# Patient Record
Sex: FEMALE | Race: WHITE | Employment: UNEMPLOYED | URBAN - METROPOLITAN AREA
[De-identification: names, ages, dates, MRNs, and addresses within clinical notes are randomized per-mention and may not be internally consistent; named-entity substitution may affect disease eponyms.]

---

## 2017-02-06 ENCOUNTER — OFFICE VISIT (OUTPATIENT)
Dept: INTERNAL MEDICINE | Age: 82
End: 2017-02-06

## 2017-02-06 VITALS
SYSTOLIC BLOOD PRESSURE: 110 MMHG | WEIGHT: 140 LBS | HEIGHT: 61 IN | TEMPERATURE: 97.6 F | BODY MASS INDEX: 26.43 KG/M2 | DIASTOLIC BLOOD PRESSURE: 60 MMHG | HEART RATE: 60 BPM

## 2017-02-06 DIAGNOSIS — E11.9 DIET-CONTROLLED TYPE 2 DIABETES MELLITUS (HCC): Primary | Chronic | ICD-10-CM

## 2017-02-06 DIAGNOSIS — Z01.818 PREOP GENERAL PHYSICAL EXAM: ICD-10-CM

## 2017-02-06 DIAGNOSIS — Z91.81 AT HIGH RISK FOR FALLS: ICD-10-CM

## 2017-02-06 PROCEDURE — G8427 DOCREV CUR MEDS BY ELIG CLIN: HCPCS | Performed by: INTERNAL MEDICINE

## 2017-02-06 PROCEDURE — 1123F ACP DISCUSS/DSCN MKR DOCD: CPT | Performed by: INTERNAL MEDICINE

## 2017-02-06 PROCEDURE — G8420 CALC BMI NORM PARAMETERS: HCPCS | Performed by: INTERNAL MEDICINE

## 2017-02-06 PROCEDURE — 83036 HEMOGLOBIN GLYCOSYLATED A1C: CPT | Performed by: INTERNAL MEDICINE

## 2017-02-06 PROCEDURE — 1090F PRES/ABSN URINE INCON ASSESS: CPT | Performed by: INTERNAL MEDICINE

## 2017-02-06 PROCEDURE — 99213 OFFICE O/P EST LOW 20 MIN: CPT | Performed by: INTERNAL MEDICINE

## 2017-02-06 PROCEDURE — 1036F TOBACCO NON-USER: CPT | Performed by: INTERNAL MEDICINE

## 2017-02-06 PROCEDURE — 4040F PNEUMOC VAC/ADMIN/RCVD: CPT | Performed by: INTERNAL MEDICINE

## 2017-02-06 PROCEDURE — G8484 FLU IMMUNIZE NO ADMIN: HCPCS | Performed by: INTERNAL MEDICINE

## 2017-02-06 ASSESSMENT — ENCOUNTER SYMPTOMS
EYE PAIN: 0
BLURRED VISION: 0
VOICE CHANGE: 0
COUGH: 0
BLOOD IN STOOL: 0
SHORTNESS OF BREATH: 0
ABDOMINAL PAIN: 0
SORE THROAT: 0
RESPIRATORY NEGATIVE: 1
GASTROINTESTINAL NEGATIVE: 1
EYE DISCHARGE: 0
CHEST TIGHTNESS: 0
TROUBLE SWALLOWING: 0

## 2017-03-27 RX ORDER — GABAPENTIN 300 MG/1
CAPSULE ORAL
Qty: 60 CAPSULE | Refills: 2 | Status: SHIPPED | OUTPATIENT
Start: 2017-03-27 | End: 2017-06-14 | Stop reason: SDUPTHER

## 2017-04-27 RX ORDER — OMEPRAZOLE 20 MG/1
CAPSULE, DELAYED RELEASE ORAL
Qty: 90 CAPSULE | Refills: 1 | Status: SHIPPED | OUTPATIENT
Start: 2017-04-27 | End: 2017-10-16 | Stop reason: SDUPTHER

## 2017-05-08 ENCOUNTER — OFFICE VISIT (OUTPATIENT)
Dept: INTERNAL MEDICINE | Age: 82
End: 2017-05-08

## 2017-05-08 VITALS
HEART RATE: 59 BPM | TEMPERATURE: 98.7 F | WEIGHT: 140 LBS | DIASTOLIC BLOOD PRESSURE: 62 MMHG | HEIGHT: 61 IN | SYSTOLIC BLOOD PRESSURE: 122 MMHG | OXYGEN SATURATION: 98 % | BODY MASS INDEX: 26.43 KG/M2

## 2017-05-08 DIAGNOSIS — E11.9 DIET-CONTROLLED TYPE 2 DIABETES MELLITUS (HCC): Chronic | ICD-10-CM

## 2017-05-08 DIAGNOSIS — J00 HEAD COLD: Primary | ICD-10-CM

## 2017-05-08 PROBLEM — H25.10 NUCLEAR SENILE CATARACT: Status: ACTIVE | Noted: 2017-03-13

## 2017-05-08 PROBLEM — H40.1190 PRIMARY OPEN ANGLE GLAUCOMA: Status: ACTIVE | Noted: 2017-03-13

## 2017-05-08 PROBLEM — H35.3190 NONEXUDATIVE AGE-RELATED MACULAR DEGENERATION: Status: ACTIVE | Noted: 2017-03-13

## 2017-05-08 LAB
GLUCOSE BLD-MCNC: 164 MG/DL
HBA1C MFR BLD: 6.3 %

## 2017-05-08 PROCEDURE — G8427 DOCREV CUR MEDS BY ELIG CLIN: HCPCS | Performed by: INTERNAL MEDICINE

## 2017-05-08 PROCEDURE — 1123F ACP DISCUSS/DSCN MKR DOCD: CPT | Performed by: INTERNAL MEDICINE

## 2017-05-08 PROCEDURE — 1090F PRES/ABSN URINE INCON ASSESS: CPT | Performed by: INTERNAL MEDICINE

## 2017-05-08 PROCEDURE — 4040F PNEUMOC VAC/ADMIN/RCVD: CPT | Performed by: INTERNAL MEDICINE

## 2017-05-08 PROCEDURE — 83036 HEMOGLOBIN GLYCOSYLATED A1C: CPT | Performed by: INTERNAL MEDICINE

## 2017-05-08 PROCEDURE — G8420 CALC BMI NORM PARAMETERS: HCPCS | Performed by: INTERNAL MEDICINE

## 2017-05-08 PROCEDURE — 1036F TOBACCO NON-USER: CPT | Performed by: INTERNAL MEDICINE

## 2017-05-08 PROCEDURE — 99213 OFFICE O/P EST LOW 20 MIN: CPT | Performed by: INTERNAL MEDICINE

## 2017-05-08 PROCEDURE — 82962 GLUCOSE BLOOD TEST: CPT | Performed by: INTERNAL MEDICINE

## 2017-05-08 ASSESSMENT — ENCOUNTER SYMPTOMS
BLOOD IN STOOL: 0
BLURRED VISION: 0
SORE THROAT: 0
RHINORRHEA: 1
EYE PAIN: 0
TROUBLE SWALLOWING: 0
GASTROINTESTINAL NEGATIVE: 1
SHORTNESS OF BREATH: 0
VOICE CHANGE: 0
WHEEZING: 0
ABDOMINAL PAIN: 0
EYE DISCHARGE: 0
COUGH: 1
SINUS PRESSURE: 0
CHEST TIGHTNESS: 0

## 2017-08-08 ENCOUNTER — OFFICE VISIT (OUTPATIENT)
Dept: INTERNAL MEDICINE | Age: 82
End: 2017-08-08

## 2017-08-08 VITALS
TEMPERATURE: 98.8 F | WEIGHT: 137 LBS | BODY MASS INDEX: 25.86 KG/M2 | DIASTOLIC BLOOD PRESSURE: 54 MMHG | HEIGHT: 61 IN | SYSTOLIC BLOOD PRESSURE: 138 MMHG | HEART RATE: 60 BPM

## 2017-08-08 DIAGNOSIS — I73.9 PERIPHERAL VASCULAR DISEASE OF LOWER EXTREMITY (HCC): ICD-10-CM

## 2017-08-08 DIAGNOSIS — E11.9 CONTROLLED TYPE 2 DIABETES MELLITUS WITHOUT COMPLICATION, WITHOUT LONG-TERM CURRENT USE OF INSULIN (HCC): Primary | ICD-10-CM

## 2017-08-08 DIAGNOSIS — M48.061 SPINAL STENOSIS OF LUMBAR REGION: Chronic | ICD-10-CM

## 2017-08-08 LAB — GLUCOSE BLD-MCNC: 121 MG/DL

## 2017-08-08 PROCEDURE — 99213 OFFICE O/P EST LOW 20 MIN: CPT | Performed by: INTERNAL MEDICINE

## 2017-08-08 PROCEDURE — 1090F PRES/ABSN URINE INCON ASSESS: CPT | Performed by: INTERNAL MEDICINE

## 2017-08-08 PROCEDURE — G8427 DOCREV CUR MEDS BY ELIG CLIN: HCPCS | Performed by: INTERNAL MEDICINE

## 2017-08-08 PROCEDURE — 82962 GLUCOSE BLOOD TEST: CPT | Performed by: INTERNAL MEDICINE

## 2017-08-08 PROCEDURE — G8419 CALC BMI OUT NRM PARAM NOF/U: HCPCS | Performed by: INTERNAL MEDICINE

## 2017-08-08 PROCEDURE — 1123F ACP DISCUSS/DSCN MKR DOCD: CPT | Performed by: INTERNAL MEDICINE

## 2017-08-08 PROCEDURE — 4040F PNEUMOC VAC/ADMIN/RCVD: CPT | Performed by: INTERNAL MEDICINE

## 2017-08-08 PROCEDURE — 1036F TOBACCO NON-USER: CPT | Performed by: INTERNAL MEDICINE

## 2017-08-08 ASSESSMENT — ENCOUNTER SYMPTOMS
BACK PAIN: 1
BLOOD IN STOOL: 0
GASTROINTESTINAL NEGATIVE: 1
VOICE CHANGE: 0
RESPIRATORY NEGATIVE: 1
SHORTNESS OF BREATH: 0
COUGH: 0
SORE THROAT: 0
BOWEL INCONTINENCE: 0
BLURRED VISION: 0
ABDOMINAL PAIN: 0
EYE PAIN: 0
TROUBLE SWALLOWING: 0
EYE DISCHARGE: 0
CHEST TIGHTNESS: 0

## 2017-09-12 DIAGNOSIS — Z12.31 VISIT FOR SCREENING MAMMOGRAM: Primary | ICD-10-CM

## 2017-09-18 RX ORDER — GABAPENTIN 300 MG/1
CAPSULE ORAL
Qty: 60 CAPSULE | Refills: 2 | Status: SHIPPED | OUTPATIENT
Start: 2017-09-18 | End: 2018-01-22

## 2017-10-16 RX ORDER — OMEPRAZOLE 20 MG/1
CAPSULE, DELAYED RELEASE ORAL
Qty: 90 CAPSULE | Refills: 1 | Status: SHIPPED | OUTPATIENT
Start: 2017-10-16 | End: 2018-11-01

## 2017-10-25 ENCOUNTER — HOSPITAL ENCOUNTER (OUTPATIENT)
Dept: WOMENS IMAGING | Age: 82
Discharge: HOME | End: 2017-10-25
Payer: MEDICARE

## 2017-10-25 DIAGNOSIS — Z12.31 VISIT FOR SCREENING MAMMOGRAM: ICD-10-CM

## 2017-10-25 DIAGNOSIS — E11.9 CONTROLLED TYPE 2 DIABETES MELLITUS WITHOUT COMPLICATION, WITHOUT LONG-TERM CURRENT USE OF INSULIN (HCC): ICD-10-CM

## 2017-10-25 LAB
ANION GAP SERPL CALCULATED.3IONS-SCNC: 12 MEQ/L (ref 7–13)
BUN BLDV-MCNC: 24 MG/DL (ref 8–23)
CALCIUM SERPL-MCNC: 10.4 MG/DL (ref 8.6–10.2)
CHLORIDE BLD-SCNC: 101 MEQ/L (ref 98–107)
CO2: 28 MEQ/L (ref 22–29)
CREAT SERPL-MCNC: 0.57 MG/DL (ref 0.5–0.9)
FOLATE: >20 NG/ML (ref 7.3–26.1)
GFR AFRICAN AMERICAN: >60
GFR NON-AFRICAN AMERICAN: >60
GLUCOSE BLD-MCNC: 141 MG/DL (ref 74–109)
HCT VFR BLD CALC: 38.8 % (ref 37–47)
HEMOGLOBIN: 12.5 G/DL (ref 12–16)
MCH RBC QN AUTO: 30.3 PG (ref 27–31.3)
MCHC RBC AUTO-ENTMCNC: 32.2 % (ref 33–37)
MCV RBC AUTO: 94 FL (ref 82–100)
PDW BLD-RTO: 13.7 % (ref 11.5–14.5)
PLATELET # BLD: 199 K/UL (ref 130–400)
POTASSIUM SERPL-SCNC: 4.9 MEQ/L (ref 3.5–5.1)
RBC # BLD: 4.13 M/UL (ref 4.2–5.4)
SODIUM BLD-SCNC: 141 MEQ/L (ref 132–144)
VITAMIN B-12: 489 PG/ML (ref 211–946)
WBC # BLD: 7.4 K/UL (ref 4.8–10.8)

## 2017-10-25 PROCEDURE — G0202 SCR MAMMO BI INCL CAD: HCPCS

## 2017-11-22 RX ORDER — OMEPRAZOLE 20 MG/1
CAPSULE, DELAYED RELEASE ORAL
Qty: 90 CAPSULE | Refills: 1 | Status: SHIPPED | OUTPATIENT
Start: 2017-11-22 | End: 2018-11-01 | Stop reason: SDUPTHER

## 2017-12-04 ENCOUNTER — HOSPITAL ENCOUNTER (EMERGENCY)
Age: 82
Discharge: HOME | End: 2017-12-04
Attending: EMERGENCY MEDICINE
Payer: MEDICARE

## 2017-12-04 ENCOUNTER — APPOINTMENT (OUTPATIENT)
Dept: GENERAL RADIOLOGY | Age: 82
End: 2017-12-04
Payer: MEDICARE

## 2017-12-04 VITALS
SYSTOLIC BLOOD PRESSURE: 182 MMHG | HEIGHT: 62 IN | HEART RATE: 66 BPM | RESPIRATION RATE: 18 BRPM | BODY MASS INDEX: 25.76 KG/M2 | DIASTOLIC BLOOD PRESSURE: 73 MMHG | OXYGEN SATURATION: 98 % | WEIGHT: 140 LBS | TEMPERATURE: 98.8 F
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
## 2017-12-04 PROCEDURE — 96374 THER/PROPH/DIAG INJ IV PUSH: CPT

## 2017-12-04 PROCEDURE — 6360000002 HC RX W HCPCS: Performed by: EMERGENCY MEDICINE

## 2017-12-04 PROCEDURE — 99283 EMERGENCY DEPT VISIT LOW MDM: CPT

## 2017-12-04 PROCEDURE — 72170 X-RAY EXAM OF PELVIS: CPT

## 2017-12-04 RX ORDER — KETOROLAC TROMETHAMINE 30 MG/ML
30 INJECTION, SOLUTION INTRAMUSCULAR; INTRAVENOUS ONCE
Status: COMPLETED | OUTPATIENT
Start: 2017-12-04 | End: 2017-12-04

## 2017-12-04 RX ADMIN — KETOROLAC TROMETHAMINE 30 MG: 30 INJECTION, SOLUTION INTRAMUSCULAR at 15:10

## 2017-12-04 ASSESSMENT — ENCOUNTER SYMPTOMS
EYE DISCHARGE: 0
ABDOMINAL DISTENTION: 0
ABDOMINAL PAIN: 0
COLOR CHANGE: 0
FACIAL SWELLING: 0
SHORTNESS OF BREATH: 0
VOMITING: 0
PHOTOPHOBIA: 0
WHEEZING: 0
BACK PAIN: 1
RHINORRHEA: 0

## 2017-12-04 ASSESSMENT — PAIN DESCRIPTION - ORIENTATION
ORIENTATION: LOWER
ORIENTATION: LOWER

## 2017-12-04 ASSESSMENT — PAIN DESCRIPTION - DESCRIPTORS: DESCRIPTORS: ACHING;DISCOMFORT

## 2017-12-04 ASSESSMENT — PAIN SCALES - GENERAL
PAINLEVEL_OUTOF10: 8
PAINLEVEL_OUTOF10: 6
PAINLEVEL_OUTOF10: 7

## 2017-12-04 ASSESSMENT — PAIN DESCRIPTION - PAIN TYPE
TYPE: ACUTE PAIN
TYPE: ACUTE PAIN

## 2017-12-04 ASSESSMENT — PAIN DESCRIPTION - LOCATION
LOCATION: BACK
LOCATION: BACK

## 2017-12-04 ASSESSMENT — PAIN DESCRIPTION - PROGRESSION: CLINICAL_PROGRESSION: GRADUALLY IMPROVING

## 2017-12-04 ASSESSMENT — PAIN DESCRIPTION - FREQUENCY: FREQUENCY: INTERMITTENT

## 2017-12-04 ASSESSMENT — PAIN DESCRIPTION - ONSET: ONSET: ON-GOING

## 2017-12-04 ASSESSMENT — PAIN SCALES - WONG BAKER: WONGBAKER_NUMERICALRESPONSE: 2

## 2017-12-04 NOTE — ED PROVIDER NOTES
[facility] ED  eMERGENCY dEPARTMENT eNCOUnter      Pt Name: Genoveva Marks  MRN: [id number]  [place] 2/27/1918  Date of evaluation: 12/4/2017  Provider: Marty Solis Freedom Lucy       Chief Complaint   Patient presents with    Fall     fell on [place], not seen. having pain in low back and buttocks area, able to work and walk since fall but pain increasing         HISTORY OF PRESENT ILLNESS   (Location/Symptom, Timing/Onset, Context/Setting, Quality, Duration, Modifying Factors, Severity)  Note limiting factors. Genoveva Marks is a 80 y.o. female who presents to the emergency department With a chief complaint of pain. Patient fell originally on [place], toppling backward on her walker. She states that she landed on her back. She had a lifeline and had help getting up. She was busy because those Thanksgiving day and wasn't having any pain so she opted not to go to the hospital.  The day after Thanksgiving she started noticing some pain low \"in the crease of my buttock\". It has progressively gotten worse and now is hurting her and both of her buttock cheeks as well. She has not slowed down, she volunteered her normal work hours already twice since the accident. She states that she didn't want to bother anyone sooner however the pain became too intense today. Tylenol does take the edge off. HPI    Nursing Notes were reviewed. REVIEW OF SYSTEMS    (2-9 systems for level 4, 10 or more for level 5)     Review of Systems   Constitutional: Negative for activity change and appetite change. HENT: Negative for congestion, facial swelling and rhinorrhea. Eyes: Negative for photophobia and discharge. Respiratory: Negative for shortness of breath and wheezing. Cardiovascular: Negative for chest pain. Gastrointestinal: Negative for abdominal distention, abdominal pain and vomiting. Endocrine: Negative for polydipsia and polyphagia.    Genitourinary: Negative for difficulty urinating, frequency, vaginal bleeding and vaginal discharge. Musculoskeletal: Positive for back pain and gait problem. Skin: Negative for color change. Allergic/Immunologic: Negative for immunocompromised state. Neurological: Negative for dizziness, weakness and light-headedness. Hematological: Negative for adenopathy. Psychiatric/Behavioral: Negative for behavioral problems. Except as noted above the remainder of the review of systems was reviewed and negative. PAST MEDICAL HISTORY     Past Medical History:   Diagnosis Date    Compression fracture of T12 vertebra [facility]) 2013    kyphoplasty Dr Carmencita Busby DJD (degenerative joint disease) of hip 3/20/2014    Dr Shantell Martinez    GERD (gastroesophageal reflux disease) 8/31/10    Glaucoma     bilateral    H/O total hip arthroplasty 09/25/2015    Dr Shantell Martinez    Lumbar stenosis     Macular degeneration     left eye    PVD [facility]peripheral vascular disease) [facility] 10/28/2006    Dr Vallejo Has, stents LE    Stress incontinence 10/28/2011    Thoracic back pain 5/29/2014    Type II or unspecified type diabetes mellitus without mention of complication, not stated as uncontrolled     diet controlled    Wears dentures 2017    upper         SURGICAL HISTORY       Past Surgical History:   Procedure Laterality Date    APPENDECTOMY      ARTERIAL BYPASS [address]  2006    BRAIN SURGERY      CYST REMOVAL  06/27/2016    DR SLOAN  RT THUMB MUCOUS CYST    HYSTERECTOMY      REFRACTIVE SURGERY  062001    left     TOTAL HIP ARTHROPLASTY Right 9/25/15    DR. NARANJO    UPPER GASTROINTESTINAL ENDOSCOPY  10/07/15    DR Silvana Del Valle    VERTEBROPLASTY  2013    T12, Dr Shantell Chaudhari       Previous Medications    ACETAMINOPHEN (TYLENOL) 325 MG TABLET    Take 2 tablets by mouth every 4 hours as needed for Pain or Fever (DO NOT EXCEED 4GM IN 24 HOURS)    ASPIRIN EC 81 MG EC TABLET    Take 1 tablet by mouth daily    BRIMONIDINE (ALPHAGAN P) 0.1 % SOLN    Place 1 drop into both eyes 2 times daily    GABAPENTIN (NEURONTIN) 300 MG CAPSULE    TAKE ONE CAPSULE BY MOUTH TWICE A DAY AS NEEDED FOR PAIN    LATANOPROST (XALATAN) 0.005 % OPHTHALMIC SOLUTION    Place 1 drop into the left eye nightly    MAGNESIUM HYDROXIDE (MILK OF MAGNESIA) 400 MG/5ML SUSPENSION    Take 30 mLs by mouth as needed for Constipation    MULTIPLE VITAMIN (MULTI VITAMIN DAILY) TABS    Take 1 tablet by mouth every morning    OMEPRAZOLE (PRILOSEC) 20 MG DELAYED RELEASE CAPSULE    TAKE ONE CAPSULE BY MOUTH EVERY DAY    OMEPRAZOLE (PRILOSEC) 20 MG DELAYED RELEASE CAPSULE    TAKE ONE CAPSULE BY MOUTH EVERY DAY    POLYETHYLENE GLYCOL (MIRALAX) POWDER    Take 17 g by mouth daily as needed    SIMVASTATIN (ZOCOR) 20 MG TABLET    TAKE 1 TABLET BY MOUTH NIGHTLY. TIMOLOL (TIMOPTIC) 0.5 % OPHTHALMIC SOLUTION    Place 1 drop into both eyes 2 times daily    VITAMIN D (CHOLECALCIFEROL) 400 UNITS TABS TABLET    Take 400 Units by mouth daily       ALLERGIES     Review of patient's allergies indicates no known allergies.     FAMILY HISTORY       Family History   Problem Relation Age of Onset    Heart Failure Mother      dec age 77    Hypertension Mother     Diabetes Father      dec age 67          SOCIAL HISTORY       Social History     Social History    Marital status: Single     Spouse name: N/A    Number of children: 0    Years of education: N/A     Occupational History    retired [employer]      Social History Main Topics    Smoking status: Former Smoker     Types: Cigarettes     Quit date: 5/29/1980    Smokeless tobacco: Never Used    Alcohol use No    Drug use: No    Sexual activity: Not Currently     Partners: Male     Other Topics Concern    None     Social History Narrative    Retired [employer]    Lives alone in a house, no children     at [employer] [employer] [place] for [facility] Opening: Spontaneous  Best Verbal Response: Oriented  Best Motor Response: Obeys commands  Ton Coma Scale Score: 15        PHYSICAL EXAM    (up to 7 for level 4, 8 or more for level 5)     ED Triage Vitals [12/04/17 1420]   BP Temp Temp src Pulse Resp SpO2 Height Weight   (!) 182/73 98.8 °F (37.1 °C) -- 66 18 98 % 5' 1.5\" (1.562 m) 140 lb (63.5 kg)       Physical Exam   Constitutional: She is oriented to person, place, and time. She appears well-developed and well-nourished. HENT:   Head: Normocephalic and atraumatic. Eyes: Conjunctivae and EOM are normal. Pupils are equal, round, and reactive to light. Neck: Normal range of motion. Neck supple. Cardiovascular: Normal rate. Pulmonary/Chest: Effort normal.   Abdominal: Soft. Bowel sounds are normal.   Musculoskeletal: Normal range of motion. Patient has pain with palpation along her sacrum centrally extending down to her coccyx. Neurovascularly she is intact distally. Neurological: She is alert and oriented to person, place, and time. She has normal reflexes. Skin: Skin is warm and dry. Psychiatric: She has a normal mood and affect. DIAGNOSTIC RESULTS     EKG: All EKG's are interpreted by the Emergency Department Physician who either signs or Co-signs this chart in the absence of a cardiologist.        RADIOLOGY:   Non-plain film images such as CT, Ultrasound and MRI are read by the radiologist. Plain radiographic images are visualized and preliminarily interpreted by the emergency physician with the below findings:    No acute fracture per my read    Interpretation per the Radiologist below, if available at the time of this note:    XR PELVIS (1-2 VIEWS)   Final Result   NO APPRECIABLE ACUTE OSSEOUS ABNORMALITY. NOTE: A NONDISPLACED FRACTURE MAY NOT BE EVIDENT ON RADIOGRAPH. CONSIDER FURTHER EVALUATION AS CLINICALLY INDICATED.             ED BEDSIDE ULTRASOUND:   Performed by ED Physician - none    LABS:  Labs Reviewed - No data to display    All other labs were within normal range or not returned as of this dictation. EMERGENCY DEPARTMENT COURSE and DIFFERENTIAL DIAGNOSIS/MDM:   Vitals:    Vitals:    12/04/17 1420 12/04/17 1425   BP: (!) 182/73    Pulse: 66 66   Resp: 18    Temp: 98.8 °F (37.1 °C)    SpO2: 98%    Weight: 140 lb (63.5 kg)    Height: 5' 1.5\" (1.562 m)        Patient is given a Toradol injection here and appreciates the pain relief. She did not think that she was broken, and neither the radiologist nor myself see any acute fracture. She is able to get around at home and has helped. We discussed modalities that can alleviate her pain and discuss adding in the leave to the Tylenol and gabapentin that she typically takes while she is having this acute pain. She is requesting discharge and is discharged in stable condition. MDM    CRITICAL CARE TIME   Total Critical Care time was 0 minutes, excluding separately reportable procedures. There was a high probability of clinically significant/life threatening deterioration in the patient's condition which required my urgent intervention. CONSULTS:  None    PROCEDURES:  Unless otherwise noted below, none     Procedures    FINAL IMPRESSION      1.  Fall, initial encounter          DISPOSITION/PLAN   DISPOSITION     PATIENT REFERRED TO:  Jorge Ramos MD  [address]  [place] [address]  [facility]  [phone]      As needed      DISCHARGE MEDICATIONS:  New Prescriptions    No medications on file          (Please note that portions of this note were completed with a voice recognition program.  Efforts were made to edit the dictations but occasionally words are mis-transcribed.)    Erin Venegas DO (electronically signed)  Attending Emergency Physician          Erin Venegas DO  12/04/17 1708

## 2017-12-11 ENCOUNTER — OFFICE VISIT (OUTPATIENT)
Dept: INTERNAL MEDICINE | Age: 82
End: 2017-12-11

## 2017-12-11 ENCOUNTER — HOSPITAL ENCOUNTER (OUTPATIENT)
Dept: GENERAL RADIOLOGY | Age: 82
Discharge: HOME | End: 2017-12-11
Payer: MEDICARE

## 2017-12-11 VITALS
WEIGHT: 140 LBS | HEART RATE: 62 BPM | SYSTOLIC BLOOD PRESSURE: 132 MMHG | BODY MASS INDEX: 26.43 KG/M2 | TEMPERATURE: 97.6 F | HEIGHT: 61 IN | DIASTOLIC BLOOD PRESSURE: 50 MMHG

## 2017-12-11 DIAGNOSIS — M79.18 PAIN IN RIGHT BUTTOCK: ICD-10-CM

## 2017-12-11 DIAGNOSIS — M79.18 PAIN IN RIGHT BUTTOCK: Primary | ICD-10-CM

## 2017-12-11 DIAGNOSIS — Z96.641 HISTORY OF TOTAL RIGHT HIP REPLACEMENT: ICD-10-CM
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
## 2017-12-11 DIAGNOSIS — E11.51 DM (DIABETES MELLITUS), TYPE 2 WITH PERIPHERAL VASCULAR COMPLICATIONS (HCC): Chronic | ICD-10-CM

## 2017-12-11 LAB — HBA1C MFR BLD: 6.4 %

## 2017-12-11 PROCEDURE — G8417 CALC BMI ABV UP PARAM F/U: HCPCS | Performed by: INTERNAL MEDICINE

## 2017-12-11 PROCEDURE — G8427 DOCREV CUR MEDS BY ELIG CLIN: HCPCS | Performed by: INTERNAL MEDICINE

## 2017-12-11 PROCEDURE — 99213 OFFICE O/P EST LOW 20 MIN: CPT | Performed by: INTERNAL MEDICINE

## 2017-12-11 PROCEDURE — 1090F PRES/ABSN URINE INCON ASSESS: CPT | Performed by: INTERNAL MEDICINE

## 2017-12-11 PROCEDURE — 83036 HEMOGLOBIN GLYCOSYLATED A1C: CPT | Performed by: INTERNAL MEDICINE

## 2017-12-11 PROCEDURE — 1123F ACP DISCUSS/DSCN MKR DOCD: CPT | Performed by: INTERNAL MEDICINE

## 2017-12-11 PROCEDURE — 73502 X-RAY EXAM HIP UNI 2-3 VIEWS: CPT

## 2017-12-11 PROCEDURE — G8484 FLU IMMUNIZE NO ADMIN: HCPCS | Performed by: INTERNAL MEDICINE

## 2017-12-11 PROCEDURE — 4040F PNEUMOC VAC/ADMIN/RCVD: CPT | Performed by: INTERNAL MEDICINE

## 2017-12-11 PROCEDURE — 1036F TOBACCO NON-USER: CPT | Performed by: INTERNAL MEDICINE

## 2017-12-11 RX ORDER — TRAMADOL HYDROCHLORIDE 50 MG/1
TABLET ORAL
Qty: 21 TABLET | Refills: 0 | Status: SHIPPED | OUTPATIENT
Start: 2017-12-11 | End: 2018-01-22

## 2017-12-11 RX ORDER — ACETAMINOPHEN 325 MG/1
325 TABLET ORAL 3 TIMES DAILY PRN
Qty: 90 TABLET | Refills: 0 | Status: SHIPPED | OUTPATIENT
Start: 2017-12-11

## 2017-12-11 ASSESSMENT — ENCOUNTER SYMPTOMS
VOICE CHANGE: 0
EYE DISCHARGE: 0
BACK PAIN: 1
SHORTNESS OF BREATH: 0
BLOOD IN STOOL: 0
TROUBLE SWALLOWING: 0
RESPIRATORY NEGATIVE: 1
GASTROINTESTINAL NEGATIVE: 1
EYE PAIN: 0
CHEST TIGHTNESS: 0
COUGH: 0
SORE THROAT: 0
ABDOMINAL PAIN: 0
BLURRED VISION: 0

## 2017-12-11 NOTE — PROGRESS NOTES
Comment: >60 mL/min/1.73m2 EGFR, calc. for ages 25 and older using the  MDRD formula (not corrected for weight), is valid for stable  renal function.  GFR  10/25/2017 >60.0  >60 Final    Comment: >60 mL/min/1.73m2 EGFR, calc. for ages 25 and older using the  MDRD formula (not corrected for weight), is valid for stable  renal function.  Calcium 10/25/2017 10.4* 8.6 - 10.2 mg/dL Final    WBC 10/25/2017 7.4  4.8 - 10.8 K/uL Final    RBC 10/25/2017 4.13* 4.20 - 5.40 M/uL Final    Hemoglobin 10/25/2017 12.5  12.0 - 16.0 g/dL Final    Hematocrit 10/25/2017 38.8  37.0 - 47.0 % Final    MCV 10/25/2017 94.0  82.0 - 100.0 fL Final    MCH 10/25/2017 30.3  27.0 - 31.3 pg Final    MCHC 10/25/2017 32.2* 33.0 - 37.0 % Final    RDW 10/25/2017 13.7  11.5 - 14.5 % Final    Platelets 27/60/0841 199  130 - 400 K/uL Final       HPI:    Diabetes   She presents for her follow-up diabetic visit. She has type 2 diabetes mellitus. Her disease course has been stable. Hypoglycemia symptoms include nervousness/anxiousness. Pertinent negatives for hypoglycemia include no confusion, dizziness, headaches, speech difficulty or tremors. Pertinent negatives for diabetes include no blurred vision, no chest pain, no fatigue, no foot paresthesias, no foot ulcerations, no polydipsia, no polyuria and no weakness. There are no hypoglycemic complications. Symptoms are stable. Pertinent negatives for diabetic complications include no CVA, nephropathy, peripheral neuropathy, PVD or retinopathy. Risk factors for coronary artery disease include post-menopausal, sedentary lifestyle and diabetes mellitus. Current diabetic treatment includes diet. She is compliant with treatment most of the time. Her weight is increasing steadily. She is following a generally healthy diet. Meal planning includes avoidance of concentrated sweets. She participates in exercise intermittently. Her home blood glucose trend is fluctuating minimally. acetaminophen (TYLENOL) 325 MG tablet; Take 1 tablet by mouth 3 times daily as needed for Pain (DO NOT EXCEED 4GM IN 24 HOURS)      Quality & Risk Score Accuracy - MEDICARE ADVANTAGE    Visit Dx:  DM (diabetes mellitus), type 2 with peripheral vascular complications (HCC)  Stable Diabetes type 2 and complications based on lab values and symptoms. Continue present treatment plan, control of risk factors, and recheck at least yearly. Last edited 12/11/17 16:06 EST by Elizabeth Stovall MD  Asymptomatic, followed by vascular disease specialist, s/p arterial stents            Reviewed with the patient (/and caregiver if present): current health status, medications, activities and diet. See also orders and comments in the assessment section. Today's diagnosis (in the context of chronic problems) was discussed with patient (/and caregiver if present), questions answered. Patient given positive feedback and encouraged to continue the current healthy lifestyle, call with any concerns. Side effects, adverse effects of the medication prescribed (or refilled) today, as well as treatment plan/ rationale and result expectations have (again) been discussed with the patient who expresses understanding and consents to proceed as outlined above. Additional advise included in the \"after visit summary\". Orders Placed This Encounter   Medications    traMADol (ULTRAM) 50 MG tablet     Sig: Take 1/2- 1 tab po tid as needed for pain.      Dispense:  21 tablet     Refill:  0    acetaminophen (TYLENOL) 325 MG tablet     Sig: Take 1 tablet by mouth 3 times daily as needed for Pain (DO NOT EXCEED 4GM IN 24 HOURS)     Dispense:  90 tablet     Refill:  0       Orders Placed This Encounter   Procedures    XR HIP 2-3 VW W PELVIS RIGHT     Standing Status:   Future     Number of Occurrences:   1     Standing Expiration Date:   12/11/2018    POCT glycosylated hemoglobin (Hb A1C)     Further workup and plan will be determined based on clinical progression and follow up test/ treatment results. Close follow up needed to evaluate treatment results and for care coordination. Return if symptoms worsen or fail to improve. I have reviewed the patient's medical and surgical, family and social history, health maintenance schedule, and updated the computerized patient record. Please note this report has been partially produced by using speech recognition hardware. It may contain errors related to the system, including grammar, punctuation and spelling as well as words and phrases that may seem inaccurate. For any questions or concerns, please feel free to contact me for clarification.         Electronically signed by Nasima Owusu MD

## 2017-12-26 RX ORDER — SIMVASTATIN 20 MG
TABLET ORAL
Qty: 90 TABLET | Refills: 1 | Status: SHIPPED | OUTPATIENT
Start: 2017-12-26 | End: 2018-06-21 | Stop reason: SDUPTHER

## 2018-02-28 DIAGNOSIS — G89.29 CHRONIC MIDLINE THORACIC BACK PAIN: Primary | ICD-10-CM

## 2018-02-28 DIAGNOSIS — M54.6 CHRONIC MIDLINE THORACIC BACK PAIN: Primary | ICD-10-CM

## 2018-02-28 DIAGNOSIS — M48.061 SPINAL STENOSIS OF LUMBAR REGION, UNSPECIFIED WHETHER NEUROGENIC CLAUDICATION PRESENT: ICD-10-CM

## 2018-03-02 RX ORDER — GABAPENTIN 300 MG/1
CAPSULE ORAL
Qty: 180 CAPSULE | Refills: 0 | Status: SHIPPED | OUTPATIENT
Start: 2018-03-02 | End: 2018-06-21 | Stop reason: SDUPTHER

## 2018-04-03 ENCOUNTER — HOSPITAL ENCOUNTER (OUTPATIENT)
Dept: ULTRASOUND IMAGING | Age: 83
Discharge: HOME | End: 2018-04-05
Payer: MEDICARE

## 2018-04-03 ENCOUNTER — OFFICE VISIT (OUTPATIENT)
Dept: INTERNAL MEDICINE CLINIC | Age: 83
End: 2018-04-03
Payer: MEDICARE

## 2018-04-03 VITALS
BODY MASS INDEX: 26.43 KG/M2 | SYSTOLIC BLOOD PRESSURE: 110 MMHG | HEART RATE: 56 BPM | HEIGHT: 61 IN | OXYGEN SATURATION: 98 % | TEMPERATURE: 97.3 F | DIASTOLIC BLOOD PRESSURE: 52 MMHG | WEIGHT: 140 LBS

## 2018-04-03 DIAGNOSIS — E11.9 DIET-CONTROLLED TYPE 2 DIABETES MELLITUS (HCC): ICD-10-CM

## 2018-04-03 DIAGNOSIS — Z78.9 IMPAIRED MOBILITY AND ACTIVITIES OF DAILY LIVING: ICD-10-CM

## 2018-04-03 DIAGNOSIS — I73.9 PVD (PERIPHERAL VASCULAR DISEASE) (HCC): ICD-10-CM

## 2018-04-03 DIAGNOSIS — R60.0 LEG EDEMA, RIGHT: ICD-10-CM

## 2018-04-03 DIAGNOSIS — Z74.09 IMPAIRED MOBILITY AND ACTIVITIES OF DAILY LIVING: ICD-10-CM

## 2018-04-03 DIAGNOSIS — R60.0 LEG EDEMA, RIGHT: Primary | ICD-10-CM

## 2018-04-03 LAB
HCT VFR BLD CALC: 36.2 % (ref 37–47)
HEMOGLOBIN: 11.9 G/DL (ref 12–16)
MCH RBC QN AUTO: 31.7 PG (ref 27–31.3)
MCHC RBC AUTO-ENTMCNC: 33 % (ref 33–37)
MCV RBC AUTO: 96.1 FL (ref 82–100)
PDW BLD-RTO: 13.4 % (ref 11.5–14.5)
PLATELET # BLD: 190 K/UL (ref 130–400)
RBC # BLD: 3.76 M/UL (ref 4.2–5.4)
WBC # BLD: 6.3 K/UL (ref 4.8–10.8)

## 2018-04-03 PROCEDURE — 1123F ACP DISCUSS/DSCN MKR DOCD: CPT | Performed by: INTERNAL MEDICINE

## 2018-04-03 PROCEDURE — 99213 OFFICE O/P EST LOW 20 MIN: CPT | Performed by: INTERNAL MEDICINE

## 2018-04-03 PROCEDURE — 1036F TOBACCO NON-USER: CPT | Performed by: INTERNAL MEDICINE

## 2018-04-03 PROCEDURE — 4040F PNEUMOC VAC/ADMIN/RCVD: CPT | Performed by: INTERNAL MEDICINE

## 2018-04-03 PROCEDURE — 93971 EXTREMITY STUDY: CPT

## 2018-04-03 PROCEDURE — G8417 CALC BMI ABV UP PARAM F/U: HCPCS | Performed by: INTERNAL MEDICINE

## 2018-04-03 PROCEDURE — 1090F PRES/ABSN URINE INCON ASSESS: CPT | Performed by: INTERNAL MEDICINE

## 2018-04-03 PROCEDURE — G8428 CUR MEDS NOT DOCUMENT: HCPCS | Performed by: INTERNAL MEDICINE

## 2018-04-03 ASSESSMENT — ENCOUNTER SYMPTOMS
BLURRED VISION: 0
BLOOD IN STOOL: 0
BACK PAIN: 1
SORE THROAT: 0
SHORTNESS OF BREATH: 0
CHEST TIGHTNESS: 0
RESPIRATORY NEGATIVE: 1
COUGH: 0
ABDOMINAL PAIN: 0

## 2018-04-17 ENCOUNTER — OFFICE VISIT (OUTPATIENT)
Dept: INTERNAL MEDICINE CLINIC | Age: 83
End: 2018-04-17
Payer: MEDICARE

## 2018-04-17 VITALS
HEIGHT: 61 IN | WEIGHT: 140 LBS | SYSTOLIC BLOOD PRESSURE: 120 MMHG | BODY MASS INDEX: 26.43 KG/M2 | TEMPERATURE: 97.6 F | DIASTOLIC BLOOD PRESSURE: 60 MMHG | OXYGEN SATURATION: 98 % | HEART RATE: 56 BPM

## 2018-04-17 DIAGNOSIS — Z78.9 IMPAIRED MOBILITY AND ACTIVITIES OF DAILY LIVING: ICD-10-CM

## 2018-04-17 DIAGNOSIS — R60.0 LEG EDEMA, RIGHT: Primary | ICD-10-CM

## 2018-04-17 DIAGNOSIS — M25.551 PAIN OF RIGHT HIP JOINT: ICD-10-CM

## 2018-04-17 DIAGNOSIS — Z74.09 IMPAIRED MOBILITY AND ACTIVITIES OF DAILY LIVING: ICD-10-CM

## 2018-04-17 PROCEDURE — 99212 OFFICE O/P EST SF 10 MIN: CPT | Performed by: INTERNAL MEDICINE

## 2018-04-17 PROCEDURE — 4040F PNEUMOC VAC/ADMIN/RCVD: CPT | Performed by: INTERNAL MEDICINE

## 2018-04-17 PROCEDURE — G8417 CALC BMI ABV UP PARAM F/U: HCPCS | Performed by: INTERNAL MEDICINE

## 2018-04-17 PROCEDURE — 1036F TOBACCO NON-USER: CPT | Performed by: INTERNAL MEDICINE

## 2018-04-17 PROCEDURE — G8427 DOCREV CUR MEDS BY ELIG CLIN: HCPCS | Performed by: INTERNAL MEDICINE

## 2018-04-17 PROCEDURE — 1090F PRES/ABSN URINE INCON ASSESS: CPT | Performed by: INTERNAL MEDICINE

## 2018-04-17 PROCEDURE — 1123F ACP DISCUSS/DSCN MKR DOCD: CPT | Performed by: INTERNAL MEDICINE

## 2018-04-17 ASSESSMENT — ENCOUNTER SYMPTOMS
ABDOMINAL PAIN: 0
COUGH: 0
SORE THROAT: 0
RESPIRATORY NEGATIVE: 1
CHEST TIGHTNESS: 0
SHORTNESS OF BREATH: 0
BLOOD IN STOOL: 0
BACK PAIN: 1

## 2018-04-26 ENCOUNTER — HOSPITAL ENCOUNTER (OUTPATIENT)
Dept: PHYSICAL THERAPY | Age: 83
Setting detail: THERAPIES SERIES
Discharge: HOME | End: 2018-04-26
Payer: MEDICARE

## 2018-04-26 PROCEDURE — 97162 PT EVAL MOD COMPLEX 30 MIN: CPT

## 2018-04-26 PROCEDURE — G8978 MOBILITY CURRENT STATUS: HCPCS

## 2018-04-26 PROCEDURE — G8979 MOBILITY GOAL STATUS: HCPCS

## 2018-04-26 ASSESSMENT — PAIN DESCRIPTION - PAIN TYPE: TYPE: CHRONIC PAIN

## 2018-04-26 ASSESSMENT — PAIN DESCRIPTION - ORIENTATION: ORIENTATION: RIGHT

## 2018-04-26 ASSESSMENT — PAIN DESCRIPTION - FREQUENCY: FREQUENCY: INTERMITTENT

## 2018-04-26 ASSESSMENT — PAIN DESCRIPTION - DESCRIPTORS: DESCRIPTORS: ACHING

## 2018-04-26 ASSESSMENT — PAIN DESCRIPTION - LOCATION: LOCATION: HIP

## 2018-04-30 ENCOUNTER — HOSPITAL ENCOUNTER (OUTPATIENT)
Dept: PHYSICAL THERAPY | Age: 83
Setting detail: THERAPIES SERIES
Discharge: HOME | End: 2018-04-30
Payer: MEDICARE

## 2018-04-30 PROCEDURE — 97110 THERAPEUTIC EXERCISES: CPT

## 2018-04-30 PROCEDURE — 97112 NEUROMUSCULAR REEDUCATION: CPT

## 2018-04-30 PROCEDURE — 97116 GAIT TRAINING THERAPY: CPT

## 2018-04-30 ASSESSMENT — PAIN DESCRIPTION - LOCATION: LOCATION: HIP

## 2018-04-30 ASSESSMENT — PAIN DESCRIPTION - DESCRIPTORS: DESCRIPTORS: ACHING

## 2018-04-30 ASSESSMENT — PAIN DESCRIPTION - ORIENTATION: ORIENTATION: LEFT

## 2018-05-04 ENCOUNTER — HOSPITAL ENCOUNTER (OUTPATIENT)
Dept: PHYSICAL THERAPY | Age: 83
Setting detail: THERAPIES SERIES
Discharge: HOME | End: 2018-05-04
Payer: MEDICARE

## 2018-05-04 PROCEDURE — 97110 THERAPEUTIC EXERCISES: CPT

## 2018-05-04 PROCEDURE — 97116 GAIT TRAINING THERAPY: CPT

## 2018-05-04 PROCEDURE — 97112 NEUROMUSCULAR REEDUCATION: CPT

## 2018-05-04 ASSESSMENT — PAIN SCALES - GENERAL: PAINLEVEL_OUTOF10: 2

## 2018-05-07 ENCOUNTER — HOSPITAL ENCOUNTER (OUTPATIENT)
Dept: PHYSICAL THERAPY | Age: 83
Setting detail: THERAPIES SERIES
Discharge: HOME | End: 2018-05-07
Payer: MEDICARE

## 2018-05-07 PROCEDURE — 97112 NEUROMUSCULAR REEDUCATION: CPT

## 2018-05-07 PROCEDURE — 97110 THERAPEUTIC EXERCISES: CPT

## 2018-05-07 PROCEDURE — 97535 SELF CARE MNGMENT TRAINING: CPT

## 2018-05-07 ASSESSMENT — PAIN DESCRIPTION - ORIENTATION: ORIENTATION: LEFT

## 2018-05-07 ASSESSMENT — PAIN SCALES - GENERAL: PAINLEVEL_OUTOF10: 1

## 2018-05-07 ASSESSMENT — PAIN DESCRIPTION - DESCRIPTORS: DESCRIPTORS: ACHING

## 2018-05-07 ASSESSMENT — PAIN DESCRIPTION - LOCATION: LOCATION: HIP

## 2018-05-10 ENCOUNTER — HOSPITAL ENCOUNTER (OUTPATIENT)
Dept: PHYSICAL THERAPY | Age: 83
Setting detail: THERAPIES SERIES
Discharge: HOME | End: 2018-05-10
Payer: MEDICARE

## 2018-05-10 PROCEDURE — 97116 GAIT TRAINING THERAPY: CPT

## 2018-05-10 PROCEDURE — 97110 THERAPEUTIC EXERCISES: CPT

## 2018-05-10 PROCEDURE — 97112 NEUROMUSCULAR REEDUCATION: CPT

## 2018-05-10 ASSESSMENT — PAIN DESCRIPTION - LOCATION: LOCATION: HIP

## 2018-05-10 ASSESSMENT — PAIN DESCRIPTION - DESCRIPTORS: DESCRIPTORS: ACHING

## 2018-05-10 ASSESSMENT — PAIN DESCRIPTION - PAIN TYPE: TYPE: CHRONIC PAIN

## 2018-05-10 ASSESSMENT — PAIN DESCRIPTION - ORIENTATION: ORIENTATION: LEFT

## 2018-05-10 ASSESSMENT — PAIN SCALES - GENERAL: PAINLEVEL_OUTOF10: 1

## 2018-05-14 ENCOUNTER — HOSPITAL ENCOUNTER (OUTPATIENT)
Dept: PHYSICAL THERAPY | Age: 83
Setting detail: THERAPIES SERIES
Discharge: HOME | End: 2018-05-14
Payer: MEDICARE

## 2018-05-14 PROCEDURE — 97112 NEUROMUSCULAR REEDUCATION: CPT

## 2018-05-14 PROCEDURE — 97110 THERAPEUTIC EXERCISES: CPT

## 2018-05-14 ASSESSMENT — PAIN DESCRIPTION - LOCATION: LOCATION: HIP

## 2018-05-14 ASSESSMENT — PAIN DESCRIPTION - FREQUENCY: FREQUENCY: INTERMITTENT

## 2018-05-14 ASSESSMENT — PAIN SCALES - GENERAL: PAINLEVEL_OUTOF10: 3

## 2018-05-14 ASSESSMENT — PAIN DESCRIPTION - ORIENTATION: ORIENTATION: LEFT

## 2018-05-14 ASSESSMENT — PAIN DESCRIPTION - DESCRIPTORS: DESCRIPTORS: ACHING

## 2018-05-16 ENCOUNTER — OFFICE VISIT (OUTPATIENT)
Dept: INTERNAL MEDICINE CLINIC | Age: 83
End: 2018-05-16
Payer: MEDICARE

## 2018-05-16 VITALS
WEIGHT: 140 LBS | HEART RATE: 53 BPM | HEIGHT: 61 IN | OXYGEN SATURATION: 98 % | TEMPERATURE: 98.6 F | SYSTOLIC BLOOD PRESSURE: 118 MMHG | BODY MASS INDEX: 26.43 KG/M2 | DIASTOLIC BLOOD PRESSURE: 60 MMHG

## 2018-05-16 DIAGNOSIS — Z23 NEED FOR VACCINATION WITH 13-POLYVALENT PNEUMOCOCCAL CONJUGATE VACCINE: ICD-10-CM

## 2018-05-16 DIAGNOSIS — R60.0 BILATERAL LEG EDEMA: Primary | Chronic | ICD-10-CM

## 2018-05-16 PROCEDURE — G8427 DOCREV CUR MEDS BY ELIG CLIN: HCPCS | Performed by: INTERNAL MEDICINE

## 2018-05-16 PROCEDURE — G0009 ADMIN PNEUMOCOCCAL VACCINE: HCPCS | Performed by: INTERNAL MEDICINE

## 2018-05-16 PROCEDURE — 99212 OFFICE O/P EST SF 10 MIN: CPT | Performed by: INTERNAL MEDICINE

## 2018-05-16 PROCEDURE — G8417 CALC BMI ABV UP PARAM F/U: HCPCS | Performed by: INTERNAL MEDICINE

## 2018-05-16 PROCEDURE — 1090F PRES/ABSN URINE INCON ASSESS: CPT | Performed by: INTERNAL MEDICINE

## 2018-05-16 PROCEDURE — 4040F PNEUMOC VAC/ADMIN/RCVD: CPT | Performed by: INTERNAL MEDICINE

## 2018-05-16 PROCEDURE — 90670 PCV13 VACCINE IM: CPT | Performed by: INTERNAL MEDICINE

## 2018-05-16 PROCEDURE — 1036F TOBACCO NON-USER: CPT | Performed by: INTERNAL MEDICINE

## 2018-05-16 PROCEDURE — 1123F ACP DISCUSS/DSCN MKR DOCD: CPT | Performed by: INTERNAL MEDICINE

## 2018-05-16 RX ORDER — TIMOLOL MALEATE 5 MG/ML
SOLUTION OPHTHALMIC
Refills: 11 | COMMUNITY
Start: 2018-05-01

## 2018-05-16 ASSESSMENT — ENCOUNTER SYMPTOMS
CHEST TIGHTNESS: 0
SHORTNESS OF BREATH: 0
BACK PAIN: 1
RESPIRATORY NEGATIVE: 1
COUGH: 0
SORE THROAT: 0
ABDOMINAL PAIN: 0
BLOOD IN STOOL: 0

## 2018-05-16 ASSESSMENT — PATIENT HEALTH QUESTIONNAIRE - PHQ9
SUM OF ALL RESPONSES TO PHQ QUESTIONS 1-9: 0
SUM OF ALL RESPONSES TO PHQ9 QUESTIONS 1 & 2: 0
1. LITTLE INTEREST OR PLEASURE IN DOING THINGS: 0
2. FEELING DOWN, DEPRESSED OR HOPELESS: 0

## 2018-05-17 ENCOUNTER — HOSPITAL ENCOUNTER (OUTPATIENT)
Dept: PHYSICAL THERAPY | Age: 83
Setting detail: THERAPIES SERIES
Discharge: HOME | End: 2018-05-17
Payer: MEDICARE

## 2018-05-17 PROCEDURE — 97110 THERAPEUTIC EXERCISES: CPT

## 2018-05-17 PROCEDURE — 97112 NEUROMUSCULAR REEDUCATION: CPT

## 2018-05-17 PROCEDURE — 97116 GAIT TRAINING THERAPY: CPT

## 2018-05-17 ASSESSMENT — PAIN SCALES - GENERAL: PAINLEVEL_OUTOF10: 2

## 2018-05-17 ASSESSMENT — PAIN DESCRIPTION - ORIENTATION: ORIENTATION: LEFT

## 2018-05-17 ASSESSMENT — PAIN DESCRIPTION - LOCATION: LOCATION: HIP

## 2018-05-17 ASSESSMENT — PAIN DESCRIPTION - DESCRIPTORS: DESCRIPTORS: ACHING

## 2018-05-17 ASSESSMENT — PAIN DESCRIPTION - PAIN TYPE: TYPE: CHRONIC PAIN

## 2018-05-21 ENCOUNTER — HOSPITAL ENCOUNTER (OUTPATIENT)
Dept: PHYSICAL THERAPY | Age: 83
Setting detail: THERAPIES SERIES
Discharge: HOME | End: 2018-05-21
Payer: MEDICARE

## 2018-05-21 PROCEDURE — 97112 NEUROMUSCULAR REEDUCATION: CPT

## 2018-05-21 PROCEDURE — 97110 THERAPEUTIC EXERCISES: CPT

## 2018-05-21 ASSESSMENT — PAIN DESCRIPTION - DESCRIPTORS: DESCRIPTORS: ACHING

## 2018-05-21 ASSESSMENT — PAIN DESCRIPTION - ORIENTATION: ORIENTATION: LEFT

## 2018-05-21 ASSESSMENT — PAIN DESCRIPTION - LOCATION: LOCATION: HIP

## 2018-05-21 ASSESSMENT — PAIN DESCRIPTION - PAIN TYPE: TYPE: CHRONIC PAIN

## 2018-05-24 ENCOUNTER — HOSPITAL ENCOUNTER (OUTPATIENT)
Dept: PHYSICAL THERAPY | Age: 83
Setting detail: THERAPIES SERIES
Discharge: HOME | End: 2018-05-24
Payer: MEDICARE

## 2018-05-24 PROCEDURE — 97112 NEUROMUSCULAR REEDUCATION: CPT

## 2018-05-24 PROCEDURE — 97110 THERAPEUTIC EXERCISES: CPT

## 2018-05-24 PROCEDURE — 97116 GAIT TRAINING THERAPY: CPT

## 2018-05-24 ASSESSMENT — PAIN SCALES - GENERAL: PAINLEVEL_OUTOF10: 2

## 2018-05-24 ASSESSMENT — PAIN DESCRIPTION - PAIN TYPE: TYPE: CHRONIC PAIN

## 2018-05-24 ASSESSMENT — PAIN DESCRIPTION - LOCATION: LOCATION: HIP

## 2018-05-24 ASSESSMENT — PAIN DESCRIPTION - DESCRIPTORS: DESCRIPTORS: ACHING

## 2018-05-24 ASSESSMENT — PAIN DESCRIPTION - ORIENTATION: ORIENTATION: LEFT

## 2018-05-29 ENCOUNTER — HOSPITAL ENCOUNTER (OUTPATIENT)
Dept: PHYSICAL THERAPY | Age: 83
Setting detail: THERAPIES SERIES
Discharge: HOME | End: 2018-05-29
Payer: MEDICARE

## 2018-05-29 PROCEDURE — G8979 MOBILITY GOAL STATUS: HCPCS

## 2018-05-29 PROCEDURE — 97110 THERAPEUTIC EXERCISES: CPT

## 2018-05-29 PROCEDURE — 97112 NEUROMUSCULAR REEDUCATION: CPT

## 2018-05-29 PROCEDURE — G8978 MOBILITY CURRENT STATUS: HCPCS

## 2018-05-29 ASSESSMENT — PAIN DESCRIPTION - ORIENTATION: ORIENTATION: LEFT

## 2018-05-29 ASSESSMENT — PAIN DESCRIPTION - DESCRIPTORS: DESCRIPTORS: ACHING

## 2018-05-29 ASSESSMENT — PAIN DESCRIPTION - PAIN TYPE: TYPE: CHRONIC PAIN

## 2018-05-29 ASSESSMENT — PAIN SCALES - GENERAL: PAINLEVEL_OUTOF10: 2

## 2018-05-29 ASSESSMENT — PAIN DESCRIPTION - LOCATION: LOCATION: HIP

## 2018-05-31 ENCOUNTER — HOSPITAL ENCOUNTER (OUTPATIENT)
Dept: PHYSICAL THERAPY | Age: 83
Setting detail: THERAPIES SERIES
Discharge: HOME | End: 2018-05-31
Payer: MEDICARE

## 2018-05-31 PROCEDURE — 97110 THERAPEUTIC EXERCISES: CPT

## 2018-05-31 PROCEDURE — 97535 SELF CARE MNGMENT TRAINING: CPT

## 2018-05-31 PROCEDURE — 97112 NEUROMUSCULAR REEDUCATION: CPT

## 2018-05-31 ASSESSMENT — PAIN DESCRIPTION - ORIENTATION: ORIENTATION: RIGHT;LEFT

## 2018-05-31 ASSESSMENT — PAIN SCALES - GENERAL: PAINLEVEL_OUTOF10: 1

## 2018-05-31 ASSESSMENT — PAIN DESCRIPTION - LOCATION: LOCATION: HIP

## 2018-05-31 ASSESSMENT — PAIN DESCRIPTION - PAIN TYPE: TYPE: CHRONIC PAIN

## 2018-06-04 ENCOUNTER — HOSPITAL ENCOUNTER (OUTPATIENT)
Dept: PHYSICAL THERAPY | Age: 83
Setting detail: THERAPIES SERIES
Discharge: HOME | End: 2018-06-04
Payer: MEDICARE

## 2018-06-04 PROCEDURE — 97112 NEUROMUSCULAR REEDUCATION: CPT

## 2018-06-04 PROCEDURE — 97110 THERAPEUTIC EXERCISES: CPT

## 2018-06-04 PROCEDURE — 97116 GAIT TRAINING THERAPY: CPT

## 2018-06-04 ASSESSMENT — PAIN DESCRIPTION - ORIENTATION: ORIENTATION: LEFT

## 2018-06-04 ASSESSMENT — PAIN DESCRIPTION - PAIN TYPE: TYPE: CHRONIC PAIN

## 2018-06-04 ASSESSMENT — PAIN DESCRIPTION - FREQUENCY: FREQUENCY: INTERMITTENT

## 2018-06-04 ASSESSMENT — PAIN SCALES - GENERAL: PAINLEVEL_OUTOF10: 3

## 2018-06-04 ASSESSMENT — PAIN DESCRIPTION - DESCRIPTORS: DESCRIPTORS: ACHING

## 2018-06-04 ASSESSMENT — PAIN DESCRIPTION - LOCATION: LOCATION: HIP

## 2018-06-07 ENCOUNTER — HOSPITAL ENCOUNTER (OUTPATIENT)
Dept: PHYSICAL THERAPY | Age: 83
Setting detail: THERAPIES SERIES
Discharge: HOME | End: 2018-06-07
Payer: MEDICARE

## 2018-06-07 PROCEDURE — 97116 GAIT TRAINING THERAPY: CPT

## 2018-06-07 PROCEDURE — 97112 NEUROMUSCULAR REEDUCATION: CPT

## 2018-06-07 PROCEDURE — 97110 THERAPEUTIC EXERCISES: CPT

## 2018-06-07 PROCEDURE — 97140 MANUAL THERAPY 1/> REGIONS: CPT

## 2018-06-07 ASSESSMENT — PAIN SCALES - GENERAL: PAINLEVEL_OUTOF10: 3

## 2018-06-07 ASSESSMENT — PAIN DESCRIPTION - ORIENTATION: ORIENTATION: LOWER;LEFT

## 2018-06-07 ASSESSMENT — PAIN DESCRIPTION - DESCRIPTORS: DESCRIPTORS: ACHING

## 2018-06-07 ASSESSMENT — PAIN DESCRIPTION - LOCATION: LOCATION: BACK

## 2018-06-11 ENCOUNTER — HOSPITAL ENCOUNTER (OUTPATIENT)
Dept: PHYSICAL THERAPY | Age: 83
Setting detail: THERAPIES SERIES
Discharge: HOME | End: 2018-06-11
Payer: MEDICARE

## 2018-06-11 PROCEDURE — 97110 THERAPEUTIC EXERCISES: CPT

## 2018-06-11 PROCEDURE — 97112 NEUROMUSCULAR REEDUCATION: CPT

## 2018-06-18 ENCOUNTER — HOSPITAL ENCOUNTER (OUTPATIENT)
Dept: PHYSICAL THERAPY | Age: 83
Setting detail: THERAPIES SERIES
Discharge: HOME | End: 2018-06-18
Payer: MEDICARE

## 2018-06-18 PROCEDURE — 97112 NEUROMUSCULAR REEDUCATION: CPT

## 2018-06-18 PROCEDURE — 97110 THERAPEUTIC EXERCISES: CPT

## 2018-06-18 ASSESSMENT — PAIN SCALES - GENERAL: PAINLEVEL_OUTOF10: 2

## 2018-06-18 ASSESSMENT — PAIN DESCRIPTION - LOCATION: LOCATION: HIP

## 2018-06-18 ASSESSMENT — PAIN DESCRIPTION - DESCRIPTORS: DESCRIPTORS: STABBING

## 2018-06-18 ASSESSMENT — PAIN DESCRIPTION - ORIENTATION: ORIENTATION: LEFT

## 2018-07-18 ENCOUNTER — CLINICAL DOCUMENTATION (OUTPATIENT)
Dept: PHYSICAL THERAPY | Age: 83
End: 2018-07-18

## 2018-07-30 ENCOUNTER — CLINICAL DOCUMENTATION (OUTPATIENT)
Dept: CARDIOLOGY | Age: 83
End: 2018-07-30

## 2018-08-20 ENCOUNTER — HOSPITAL ENCOUNTER (OUTPATIENT)
Dept: GENERAL RADIOLOGY | Age: 83
Discharge: HOME | End: 2018-08-22
Payer: MEDICARE

## 2018-08-20 ENCOUNTER — OFFICE VISIT (OUTPATIENT)
Dept: INTERNAL MEDICINE CLINIC | Age: 83
End: 2018-08-20
Payer: MEDICARE

## 2018-08-20 VITALS
BODY MASS INDEX: 26.43 KG/M2 | DIASTOLIC BLOOD PRESSURE: 60 MMHG | TEMPERATURE: 97.2 F | SYSTOLIC BLOOD PRESSURE: 130 MMHG | WEIGHT: 140 LBS | OXYGEN SATURATION: 98 % | HEIGHT: 61 IN | HEART RATE: 65 BPM

## 2018-08-20 DIAGNOSIS — E11.51 DM (DIABETES MELLITUS), TYPE 2 WITH PERIPHERAL VASCULAR COMPLICATIONS (HCC): Chronic | ICD-10-CM

## 2018-08-20 DIAGNOSIS — I89.0 DEPENDENT LYMPHEDEMA DUE TO IMPAIRED MOBILITY: ICD-10-CM

## 2018-08-20 DIAGNOSIS — R05.9 COUGH: ICD-10-CM

## 2018-08-20 DIAGNOSIS — R05.9 COUGH: Primary | Chronic | ICD-10-CM

## 2018-08-20 DIAGNOSIS — Z74.09 DEPENDENT LYMPHEDEMA DUE TO IMPAIRED MOBILITY: ICD-10-CM

## 2018-08-20 LAB
GLUCOSE BLD-MCNC: 179 MG/DL
HBA1C MFR BLD: 5.7 %

## 2018-08-20 PROCEDURE — 4040F PNEUMOC VAC/ADMIN/RCVD: CPT | Performed by: INTERNAL MEDICINE

## 2018-08-20 PROCEDURE — G8417 CALC BMI ABV UP PARAM F/U: HCPCS | Performed by: INTERNAL MEDICINE

## 2018-08-20 PROCEDURE — 71046 X-RAY EXAM CHEST 2 VIEWS: CPT

## 2018-08-20 PROCEDURE — 1036F TOBACCO NON-USER: CPT | Performed by: INTERNAL MEDICINE

## 2018-08-20 PROCEDURE — 99213 OFFICE O/P EST LOW 20 MIN: CPT | Performed by: INTERNAL MEDICINE

## 2018-08-20 PROCEDURE — 1123F ACP DISCUSS/DSCN MKR DOCD: CPT | Performed by: INTERNAL MEDICINE

## 2018-08-20 PROCEDURE — G8427 DOCREV CUR MEDS BY ELIG CLIN: HCPCS | Performed by: INTERNAL MEDICINE

## 2018-08-20 PROCEDURE — 83036 HEMOGLOBIN GLYCOSYLATED A1C: CPT | Performed by: INTERNAL MEDICINE

## 2018-08-20 PROCEDURE — 1101F PT FALLS ASSESS-DOCD LE1/YR: CPT | Performed by: INTERNAL MEDICINE

## 2018-08-20 PROCEDURE — 1090F PRES/ABSN URINE INCON ASSESS: CPT | Performed by: INTERNAL MEDICINE

## 2018-08-20 PROCEDURE — 82962 GLUCOSE BLOOD TEST: CPT | Performed by: INTERNAL MEDICINE

## 2018-08-20 RX ORDER — BRIMONIDINE TARTRATE 2 MG/ML
1 SOLUTION/ DROPS OPHTHALMIC 2 TIMES DAILY
Refills: 11 | COMMUNITY
Start: 2018-08-07

## 2018-08-20 ASSESSMENT — ENCOUNTER SYMPTOMS
WHEEZING: 0
RHINORRHEA: 0
HEARTBURN: 0
ABDOMINAL PAIN: 0
SHORTNESS OF BREATH: 0
CHEST TIGHTNESS: 0
BLOOD IN STOOL: 0
BACK PAIN: 1
SORE THROAT: 0
COUGH: 1

## 2018-08-20 NOTE — PROGRESS NOTES
Alcohol use No    Drug use: No    Sexual activity: Not Currently     Partners: Male     Other Topics Concern    Not on file     Social History Narrative    Retired [employer] alone in a house, no children     [place] [employer]    Volunteers for [employer] [facility]       Family History   Problem Relation Age of Onset    Heart Failure Mother         dec age 77    Hypertension Mother    [facility] Diabetes Father         dec age 67       Family and social history were reviewed and pertinent changes documented. ALLERGIES    Patient has no known allergies. Current Outpatient Prescriptions on File Prior to Visit   Medication Sig Dispense Refill    simvastatin (ZOCOR) 20 MG tablet TAKE 1 TABLET BY MOUTH NIGHTLY. 90 tablet 1    gabapentin (NEURONTIN) 300 MG capsule TAKE ONE CAPSULE BY MOUTH TWICE A DAY AS NEEDED FOR PAIN. 180 capsule 0    timolol (TIMOPTIC-XE) 0.5 % ophthalmic gel-forming USE 1 DROP IN BOTH EYES TWICE DAILY.   11    acetaminophen (TYLENOL) 325 MG tablet Take 1 tablet by mouth 3 times daily as needed for Pain (DO NOT EXCEED 4GM IN 24 HOURS) 90 tablet 0    omeprazole (PRILOSEC) 20 MG delayed release capsule TAKE ONE CAPSULE BY MOUTH EVERY DAY 90 capsule 1    omeprazole (PRILOSEC) 20 MG delayed release capsule TAKE ONE CAPSULE BY MOUTH EVERY DAY 90 capsule 1    polyethylene glycol (MIRALAX) powder Take 17 g by mouth daily as needed 510 g 3    aspirin EC 81 MG EC tablet Take 1 tablet by mouth daily 30 tablet 6    latanoprost (XALATAN) 0.005 % ophthalmic solution Place 1 drop into the left eye nightly      timolol (TIMOPTIC) 0.5 % ophthalmic solution Place 1 drop into both eyes 2 times daily      brimonidine (ALPHAGAN P) 0.1 % SOLN Place 1 drop into both eyes 2 times daily      Multiple Vitamin (MULTI VITAMIN DAILY) TABS Take 1 tablet by mouth every morning      vitamin D (CHOLECALCIFEROL) 400 UNITS TABS tablet Take 400 Units by mouth daily      magnesium hydroxide Carotid pulses are 2+ on the right side, and 2+ on the left side. Radial pulses are 2+ on the right side, and 2+ on the left side. 3+ hard, pitting edema of the right lower extremity all way up to the thigh,  Newly developed edema of the distal left leg and +2 pitting edema of the dorsum of the left foot   Pulmonary/Chest: Effort normal and breath sounds normal. No respiratory distress. She has no wheezes. She has no rales. Abdominal: Soft. She exhibits no distension. There is no tenderness. Musculoskeletal:        Right hip: She exhibits decreased range of motion, decreased strength and tenderness. Left hip: She exhibits decreased strength. She exhibits normal range of motion and no tenderness. Able to stand from the wheelchair with difficulty and very slowly   Neurological: She is alert and oriented to person, place, and time. Coordination normal.   Skin: Skin is warm. No rash noted. There is pallor. Psychiatric: She has a normal mood and affect. Her behavior is normal. Judgment normal. Her mood appears not anxious. Her speech is rapid and/or pressured. She is not slowed and not withdrawn. She does not express inappropriate judgment. She does not exhibit a depressed mood. She exhibits abnormal recent memory (mild impairment). She exhibits normal remote memory. Occasional repetitions  She is attentive. Assessment:    Venus was seen today for cough, diabetes and leg swelling. 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 See also orders and comments in the assessment section. Today's diagnosis (in the context of chronic problems) was discussed with patient (/and caregiver if present), questions answered. The current medical regimen is effective;  continue present plan and medications. The following tests/ consults were ordered for diagnostic workup of the current complaints/ physical exam findings, after discussion and with patient's consent. Will contact patient for discussion and further management planning once reports available for review. Orders Placed This Encounter   Procedures    XR CHEST STANDARD (2 VW)     Standing Status:   Future     Number of Occurrences:   1     Standing Expiration Date:   8/20/2019    Internal Referral to Home Health     Referral Priority:   Routine     Referral Type:   Home Health Care     Referral Reason:   Specialty Services Required     Number of Visits Requested:   1    POCT glycosylated hemoglobin (Hb A1C)    POCT Glucose       Close follow up needed to evaluate treatment results and for care coordination. Return in about 3 months (around 11/20/2018). I have reviewed the patient's medical and surgical, family and social history, health maintenance schedule, and updated the computerized patient record. Please note this report has been partially produced by using speech recognition hardware. It may contain errors related to the system, including grammar, punctuation and spelling as well as words and phrases that may seem inaccurate. For any questions or concerns, please feel free to contact me for clarification.         Electronically signed by Ronn Caal MD

## 2018-08-21 ENCOUNTER — CARE COORDINATION (OUTPATIENT)
Dept: CARE COORDINATION | Age: 83
End: 2018-08-21

## 2018-08-21 NOTE — CARE COORDINATION
Called patient to discuss enrollment in [address] [place] [address]. I left a voice mail message introducing myself and a brief description of the Care Coordination Program.   Requested a call back to discuss the program.  Cell phone number given. Mailed Care Coordination Introduction letter to patient's home address. Received call back from patient. Patient is hard of hearing. Informed patient that Dr Suresh Johnson made a referral to home health for SN/PT/OT/MSW. Informed patient that [facility] will be contacting her to schedule appointment.

## 2018-08-21 NOTE — LETTER
8/21/2018    [address] [facility]  [address] [facility]      Dear Juaquin Servin,    My name is Eunice De Oliveira and I am a registered nurse who partners with Ifrah Decker MD to improve patients' health. Ifrah Dekcer MD believes you would benefit from working with me. As a member of your health care team, I would work with other providers involved in your care, offer education for your specific health conditions, and connect you with additional resources as needed. I will collaborate with Ifrah Decker MD to support you in following your treatment plan. The additional support I provide is no additional cost to you. My primary focus is to help you achieve specific goals and improve your health. We are committed to walk with you on this journey and look forward to working with you. Please call me to further discuss your healthcare needs. I am available by phone or for appointments at the office. You can reach me at [phone].     In good health,     Eunice De Oliveira RN

## 2018-08-28 ENCOUNTER — CARE COORDINATION (OUTPATIENT)
Dept: CARE COORDINATION | Age: 83
End: 2018-08-28

## 2018-08-28 NOTE — CARE COORDINATION
Ambulatory Care Coordination Note  8/28/2018  CM Risk Score: 1  Jose Mortality Risk Score: 44.37    ACC: Sarah Keys RN    Summary Note: Patient contacted me by phone in follow-up to introduction letter. Discussed enrollment  Care Coordination Program. Patient enrolled in Care Coordination. Completed Care Coordination initial assessement. Patient states that Dr Scot Valentin office is working on getting her a \"boot\" for her legs. She states that her leg measurements were taken and the boot was ordered. She states she was informed that it may take 3 weeks before she receives the boot. Patient states she has not heard from [facility]. Informed patient that I would contact [facility] to follow-up. [address], spoke with Nell Lord RN. She will follow-up on referral and contact patient to schedule. Received e-mail conformation that Merari Arriaza scheduled for 8/29/2018. Lab Results   Component Value Date    LABA1C 5.7 08/20/2018    LABA1C 6.4 12/11/2017    LABA1C 6.3 05/08/2017       Diabetes Assessment    Medic Alert ID:  No  Meal Planning:  None   How often do you test your blood sugar?:  No Testing   Do you have barriers with adherence to non-pharmacologic self-management interventions?  (Nutrition/Exercise/Self-Monitoring):  No   Have you ever had to go to the ED for symptoms of low blood sugar?:  No       No patient-reported symptoms       and   General Assessment    Do you have any symptoms that are causing concern?:  Yes  Progression since Onset:  Unchanged  Reported Symptoms:  Other (Comment: swelling of bilateral legs)         Ambulatory Care Coordination Assessment    Care Coordination Protocol  Program Enrollment:  Rising Risk  Referral from Primary Care Provider:  Yes  Week 1 - Initial Assessment     Do you have all of your prescriptions and are they filled?:  Yes (Comment: Kash manages pill box.)  Barriers to medication adherence:  None  Are you able to afford your medications?:

## 2018-08-29 ENCOUNTER — TELEPHONE (OUTPATIENT)
Dept: INTERNAL MEDICINE CLINIC | Age: 83
End: 2018-08-29

## 2018-08-29 NOTE — TELEPHONE ENCOUNTER
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

## 2018-10-25 ENCOUNTER — CARE COORDINATION (OUTPATIENT)
Dept: CARE COORDINATION | Age: 83
End: 2018-10-25

## 2018-11-01 RX ORDER — OMEPRAZOLE 20 MG/1
CAPSULE, DELAYED RELEASE ORAL
Qty: 90 CAPSULE | Refills: 1 | Status: SHIPPED | OUTPATIENT
Start: 2018-11-01 | End: 2019-04-15 | Stop reason: SDUPTHER

## 2018-12-07 ENCOUNTER — CARE COORDINATION (OUTPATIENT)
Dept: CARE COORDINATION | Age: 83
End: 2018-12-07

## 2018-12-10 ENCOUNTER — OFFICE VISIT (OUTPATIENT)
Dept: FAMILY MEDICINE CLINIC | Age: 83
End: 2018-12-10
Payer: MEDICARE

## 2018-12-10 ENCOUNTER — HOSPITAL ENCOUNTER (OUTPATIENT)
Dept: GENERAL RADIOLOGY | Age: 83
Discharge: HOME | End: 2018-12-12
Payer: MEDICARE

## 2018-12-10 ENCOUNTER — CARE COORDINATION (OUTPATIENT)
Dept: CARE COORDINATION | Age: 83
End: 2018-12-10

## 2018-12-10 VITALS
HEIGHT: 61 IN | BODY MASS INDEX: 26.43 KG/M2 | RESPIRATION RATE: 12 BRPM | OXYGEN SATURATION: 98 % | SYSTOLIC BLOOD PRESSURE: 132 MMHG | TEMPERATURE: 97.1 F | DIASTOLIC BLOOD PRESSURE: 70 MMHG | WEIGHT: 140 LBS | HEART RATE: 63 BPM

## 2018-12-10 DIAGNOSIS — R53.83 FATIGUE, UNSPECIFIED TYPE: ICD-10-CM

## 2018-12-10 DIAGNOSIS — R06.89 DECREASED LUNG SOUNDS: Primary | ICD-10-CM

## 2018-12-10 DIAGNOSIS — R06.89 DECREASED LUNG SOUNDS: ICD-10-CM

## 2018-12-10 DIAGNOSIS — R06.89 DECREASED BREATH SOUNDS: Primary | ICD-10-CM

## 2018-12-10 DIAGNOSIS — R05.9 COUGH: ICD-10-CM

## 2018-12-10 PROCEDURE — 1090F PRES/ABSN URINE INCON ASSESS: CPT | Performed by: NURSE PRACTITIONER

## 2018-12-10 PROCEDURE — G8484 FLU IMMUNIZE NO ADMIN: HCPCS | Performed by: NURSE PRACTITIONER

## 2018-12-10 PROCEDURE — G8427 DOCREV CUR MEDS BY ELIG CLIN: HCPCS | Performed by: NURSE PRACTITIONER

## 2018-12-10 PROCEDURE — 71046 X-RAY EXAM CHEST 2 VIEWS: CPT

## 2018-12-10 PROCEDURE — 1101F PT FALLS ASSESS-DOCD LE1/YR: CPT | Performed by: NURSE PRACTITIONER

## 2018-12-10 PROCEDURE — 4040F PNEUMOC VAC/ADMIN/RCVD: CPT | Performed by: NURSE PRACTITIONER

## 2018-12-10 PROCEDURE — 1123F ACP DISCUSS/DSCN MKR DOCD: CPT | Performed by: NURSE PRACTITIONER

## 2018-12-10 PROCEDURE — 1036F TOBACCO NON-USER: CPT | Performed by: NURSE PRACTITIONER

## 2018-12-10 PROCEDURE — 99213 OFFICE O/P EST LOW 20 MIN: CPT | Performed by: NURSE PRACTITIONER

## 2018-12-10 PROCEDURE — G8417 CALC BMI ABV UP PARAM F/U: HCPCS | Performed by: NURSE PRACTITIONER

## 2018-12-10 RX ORDER — DOXYCYCLINE HYCLATE 100 MG
100 TABLET ORAL 2 TIMES DAILY
Qty: 14 TABLET | Refills: 0 | Status: SHIPPED | OUTPATIENT
Start: 2018-12-10 | End: 2018-12-17

## 2018-12-10 ASSESSMENT — ENCOUNTER SYMPTOMS
EYE PAIN: 0
VOICE CHANGE: 1
ABDOMINAL PAIN: 0
VOMITING: 0
EYE REDNESS: 0
CHEST TIGHTNESS: 0
DIARRHEA: 0
HEMOPTYSIS: 0
SINUS PRESSURE: 0
NAUSEA: 0
EYE ITCHING: 0
COUGH: 1
HEARTBURN: 0
RHINORRHEA: 1
EYE DISCHARGE: 0
PHOTOPHOBIA: 0

## 2018-12-10 NOTE — PATIENT INSTRUCTIONS
Patient Education        Pneumonia: Care Instructions  Your Care Instructions    Pneumonia is an infection of the lungs. Most cases are caused by infections from bacteria or viruses. Pneumonia may be mild or very severe. If it is caused by bacteria, you will be treated with antibiotics. It may take a few weeks to a few months to recover fully from pneumonia, depending on how sick you were and whether your overall health is good. Follow-up care is a key part of your treatment and safety. Be sure to make and go to all appointments, and call your doctor if you are having problems. It's also a good idea to know your test results and keep a list of the medicines you take. How can you care for yourself at home? · Take your antibiotics exactly as directed. Do not stop taking the medicine just because you are feeling better. You need to take the full course of antibiotics. · Take your medicines exactly as prescribed. Call your doctor if you think you are having a problem with your medicine. · Get plenty of rest and sleep. You may feel weak and tired for a while, but your energy level will improve with time. · To prevent dehydration, drink plenty of fluids, enough so that your urine is light yellow or clear like water. Choose water and other caffeine-free clear liquids until you feel better. If you have kidney, heart, or liver disease and have to limit fluids, talk with your doctor before you increase the amount of fluids you drink. · Take care of your cough so you can rest. A cough that brings up mucus from your lungs is common with pneumonia. It is one way your body gets rid of the infection. But if coughing keeps you from resting or causes severe fatigue and chest-wall pain, talk to your doctor. He or she may suggest that you take a medicine to reduce the cough. · Use a vaporizer or humidifier to add moisture to your bedroom. Follow the directions for cleaning the machine.   · Do not smoke or allow others to under license by [facility] [address] If you have questions about a medical condition or this instruction, always ask your healthcare professional. [place] [address] any warranty or liability for your use of this information.

## 2018-12-31 ENCOUNTER — HOSPITAL ENCOUNTER (OUTPATIENT)
Dept: GENERAL RADIOLOGY | Age: 83
Discharge: HOME | End: 2019-01-02
Payer: MEDICARE

## 2018-12-31 ENCOUNTER — OFFICE VISIT (OUTPATIENT)
Dept: INTERNAL MEDICINE CLINIC | Age: 83
End: 2018-12-31
Payer: MEDICARE

## 2018-12-31 VITALS
HEIGHT: 61 IN | OXYGEN SATURATION: 95 % | SYSTOLIC BLOOD PRESSURE: 120 MMHG | BODY MASS INDEX: 26.43 KG/M2 | WEIGHT: 140 LBS | TEMPERATURE: 98.5 F | DIASTOLIC BLOOD PRESSURE: 70 MMHG | HEART RATE: 63 BPM

## 2018-12-31 DIAGNOSIS — M54.6 ACUTE LEFT-SIDED THORACIC BACK PAIN: ICD-10-CM

## 2018-12-31 DIAGNOSIS — M48.061 SPINAL STENOSIS OF LUMBAR REGION, UNSPECIFIED WHETHER NEUROGENIC CLAUDICATION PRESENT: ICD-10-CM

## 2018-12-31 DIAGNOSIS — M54.6 ACUTE LEFT-SIDED THORACIC BACK PAIN: Primary | ICD-10-CM

## 2018-12-31 DIAGNOSIS — M54.6 CHRONIC MIDLINE THORACIC BACK PAIN: ICD-10-CM

## 2018-12-31 DIAGNOSIS — G89.29 CHRONIC MIDLINE THORACIC BACK PAIN: ICD-10-CM

## 2018-12-31 PROCEDURE — 1101F PT FALLS ASSESS-DOCD LE1/YR: CPT | Performed by: INTERNAL MEDICINE

## 2018-12-31 PROCEDURE — 71101 X-RAY EXAM UNILAT RIBS/CHEST: CPT

## 2018-12-31 PROCEDURE — G8417 CALC BMI ABV UP PARAM F/U: HCPCS | Performed by: INTERNAL MEDICINE

## 2018-12-31 PROCEDURE — 4040F PNEUMOC VAC/ADMIN/RCVD: CPT | Performed by: INTERNAL MEDICINE

## 2018-12-31 PROCEDURE — G8484 FLU IMMUNIZE NO ADMIN: HCPCS | Performed by: INTERNAL MEDICINE

## 2018-12-31 PROCEDURE — 1036F TOBACCO NON-USER: CPT | Performed by: INTERNAL MEDICINE

## 2018-12-31 PROCEDURE — G8428 CUR MEDS NOT DOCUMENT: HCPCS | Performed by: INTERNAL MEDICINE

## 2018-12-31 PROCEDURE — 1123F ACP DISCUSS/DSCN MKR DOCD: CPT | Performed by: INTERNAL MEDICINE

## 2018-12-31 PROCEDURE — 1090F PRES/ABSN URINE INCON ASSESS: CPT | Performed by: INTERNAL MEDICINE

## 2018-12-31 PROCEDURE — 99213 OFFICE O/P EST LOW 20 MIN: CPT | Performed by: INTERNAL MEDICINE

## 2018-12-31 RX ORDER — GABAPENTIN 300 MG/1
300 CAPSULE ORAL NIGHTLY
Qty: 90 CAPSULE | Refills: 0 | Status: SHIPPED | OUTPATIENT
Start: 2018-12-31 | End: 2019-02-02

## 2018-12-31 NOTE — PROGRESS NOTES
include no CAD, no COPD, no CHF and no DVT. Fatigue   This is a recurrent problem. The current episode started more than 1 month ago. The problem occurs daily. Associated symptoms include coughing, fatigue and weakness (both legs). Pertinent negatives include no abdominal pain, change in bowel habit, chills, diaphoresis, fever, headaches, neck pain, numbness, sore throat or urinary symptoms. Nothing aggravates the symptoms. She has tried rest and sleep for the symptoms. The treatment provided no relief. More detail above in the chiefcomplaint(s), interim history and below in the review of systems. Past Medical History:   Diagnosis Date    Compression fracture of T12 vertebra [facility]) 2013    kyphoplasty Dr Justin Mendoza DJD (degenerative joint disease) of hip 3/20/2014    Dr Jacques Tenorio DM (diabetes mellitus), type 2 with peripheral vascular complications (HCC)     diet controlled    GERD (gastroesophageal reflux disease) 8/31/10    Glaucoma     bilateral    H/O total hip arthroplasty 09/25/2015    Dr Mccoy Smaller    Lower leg edema     Lumbar stenosis     Macular degeneration     left eye    PVD (peripheral vascular disease) ([facility] [address] 10/28/2006    Dr Echeverria Rho, stents LE    Stress incontinence 10/28/2011    Thoracic back pain 05/29/2014    Wears dentures 2017    upper       Past Surgical History:   Procedure Laterality Date    APPENDECTOMY      ARTERIAL BYPASS [address]  2006    BRAIN SURGERY      CYST REMOVAL  06/27/2016    DR SLOAN  RT THUMB MUCOUS CYST    HYSTERECTOMY      REFRACTIVE SURGERY  062001    left     TOTAL HIP ARTHROPLASTY Right 9/25/15    DR. NARANJO    UPPER GASTROINTESTINAL ENDOSCOPY  10/07/15    DR Rodriguez Outlroc    VERTEBROPLASTY  2013    T12, Dr Wren Fried History     Social History    Marital status: Single     Spouse name: N/A    Number of children: 0    Years of education: N/A     Occupational History    retired [employer]      Social History Main Topics    Smoking status: Pulses:       Carotid pulses are 2+ on the right side, and 2+ on the left side. Radial pulses are 2+ on the right side, and 2+ on the left side. 3+ hard, pitting edema of the right lower extremity all way up to the thigh,  Newly developed edema of the distal left leg and +2 pitting edema of the dorsum of the left foot   Pulmonary/Chest: Effort normal and breath sounds normal. No respiratory distress. She has no wheezes. She has no rales. She exhibits tenderness (left ribs 8-9 on the axillary line, sharp and reproducible with palpation). Abdominal: Soft. She exhibits no distension. There is no tenderness. Musculoskeletal:        Right hip: She exhibits decreased range of motion, decreased strength and tenderness. Left hip: She exhibits decreased strength. She exhibits normal range of motion and no tenderness. Able to stand from the wheelchair with difficulty and very slowly   Neurological: She is alert and oriented to person, place, and time. Coordination normal.   Skin: Skin is warm. No rash noted. There is pallor. Psychiatric: Her behavior is normal. Judgment normal. Her mood appears anxious. Her speech is rapid and/or pressured. She is not slowed and not withdrawn. She does not express inappropriate judgment. She does not exhibit a depressed mood. She exhibits abnormal recent memory (mild impairment). She exhibits normal remote memory. Occasional repetitions  She is attentive. Assessment:    Venus was seen today for chest pain, fatigue and medication refill. Diagnoses and all orders for this visit:    Acute left-sided thoracic back pain              No evidence of rib contusion but cannot rule out intercostal muscle strain, even rib fracture due to osteoporosis and cough  -     XR RIBS LEFT INCLUDE CHEST (MIN 3 VIEWS);  Future    Chronic midline thoracic back pain               Due to reports of fatigue and oversleeping, reduce gabapentin to 300 mg daily only at bedtime (eliminated the morning 300 mg dose)  -     gabapentin (NEURONTIN) 300 MG capsule; Take 1 capsule by mouth nightly for 90 days. Raquel Martinez Spinal stenosis of lumbar region, unspecified whether neurogenic claudication present  -     gabapentin (NEURONTIN) 300 MG capsule; Take 1 capsule by mouth nightly for 90 days. .        Plan:    Reviewed with the patient (/and caregiver if present): current health status, medications, activities and diet. See also orders and comments in the assessment section. Today's diagnosis (in the context ofchronic problems) was discussed with patient (/and caregiver if present), questions answered. The patient is evaluated today prior to controlled medication refill. The patient has been on a stable dose of medication for at least 3 months, without impairing side effects, with good insight into the problem, compliant with the treatment plan. The medication dose is reduced due to reports of oversleeping and slight memory impairment, per her friend. The medication provides relief to the symptoms for which it is prescribed (chronic back pain). Orders Placed This Encounter   Medications    gabapentin (NEURONTIN) 300 MG capsule     Sig: Take 1 capsule by mouth nightly for 90 days. .     Dispense:  90 capsule     Refill:  0       Orders Placed This Encounter   Procedures    XR RIBS LEFT INCLUDE CHEST (MIN 3 VIEWS)     Standing Status:   Future     Number of Occurrences:   1     Standing Expiration Date:   12/31/2019     Further workup and plan will be determined based on clinical progression and follow up test/ treatment results. Close follow up needed to evaluate treatment results and for care coordination. Return if symptoms worsen or fail to improve. I have reviewed the patient's medical and surgical, family and social history, health maintenance schedule, and updated the computerized patient record.     Please note this report has been partially produced by using speech

## 2019-02-02 ENCOUNTER — OFFICE VISIT (OUTPATIENT)
Dept: INTERNAL MEDICINE CLINIC | Age: 84
End: 2019-02-02
Payer: MEDICARE

## 2019-02-02 VITALS
HEIGHT: 61 IN | HEART RATE: 60 BPM | SYSTOLIC BLOOD PRESSURE: 142 MMHG | TEMPERATURE: 96 F | BODY MASS INDEX: 26.43 KG/M2 | WEIGHT: 140 LBS | DIASTOLIC BLOOD PRESSURE: 52 MMHG | OXYGEN SATURATION: 94 %

## 2019-02-02 DIAGNOSIS — Z00.00 ROUTINE GENERAL MEDICAL EXAMINATION AT A HEALTH CARE FACILITY: ICD-10-CM

## 2019-02-02 DIAGNOSIS — Z23 NEED FOR PROPHYLACTIC VACCINATION AND INOCULATION AGAINST VARICELLA: ICD-10-CM

## 2019-02-02 DIAGNOSIS — R30.0 DYSURIA: Primary | ICD-10-CM

## 2019-02-02 DIAGNOSIS — R30.0 DYSURIA: ICD-10-CM

## 2019-02-02 LAB
BACTERIA: ABNORMAL /HPF
BILIRUBIN URINE: ABNORMAL
BLOOD, URINE: ABNORMAL
CLARITY: ABNORMAL
COLOR: ABNORMAL
EPITHELIAL CELLS, UA: ABNORMAL /HPF (ref 0–5)
GLUCOSE URINE: NEGATIVE MG/DL
HYALINE CASTS: ABNORMAL /HPF (ref 0–5)
KETONES, URINE: ABNORMAL MG/DL
LEUKOCYTE ESTERASE, URINE: ABNORMAL
NITRITE, URINE: NEGATIVE
PH UA: 5.5 (ref 5–9)
PROTEIN UA: NEGATIVE MG/DL
RBC UA: ABNORMAL /HPF (ref 0–5)
SPECIFIC GRAVITY UA: 1.02 (ref 1–1.03)
URINE REFLEX TO CULTURE: YES
UROBILINOGEN, URINE: 1 E.U./DL
WBC UA: >100 /HPF (ref 0–5)

## 2019-02-02 PROCEDURE — G0438 PPPS, INITIAL VISIT: HCPCS | Performed by: INTERNAL MEDICINE

## 2019-02-02 PROCEDURE — 4040F PNEUMOC VAC/ADMIN/RCVD: CPT | Performed by: INTERNAL MEDICINE

## 2019-02-02 PROCEDURE — G8484 FLU IMMUNIZE NO ADMIN: HCPCS | Performed by: INTERNAL MEDICINE

## 2019-02-02 ASSESSMENT — ANXIETY QUESTIONNAIRES: GAD7 TOTAL SCORE: 0

## 2019-02-02 ASSESSMENT — PATIENT HEALTH QUESTIONNAIRE - PHQ9
SUM OF ALL RESPONSES TO PHQ QUESTIONS 1-9: 0
SUM OF ALL RESPONSES TO PHQ QUESTIONS 1-9: 0

## 2019-02-02 ASSESSMENT — LIFESTYLE VARIABLES: HOW OFTEN DO YOU HAVE A DRINK CONTAINING ALCOHOL: 0

## 2019-02-04 DIAGNOSIS — N39.0 E. COLI URINARY TRACT INFECTION: Primary | ICD-10-CM

## 2019-02-04 DIAGNOSIS — B96.20 E. COLI URINARY TRACT INFECTION: Primary | ICD-10-CM

## 2019-02-04 LAB
ORGANISM: ABNORMAL
URINE CULTURE, ROUTINE: ABNORMAL
URINE CULTURE, ROUTINE: ABNORMAL

## 2019-02-04 RX ORDER — CIPROFLOXACIN 250 MG/1
250 TABLET, FILM COATED ORAL 2 TIMES DAILY
Qty: 10 TABLET | Refills: 0 | Status: SHIPPED | OUTPATIENT
Start: 2019-02-04 | End: 2019-02-09

## 2019-02-15 ENCOUNTER — CARE COORDINATION (OUTPATIENT)
Dept: CARE COORDINATION | Age: 84
End: 2019-02-15

## 2019-03-05 ENCOUNTER — TELEPHONE (OUTPATIENT)
Dept: INTERNAL MEDICINE CLINIC | Age: 84
End: 2019-03-05

## 2019-03-05 RX ORDER — AZITHROMYCIN 250 MG/1
250 TABLET, FILM COATED ORAL SEE ADMIN INSTRUCTIONS
Qty: 6 TABLET | Refills: 0 | Status: SHIPPED | OUTPATIENT
Start: 2019-03-05 | End: 2019-03-10

## 2019-03-11 RX ORDER — SIMVASTATIN 20 MG
TABLET ORAL
Qty: 90 TABLET | Refills: 1 | Status: SHIPPED | OUTPATIENT
Start: 2019-03-11 | End: 2019-05-29 | Stop reason: ALTCHOICE

## 2019-03-25 ENCOUNTER — HOSPITAL ENCOUNTER (OUTPATIENT)
Dept: ULTRASOUND IMAGING | Age: 84
Discharge: HOME | End: 2019-03-27
Payer: MEDICARE

## 2019-03-25 ENCOUNTER — OFFICE VISIT (OUTPATIENT)
Dept: FAMILY MEDICINE CLINIC | Age: 84
End: 2019-03-25
Payer: MEDICARE

## 2019-03-25 ENCOUNTER — HOSPITAL ENCOUNTER (OUTPATIENT)
Dept: GENERAL RADIOLOGY | Age: 84
Discharge: HOME | End: 2019-03-27
Payer: MEDICARE

## 2019-03-25 VITALS
DIASTOLIC BLOOD PRESSURE: 64 MMHG | SYSTOLIC BLOOD PRESSURE: 124 MMHG | HEART RATE: 61 BPM | HEIGHT: 61 IN | OXYGEN SATURATION: 98 % | BODY MASS INDEX: 26.43 KG/M2 | TEMPERATURE: 98.4 F | WEIGHT: 140 LBS | RESPIRATION RATE: 14 BRPM

## 2019-03-25 DIAGNOSIS — I73.9 CLAUDICATION (HCC): ICD-10-CM

## 2019-03-25 DIAGNOSIS — M79.605 ACUTE LEG PAIN, LEFT: ICD-10-CM

## 2019-03-25 DIAGNOSIS — M89.8X1 PAIN OF LEFT SCAPULA: Primary | ICD-10-CM

## 2019-03-25 DIAGNOSIS — M89.8X1 PAIN OF LEFT SCAPULA: ICD-10-CM

## 2019-03-25 DIAGNOSIS — I70.209 FEMORAL ARTERY STENOSIS (HCC): Primary | ICD-10-CM

## 2019-03-25 DIAGNOSIS — R05.9 COUGH: ICD-10-CM

## 2019-03-25 DIAGNOSIS — E11.9 DIET-CONTROLLED TYPE 2 DIABETES MELLITUS (HCC): ICD-10-CM

## 2019-03-25 DIAGNOSIS — E11.51 DM (DIABETES MELLITUS), TYPE 2 WITH PERIPHERAL VASCULAR COMPLICATIONS (HCC): ICD-10-CM

## 2019-03-25 PROCEDURE — 93926 LOWER EXTREMITY STUDY: CPT

## 2019-03-25 PROCEDURE — 73010 X-RAY EXAM OF SHOULDER BLADE: CPT

## 2019-03-25 PROCEDURE — 99214 OFFICE O/P EST MOD 30 MIN: CPT | Performed by: PHYSICIAN ASSISTANT

## 2019-03-25 PROCEDURE — 1123F ACP DISCUSS/DSCN MKR DOCD: CPT | Performed by: PHYSICIAN ASSISTANT

## 2019-03-25 PROCEDURE — 1090F PRES/ABSN URINE INCON ASSESS: CPT | Performed by: PHYSICIAN ASSISTANT

## 2019-03-25 PROCEDURE — G8428 CUR MEDS NOT DOCUMENT: HCPCS | Performed by: PHYSICIAN ASSISTANT

## 2019-03-25 PROCEDURE — G8417 CALC BMI ABV UP PARAM F/U: HCPCS | Performed by: PHYSICIAN ASSISTANT

## 2019-03-25 PROCEDURE — 71046 X-RAY EXAM CHEST 2 VIEWS: CPT

## 2019-03-25 PROCEDURE — 1036F TOBACCO NON-USER: CPT | Performed by: PHYSICIAN ASSISTANT

## 2019-03-25 PROCEDURE — 1101F PT FALLS ASSESS-DOCD LE1/YR: CPT | Performed by: PHYSICIAN ASSISTANT

## 2019-03-25 PROCEDURE — 4040F PNEUMOC VAC/ADMIN/RCVD: CPT | Performed by: PHYSICIAN ASSISTANT

## 2019-03-25 PROCEDURE — G8484 FLU IMMUNIZE NO ADMIN: HCPCS | Performed by: PHYSICIAN ASSISTANT

## 2019-03-25 PROCEDURE — 73030 X-RAY EXAM OF SHOULDER: CPT

## 2019-03-25 RX ORDER — LIDOCAINE 50 MG/G
1 PATCH TOPICAL DAILY
Qty: 30 PATCH | Refills: 1 | Status: SHIPPED | OUTPATIENT
Start: 2019-03-25 | End: 2019-04-15

## 2019-03-25 RX ORDER — GLIPIZIDE 10 MG/1
TABLET, FILM COATED, EXTENDED RELEASE ORAL
COMMUNITY
Start: 2005-11-15 | End: 2019-05-29 | Stop reason: ALTCHOICE

## 2019-03-25 ASSESSMENT — ENCOUNTER SYMPTOMS
SHORTNESS OF BREATH: 0
COLOR CHANGE: 0
BACK PAIN: 1
CHEST TIGHTNESS: 0
WHEEZING: 0
COUGH: 1

## 2019-03-28 DIAGNOSIS — M48.061 SPINAL STENOSIS OF LUMBAR REGION, UNSPECIFIED WHETHER NEUROGENIC CLAUDICATION PRESENT: ICD-10-CM

## 2019-03-28 DIAGNOSIS — G89.29 CHRONIC MIDLINE THORACIC BACK PAIN: ICD-10-CM

## 2019-03-28 DIAGNOSIS — M54.6 CHRONIC MIDLINE THORACIC BACK PAIN: ICD-10-CM

## 2019-03-29 RX ORDER — GABAPENTIN 300 MG/1
300 CAPSULE ORAL NIGHTLY
Qty: 90 CAPSULE | Refills: 0 | Status: SHIPPED | OUTPATIENT
Start: 2019-03-29 | End: 2019-04-15 | Stop reason: ALTCHOICE

## 2019-04-02 ENCOUNTER — HOSPITAL ENCOUNTER (EMERGENCY)
Age: 84
Discharge: HOME | End: 2019-04-02
Attending: EMERGENCY MEDICINE
Payer: MEDICARE

## 2019-04-02 VITALS
DIASTOLIC BLOOD PRESSURE: 69 MMHG | BODY MASS INDEX: 26.43 KG/M2 | RESPIRATION RATE: 16 BRPM | HEIGHT: 61 IN | SYSTOLIC BLOOD PRESSURE: 168 MMHG | HEART RATE: 76 BPM | OXYGEN SATURATION: 97 % | WEIGHT: 140 LBS | TEMPERATURE: 98 F
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
## 2019-04-02 DIAGNOSIS — N39.0 ACUTE UTI: ICD-10-CM

## 2019-04-02 DIAGNOSIS — E87.5 HYPERKALEMIA: Primary | ICD-10-CM

## 2019-04-02 LAB
ALBUMIN SERPL-MCNC: 3.8 G/DL (ref 3.5–4.6)
ALP BLD-CCNC: 104 U/L (ref 40–130)
ALT SERPL-CCNC: 19 U/L (ref 0–33)
ANION GAP SERPL CALCULATED.3IONS-SCNC: 11 MEQ/L (ref 9–15)
AST SERPL-CCNC: 23 U/L (ref 0–35)
BACTERIA: ABNORMAL /HPF
BASOPHILS ABSOLUTE: 0.1 K/UL (ref 0–0.2)
BASOPHILS RELATIVE PERCENT: 1.1 %
BILIRUB SERPL-MCNC: <0.2 MG/DL (ref 0.2–0.7)
BILIRUBIN URINE: NEGATIVE
BLOOD, URINE: ABNORMAL
BUN BLDV-MCNC: 20 MG/DL (ref 8–23)
CALCIUM SERPL-MCNC: 10.3 MG/DL (ref 8.5–9.9)
CHLORIDE BLD-SCNC: 97 MEQ/L (ref 95–107)
CLARITY: ABNORMAL
CO2: 25 MEQ/L (ref 20–31)
COLOR: YELLOW
CREAT SERPL-MCNC: 0.59 MG/DL (ref 0.5–0.9)
EOSINOPHILS ABSOLUTE: 0.2 K/UL (ref 0–0.7)
EOSINOPHILS RELATIVE PERCENT: 3.4 %
EPITHELIAL CELLS, UA: ABNORMAL /HPF (ref 0–5)
GFR AFRICAN AMERICAN: >60
GFR NON-AFRICAN AMERICAN: >60
GLOBULIN: 2.5 G/DL (ref 2.3–3.5)
GLUCOSE BLD-MCNC: 114 MG/DL (ref 70–99)
GLUCOSE URINE: NEGATIVE MG/DL
HCT VFR BLD CALC: 37.8 % (ref 37–47)
HEMOGLOBIN: 12.6 G/DL (ref 12–16)
HYALINE CASTS: ABNORMAL /HPF (ref 0–5)
KETONES, URINE: ABNORMAL MG/DL
LEUKOCYTE ESTERASE, URINE: ABNORMAL
LYMPHOCYTES ABSOLUTE: 1.5 K/UL (ref 1–4.8)
LYMPHOCYTES RELATIVE PERCENT: 20.4 %
MCH RBC QN AUTO: 30.8 PG (ref 27–31.3)
MCHC RBC AUTO-ENTMCNC: 33.2 % (ref 33–37)
MCV RBC AUTO: 92.6 FL (ref 82–100)
MONOCYTES ABSOLUTE: 0.7 K/UL (ref 0.2–0.8)
MONOCYTES RELATIVE PERCENT: 9.5 %
NEUTROPHILS ABSOLUTE: 4.9 K/UL (ref 1.4–6.5)
NEUTROPHILS RELATIVE PERCENT: 65.6 %
NITRITE, URINE: NEGATIVE
PDW BLD-RTO: 13.7 % (ref 11.5–14.5)
PH UA: 5.5 (ref 5–9)
PLATELET # BLD: 248 K/UL (ref 130–400)
POTASSIUM SERPL-SCNC: 5.2 MEQ/L (ref 3.4–4.9)
PROTEIN UA: 30 MG/DL
RBC # BLD: 4.08 M/UL (ref 4.2–5.4)
RBC UA: ABNORMAL /HPF (ref 0–5)
SODIUM BLD-SCNC: 133 MEQ/L (ref 135–144)
SPECIFIC GRAVITY UA: 1.02 (ref 1–1.03)
TOTAL PROTEIN: 6.3 G/DL (ref 6.3–8)
URINE REFLEX TO CULTURE: YES
UROBILINOGEN, URINE: 0.2 E.U./DL
WBC # BLD: 7.4 K/UL (ref 4.8–10.8)
WBC UA: >100 /HPF (ref 0–5)

## 2019-04-02 PROCEDURE — 87077 CULTURE AEROBIC IDENTIFY: CPT

## 2019-04-02 PROCEDURE — 87086 URINE CULTURE/COLONY COUNT: CPT

## 2019-04-02 PROCEDURE — 6360000002 HC RX W HCPCS: Performed by: NURSE PRACTITIONER

## 2019-04-02 PROCEDURE — 81001 URINALYSIS AUTO W/SCOPE: CPT

## 2019-04-02 PROCEDURE — 85025 COMPLETE CBC W/AUTO DIFF WBC: CPT

## 2019-04-02 PROCEDURE — 96365 THER/PROPH/DIAG IV INF INIT: CPT

## 2019-04-02 PROCEDURE — 6370000000 HC RX 637 (ALT 250 FOR IP): Performed by: NURSE PRACTITIONER

## 2019-04-02 PROCEDURE — 99284 EMERGENCY DEPT VISIT MOD MDM: CPT

## 2019-04-02 PROCEDURE — 36415 COLL VENOUS BLD VENIPUNCTURE: CPT

## 2019-04-02 PROCEDURE — 87186 SC STD MICRODIL/AGAR DIL: CPT

## 2019-04-02 PROCEDURE — 2580000003 HC RX 258: Performed by: NURSE PRACTITIONER

## 2019-04-02 PROCEDURE — 80053 COMPREHEN METABOLIC PANEL: CPT

## 2019-04-02 RX ORDER — CIPROFLOXACIN 500 MG/1
500 TABLET, FILM COATED ORAL 2 TIMES DAILY
Qty: 14 TABLET | Refills: 0 | Status: SHIPPED | OUTPATIENT
Start: 2019-04-02 | End: 2019-04-09

## 2019-04-02 RX ORDER — SODIUM POLYSTYRENE SULFONATE 15 G/60ML
15 SUSPENSION ORAL; RECTAL ONCE
Status: COMPLETED | OUTPATIENT
Start: 2019-04-02 | End: 2019-04-02

## 2019-04-02 RX ORDER — 0.9 % SODIUM CHLORIDE 0.9 %
500 INTRAVENOUS SOLUTION INTRAVENOUS ONCE
Status: COMPLETED | OUTPATIENT
Start: 2019-04-02 | End: 2019-04-02

## 2019-04-02 RX ADMIN — SODIUM POLYSTYRENE SULFONATE 15 G: 15 SUSPENSION ORAL; RECTAL at 16:58

## 2019-04-02 RX ADMIN — SODIUM CHLORIDE 500 ML: 9 INJECTION, SOLUTION INTRAVENOUS at 16:58

## 2019-04-02 RX ADMIN — CEFTRIAXONE SODIUM 1 G: 1 INJECTION, POWDER, FOR SOLUTION INTRAMUSCULAR; INTRAVENOUS at 16:58

## 2019-04-02 ASSESSMENT — ENCOUNTER SYMPTOMS
ABDOMINAL PAIN: 0
SHORTNESS OF BREATH: 0
BACK PAIN: 0
COUGH: 0

## 2019-04-02 NOTE — ED NOTES
Pt states that when she was in the bathroom today she felt as if she was floating. Pt stated she eased herself to the ground and did not hit her head on anything. Strength in the BUE is strong and equal. Strength in the BLE is moderate and equal. Pt denies any pain.       Immanuel Munguia V RN  04/02/19 1056

## 2019-04-02 NOTE — ED PROVIDER NOTES
Macular degeneration     left eye    PVD (peripheral vascular disease) ([facility] [address] 10/28/2006    Dr Era Swanson, stents LE    Stress incontinence 10/28/2011    Thoracic back pain 05/29/2014    Wears dentures 2017    upper         SURGICAL HISTORY       Past Surgical History:   Procedure Laterality Date    APPENDECTOMY      ARTERIAL BYPASS SURGRY  2006    BRAIN SURGERY      CYST REMOVAL  06/27/2016    DR SLOAN  RT THUMB MUCOUS CYST    HYSTERECTOMY      REFRACTIVE SURGERY  062001    left     TOTAL HIP ARTHROPLASTY Right 9/25/15    DR. NARANJO    UPPER GASTROINTESTINAL ENDOSCOPY  10/07/15    DR Saige Arenas    VERTEBROPLASTY  2013    T12, Dr Yue Martínez       Previous Medications    ACETAMINOPHEN (TYLENOL) 325 MG TABLET    Take 1 tablet by mouth 3 times daily as needed for Pain (DO NOT EXCEED 4GM IN 24 HOURS)    ASPIRIN EC 81 MG EC TABLET    Take 1 tablet by mouth daily    BRIMONIDINE (ALPHAGAN P) 0.1 % SOLN    Place 1 drop into both eyes 2 times daily    BRIMONIDINE (ALPHAGAN P) 0.15 % OPHTHALMIC SOLUTION    USE 1 DROP IN BOTH EYES TWICE DAILY. GABAPENTIN (NEURONTIN) 300 MG CAPSULE    Take 1 capsule by mouth nightly for 90 days. GLIPIZIDE (GLUCOTROL XL) 10 MG EXTENDED RELEASE TABLET    Take by mouth    LATANOPROST (XALATAN) 0.005 % OPHTHALMIC SOLUTION    Place 1 drop into the left eye nightly    LIDOCAINE (LIDODERM) 5 %    Place 1 patch onto the skin daily 12 hours on, 12 hours off.     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 She appears well-developed and well-nourished. HENT:   Head: Normocephalic and atraumatic. Right Ear: External ear normal.   Left Ear: External ear normal.   Mouth/Throat: Oropharynx is clear and moist.   Eyes: Pupils are equal, round, and reactive to light. Conjunctivae and EOM are normal.   Neck: Normal range of motion. Neck supple. Cardiovascular: Normal rate and regular rhythm. Pulmonary/Chest: Effort normal and breath sounds normal.   Abdominal: Soft. Bowel sounds are normal. She exhibits no distension. There is no tenderness. Genitourinary:       There is tenderness on the left labia. There is no lesion on the left labia. Musculoskeletal: Normal range of motion. Neurological: She is alert and oriented to person, place, and time. She has normal reflexes. Skin: Skin is warm and dry. Psychiatric: Judgment normal.   Nursing note and vitals reviewed. All other labs were within normal range or not returned as of this dictation. EMERGENCY DEPARTMENT COURSE and DIFFERENTIALDIAGNOSIS/MDM:   Vitals:    Vitals:    04/02/19 1545 04/02/19 1553 04/02/19 1554 04/02/19 1757   BP: (!) 161/85   (!) 168/69   Pulse: 63   76   Resp: 16   16   Temp:       TempSrc:       SpO2: 97% 100% 100% 97%   Weight:       Height:                80 yr old female with UTI, fall and hyperkalemia. She was given a prescription for Cipro. She is to  F/U with her PCP in 1-2 days.   Patient is comfortable at discharge, denies any pain. Caregiver/Family concerned because of her elevated SBP. Discussed with family to check BP's at home for a couple days and report them to her PCP if elevation continues. Patient denies any CP, SOB, headache or dizziness at discharge. Return to ER if symptoms change as discussed with family. Patient/ Caregiver verbalizes understanding. PROCEDURES:  Unless otherwise noted below, none     Procedures      FINAL IMPRESSION      1. Hyperkalemia    2. Acute UTI    3.  Fall, initial encounter          DISPOSITION/PLAN   DISPOSITION Decision To Discharge 04/02/2019 06:04:21 PM          JADE Langford CNP (electronically signed)  Attending Emergency Physician      JADE Langford CNP  04/02/19 9419 [facility], APRN - CNP  04/02/19 9396

## 2019-04-02 NOTE — ED NOTES
Pt resting in bed, visitor at bedside. No distress noted. Denies any needs at this time.      Tomi Correa, RN  04/02/19 9870

## 2019-04-02 NOTE — ED NOTES
Pt states needs water and a little time before she will be able to urinate. Water provided.       Alison Guerra RN  04/02/19 2219

## 2019-04-02 NOTE — ED NOTES
Pt and family now state that they do not want to take anything for her HTN as she is usually low and they are afraid she will become too low. They will f/u with PCP.       Zully Ortega V RN  04/02/19 0814

## 2019-04-02 NOTE — ED TRIAGE NOTES
Pt arrived to ER with c/o fall while in the bathroom. Per pt she walked to the restroom with her walker and she began to feel her legs get weak and eased down to the ground. Pt denies any pain at this time. Pt A&Ox4, skin p/w/d. resp even and unlabored.

## 2019-04-04 LAB
ORGANISM: ABNORMAL
URINE CULTURE, ROUTINE: ABNORMAL
URINE CULTURE, ROUTINE: ABNORMAL

## 2019-04-07 ENCOUNTER — TELEPHONE (OUTPATIENT)
Dept: FAMILY MEDICINE CLINIC | Age: 84
End: 2019-04-07

## 2019-04-08 ENCOUNTER — TELEPHONE (OUTPATIENT)
Dept: FAMILY MEDICINE CLINIC | Age: 84
End: 2019-04-08

## 2019-04-08 NOTE — TELEPHONE ENCOUNTER
PT sees DR. Germán Deleon for vascular and niece wants to know if she really needs to see a cardiologist. She has had surgery and stents and has been in treatment for the stenosis for a while, and she states that she does not want to put her through meeting a new doctor and extra tests, if it is something that the vascular doctor can continue to manage. Please advise, thank you.

## 2019-04-08 NOTE — TELEPHONE ENCOUNTER
No, I was not aware that she saw Dr. Salty Benavidez. Continue with seeing him. Thank you for letting us know!

## 2019-04-08 NOTE — TELEPHONE ENCOUNTER
Would prefer if we can get something from the pharmacy. Does she still want something sent in for her?

## 2019-04-08 NOTE — TELEPHONE ENCOUNTER
Patients niece called she is the POA and was not able to come to the visit but was wondering why you did a referral to Cardiology? Mary said she does not remember discussing and neither did the aid.   She said that at age 80 does she really need to start seeing a cardiologist?  Celia Deleon would like a call back with an explanation [phone]

## 2019-04-15 ENCOUNTER — OFFICE VISIT (OUTPATIENT)
Dept: FAMILY MEDICINE CLINIC | Age: 84
End: 2019-04-15
Payer: MEDICARE

## 2019-04-15 VITALS
OXYGEN SATURATION: 98 % | TEMPERATURE: 97.2 F | DIASTOLIC BLOOD PRESSURE: 60 MMHG | BODY MASS INDEX: 26.45 KG/M2 | RESPIRATION RATE: 16 BRPM | HEART RATE: 64 BPM | HEIGHT: 61 IN | SYSTOLIC BLOOD PRESSURE: 122 MMHG

## 2019-04-15 DIAGNOSIS — Z74.09 IMPAIRED MOBILITY AND ACTIVITIES OF DAILY LIVING: ICD-10-CM

## 2019-04-15 DIAGNOSIS — Z78.9 IMPAIRED MOBILITY AND ACTIVITIES OF DAILY LIVING: ICD-10-CM

## 2019-04-15 DIAGNOSIS — E87.5 HYPERKALEMIA: ICD-10-CM

## 2019-04-15 DIAGNOSIS — K21.9 GASTROESOPHAGEAL REFLUX DISEASE WITHOUT ESOPHAGITIS: Chronic | ICD-10-CM

## 2019-04-15 DIAGNOSIS — I73.9 PVD (PERIPHERAL VASCULAR DISEASE) (HCC): ICD-10-CM

## 2019-04-15 DIAGNOSIS — E11.51 DM (DIABETES MELLITUS), TYPE 2 WITH PERIPHERAL VASCULAR COMPLICATIONS (HCC): Primary | ICD-10-CM

## 2019-04-15 DIAGNOSIS — Z87.440 H/O URINARY TRACT INFECTION: ICD-10-CM

## 2019-04-15 PROBLEM — M89.8X1 PAIN OF LEFT SCAPULA: Status: RESOLVED | Noted: 2019-03-25 | Resolved: 2019-04-15

## 2019-04-15 LAB
ANION GAP SERPL CALCULATED.3IONS-SCNC: 13 MEQ/L (ref 9–15)
BUN BLDV-MCNC: 13 MG/DL (ref 8–23)
CALCIUM SERPL-MCNC: 9.5 MG/DL (ref 8.5–9.9)
CHLORIDE BLD-SCNC: 93 MEQ/L (ref 95–107)
CO2: 23 MEQ/L (ref 20–31)
CREAT SERPL-MCNC: 0.55 MG/DL (ref 0.5–0.9)
GFR AFRICAN AMERICAN: >60
GFR NON-AFRICAN AMERICAN: >60
GLUCOSE BLD-MCNC: 108 MG/DL (ref 70–99)
POTASSIUM SERPL-SCNC: 4 MEQ/L (ref 3.4–4.9)
SODIUM BLD-SCNC: 129 MEQ/L (ref 135–144)

## 2019-04-15 PROCEDURE — 99215 OFFICE O/P EST HI 40 MIN: CPT | Performed by: FAMILY MEDICINE

## 2019-04-15 PROCEDURE — 1036F TOBACCO NON-USER: CPT | Performed by: FAMILY MEDICINE

## 2019-04-15 PROCEDURE — G8599 NO ASA/ANTIPLAT THER USE RNG: HCPCS | Performed by: FAMILY MEDICINE

## 2019-04-15 PROCEDURE — 1090F PRES/ABSN URINE INCON ASSESS: CPT | Performed by: FAMILY MEDICINE

## 2019-04-15 PROCEDURE — 1123F ACP DISCUSS/DSCN MKR DOCD: CPT | Performed by: FAMILY MEDICINE

## 2019-04-15 PROCEDURE — G8427 DOCREV CUR MEDS BY ELIG CLIN: HCPCS | Performed by: FAMILY MEDICINE

## 2019-04-15 PROCEDURE — 4040F PNEUMOC VAC/ADMIN/RCVD: CPT | Performed by: FAMILY MEDICINE

## 2019-04-15 PROCEDURE — G8417 CALC BMI ABV UP PARAM F/U: HCPCS | Performed by: FAMILY MEDICINE

## 2019-04-15 RX ORDER — OMEPRAZOLE 20 MG/1
CAPSULE, DELAYED RELEASE ORAL
Qty: 90 CAPSULE | Refills: 1 | Status: SHIPPED | OUTPATIENT
Start: 2019-04-15 | End: 2019-08-28 | Stop reason: SDUPTHER

## 2019-04-15 RX ORDER — LATANOPROST 50 UG/ML
1 SOLUTION/ DROPS OPHTHALMIC NIGHTLY
Qty: 1 BOTTLE | Refills: 2 | Status: CANCELLED | OUTPATIENT
Start: 2019-04-15

## 2019-04-15 RX ORDER — DOCUSATE SODIUM 100 MG/1
100 CAPSULE, LIQUID FILLED ORAL DAILY
Status: ON HOLD | COMMUNITY
End: 2019-05-24 | Stop reason: HOSPADM

## 2019-04-15 NOTE — PROGRESS NOTES
Subjective  Venus Teran, 80 y.o. female presents today with:  Chief Complaint   Patient presents with   [address]     former dr Joy Casas pt here to establish care.  Follow-Up from [facility]     04/02/19 was seen in the ER dx fall, hyperkalemia, UTI. completed Rocephin injection and Cipro. would like urine rechecked.  Cough     completed xrays but states it does not go away. clear phlegm. HPI    This is a new patient to me. I have reviewed the past medical and surgical history, social history and family history provided. I have reviewed  medication, previous testing and working diagnoses. I have reviewed the allergies and health maintenance information and correlated it into my decision making for this patient for care, diagnostics, consultations and treatment for today's visit. Patient is here for DM f/u. Sugars have been moderately well-controlled and patient has not been experiencing hyper- or hypoglycemic symptoms. Compliance with meds is good and there are no side effects. Patient exercises occasionally and tries to eat healthy. Is up to date on foot and eye exams and immunizations. UTI - recently treated. Feels well now. Also had hyperkalemia at the time. Has spinal stenosis but states she has no back pain. Notes that her left leg is weak and gives out and that she has swelling in her right leg for which she uses a lymphedema pump for an hour daily. 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 Uses a w/c for long distances. No recent falls. Has live-in caregiver. Is followed by Dr. William Mccurdy for PVD, claudication. No other questions and or concerns for today's visit      Review of Systems  No fevers, chills, sweats. No unintended weight loss. No abdominal pain, nausea, vomiting, diarrhea, constipation, bloody stools, black tarry stools. No rashes. No swollen glands. This patient has no chest pain or pressure. There is no shortness of breath.         Past Sexual Activity    Alcohol use: No     Alcohol/week: 0.0 oz    Drug use: No    Sexual activity: Not Currently     Partners: Male   Lifestyle    Physical activity:     Days per week: Not on file     Minutes per session: Not on file    Stress: Not on file   Relationships    Social connections:     Talks on phone: Not on file     Gets together: Not on file     Attends Denominational service: Not on file     Active member of club or organization: Not on file     Attends meetings of clubs or organizations: Not on file     Relationship status: Not on file    Intimate partner violence:     Fear of current or ex partner: Not on file     Emotionally abused: Not on file     Physically abused: Not on file     Forced sexual activity: Not on file   Other Topics Concern    Not on file   Social History Narrative    Retired [employer] alone in a house, no children    Former  at [address] for [employer] for many years      Family History   Problem Relation Age of Onset    Heart Failure Mother         dec age 77    Hypertension Mother     Diabetes Father         dec age 67     No Known Allergies  Current Outpatient Medications   Medication Sig Dispense Refill    omeprazole (PRILOSEC) 20 MG delayed release capsule TAKE ONE CAPSULE BY MOUTH EVERY DAY 90 capsule 1    docusate sodium (COLACE) 100 MG capsule Take 100 mg by mouth daily      glipiZIDE (GLUCOTROL XL) 10 MG extended release tablet Take by mouth      LUTEIN PO Take by mouth      simvastatin (ZOCOR) 20 MG tablet TAKE 1 TABLET BY MOUTH EVERY DAY AT NIGHT 90 tablet 1    brimonidine (ALPHAGAN P) 0.15 % ophthalmic solution USE 1 DROP IN BOTH EYES TWICE DAILY. 11    timolol (TIMOPTIC-XE) 0.5 % ophthalmic gel-forming USE 1 DROP IN BOTH EYES TWICE DAILY.   11    acetaminophen (TYLENOL) 325 MG tablet Take 1 tablet by mouth 3 times daily as needed for Pain (DO NOT EXCEED 4GM IN 24 HOURS) 90 tablet 0    aspirin EC 81 MG EC tablet Take 1 tablet by mouth daily 30 tablet 6    latanoprost (XALATAN) 0.005 % ophthalmic solution Place 1 drop into the left eye nightly      Multiple Vitamin (MULTI VITAMIN DAILY) TABS Take 1 tablet by mouth every morning      vitamin D (CHOLECALCIFEROL) 400 UNITS TABS tablet Take 400 Units by mouth daily      magnesium hydroxide (MILK OF MAGNESIA) 400 MG/5ML suspension Take 30 mLs by mouth as needed for Constipation       No current facility-administered medications for this visit. PMH, Surgical Hx, Family Hx, and Social Hxreviewed and updated. Health Maintenance reviewed. Objective    Vitals:    04/15/19 1026   BP: 122/60   Pulse: 64   Resp: 16   Temp: 97.2 °F (36.2 °C)   SpO2: 98%   Height: 5' 1\" (1.549 m)        Physical Exam   Constitutional: She is oriented to person, place, and time. She appears well-developed and well-nourished. HENT:   Head: Normocephalic and atraumatic. Right Ear: External ear normal.   Left Ear: External ear normal.   Nose: Nose normal.   Mouth/Throat: Oropharynx is clear and moist.   Eyes: Conjunctivae are normal. No scleral icterus. Neck: Normal range of motion. Neck supple. No thyromegaly present. Cardiovascular: Normal rate, regular rhythm and normal heart sounds. Pulmonary/Chest: Effort normal and breath sounds normal.   Musculoskeletal: She exhibits edema (2+ RLE, pitting). Lymphadenopathy:     She has no cervical adenopathy. Neurological: She is alert and oriented to person, place, and time. Left leg girth sig smaller than right from hip to ankle   Skin: Skin is warm and dry. Psychiatric: She has a normal mood and affect.          Lab Results   Component Value Date    LABA1C 5.7 08/20/2018    LABA1C 6.4 12/11/2017    LABA1C 6.3 05/08/2017     Lab Results   Component Value Date    CREATININE 0.55 04/15/2019     Lab Results   Component Value Date    ALT 19 04/02/2019    AST 23 04/02/2019     Lab Results   Component Value Date    CHOL 161 08/16/2016    TRIG 93 08/16/2016    HDL 64 (H) 08/16/2016    LDLCALC 78 08/16/2016        Assessment & Plan   Visit Diagnoses and Associated Orders     DM (diabetes mellitus), type 2 with peripheral vascular complications ([facility] [address]    -  Primary    Stable and well-controlled. Will discuss reduction of glucotrol dose at next appt. PVD (peripheral vascular disease) ([facility])        stable, fair controlled; followed by Dr. Bishop Lay. Impaired mobility and activities of daily living        required aide and w/c. Hyperkalemia        recheck labs    Basic Metabolic Panel [[id number] Custom]   - Future Order         Gastroesophageal reflux disease without esophagitis        controlled with PPI    omeprazole (PRILOSEC) 20 MG delayed release capsule [[id number]]           H/O urinary tract infection        check UA    Urinalysis [[id number] Custom]   - Future Order         ORDERS WITHOUT AN ASSOCIATED DIAGNOSIS    docusate sodium (COLACE) 100 MG capsule [[id number]]              Reviewed with the patient: all disease processes, current clinical status, medications, activities and diet.      Side effects, adverse effects of the medication prescribed today, as well as treatment plan/ rationale and result expectations have been discussed with the patient who expresses understanding and desires to proceed.     Close follow up to evaluate treatment results and for coordination of care. I have reviewed the patient's medical history in detail and updated the computerized patient record. More than 50% of the 45 minute appointment was spent in face-to-face counseling, education and care coordination.       Orders Placed This Encounter   Procedures    Basic Metabolic Panel     Standing Status:   Future     Number of Occurrences:   1     Standing Expiration Date:   4/15/2020    Urinalysis     Standing Status:   Future     Standing Expiration Date:   4/15/2020     Orders Placed This Encounter   Medications    omeprazole (PRILOSEC) 20 MG delayed release capsule     Sig: TAKE ONE CAPSULE BY MOUTH EVERY DAY     Dispense:  90 capsule     Refill:  1     Medications Discontinued During This Encounter   Medication Reason    brimonidine (ALPHAGAN P) 0.1 % SOLN DUPLICATE    timolol (TIMOPTIC) 0.5 % ophthalmic solution DUPLICATE    gabapentin (NEURONTIN) 300 MG capsule Therapy completed    lidocaine (LIDODERM) 5 % LIST CLEANUP    polyethylene glycol (MIRALAX) powder LIST CLEANUP    omeprazole (PRILOSEC) 20 MG delayed release capsule REORDER     Return in about 3 months (around 7/15/2019) for GERD, multi. Controlled Substances Monitoring:     RX Monitoring 3/29/2019   Attestation The Prescription Monitoring Report for this patient was reviewed today. Chronic Pain Routine Monitoring Obtaining appropriate analgesic effect of treatment. ;No signs of potential drug abuse or diversion identified: otherwise, see note documentation   Chronic Pain > 80 MEDD -       Aysha SCHULTE MD

## 2019-04-16 DIAGNOSIS — Z87.440 H/O URINARY TRACT INFECTION: ICD-10-CM

## 2019-04-16 LAB
BACTERIA: ABNORMAL /HPF
BILIRUBIN URINE: NEGATIVE
BLOOD, URINE: NEGATIVE
CLARITY: CLEAR
COLOR: YELLOW
EPITHELIAL CELLS, UA: ABNORMAL /HPF (ref 0–5)
GLUCOSE URINE: NEGATIVE MG/DL
HYALINE CASTS: ABNORMAL /HPF (ref 0–5)
KETONES, URINE: NEGATIVE MG/DL
LEUKOCYTE ESTERASE, URINE: ABNORMAL
NITRITE, URINE: NEGATIVE
PH UA: 7 (ref 5–9)
PROTEIN UA: NEGATIVE MG/DL
RBC UA: ABNORMAL /HPF (ref 0–5)
SPECIFIC GRAVITY UA: 1.01 (ref 1–1.03)
UROBILINOGEN, URINE: 0.2 E.U./DL
WBC UA: ABNORMAL /HPF (ref 0–5)

## 2019-04-17 DIAGNOSIS — E87.1 HYPONATREMIA: Chronic | ICD-10-CM

## 2019-04-24 PROBLEM — R05.9 COUGH: Status: RESOLVED | Noted: 2019-03-25 | Resolved: 2019-04-24

## 2019-04-25 DIAGNOSIS — E87.1 HYPONATREMIA: Chronic | ICD-10-CM

## 2019-04-25 LAB
ANION GAP SERPL CALCULATED.3IONS-SCNC: 14 MEQ/L (ref 9–15)
BUN BLDV-MCNC: 15 MG/DL (ref 8–23)
CALCIUM SERPL-MCNC: 9.4 MG/DL (ref 8.5–9.9)
CHLORIDE BLD-SCNC: 96 MEQ/L (ref 95–107)
CO2: 23 MEQ/L (ref 20–31)
CREAT SERPL-MCNC: 0.55 MG/DL (ref 0.5–0.9)
GFR AFRICAN AMERICAN: >60
GFR NON-AFRICAN AMERICAN: >60
GLUCOSE BLD-MCNC: 123 MG/DL (ref 70–99)
POTASSIUM SERPL-SCNC: 5.1 MEQ/L (ref 3.4–4.9)
SODIUM BLD-SCNC: 133 MEQ/L (ref 135–144)

## 2019-05-01 DIAGNOSIS — E87.1 HYPONATREMIA: Primary | Chronic | ICD-10-CM

## 2019-05-08 ENCOUNTER — HOSPITAL ENCOUNTER (OUTPATIENT)
Age: 84
Setting detail: OBSERVATION
Discharge: NURSING, REHAB OR LONG-TERM CARE | DRG: 552 | End: 2019-05-09
Attending: EMERGENCY MEDICINE | Admitting: INTERNAL MEDICINE
Payer: MEDICARE

## 2019-05-08 ENCOUNTER — APPOINTMENT (OUTPATIENT)
Dept: GENERAL RADIOLOGY | Age: 84
DRG: 552 | End: 2019-05-08
Payer: MEDICARE

## 2019-05-08 DIAGNOSIS — M25.551 RIGHT HIP PAIN: Primary | ICD-10-CM

## 2019-05-08 DIAGNOSIS — R26.2 UNABLE TO AMBULATE: ICD-10-CM

## 2019-05-08 PROCEDURE — 36415 COLL VENOUS BLD VENIPUNCTURE: CPT

## 2019-05-08 PROCEDURE — 80053 COMPREHEN METABOLIC PANEL: CPT

## 2019-05-08 PROCEDURE — 73502 X-RAY EXAM HIP UNI 2-3 VIEWS: CPT

## 2019-05-08 PROCEDURE — 99285 EMERGENCY DEPT VISIT HI MDM: CPT

## 2019-05-08 PROCEDURE — 96374 THER/PROPH/DIAG INJ IV PUSH: CPT

## 2019-05-08 PROCEDURE — 85025 COMPLETE CBC W/AUTO DIFF WBC: CPT

## 2019-05-08 PROCEDURE — 6360000002 HC RX W HCPCS: Performed by: EMERGENCY MEDICINE

## 2019-05-08 PROCEDURE — 96375 TX/PRO/DX INJ NEW DRUG ADDON: CPT

## 2019-05-08 RX ORDER — ONDANSETRON 2 MG/ML
4 INJECTION INTRAMUSCULAR; INTRAVENOUS ONCE
Status: COMPLETED | OUTPATIENT
Start: 2019-05-08 | End: 2019-05-08

## 2019-05-08 RX ORDER — MORPHINE SULFATE 2 MG/ML
2 INJECTION, SOLUTION INTRAMUSCULAR; INTRAVENOUS ONCE
Status: COMPLETED | OUTPATIENT
Start: 2019-05-08 | End: 2019-05-08

## 2019-05-08 RX ORDER — KETOROLAC TROMETHAMINE 15 MG/ML
15 INJECTION, SOLUTION INTRAMUSCULAR; INTRAVENOUS ONCE
Status: COMPLETED | OUTPATIENT
Start: 2019-05-08 | End: 2019-05-08

## 2019-05-08 RX ADMIN — ONDANSETRON 4 MG: 2 INJECTION INTRAMUSCULAR; INTRAVENOUS at 22:55

## 2019-05-08 RX ADMIN — MORPHINE SULFATE 2 MG: 2 INJECTION, SOLUTION INTRAMUSCULAR; INTRAVENOUS at 22:55

## 2019-05-08 RX ADMIN — KETOROLAC TROMETHAMINE 15 MG: 15 INJECTION, SOLUTION INTRAMUSCULAR; INTRAVENOUS at 22:55

## 2019-05-08 ASSESSMENT — PAIN DESCRIPTION - LOCATION
LOCATION: HIP
LOCATION: HIP

## 2019-05-08 ASSESSMENT — ENCOUNTER SYMPTOMS
EYE DISCHARGE: 0
SORE THROAT: 0
VOMITING: 0
COUGH: 0
PHOTOPHOBIA: 0
SHORTNESS OF BREATH: 0
ABDOMINAL PAIN: 0
WHEEZING: 0
CHEST TIGHTNESS: 0
ABDOMINAL DISTENTION: 0

## 2019-05-08 ASSESSMENT — PAIN DESCRIPTION - PAIN TYPE
TYPE: ACUTE PAIN
TYPE: ACUTE PAIN

## 2019-05-08 ASSESSMENT — PAIN SCALES - GENERAL
PAINLEVEL_OUTOF10: 7
PAINLEVEL_OUTOF10: 10
PAINLEVEL_OUTOF10: 10

## 2019-05-08 ASSESSMENT — PAIN DESCRIPTION - ORIENTATION: ORIENTATION: RIGHT

## 2019-05-09 ENCOUNTER — APPOINTMENT (OUTPATIENT)
Dept: CT IMAGING | Age: 84
DRG: 552 | End: 2019-05-09
Payer: MEDICARE

## 2019-05-09 ENCOUNTER — HOSPITAL ENCOUNTER (INPATIENT)
Age: 84
LOS: 15 days | Discharge: HOME HEALTH | DRG: 552 | End: 2019-05-24
Attending: PHYSICAL MEDICINE & REHABILITATION | Admitting: PHYSICAL MEDICINE & REHABILITATION
Payer: MEDICARE

## 2019-05-09 ENCOUNTER — APPOINTMENT (OUTPATIENT)
Dept: GENERAL RADIOLOGY | Age: 84
DRG: 552 | End: 2019-05-09
Payer: MEDICARE

## 2019-05-09 VITALS
BODY MASS INDEX: 26.43 KG/M2 | DIASTOLIC BLOOD PRESSURE: 61 MMHG | OXYGEN SATURATION: 100 % | HEIGHT: 61 IN | HEART RATE: 65 BPM | WEIGHT: 140 LBS | SYSTOLIC BLOOD PRESSURE: 140 MMHG | RESPIRATION RATE: 16 BRPM | TEMPERATURE: 97.7 F

## 2019-05-09 DIAGNOSIS — M15.9 PRIMARY OSTEOARTHRITIS INVOLVING MULTIPLE JOINTS: ICD-10-CM

## 2019-05-09 DIAGNOSIS — Z96.642 HISTORY OF TOTAL LEFT HIP REPLACEMENT: ICD-10-CM

## 2019-05-09 DIAGNOSIS — I73.9 CLAUDICATION (HCC): ICD-10-CM

## 2019-05-09 DIAGNOSIS — M48.061 SPINAL STENOSIS OF LUMBAR REGION WITHOUT NEUROGENIC CLAUDICATION: Primary | ICD-10-CM

## 2019-05-09 DIAGNOSIS — M79.605 ACUTE LEG PAIN, LEFT: ICD-10-CM

## 2019-05-09 PROBLEM — Z96.641 HISTORY OF TOTAL HIP REPLACEMENT, RIGHT: Status: ACTIVE | Noted: 2019-05-09

## 2019-05-09 PROBLEM — R27.0 ATAXIA: Status: ACTIVE | Noted: 2019-05-09

## 2019-05-09 PROBLEM — R26.9 GAIT ABNORMALITY: Status: ACTIVE | Noted: 2019-05-09

## 2019-05-09 PROBLEM — Z98.890: Status: ACTIVE | Noted: 2019-05-09

## 2019-05-09 PROBLEM — R26.2 AMBULATORY DYSFUNCTION: Status: ACTIVE | Noted: 2019-05-09

## 2019-05-09 PROBLEM — E11.39 TYPE 2 DIABETES MELLITUS WITH OPHTHALMIC COMPLICATION, WITHOUT LONG-TERM CURRENT USE OF INSULIN (HCC): Chronic | Status: ACTIVE | Noted: 2019-05-09

## 2019-05-09 PROBLEM — H35.30 MACULAR DEGENERATION OF LEFT EYE: Status: ACTIVE | Noted: 2019-05-09

## 2019-05-09 PROBLEM — E11.39 TYPE 2 DIABETES MELLITUS WITH OPHTHALMIC COMPLICATION, WITHOUT LONG-TERM CURRENT USE OF INSULIN (HCC): Status: ACTIVE | Noted: 2019-05-09

## 2019-05-09 PROBLEM — R53.1 WEAKNESS: Status: ACTIVE | Noted: 2019-05-09

## 2019-05-09 PROBLEM — Z98.890 H/O BRAIN SURGERY: Status: ACTIVE | Noted: 2019-05-09

## 2019-05-09 PROBLEM — M19.90 DJD (DEGENERATIVE JOINT DISEASE): Status: ACTIVE | Noted: 2019-05-09

## 2019-05-09 PROBLEM — Z98.890 S/P KYPHOPLASTY: Status: ACTIVE | Noted: 2019-05-09

## 2019-05-09 PROBLEM — H40.9 GLAUCOMA: Status: ACTIVE | Noted: 2019-05-09

## 2019-05-09 LAB
ALBUMIN SERPL-MCNC: 4.2 G/DL (ref 3.5–4.6)
ALP BLD-CCNC: 143 U/L (ref 40–130)
ALT SERPL-CCNC: 18 U/L (ref 0–33)
ANION GAP SERPL CALCULATED.3IONS-SCNC: 12 MEQ/L (ref 9–15)
ANION GAP SERPL CALCULATED.3IONS-SCNC: 12 MEQ/L (ref 9–15)
AST SERPL-CCNC: 15 U/L (ref 0–35)
BACTERIA: ABNORMAL /HPF
BASOPHILS ABSOLUTE: 0.1 K/UL (ref 0–0.2)
BASOPHILS RELATIVE PERCENT: 0.6 %
BILIRUB SERPL-MCNC: <0.2 MG/DL (ref 0.2–0.7)
BILIRUBIN URINE: NEGATIVE
BLOOD, URINE: NEGATIVE
BUN BLDV-MCNC: 17 MG/DL (ref 8–23)
BUN BLDV-MCNC: 19 MG/DL (ref 8–23)
CALCIUM SERPL-MCNC: 9.4 MG/DL (ref 8.5–9.9)
CALCIUM SERPL-MCNC: 9.9 MG/DL (ref 8.5–9.9)
CHLORIDE BLD-SCNC: 95 MEQ/L (ref 95–107)
CHLORIDE BLD-SCNC: 97 MEQ/L (ref 95–107)
CLARITY: CLEAR
CO2: 24 MEQ/L (ref 20–31)
CO2: 24 MEQ/L (ref 20–31)
COLOR: YELLOW
CREAT SERPL-MCNC: 0.55 MG/DL (ref 0.5–0.9)
CREAT SERPL-MCNC: 0.66 MG/DL (ref 0.5–0.9)
EOSINOPHILS ABSOLUTE: 0.1 K/UL (ref 0–0.7)
EOSINOPHILS RELATIVE PERCENT: 1.5 %
EPITHELIAL CELLS, UA: ABNORMAL /HPF (ref 0–5)
GFR AFRICAN AMERICAN: >60
GFR AFRICAN AMERICAN: >60
GFR NON-AFRICAN AMERICAN: >60
GFR NON-AFRICAN AMERICAN: >60
GLOBULIN: 2.5 G/DL (ref 2.3–3.5)
GLUCOSE BLD-MCNC: 105 MG/DL (ref 70–99)
GLUCOSE BLD-MCNC: 114 MG/DL (ref 70–99)
GLUCOSE URINE: NEGATIVE MG/DL
HCT VFR BLD CALC: 36.6 % (ref 37–47)
HCT VFR BLD CALC: 39.6 % (ref 37–47)
HEMOGLOBIN: 12.1 G/DL (ref 12–16)
HEMOGLOBIN: 13.3 G/DL (ref 12–16)
HYALINE CASTS: ABNORMAL /HPF (ref 0–5)
KETONES, URINE: NEGATIVE MG/DL
LEUKOCYTE ESTERASE, URINE: ABNORMAL
LYMPHOCYTES ABSOLUTE: 1.9 K/UL (ref 1–4.8)
LYMPHOCYTES RELATIVE PERCENT: 19.9 %
MCH RBC QN AUTO: 30.8 PG (ref 27–31.3)
MCH RBC QN AUTO: 31 PG (ref 27–31.3)
MCHC RBC AUTO-ENTMCNC: 33.1 % (ref 33–37)
MCHC RBC AUTO-ENTMCNC: 33.5 % (ref 33–37)
MCV RBC AUTO: 91.8 FL (ref 82–100)
MCV RBC AUTO: 93.4 FL (ref 82–100)
MONOCYTES ABSOLUTE: 0.7 K/UL (ref 0.2–0.8)
MONOCYTES RELATIVE PERCENT: 7.4 %
NEUTROPHILS ABSOLUTE: 6.6 K/UL (ref 1.4–6.5)
NEUTROPHILS RELATIVE PERCENT: 70.6 %
NITRITE, URINE: NEGATIVE
PDW BLD-RTO: 13.1 % (ref 11.5–14.5)
PDW BLD-RTO: 13.1 % (ref 11.5–14.5)
PH UA: 5.5 (ref 5–9)
PLATELET # BLD: 225 K/UL (ref 130–400)
PLATELET # BLD: 232 K/UL (ref 130–400)
POTASSIUM REFLEX MAGNESIUM: 4.2 MEQ/L (ref 3.4–4.9)
POTASSIUM SERPL-SCNC: 4.4 MEQ/L (ref 3.4–4.9)
PROTEIN UA: NEGATIVE MG/DL
RBC # BLD: 3.92 M/UL (ref 4.2–5.4)
RBC # BLD: 4.32 M/UL (ref 4.2–5.4)
RBC UA: ABNORMAL /HPF (ref 0–5)
SODIUM BLD-SCNC: 131 MEQ/L (ref 135–144)
SODIUM BLD-SCNC: 133 MEQ/L (ref 135–144)
SPECIFIC GRAVITY UA: 1.05 (ref 1–1.03)
TOTAL PROTEIN: 6.7 G/DL (ref 6.3–8)
URINE REFLEX TO CULTURE: YES
UROBILINOGEN, URINE: 1 E.U./DL
WBC # BLD: 9.4 K/UL (ref 4.8–10.8)
WBC # BLD: 9.7 K/UL (ref 4.8–10.8)
WBC UA: ABNORMAL /HPF (ref 0–5)

## 2019-05-09 PROCEDURE — 6370000000 HC RX 637 (ALT 250 FOR IP): Performed by: INTERNAL MEDICINE

## 2019-05-09 PROCEDURE — G0378 HOSPITAL OBSERVATION PER HR: HCPCS

## 2019-05-09 PROCEDURE — 6360000002 HC RX W HCPCS: Performed by: PHYSICAL MEDICINE & REHABILITATION

## 2019-05-09 PROCEDURE — 87186 SC STD MICRODIL/AGAR DIL: CPT

## 2019-05-09 PROCEDURE — 96376 TX/PRO/DX INJ SAME DRUG ADON: CPT

## 2019-05-09 PROCEDURE — 72193 CT PELVIS W/DYE: CPT

## 2019-05-09 PROCEDURE — 1180000000 HC REHAB R&B

## 2019-05-09 PROCEDURE — 6370000000 HC RX 637 (ALT 250 FOR IP): Performed by: HOSPITALIST

## 2019-05-09 PROCEDURE — 36415 COLL VENOUS BLD VENIPUNCTURE: CPT

## 2019-05-09 PROCEDURE — 6360000004 HC RX CONTRAST MEDICATION: Performed by: INTERNAL MEDICINE

## 2019-05-09 PROCEDURE — 85027 COMPLETE CBC AUTOMATED: CPT

## 2019-05-09 PROCEDURE — 2580000003 HC RX 258: Performed by: HOSPITALIST

## 2019-05-09 PROCEDURE — 97166 OT EVAL MOD COMPLEX 45 MIN: CPT

## 2019-05-09 PROCEDURE — 6360000002 HC RX W HCPCS: Performed by: HOSPITALIST

## 2019-05-09 PROCEDURE — 71045 X-RAY EXAM CHEST 1 VIEW: CPT

## 2019-05-09 PROCEDURE — 6370000000 HC RX 637 (ALT 250 FOR IP): Performed by: PHYSICAL MEDICINE & REHABILITATION

## 2019-05-09 PROCEDURE — 96372 THER/PROPH/DIAG INJ SC/IM: CPT

## 2019-05-09 PROCEDURE — 87077 CULTURE AEROBIC IDENTIFY: CPT

## 2019-05-09 PROCEDURE — 80048 BASIC METABOLIC PNL TOTAL CA: CPT

## 2019-05-09 PROCEDURE — 81001 URINALYSIS AUTO W/SCOPE: CPT

## 2019-05-09 PROCEDURE — 87086 URINE CULTURE/COLONY COUNT: CPT

## 2019-05-09 PROCEDURE — 97162 PT EVAL MOD COMPLEX 30 MIN: CPT

## 2019-05-09 RX ORDER — PANTOPRAZOLE SODIUM 40 MG/1
40 TABLET, DELAYED RELEASE ORAL
Status: DISCONTINUED | OUTPATIENT
Start: 2019-05-09 | End: 2019-05-09 | Stop reason: HOSPADM

## 2019-05-09 RX ORDER — SODIUM CHLORIDE 0.9 % (FLUSH) 0.9 %
10 SYRINGE (ML) INJECTION PRN
Status: DISCONTINUED | OUTPATIENT
Start: 2019-05-09 | End: 2019-05-24 | Stop reason: HOSPADM

## 2019-05-09 RX ORDER — KETOROLAC TROMETHAMINE 15 MG/ML
15 INJECTION, SOLUTION INTRAMUSCULAR; INTRAVENOUS EVERY 6 HOURS PRN
Status: DISCONTINUED | OUTPATIENT
Start: 2019-05-09 | End: 2019-05-09 | Stop reason: HOSPADM

## 2019-05-09 RX ORDER — PANTOPRAZOLE SODIUM 40 MG/1
40 TABLET, DELAYED RELEASE ORAL
Status: CANCELLED | OUTPATIENT
Start: 2019-05-10

## 2019-05-09 RX ORDER — SODIUM CHLORIDE 0.9 % (FLUSH) 0.9 %
10 SYRINGE (ML) INJECTION PRN
Status: CANCELLED | OUTPATIENT
Start: 2019-05-09

## 2019-05-09 RX ORDER — ASPIRIN 81 MG/1
81 TABLET ORAL DAILY
Status: CANCELLED | OUTPATIENT
Start: 2019-05-10

## 2019-05-09 RX ORDER — SODIUM CHLORIDE 0.9 % (FLUSH) 0.9 %
10 SYRINGE (ML) INJECTION EVERY 12 HOURS SCHEDULED
Status: DISCONTINUED | OUTPATIENT
Start: 2019-05-09 | End: 2019-05-14

## 2019-05-09 RX ORDER — ASPIRIN 81 MG/1
81 TABLET ORAL DAILY
Status: DISCONTINUED | OUTPATIENT
Start: 2019-05-09 | End: 2019-05-09 | Stop reason: HOSPADM

## 2019-05-09 RX ORDER — ASPIRIN 81 MG/1
81 TABLET ORAL DAILY
Status: DISCONTINUED | OUTPATIENT
Start: 2019-05-10 | End: 2019-05-24 | Stop reason: HOSPADM

## 2019-05-09 RX ORDER — ONDANSETRON 2 MG/ML
4 INJECTION INTRAMUSCULAR; INTRAVENOUS EVERY 6 HOURS PRN
Status: DISCONTINUED | OUTPATIENT
Start: 2019-05-09 | End: 2019-05-24 | Stop reason: HOSPADM

## 2019-05-09 RX ORDER — SODIUM CHLORIDE 0.9 % (FLUSH) 0.9 %
10 SYRINGE (ML) INJECTION PRN
Status: DISCONTINUED | OUTPATIENT
Start: 2019-05-09 | End: 2019-05-09 | Stop reason: HOSPADM

## 2019-05-09 RX ORDER — SODIUM CHLORIDE 0.9 % (FLUSH) 0.9 %
10 SYRINGE (ML) INJECTION EVERY 12 HOURS SCHEDULED
Status: DISCONTINUED | OUTPATIENT
Start: 2019-05-09 | End: 2019-05-09 | Stop reason: HOSPADM

## 2019-05-09 RX ORDER — ONDANSETRON 2 MG/ML
4 INJECTION INTRAMUSCULAR; INTRAVENOUS EVERY 6 HOURS PRN
Status: CANCELLED | OUTPATIENT
Start: 2019-05-09

## 2019-05-09 RX ORDER — DOCUSATE SODIUM 100 MG/1
100 CAPSULE, LIQUID FILLED ORAL DAILY
Status: DISCONTINUED | OUTPATIENT
Start: 2019-05-10 | End: 2019-05-24 | Stop reason: HOSPADM

## 2019-05-09 RX ORDER — DOCUSATE SODIUM 100 MG/1
100 CAPSULE, LIQUID FILLED ORAL DAILY
Status: DISCONTINUED | OUTPATIENT
Start: 2019-05-09 | End: 2019-05-09 | Stop reason: HOSPADM

## 2019-05-09 RX ORDER — DOCUSATE SODIUM 100 MG/1
100 CAPSULE, LIQUID FILLED ORAL DAILY
Status: CANCELLED | OUTPATIENT
Start: 2019-05-10

## 2019-05-09 RX ORDER — PANTOPRAZOLE SODIUM 40 MG/1
40 TABLET, DELAYED RELEASE ORAL
Status: DISCONTINUED | OUTPATIENT
Start: 2019-05-10 | End: 2019-05-24 | Stop reason: HOSPADM

## 2019-05-09 RX ORDER — ONDANSETRON 2 MG/ML
4 INJECTION INTRAMUSCULAR; INTRAVENOUS EVERY 6 HOURS PRN
Status: DISCONTINUED | OUTPATIENT
Start: 2019-05-09 | End: 2019-05-09 | Stop reason: HOSPADM

## 2019-05-09 RX ORDER — KETOROLAC TROMETHAMINE 15 MG/ML
15 INJECTION, SOLUTION INTRAMUSCULAR; INTRAVENOUS EVERY 6 HOURS PRN
Status: DISCONTINUED | OUTPATIENT
Start: 2019-05-09 | End: 2019-05-13

## 2019-05-09 RX ORDER — LIDOCAINE 4 G/G
3 PATCH TOPICAL DAILY
Status: DISCONTINUED | OUTPATIENT
Start: 2019-05-09 | End: 2019-05-24 | Stop reason: HOSPADM

## 2019-05-09 RX ORDER — ACETAMINOPHEN 325 MG/1
650 TABLET ORAL EVERY 4 HOURS PRN
Status: DISCONTINUED | OUTPATIENT
Start: 2019-05-09 | End: 2019-05-24 | Stop reason: HOSPADM

## 2019-05-09 RX ORDER — ACETAMINOPHEN 325 MG/1
650 TABLET ORAL EVERY 4 HOURS PRN
Status: CANCELLED | OUTPATIENT
Start: 2019-05-09

## 2019-05-09 RX ORDER — FAMOTIDINE 20 MG/1
20 TABLET, FILM COATED ORAL 2 TIMES DAILY
Status: CANCELLED | OUTPATIENT
Start: 2019-05-09

## 2019-05-09 RX ORDER — KETOROLAC TROMETHAMINE 15 MG/ML
15 INJECTION, SOLUTION INTRAMUSCULAR; INTRAVENOUS EVERY 6 HOURS PRN
Status: CANCELLED | OUTPATIENT
Start: 2019-05-09 | End: 2019-05-14

## 2019-05-09 RX ORDER — ACETAMINOPHEN 325 MG/1
650 TABLET ORAL EVERY 4 HOURS PRN
Status: DISCONTINUED | OUTPATIENT
Start: 2019-05-09 | End: 2019-05-09 | Stop reason: HOSPADM

## 2019-05-09 RX ORDER — ERGOCALCIFEROL 1.25 MG/1
50000 CAPSULE ORAL DAILY
Status: COMPLETED | OUTPATIENT
Start: 2019-05-09 | End: 2019-05-11

## 2019-05-09 RX ORDER — SODIUM CHLORIDE 0.9 % (FLUSH) 0.9 %
10 SYRINGE (ML) INJECTION EVERY 12 HOURS SCHEDULED
Status: CANCELLED | OUTPATIENT
Start: 2019-05-09

## 2019-05-09 RX ORDER — CYANOCOBALAMIN 1000 UG/ML
1000 INJECTION INTRAMUSCULAR; SUBCUTANEOUS WEEKLY
Status: DISCONTINUED | OUTPATIENT
Start: 2019-05-09 | End: 2019-05-24 | Stop reason: HOSPADM

## 2019-05-09 RX ADMIN — IOPAMIDOL 100 ML: 755 INJECTION, SOLUTION INTRAVENOUS at 09:59

## 2019-05-09 RX ADMIN — ASPIRIN 81 MG: 81 TABLET, COATED ORAL at 09:25

## 2019-05-09 RX ADMIN — DOCUSATE SODIUM 100 MG: 100 CAPSULE, LIQUID FILLED ORAL at 09:25

## 2019-05-09 RX ADMIN — ERGOCALCIFEROL 50000 UNITS: 1.25 CAPSULE ORAL at 18:09

## 2019-05-09 RX ADMIN — Medication 10 ML: at 09:25

## 2019-05-09 RX ADMIN — ACETAMINOPHEN 650 MG: 325 TABLET ORAL at 20:13

## 2019-05-09 RX ADMIN — CYANOCOBALAMIN 1000 MCG: 1000 INJECTION INTRAMUSCULAR; SUBCUTANEOUS at 18:09

## 2019-05-09 RX ADMIN — PANTOPRAZOLE SODIUM 40 MG: 40 TABLET, DELAYED RELEASE ORAL at 06:16

## 2019-05-09 RX ADMIN — ACETAMINOPHEN 650 MG: 325 TABLET ORAL at 06:16

## 2019-05-09 RX ADMIN — KETOROLAC TROMETHAMINE 15 MG: 15 INJECTION, SOLUTION INTRAMUSCULAR; INTRAVENOUS at 05:12

## 2019-05-09 RX ADMIN — ENOXAPARIN SODIUM 40 MG: 40 INJECTION SUBCUTANEOUS at 09:25

## 2019-05-09 ASSESSMENT — PAIN SCALES - GENERAL
PAINLEVEL_OUTOF10: 2
PAINLEVEL_OUTOF10: 7
PAINLEVEL_OUTOF10: 7
PAINLEVEL_OUTOF10: 0
PAINLEVEL_OUTOF10: 7
PAINLEVEL_OUTOF10: 0
PAINLEVEL_OUTOF10: 7
PAINLEVEL_OUTOF10: 2

## 2019-05-09 ASSESSMENT — ENCOUNTER SYMPTOMS
CONSTIPATION: 1
BLOOD IN STOOL: 0
VOMITING: 0
SHORTNESS OF BREATH: 0
VOMITING: 0
BACK PAIN: 1
EYE REDNESS: 0
SHORTNESS OF BREATH: 1
NAUSEA: 0
WHEEZING: 0
NAUSEA: 0
STRIDOR: 0
DIARRHEA: 0
COUGH: 1
PHOTOPHOBIA: 0
ABDOMINAL PAIN: 0
SORE THROAT: 0
EYE PAIN: 0
COUGH: 0

## 2019-05-09 ASSESSMENT — PAIN DESCRIPTION - LOCATION
LOCATION: HIP

## 2019-05-09 ASSESSMENT — PAIN DESCRIPTION - ORIENTATION
ORIENTATION: RIGHT

## 2019-05-09 ASSESSMENT — PAIN DESCRIPTION - PAIN TYPE
TYPE: ACUTE PAIN
TYPE: ACUTE PAIN

## 2019-05-09 ASSESSMENT — PAIN DESCRIPTION - DESCRIPTORS: DESCRIPTORS: ACHING;SORE

## 2019-05-09 NOTE — PROGRESS NOTES
Physical Therapy Med Surg Initial Assessment  Facility/Department: [facility]  Room: 23/[place] [address]       NAME: Arcelia Márquez  : 1918 (89 y.o.)  MRN: [id number]  CODE STATUS: Full Code    Date of Service: 2019    Patient Diagnosis(es): Ambulatory dysfunction [R26.2]   Chief Complaint   Patient presents with    Hip Pain     right     Patient Active Problem List    Diagnosis Date Noted    Ambulatory dysfunction 2019    Type 2 diabetes mellitus with ophthalmic complication, without long-term current use of insulin ([facility] 2019    Hyponatremia 2019    Gastroesophageal reflux disease without esophagitis 04/15/2019    Claudication ([facility] 2019    Lumbar stenosis     DM (diabetes mellitus), type 2 with peripheral vascular complications ([facility]     Nuclear senile cataract 2017    Nonexudative age-related macular degeneration 2017    Primary open angle glaucoma 2017    H/O total hip arthroplasty     Impaired mobility and activities of daily living 2014    Acute leg pain, left 2014    Acute right hip pain 2014    Spinal stenosis of lumbar region 2012    Stress incontinence 10/28/2011    Hearing loss 10/28/2011    PVD (peripheral vascular disease) ([facility] 10/28/2006        Past Medical History:   Diagnosis Date    Compression fracture of T12 vertebra [facility]) 2013    kyphoplasty Dr Kurt Willis    DJD (degenerative joint disease) of hip 3/20/2014    Dr Kary Brown DM (diabetes mellitus), type 2 with peripheral vascular complications (HCC)     diet controlled    GERD (gastroesophageal reflux disease) 8/31/10    Glaucoma     bilateral    Lower leg edema     Lumbar stenosis     Macular degeneration     left eye    Pain of left scapula 3/25/2019    PVD (peripheral vascular disease) ([facility] 10/28/2006    Dr Emily Loo, stents LE    Stress incontinence 10/28/2011    Thoracic back pain 2014    MILD COMPRESSION FRACTURE OF T12 WITHOUT CANAL in function and at increased risk for falls. Continue PT to address impairments and return to PLOF.     REQUIRES PT FOLLOW UP: Yes      PLAN OF CARE:  Plan  Times per week: 5-7  Times per day: Daily  Current Treatment Recommendations: Strengthening, Functional Mobility Training, Neuromuscular Re-education, Equipment Evaluation, Education, & procurement, Transfer Training, Gait Training, Safety Education & Training, Balance Training, Endurance Training, Stair training, Modalities, Manual Therapy - Soft Tissue Mobilization, Pain Management, Home Exercise Program  Safety Devices  Type of devices: Call light within reach, Chair alarm in place    Goals:  Patient goals : agreeable to PT POC  Long term goals  Long term goal 1: indep with bed mobility  Long term goal 2: indep with transfers  Long term goal 3: pt to ambulate 50 ft FWW vs RW modified indep  Long term goal 4: pt to navigate 4 steps with supervision    [facility] ([address] [place] [address]) Sahil Lynne 28 Inpatient Mobility Raw Score : 16     Therapy Time:   Individual   Time In 0847   Time Out 0903   Minutes [address] [place] [address], [address] [facility], 05/09/19 at 9:17 AM

## 2019-05-09 NOTE — ED TRIAGE NOTES
Pt staes right hip pain x 6 hours after elevating from chair. Pt denies any fall/injury. H/O right hip replacement. Pt states pain with movement. MSP's intact. No shortening or rotation noted.

## 2019-05-09 NOTE — ED PROVIDER NOTES
TABLET    Take by mouth    LATANOPROST (XALATAN) 0.005 % OPHTHALMIC SOLUTION    Place 1 drop into the left eye nightly    LUTEIN PO    Take by mouth    MAGNESIUM HYDROXIDE (MILK OF MAGNESIA) 400 MG/5ML SUSPENSION    Take 30 mLs by mouth as needed for Constipation    MULTIPLE VITAMIN (MULTI VITAMIN DAILY) TABS    Take 1 tablet by mouth every morning    OMEPRAZOLE (PRILOSEC) 20 MG DELAYED RELEASE CAPSULE    TAKE ONE CAPSULE BY MOUTH EVERY DAY    SIMVASTATIN (ZOCOR) 20 MG TABLET    TAKE 1 TABLET BY MOUTH EVERY DAY AT NIGHT    TIMOLOL (TIMOPTIC-XE) 0.5 % OPHTHALMIC GEL-FORMING    USE 1 DROP IN BOTH EYES TWICE DAILY. VITAMIN D (CHOLECALCIFEROL) 400 UNITS TABS TABLET    Take 400 Units by mouth daily       ALLERGIES     Patient has no known allergies.     FAMILY HISTORY       Family History   Problem Relation Age of Onset    Heart Failure Mother         dec age 77    Hypertension Mother     Diabetes Father         dec age 67          SOCIAL HISTORY       Social History     Socioeconomic History    Marital status: Single     Spouse name: None    Number of children: 0    Years of education: None    Highest education level: None   Occupational History    Occupation: retired [employer]   Social Needs    Financial resource strain: None    Food insecurity:     Worry: None     Inability: None    Transportation needs:     Medical: None     Non-medical: None   Tobacco Use    Smoking status: Former Smoker     Types: Cigarettes     Last attempt to quit: 1980     Years since quittin.9    Smokeless tobacco: Never Used   Substance and Sexual Activity    Alcohol use: No     Alcohol/week: 0.0 oz    Drug use: No    Sexual activity: Not Currently     Partners: Male   Lifestyle    Physical activity:     Days per week: None     Minutes per session: None    Stress: None   Relationships    Social connections:     Talks on phone: None     Gets together: None     Attends [employer] service: None     Active member of

## 2019-05-09 NOTE — CONSULTS
Physical Medicine & Rehabilitation  Consult Note      Admitting Physician: Breann Carmona DO    Primary Care Provider: Kristen Crespo MD     Reason for Consult:  Asses rehab needs,promote physical and mental function, and decrease likelihood of re-admit to the hospital after discharge. History of Present Illness:    Kvng Blunt is a 80 y.o. female admitted to [facility] on 5/9/2019. Patient was admitted through the emergency room after complaining of right hip pain ataxia and a productive cough. Hip Pain    The incident occurred 3 to 5 days ago. The incident occurred at home. There was no injury mechanism. The pain is present in the right thigh and right hip. The pain is at a severity of 8/10. The pain is severe. The pain has been fluctuating since onset. She reports no foreign bodies present. The symptoms are aggravated by movement, palpation and weight bearing. She has tried acetaminophen for the symptoms. The treatment provided mild relief. Their inpatient work up has included:    Imaging:    Imaging and other studies reviewed and discussed with patient and staff    Xr Hip Right   5/9/2019   EXAMINATION: RIGHT  HIP, TWO VIEWS CLINICAL HISTORY: Right hip pain for 6 hours COMPARISONS: None available TECHNIQUE: AP film of the pelvis to include both hips and lateral views of the right  hip were obtained. FINDINGS: Decreased bone mineralization. Postsurgical changes of right total hip arthroplasty. No periprosthetic lucency or fracture. No acute displaced fracture of the pelvis. Degenerative changes of the sacroiliac joints, lower lumbar spine, and left hip. Vascular stent noted within the proximal right lower extremity soft tissues. No acute osseous abnormality. Ct Pelvis W  5/9/2019  CT of the pelvis with intravenous contrast medium History:  Right groin pain. No history of trauma.  Technical Factors: CT imaging of the pelvis were

## 2019-05-10 ENCOUNTER — PROCEDURE VISIT (OUTPATIENT)
Dept: PHYSICAL MEDICINE AND REHAB | Age: 84
End: 2019-05-10
Payer: MEDICARE

## 2019-05-10 ENCOUNTER — CARE COORDINATION (OUTPATIENT)
Dept: CASE MANAGEMENT | Age: 84
End: 2019-05-10

## 2019-05-10 DIAGNOSIS — M79.7 FIBROMYALGIA: Primary | ICD-10-CM

## 2019-05-10 PROCEDURE — 97530 THERAPEUTIC ACTIVITIES: CPT

## 2019-05-10 PROCEDURE — 1180000000 HC REHAB R&B

## 2019-05-10 PROCEDURE — 97166 OT EVAL MOD COMPLEX 45 MIN: CPT

## 2019-05-10 PROCEDURE — 64445 NJX AA&/STRD SCIATIC NRV IMG: CPT | Performed by: PHYSICAL MEDICINE & REHABILITATION

## 2019-05-10 PROCEDURE — 97535 SELF CARE MNGMENT TRAINING: CPT

## 2019-05-10 PROCEDURE — 20611 DRAIN/INJ JOINT/BURSA W/US: CPT | Performed by: PHYSICAL MEDICINE & REHABILITATION

## 2019-05-10 PROCEDURE — 97110 THERAPEUTIC EXERCISES: CPT

## 2019-05-10 PROCEDURE — 99223 1ST HOSP IP/OBS HIGH 75: CPT | Performed by: PHYSICAL MEDICINE & REHABILITATION

## 2019-05-10 PROCEDURE — 6360000002 HC RX W HCPCS: Performed by: INTERNAL MEDICINE

## 2019-05-10 PROCEDURE — 6370000000 HC RX 637 (ALT 250 FOR IP): Performed by: PHYSICAL MEDICINE & REHABILITATION

## 2019-05-10 PROCEDURE — 97116 GAIT TRAINING THERAPY: CPT

## 2019-05-10 PROCEDURE — 97162 PT EVAL MOD COMPLEX 30 MIN: CPT

## 2019-05-10 PROCEDURE — 6370000000 HC RX 637 (ALT 250 FOR IP): Performed by: INTERNAL MEDICINE

## 2019-05-10 RX ORDER — LIDOCAINE HYDROCHLORIDE 20 MG/ML
5 INJECTION, SOLUTION INFILTRATION; PERINEURAL ONCE
Status: DISCONTINUED | OUTPATIENT
Start: 2019-05-10 | End: 2019-05-10 | Stop reason: HOSPADM

## 2019-05-10 RX ORDER — HYDROCODONE BITARTRATE AND ACETAMINOPHEN 5; 325 MG/1; MG/1
1 TABLET ORAL EVERY 4 HOURS PRN
Status: DISCONTINUED | OUTPATIENT
Start: 2019-05-10 | End: 2019-05-14

## 2019-05-10 RX ORDER — PREDNISONE 20 MG/1
20 TABLET ORAL DAILY
Status: COMPLETED | OUTPATIENT
Start: 2019-05-10 | End: 2019-05-14

## 2019-05-10 RX ORDER — NITROFURANTOIN 25; 75 MG/1; MG/1
100 CAPSULE ORAL EVERY 12 HOURS SCHEDULED
Status: DISCONTINUED | OUTPATIENT
Start: 2019-05-10 | End: 2019-05-24

## 2019-05-10 RX ORDER — LIDOCAINE HYDROCHLORIDE 10 MG/ML
20 INJECTION, SOLUTION INFILTRATION; PERINEURAL ONCE
Status: DISCONTINUED | OUTPATIENT
Start: 2019-05-10 | End: 2019-05-10 | Stop reason: HOSPADM

## 2019-05-10 RX ORDER — AZITHROMYCIN 250 MG/1
250 TABLET, FILM COATED ORAL DAILY
Status: COMPLETED | OUTPATIENT
Start: 2019-05-10 | End: 2019-05-14

## 2019-05-10 RX ADMIN — ERGOCALCIFEROL 50000 UNITS: 1.25 CAPSULE ORAL at 08:59

## 2019-05-10 RX ADMIN — DOCUSATE SODIUM 100 MG: 100 CAPSULE, LIQUID FILLED ORAL at 08:59

## 2019-05-10 RX ADMIN — PREDNISONE 20 MG: 20 TABLET ORAL at 14:38

## 2019-05-10 RX ADMIN — HYDROCODONE BITARTRATE AND ACETAMINOPHEN 1 TABLET: 5; 325 TABLET ORAL at 21:15

## 2019-05-10 RX ADMIN — ASPIRIN 81 MG: 81 TABLET, COATED ORAL at 08:59

## 2019-05-10 RX ADMIN — ACETAMINOPHEN 650 MG: 325 TABLET ORAL at 00:50

## 2019-05-10 RX ADMIN — PANTOPRAZOLE SODIUM 40 MG: 40 TABLET, DELAYED RELEASE ORAL at 06:45

## 2019-05-10 RX ADMIN — KETOROLAC TROMETHAMINE 15 MG: 15 INJECTION, SOLUTION INTRAMUSCULAR; INTRAVENOUS at 01:09

## 2019-05-10 RX ADMIN — NITROFURANTOIN (MONOHYDRATE/MACROCRYSTALS) 100 MG: 75; 25 CAPSULE ORAL at 21:15

## 2019-05-10 RX ADMIN — NITROFURANTOIN (MONOHYDRATE/MACROCRYSTALS) 100 MG: 75; 25 CAPSULE ORAL at 11:38

## 2019-05-10 RX ADMIN — LIDOCAINE HYDROCHLORIDE 5 ML: 20 INJECTION, SOLUTION INFILTRATION; PERINEURAL at 13:43

## 2019-05-10 RX ADMIN — LIDOCAINE HYDROCHLORIDE 20 ML: 10 INJECTION, SOLUTION INFILTRATION; PERINEURAL at 13:41

## 2019-05-10 RX ADMIN — AZITHROMYCIN 250 MG: 250 TABLET, FILM COATED ORAL at 14:38

## 2019-05-10 ASSESSMENT — ENCOUNTER SYMPTOMS
WHEEZING: 0
BLOOD IN STOOL: 0
DIARRHEA: 0
EYE PAIN: 0
PHOTOPHOBIA: 0
STRIDOR: 0
NAUSEA: 0
SHORTNESS OF BREATH: 1
ABDOMINAL PAIN: 0
COUGH: 0
EYE REDNESS: 0
CONSTIPATION: 1
SORE THROAT: 0
COUGH: 1
VOMITING: 0
SHORTNESS OF BREATH: 0
BACK PAIN: 1

## 2019-05-10 ASSESSMENT — PAIN SCALES - GENERAL
PAINLEVEL_OUTOF10: 7
PAINLEVEL_OUTOF10: 4
PAINLEVEL_OUTOF10: 10
PAINLEVEL_OUTOF10: 4

## 2019-05-10 ASSESSMENT — PAIN DESCRIPTION - DESCRIPTORS: DESCRIPTORS: ACHING;DISCOMFORT

## 2019-05-10 ASSESSMENT — PAIN DESCRIPTION - ORIENTATION: ORIENTATION: RIGHT

## 2019-05-10 ASSESSMENT — PAIN DESCRIPTION - LOCATION: LOCATION: HIP

## 2019-05-10 ASSESSMENT — PAIN DESCRIPTION - PAIN TYPE: TYPE: ACUTE PAIN

## 2019-05-10 NOTE — PROGRESS NOTES
Occupational Therapy   Occupational Therapy Initial Assessment  Date: 5/10/2019   Patient Name: Mauricio Sheehan  MRN: [id number]     : 1918    Date of Service: 5/10/2019     Transfer care to lead OTR    Discharge Recommendations:  Continue to assess pending progress       Assessment   Performance deficits / Impairments: Decreased functional mobility ; Decreased ADL status; Decreased strength;Decreased safe awareness;Decreased balance;Decreased fine motor control;Decreased endurance  Assessment: Pt is a 80 y.o female with the above mentioned deficits. Pt lives alone but has caregiver at home who assists pt during bathing (assist dresssing PRN for time convenience. Caregiver completes I/ADL's. Pt would benefit from skilled OT to increase safety and performance with ADL's to highest level of IND. Prognosis: Good  Decision Making: Medium Complexity  History: Multi comorb   Exam: 7 perf imp   Assistance / Modification: Mod A  REQUIRES OT FOLLOW UP: Yes  Activity Tolerance  Activity Tolerance: Patient Tolerated treatment well  Safety Devices  Safety Devices in place: Yes  Type of devices: All fall risk precautions in place           Patient Diagnosis(es): There were no encounter diagnoses. has a past medical history of Compression fracture of T12 vertebra ([address] DJD (degenerative joint disease) of hip, DM (diabetes mellitus), type 2 with peripheral vascular complications ([address] GERD (gastroesophageal reflux disease), Glaucoma, Lower leg edema, Lumbar stenosis, Macular degeneration, Pain of left scapula, PVD (peripheral vascular disease) ([address] Stress incontinence, and Thoracic back pain. has a past surgical history that includes Refractive surgery ([id number]); Appendectomy; brain surgery; Hysterectomy; vertebroplasty (); Total hip arthroplasty (Right, 9/25/15); Upper gastrointestinal endoscopy (10/07/15); Arterial bypass surgry (); and cyst removal (2016). Restrictions  Restrictions/Precautions  Restrictions/Precautions: Fall Risk    Subjective   General  Chart Reviewed: Yes  Patient assessed for rehabilitation services?: Yes  Referring Practitioner: Dr. Gregory Billings   Diagnosis: Impaired mobility 2° to flare up of DJD/O. A     Social/Functional History  Social/Functional History  Lives With: Alone  Type of Home: House  Home Layout: Two level(Main floor + basement- in basement has 'a big rec room', approx 10 steps down)  Home Access: Level entry  Bathroom Shower/Tub: Walk-in shower  Bathroom Toilet: Standard  Bathroom Equipment: Shower chair  Home Equipment: Rolling walker  Receives Help From: Personal care attendant  ADL Assistance: Needs assistance(Has assist for showering a few days a week, otherwise able to perform ADLs)  Homemaking Assistance: Needs assistance(pt reported caregiver cleans and does laundry )  Ambulation Assistance: Independent(WW)  Transfer Assistance: Independent  Active : No  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 )  Observation/Palpation  Observation: Pt eating seated in w/c no signs of distress.    Balance  Sitting Balance: Supervision  Standing Balance: Contact guard assistance  Functional Mobility  Functional - Mobility Device: 4-Wheeled Walker  Activity: To/from bathroom  Assist Level: Contact guard assistance  Toilet Transfers  Toilet - Technique: Ambulating  Equipment Used: Grab bars  Toilet Transfer: Contact guard assistance  Toilet Transfers Comments: Verbal cues to back all the way up   Shower Transfers  Shower Transfers: Not tested  [employer] Transfers Comments: Pt delined shower, agreed to bathe at sink   ADL  Feeding: Modified independent (Pt stated has dentures but dropped in toilet. Pt reported waiting for new ones. )  Grooming: Setup; Increased time to complete  UE Bathing: Increased time to complete;Stand by assistance  LE Bathing: Maximum assistance(Pt's caregiver in room and assisted to wash buttocks and BLE's)  UE Dressing: Setup; Increased time to complete  LE Dressing: Maximum assistance(Assist to don B socks, thread underpants and pants. Pt able to pull pants up with CGA. )  Toileting: Minimal assistance(to pull down brief. Pt had BM and able to complete toileting with CGA.  )  Additional Comments: Pt caregiver present. Cues to allow pt to complete tasks IND'ly. Caregiver stated this is typical routine at home. Pt stated it is quicker. Tone RUE  RUE Tone: Normotonic  Tone LUE  LUE Tone: Normotonic  Coordination  Movements Are Fluid And Coordinated: No  Coordination and Movement description: Fine motor impairments;Right UE;Left UE;Decreased speed     Bed mobility  Supine to Sit: Unable to assess  Sit to Supine: Unable to assess  Comment: Pt in w/c   Transfers  Sit to stand: Minimal assistance  Stand to sit: Contact guard assistance     Cognition  Overall Cognitive Status: Exceptions  Arousal/Alertness: Appropriate responses to stimuli  Following Commands: Follows one step commands with repetition; Follows one step commands with increased time  Attention Span: Attends with cues to redirect  Memory: Appears intact(pt recall 2/3 words IND'ly after 1 min )  Safety Judgement: Decreased awareness of need for safety  Problem Solving: Assistance required to generate solutions  Insights: Fully aware of deficits  Initiation: Requires cues for some(pt seated at sink asking \"now what\" after agreeing to bathing a sink )  Sequencing: Requires cues for some(pt wanted to put on makeup before shirt )  Cognition Comment: Comp 5  Exp 7 Social 4  Prob 5  Mem 4 Perception  Overall Perceptual Status: WFL     Sensation  Overall Sensation Status: WFL        LUE AROM (degrees)  LUE AROM : WFL  Left Hand AROM (degrees)  Left Hand AROM: WFL  RUE AROM (degrees)  RUE AROM : WFL  Right Hand AROM (degrees)  Right Hand AROM: WFL  LUE Strength  Gross LUE Strength: Exceptions to [facility]  L Hand Grasp: 3+/5  L Hand Release: 3+/5  LUE Strength Comment: 3+/5 all planes  RUE Strength  Gross RUE Strength: Exceptions to [facility]  R Hand Grasp: 3+/5  R Hand Release: 3+/5  RUE Strength Comment: 3+/5 all planes     Hand Dominance  Hand Dominance: Right          Plan   Plan  Times per week: 5-7x/wk   Plan weeks: 1-2  Current Treatment Recommendations: Strengthening, Balance Training, Functional Mobility Training, Pain Management, Safety Education & Training, Self-Care / ADL, Equipment Evaluation, Education, & procurement, Patient/Caregiver Education & Training, Positioning, Home Management Training, Endurance Training      Goals  Patient Goals   Patient goals : \"To get stronger or something. \"       [x]  Patient will complete self care as followed using the recommended adaptive equipment and/or adaptive techniques as instructed:  Feeding: Mod IND  Grooming: Mod IND  Bathing: Min A   UE Dressing: Set up   LE Dressing: Min A   Toileting: Mod IND  Toilet Transfers: Mod IND  Tub Transfers: Supervised    []  Patient will sequence self-care routine with   verbal/tactile cues. []  Patient will improve  UE sensation and/or utilize compensatory techniques for safe completion of self-care as projected. [x]  Patient will improve static and dynamic standing balance to complete pants management at Min A level     []  Patient will improve  UE Function (AROM, strength, motor control, tone normalization) to complete ADLs as projected. [x]  Patient will improve functional endurance to tolerate/complete 45 minutes of ADLs.      [x]  Patient will improve B UE strength and endurance to 4-/5 in order to participate in self-care activities as projected. [x]  Patient will improve B hand fine motor coordination to [address] + in order to manage clothing fasteners/self-care containers in a timely manner     []  Patient will improve visual perception to  in order to improve participation in self care and leisure activities     [x]  Patient will perform kitchen mobility at device level without episodes of LOB and good safety awareness      [x]  Patient will perform basic room mobility at Mod IND level. [x]  Patient will access appropriate D/C site with as few architectural barriers as possible. []  Patient and/or caregiver will demonstrate understanding of hip precautions with  verbal/tactile cues. []  Patient and/or caregiver will demonstrate understanding of energy conservation techniques with  verbal/tactile cues. [x]  Patient and/or caregiver will demonstrate understanding of recommended HEP for UE strengthening.         [x]  Patient's cognition will improve to safely perform ADLs:  Comprehension: 6  Expression: 6  Social Interaction: 5  Problem Solvin  Memory: 4        Therapy Time   Individual Concurrent Group Co-treatment   Time In 0930         Time Out 1040         Minutes 70            Variance: 10(Dr. Martinez in room, Min made up at end of session )    Electronically signed by KIARA Denson/L on 5/10/2019 at 11:36 AM  Dillan Paul OTR/ALEXIS

## 2019-05-10 NOTE — PROGRESS NOTES
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 very distractible and [place]    OBJECTIVE:   Vision/Hearing:  Vision Exceptions: Wears glasses at all times  Hearing Exceptions: Hard of hearing/hearing concerns;Bilateral hearing aid    Cognition:  Overall Orientation Status: Within Functional Limits  Follows Commands: Impaired(requires frequent reminders, verbal cues, and repetition)  Observation/Palpation  Observation: No acute distress noted. Mild edema noted R ankle. Pt hypersensitive to R hip/LE. Very distractible. ROM:  RLE General PROM: Pt reluctant to allow therapist to complete PROM of hip. Knee and ankle functional  RLE General AROM: hip flexion to 90; ER WFL; pt completes 10degrees approximately IR; active hip extension limited by pain  LLE PROM: WNL    Strength:  Strength Other  Other: Unable to follow formal MMT d/t distraction and hearing barriers. Pt demonstrates pain limiting weakness R LE requiring assist to lift LE during functional activities. Limited weight acceptance R LE during gait activities. L LE and B LE functional.    Neuro:  Sensation  Overall Sensation Status: (hypersensitive R LE)        Motor Control  Gross Motor? : (pain guarding)    Bed mobility  Bridging: Stand by assistance;Contact guard assistance  Rolling to Left: Contact guard assistance  Rolling to Right: Contact guard assistance  Supine to Sit: Moderate assistance  Sit to Supine: Moderate assistance  Comment: Pt completing 2 trials sit<>supine.  Extensive instruction

## 2019-05-10 NOTE — CARE COORDINATION
[facility] Update Call    5/10/2019    Patient: Rosanna Cruz Patient : 1918   MRN: [id number]  Reason for Admission: -[phone] [id number] Observation Ambulatory dysfunction, Right hip pain.   Discharge Date: 19 RARS: Readmission Risk Score: 7  CM: 2  PHP Plan: MSSP  PCP: Amelia Hatfield MD       Care Transitions Post Acute Facility Update    Care Transitions Interventions  Post Acute Facility:  [address] [place] [address] Acute Facility Update

## 2019-05-10 NOTE — PROGRESS NOTES
Patient was here today for Nerve block guided by U/S  injections of Right Greater Troch  Patient placed in upright position areas marked and prepped with alcohol. Sites injected with 2% 2 cc Lidocaine and 20 mg Kenalog followed by 1% 8 cc Lidocaine administered  By provider.

## 2019-05-10 NOTE — PROGRESS NOTES
(06/27/2016). Restrictions  Restrictions/Precautions  Restrictions/Precautions: Fall Risk  Subjective \"This hand and wrist are getting tired. \"  General  Chart Reviewed: Yes  Patient assessed for rehabilitation services?: Yes  Referring Practitioner: Dr. Ananya Singh   Diagnosis: Impaired mobility 2° to flare up of DJD/O. A      Orientation     Objective  Pt completed functional acts while seated at table top to increase bilateral function for task completion. Pt completed placement of marbles on 100 hole pegboard with right hand. Pt initiated removal with left UE and red graded clothes pin, but required use of right UE and finally removal with right hand secondary to left hand fatigue. Pt reports 0/10 pain during session  +                 Plan   Plan  Times per week: 5-7x/wk   Plan weeks: 1-2  Current Treatment Recommendations: Strengthening, Balance Training, Functional Mobility Training, Pain Management, Safety Education & Training, Self-Care / ADL, Equipment Evaluation, Education, & procurement, Patient/Caregiver Education & Training, Positioning, Home Management Training, Endurance Training  G-Code     OutComes Score                                                  AM-PAC Score             Goals  Patient Goals   Patient goals : \"To get stronger or something. \"        Therapy Time   Individual Concurrent Group Co-treatment   Time In 1300         Time Out 1330         Minutes [facility] KIARA Loya/L Electronically signed by KIARA Noonan/L on 4/63/78 at 3:28 PM

## 2019-05-10 NOTE — H&P
HISTORY & PHYSICAL       DATE OF ADMISSION:  5/9/2019    DATE OF SERVICE:  5/10/19    Subjective:    Alexia White, 80 y.o. female presents today with:     CHIEF COMPLAINT:   8 year old female who presented to the ER for evaluation of right hip pain. She reports right hip pain that began 2 hours prior to admission when she was going to stand up from her chair, afterwards she was unable to ambulate. In the ER x-rays were negative for acute processes. She described the pain as moderate to severe with movement. CT Pelvis 05/09/19 revealed grade one L4 spondylolisthesis. Right Hip Replacement . No fracture. Marked atherosclerosis disease with remote right superficial femoral artery stenting. On exam she has severe right sciatica as well as tenderness at the prosthesis laterally where the greater trochanter would be. The patient has been found to have severe abnormality of gait and mobility with impaired self care due to Impaired Mobility secondary to flare up of DJD/OA and is admitted to the acute inpatient rehab program.     Transcribed from pre-admission information sheet completed by Kunal Alberto RN/mdl as directed by Dr. Hayes Higuera. Hip Pain    The incident occurred 2 days ago. The incident occurred at work. There was no injury mechanism. The pain is present in the right thigh, right hip and right leg. The pain is at a severity of 10/10. The pain is severe. The pain has been intermittent since onset. Associated symptoms include an inability to bear weight and a loss of motion. Pertinent negatives include no loss of sensation, muscle weakness, numbness or tingling. She reports no foreign bodies present. The symptoms are aggravated by movement, palpation and weight bearing. She has tried rest, elevation, heat, acetaminophen, immobilization, ice and non-weight bearing for the symptoms. The treatment provided mild relief.            The patient has stabilized medically andis able to edema     Lumbar stenosis     Macular degeneration     left eye    Pain of left scapula 3/25/2019    PVD (peripheral vascular disease) ([address] 10/28/2006    Dr Debra Villarreal, stents LE    Stress incontinence 10/28/2011    Thoracic back pain 5/29/2014    MILD COMPRESSION FRACTURE OF T12 WITHOUT CANAL COMPROMISE. CENTRAL DISK HERNIATION AT T8-9 WITH MILD CANAL NARROWING AT THIS LEVEL. Past Surgical History:   Procedure Laterality Date    APPENDECTOMY      ARTERIAL BYPASS SURGRY  2006    BRAIN SURGERY      CYST REMOVAL  06/27/2016    DR SLOAN  RT THUMB MUCOUS CYST    HYSTERECTOMY      REFRACTIVE SURGERY  [id number]    left     TOTAL HIP ARTHROPLASTY Right 9/25/15    DR. NARANJO    UPPER GASTROINTESTINAL ENDOSCOPY  10/07/15    DR Gen Patel    VERTEBROPLASTY  2013    T12, Dr Jennifer Sharp       Current Facility-Administered Medications   Medication Dose Route Frequency Provider Last Rate Last Dose    HYDROcodone-acetaminophen (NORCO) 5-325 MG per tablet 1 tablet  1 tablet Oral Q4H PRN Shanice Scullin, DO        nitrofurantoin (macrocrystal-monohydrate) (MACROBID) capsule 100 mg  100 mg Oral 2 times per day Shanice Scullin, DO   100 mg at 05/10/19 1138    azithromycin (ZITHROMAX) tablet 250 mg  250 mg Oral Daily Indiana Malik MD        predniSONE (DELTASONE) tablet 20 mg  20 mg Oral Daily Indiana Malik MD        acetaminophen (TYLENOL) tablet 650 mg  650 mg Oral Q4H PRN Indiana Malik MD   650 mg at 05/10/19 0050    magnesium hydroxide (MILK OF MAGNESIA) 400 MG/5ML suspension 30 mL  30 mL Oral Daily PRN Indiana Malik MD        ondansetron [facility]) injection 4 mg  4 mg Intravenous Q6H PRN Indiana Malki MD        sodium chloride flush 0.9 % injection 10 mL  10 mL Intravenous 2 times per day Indiana Malik MD        sodium chloride flush 0.9 % injection 10 mL  10 mL Intravenous PRN Indiana Malik MD        aspirin EC tablet 81 mg  81 mg Oral Daily Indiana Malik MD   81 mg at 05/10/19 0859    docusate sodium medical status reassessed regarding patients ability to participate in therapies and patient found to be able to participate in acute intensivecomprehensive inpatient rehabilitation program.  Therapeutic modifications regarding activities in therapies, place, amount of time per day and intensity of therapy made daily. Enroll in acute course of therapy program to include 1 1/2 hours per day of PT 5 to 7days per week and 1 1/2 hours per day of OT 5 to 7 days per week, and Rec T 1/2 hour per day 3-5 days per week. The patient is stable medically and physically on previous exam.       This patient present with significant new onset decreased mobility andinability to perform activities of daily living skills independently and is at significant risk for prolonged disability  For this reason they have been admitted to [facility]. Thepatient's current functional and medical status are highly complex but the patient is able to participate in intensive rehabilitation. A comprehensive inpatient rehabilitation program is appropriate. The patient Juan R Sweeting initial evaluation by the rehabilitation team and be discussed at regular treatment team meetings to assess progress, mobility, self care, mood and discharge issues. Physical therapy will be consulted for mobilityand endurance issues and should be performed 1 to 2 times per day, 7 days per week for the length of stay. Occupational therapy will be consulted for activities of daily living and should be performed 1 to 2 times per day,7 days per week for the length of stay. Their capacity to participate at an acute level, decision to be treated in the gym, room or on the unit, their activity goals for the day and the number of minutes of activeparticipation will be reassessed and re-prescribed daily. Because this patient is medically complex, I will check a CBC, BMP, UA and orthostatic blood pressures.   They will be reassessed daily regarding their ability toparticipate in an acute level rehabilitation program.  Recreational Therapy will be consulted for community re-entry and adjustment to disability. Communication, cognitive and emotional issues will also be addressed duringthis rehabilitation stay by rehabilitation psychologist or speech therapist as appropriate. I reviewed the patient's old and current charts and discussed other rehabilitation options with the rehabilitation teamincluding the rehab RN and the admission team as well as the patient. I feel that the patient's functional recovery would be best served at an acute inpatient rehabilitation program because the patient needs intense therapythree hours per day, direct RN supervision and daily monitoring by a physician for medical status. This cannot be sufficiently provided by home health care, a skilled nursing facility or in an outpatient setting. I furtherfeel that the patient has the potential to improve functional abilities in an acute intensive rehabilitation program.    Old records were reviewed and summarized. 2.  Other diagnoses which complicate rehabilitation stay include: Active Problems:  1. Hearing loss-A she has hearing aids and I'll add assistive devices for hearing  2. Acute leg pain, left due to sciatica secondary to spondylolisthesis of the lumbar spine versus loosening of the right hip replacement   History of total hip replacement, right, -the far right hip x-rays are unremarkable try soft tissue injections and treatment of sciatica that does not work will need bone scan  3. PVD (peripheral vascular disease) H/O vascular surgery -add aspirin, monitor for bleeding  4. DM (diabetes mellitus), type 2 with peripheral vascular complications,   Type 2 diabetes mellitus with ophthalmic complication, without long-term current use of insulin   5.    Gastroesophageal reflux disease without esophagitis-titrate Protonix, Colace, elevate I have encouraged the patient to attend the Rehab Adjustment to Disability Support Group and recreational therapy. 6.  Estimated length of stay is 2-3 weeks. Discharge to home with help from family and home health PT, OT, RN, and aide. Patient should be independent at discharge. 7.  The patient's medical and rehab prognosis are good. [address] regarding the patient's back up to general medical needs. A welcome letter was presented with an explanation of my services, my specialty and what to expect during the rehabilitation process. Aswell as introducing myself, I also wrote my name on their bedside marker board with their name as well as the names of the other physicians with an explanation of our individual roles in their care, as well as the rehab process.             Morris Vidal D.O., F.A.A.P.M.&ALONZO.

## 2019-05-11 PROCEDURE — 97112 NEUROMUSCULAR REEDUCATION: CPT

## 2019-05-11 PROCEDURE — 97116 GAIT TRAINING THERAPY: CPT

## 2019-05-11 PROCEDURE — 99232 SBSQ HOSP IP/OBS MODERATE 35: CPT | Performed by: PHYSICAL MEDICINE & REHABILITATION

## 2019-05-11 PROCEDURE — 1180000000 HC REHAB R&B

## 2019-05-11 PROCEDURE — 97535 SELF CARE MNGMENT TRAINING: CPT

## 2019-05-11 PROCEDURE — 6370000000 HC RX 637 (ALT 250 FOR IP): Performed by: PHYSICAL MEDICINE & REHABILITATION

## 2019-05-11 PROCEDURE — 6370000000 HC RX 637 (ALT 250 FOR IP): Performed by: INTERNAL MEDICINE

## 2019-05-11 PROCEDURE — 97110 THERAPEUTIC EXERCISES: CPT

## 2019-05-11 PROCEDURE — 97530 THERAPEUTIC ACTIVITIES: CPT

## 2019-05-11 RX ADMIN — HYDROCODONE BITARTRATE AND ACETAMINOPHEN 1 TABLET: 5; 325 TABLET ORAL at 23:23

## 2019-05-11 RX ADMIN — NITROFURANTOIN (MONOHYDRATE/MACROCRYSTALS) 100 MG: 75; 25 CAPSULE ORAL at 21:15

## 2019-05-11 RX ADMIN — ERGOCALCIFEROL 50000 UNITS: 1.25 CAPSULE ORAL at 09:06

## 2019-05-11 RX ADMIN — DOCUSATE SODIUM 100 MG: 100 CAPSULE, LIQUID FILLED ORAL at 09:06

## 2019-05-11 RX ADMIN — PREDNISONE 20 MG: 20 TABLET ORAL at 09:06

## 2019-05-11 RX ADMIN — PANTOPRAZOLE SODIUM 40 MG: 40 TABLET, DELAYED RELEASE ORAL at 05:59

## 2019-05-11 RX ADMIN — NITROFURANTOIN (MONOHYDRATE/MACROCRYSTALS) 100 MG: 75; 25 CAPSULE ORAL at 09:06

## 2019-05-11 RX ADMIN — HYDROCODONE BITARTRATE AND ACETAMINOPHEN 1 TABLET: 5; 325 TABLET ORAL at 09:11

## 2019-05-11 RX ADMIN — AZITHROMYCIN 250 MG: 250 TABLET, FILM COATED ORAL at 09:06

## 2019-05-11 RX ADMIN — ASPIRIN 81 MG: 81 TABLET, COATED ORAL at 09:06

## 2019-05-11 ASSESSMENT — PAIN SCALES - GENERAL
PAINLEVEL_OUTOF10: 0
PAINLEVEL_OUTOF10: 0
PAINLEVEL_OUTOF10: 1
PAINLEVEL_OUTOF10: 0
PAINLEVEL_OUTOF10: 3
PAINLEVEL_OUTOF10: 5
PAINLEVEL_OUTOF10: 0

## 2019-05-11 NOTE — PROGRESS NOTES
place;Chair alarm in place;Gait belt      Therapy Time:   Individual   Time In 0930   Time Out 1030   Minutes 60     Minutes:      Transfer/Bed mobility training:10      Gait training:15      Neuro re education:15     Therapeutic ex:20      Joahn Craig PTA, 05/11/19 at 12:47 PM

## 2019-05-12 LAB
ORGANISM: ABNORMAL
URINE CULTURE, ROUTINE: ABNORMAL
URINE CULTURE, ROUTINE: ABNORMAL

## 2019-05-12 PROCEDURE — 6370000000 HC RX 637 (ALT 250 FOR IP): Performed by: INTERNAL MEDICINE

## 2019-05-12 PROCEDURE — 1180000000 HC REHAB R&B

## 2019-05-12 PROCEDURE — 92610 EVALUATE SWALLOWING FUNCTION: CPT

## 2019-05-12 PROCEDURE — 99232 SBSQ HOSP IP/OBS MODERATE 35: CPT | Performed by: PHYSICAL MEDICINE & REHABILITATION

## 2019-05-12 PROCEDURE — 92523 SPEECH SOUND LANG COMPREHEN: CPT

## 2019-05-12 PROCEDURE — 6370000000 HC RX 637 (ALT 250 FOR IP): Performed by: PHYSICAL MEDICINE & REHABILITATION

## 2019-05-12 RX ORDER — L. ACIDOPHILUS/L.BULGARICUS 1MM CELL
2 TABLET ORAL 3 TIMES DAILY
Status: DISCONTINUED | OUTPATIENT
Start: 2019-05-12 | End: 2019-05-24 | Stop reason: HOSPADM

## 2019-05-12 RX ADMIN — ACETAMINOPHEN 650 MG: 325 TABLET ORAL at 08:44

## 2019-05-12 RX ADMIN — PANTOPRAZOLE SODIUM 40 MG: 40 TABLET, DELAYED RELEASE ORAL at 05:40

## 2019-05-12 RX ADMIN — AZITHROMYCIN 250 MG: 250 TABLET, FILM COATED ORAL at 08:41

## 2019-05-12 RX ADMIN — NITROFURANTOIN (MONOHYDRATE/MACROCRYSTALS) 100 MG: 75; 25 CAPSULE ORAL at 19:48

## 2019-05-12 RX ADMIN — LACTOBACILLUS TAB 2 TABLET: TAB at 14:10

## 2019-05-12 RX ADMIN — DOCUSATE SODIUM 100 MG: 100 CAPSULE, LIQUID FILLED ORAL at 08:41

## 2019-05-12 RX ADMIN — ASPIRIN 81 MG: 81 TABLET, COATED ORAL at 08:41

## 2019-05-12 RX ADMIN — HYDROCODONE BITARTRATE AND ACETAMINOPHEN 1 TABLET: 5; 325 TABLET ORAL at 11:03

## 2019-05-12 RX ADMIN — LACTOBACILLUS TAB 2 TABLET: TAB at 19:48

## 2019-05-12 RX ADMIN — NITROFURANTOIN (MONOHYDRATE/MACROCRYSTALS) 100 MG: 75; 25 CAPSULE ORAL at 08:41

## 2019-05-12 RX ADMIN — PREDNISONE 20 MG: 20 TABLET ORAL at 08:41

## 2019-05-12 RX ADMIN — HYDROCODONE BITARTRATE AND ACETAMINOPHEN 1 TABLET: 5; 325 TABLET ORAL at 19:48

## 2019-05-12 ASSESSMENT — PAIN SCALES - GENERAL
PAINLEVEL_OUTOF10: 1
PAINLEVEL_OUTOF10: 2
PAINLEVEL_OUTOF10: 2
PAINLEVEL_OUTOF10: 7
PAINLEVEL_OUTOF10: 4

## 2019-05-12 NOTE — PROGRESS NOTES
Subjective: The patient complains of severe  acute  right hip pain and generalized back and sciatica on the right partially relieved by PT, OT, right sciatic block, trigger point injections, rest, Toradol, Norco and relieved with Tylenol and exacerbated by  palpation range of motion and weightbearing. I am concerned about patients  advanced age poor vision poor hearing. I am also concerned about her poor memory however she covers socially quite well. She is always very friendly and happy to see me again that I hear her asking the therapist who I am when I leave. This does not seem to interfere with her functional status at home and from her baseline cognition. 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 ROS x10: The patient also complains of severely impaired mobility and activities of daily living. Otherwise no new problems with vision, hearing, nose, mouth, throat, dermal, cardiovascular, GI, , pulmonary, musculoskeletal, psychiatric or neurological. See Rehab H&P on Rehab chart dated . Vital signs:  BP (!) 177/93   Pulse 84   Temp 97 °F (36.1 °C) (Oral)   Resp 18   Ht 5' 1\" (1.549 m)   Wt 140 lb (63.5 kg)   LMP  (LMP Unknown)   SpO2 97%   BMI 26.45 kg/m²   I/O:   PO/Intake:  fair PO intake, no problems observed or reported. Bowel/Bladder:  continent,  Enterococcus faecalis UTI on Macrobid  General:  Patient is well developed, adequately nourished, non-obese and     well kempt. HEENT:    PERRLA but poor vision, hearing intact to loud voice with hearing aids, external inspection of ear     and nose benign. Inspection of lips, tongue and gums benign  Musculoskeletal: No significant change in strength or tone. All joints stable. Inspection and palpation of digits and nails show no clubbing,       cyanosis or inflammatory conditions. Neuro/Psychiatric: Affect: flat but pleasant. Alert and oriented to person, place and     situation. time convenience. Caregiver completes I/ADL's. Pt would benefit from skilled OT to increase safety and performance with ADL's to highest level of IND. Speech therapy: FIMS: Comprehension: 5 - Patient understands basic needs (hungry/hot/pain)  Expression: 5 - Expresses basic ideas/needs only (hungry/hot/pain)  Social Interaction: 5 - Patient is appropriate with supervision/cues  Problem Solvin - Patient able to solve simple/routine tasks  Memory: 5 - Patient requires prompting with stress/unfamiliar situations      Lab/X-ray studies reviewed, analyzed and discussed with patient and staff:   No results found for this or any previous visit (from the past 24 hour(s)). Xr Hip Right  2019   EXAMINATION: RIGHT  HIP, TWO VIEWS CLINICAL HISTORY: Right hip pain for 6 hours COMPARISONS: None available TECHNIQUE: AP film of the pelvis to include both hips and lateral views of the right  hip were obtained. FINDINGS: Decreased bone mineralization. Postsurgical changes of right total hip arthroplasty. No periprosthetic lucency or fracture. No acute displaced fracture of the pelvis. Degenerative changes of the sacroiliac joints, lower lumbar spine, and left hip. Vascular stent noted within the proximal right lower extremity soft tissues. No acute osseous abnormality. Ct Pelvis  2019    Grade one L4 spondylolisthesis. Right hip replacement. No fracture identified. Marked atherosclerotic disease with remote right superficial femoral artery vascular stenting. All CT scans at this facility use dose modulation, iterative reconstruction, and/or weight based dosing when appropriate to reduce radiation dose to as low as reasonably achievable. Xr Chest  2019  Portable chest radiograph History: Cough Technique: AP portable view of the chest obtained. Comparison: Chest    No acute intrathoracic process. Findings suggesting COPD.        Previous extensive, complex labs, notes and diagnostics reviewed and analyzed. ALLERGIES:    Allergies as of 05/09/2019    (No Known Allergies)      (please also verify by checking MAR)     I have encouraged patient to attend their adjustment to rehabilitation support group with rec therapy and rehabilitation psychology in order to improve their adjustments, well-being, and help their spiritual and cognitive recovery. Complex Physical Medicine & Rehab Issues Assess & Plan:   1. Severe abnormality of gait and mobility and impaired self-care and ADL's secondary to progressive flare up of generalized osteoarthritis . Functional and medical status reassessed regarding patients ability to participate in therapies and patient found to be able to participate in acute intensive comprehensive inpatient rehabilitation program including PT/OT to improve balance, ambulation, ADLs, and to improve the P/AROM. Therapeutic modifications regarding activities in therapies, place, amount of time per day and intensity of therapy made daily. In bed therapies or bedside therapies prn.   2. Bowel severe opiate-related constipation and Bladder dysfunction overactive bladder with occasional stress incontinence:  frequent toileting, ambulate to bathroom with assistance, check post void residuals. Check for C.difficile x1 if >2 loose stools in 24 hours, continue bowel & bladder program.  Monitor bowel and bladder function. Lactinex 2 PO every AC. MOM prn, Brown Bomb prn, Glycerin suppository prn, enema prn.  3. Severe right hip pain at site of old prosthesis and at site of sciatica generalized OA pain: reassess pain every shift and prior to and after each therapy session, give prn Tylenol and  Norco, Ultram, modalities prn in therapy, Lidoderm, K-pad prn. Consider low-dose Duragesic patch if needed  4. Skin healing and breakdown risk:  continue pressure relief program.  Daily skin exams and reports from nursing.   5. Fatigue due to nutritional and hydration deficiency:  continue to monitor

## 2019-05-12 NOTE — PROGRESS NOTES
(refer to BSE evaluation), No for speech/language/cognition  Duration/Frequency of Treatment: 1-2x/week for dysphagia    Subjective:   Previous level of function and limitations: Per family to [facility] SLP, patients cognitive linguistic skills are at baseline. General  Chart Reviewed: Yes  Patient assessed for rehabilitation services?: Yes  Additional Pertinent Hx: Per Kinza Richter, this pt is at baseline for cognition. She has compensatory strategies implemented at [facility] and continues to benefit from them. Family / Caregiver Present: No  Subjective  Subjective: Patient alert and oriented to place, pleasant, compliant with instruction. Social/Functional History  Additional Comments: Pt. very distractible and [place]  Vision  Vision: Impaired  Vision Exceptions: Wears glasses at all times  Hearing  Hearing: Exceptions to [facility]  Hearing Exceptions: Hard of hearing/hearing concerns;Bilateral hearing aid    Objective:     Oral/Motor  Oral Motor: Within functional limits    Auditory Comprehension  Comprehension: Exceptions  Complex Questions: Moderate  Two Step Basic Commands: Mild  Multistep Basic Commands: Mild  Interfering Components: Attention - selective; Working memory; Hearing  Effective Techniques: Increased volume;Repetition; Slowed speech; Extra processing time    Reading Comprehension  Reading Status: Within functional limits(When large print provided)    Expression  Primary Mode of Expression: Verbal    Verbal Expression  Verbal Expression: Within functional limits    Pragmatics/Social Functioning  Pragmatics: Within functional limits    Cognition:      Orientation  Overall Orientation Status: Impaired  Orientation Level: Oriented to place;Oriented to person  Attention  Attention: Exceptions to [facility]  Alternating Attention: Moderate  Sustained Attention: Mild  Memory  People Encountered: Mild  Short-term Memory:  Moderate  Working Memory: Severe  Problem Solving  Problem Solving: Exceptions to [facility]  Complex Functional

## 2019-05-12 NOTE — PROGRESS NOTES
[place] [facility]   BEDSIDE SWALLOW EVALUATION    NAME: Dale Cantor  : 1918  MRN: [id number]    ADMISSION DATE: 2019  REHAB DIAGNOSIS(ES): Impaired mobility secondary to flare up of DJD/OA  ADMITTING DIAGNOSIS: has Stress incontinence; Hearing loss; Spinal stenosis of lumbar region; Impaired mobility and activities of daily living; Acute leg pain, left; Acute right hip pain; H/O total hip arthroplasty; PVD (peripheral vascular disease) ([address] Nuclear senile cataract; Nonexudative age-related macular degeneration; Primary open angle glaucoma; DM (diabetes mellitus), type 2 with peripheral vascular complications ([address] Lumbar stenosis; Claudication ([address] Gastroesophageal reflux disease without esophagitis; Hyponatremia; Ambulatory dysfunction; Type 2 diabetes mellitus with ophthalmic complication, without long-term current use of insulin ([address] Weakness; Gait abnormality due to Impaired Mobility secondary to flare up of DJD[facility]OA[facility] Ataxia; Glaucoma; Macular degeneration of left eye; H/O brain surgery; BMI 26.0-26.9,adult; History of total hip replacement, right; DJD (degenerative joint disease); S/P kyphoplasty; and H/O vascular surgery on their problem list.    ONSET DATE: 2019    Date of Eval: 2019  Evaluating Therapist: Celia Nicolas    Recommended Diet and Intervention  Diet Solids Recommendation: Dysphagia II Mechanically Altered  Liquid Consistency Recommendation: Thin  Recommended Form of Meds: Whole with water  Recommendations: Dysphagia treatment  Therapeutic Interventions: Diet tolerance monitoring    Compensatory Swallowing Strategies  Compensatory Swallowing Strategies: Small bites/sips; No straws;Remain upright for 30-45 minutes after meals;Effortful swallow    Reason for Referral  Dale Cantor was referred for a bedside swallow evaluation to assess the efficiency of her swallow function, identify signs and symptoms of aspiration

## 2019-05-13 PROCEDURE — 97535 SELF CARE MNGMENT TRAINING: CPT

## 2019-05-13 PROCEDURE — 1180000000 HC REHAB R&B

## 2019-05-13 PROCEDURE — 97530 THERAPEUTIC ACTIVITIES: CPT

## 2019-05-13 PROCEDURE — 97150 GROUP THERAPEUTIC PROCEDURES: CPT

## 2019-05-13 PROCEDURE — 6370000000 HC RX 637 (ALT 250 FOR IP): Performed by: PHYSICAL MEDICINE & REHABILITATION

## 2019-05-13 PROCEDURE — 97112 NEUROMUSCULAR REEDUCATION: CPT

## 2019-05-13 PROCEDURE — 97110 THERAPEUTIC EXERCISES: CPT

## 2019-05-13 PROCEDURE — 6370000000 HC RX 637 (ALT 250 FOR IP): Performed by: INTERNAL MEDICINE

## 2019-05-13 PROCEDURE — 99233 SBSQ HOSP IP/OBS HIGH 50: CPT | Performed by: PHYSICAL MEDICINE & REHABILITATION

## 2019-05-13 PROCEDURE — 97116 GAIT TRAINING THERAPY: CPT

## 2019-05-13 RX ORDER — ACETAMINOPHEN 80 MG
TABLET,CHEWABLE ORAL ONCE
Status: COMPLETED | OUTPATIENT
Start: 2019-05-13 | End: 2019-05-13

## 2019-05-13 RX ORDER — ACETAMINOPHEN 500 MG
500 TABLET ORAL 3 TIMES DAILY
Status: DISCONTINUED | OUTPATIENT
Start: 2019-05-13 | End: 2019-05-24 | Stop reason: HOSPADM

## 2019-05-13 RX ORDER — TRAMADOL HYDROCHLORIDE 50 MG/1
25 TABLET ORAL EVERY 6 HOURS PRN
Status: DISCONTINUED | OUTPATIENT
Start: 2019-05-13 | End: 2019-05-24 | Stop reason: HOSPADM

## 2019-05-13 RX ORDER — HYDROCODONE BITARTRATE AND ACETAMINOPHEN 5; 325 MG/1; MG/1
0.5 TABLET ORAL EVERY 4 HOURS PRN
Status: DISCONTINUED | OUTPATIENT
Start: 2019-05-13 | End: 2019-05-24 | Stop reason: HOSPADM

## 2019-05-13 RX ADMIN — TRAMADOL HYDROCHLORIDE 25 MG: 50 TABLET, FILM COATED ORAL at 21:34

## 2019-05-13 RX ADMIN — HYDROCODONE BITARTRATE AND ACETAMINOPHEN 1 TABLET: 5; 325 TABLET ORAL at 03:40

## 2019-05-13 RX ADMIN — AZITHROMYCIN 250 MG: 250 TABLET, FILM COATED ORAL at 08:21

## 2019-05-13 RX ADMIN — Medication: at 21:33

## 2019-05-13 RX ADMIN — DOCUSATE SODIUM 100 MG: 100 CAPSULE, LIQUID FILLED ORAL at 08:20

## 2019-05-13 RX ADMIN — PREDNISONE 20 MG: 20 TABLET ORAL at 08:20

## 2019-05-13 RX ADMIN — NITROFURANTOIN (MONOHYDRATE/MACROCRYSTALS) 100 MG: 75; 25 CAPSULE ORAL at 08:20

## 2019-05-13 RX ADMIN — LACTOBACILLUS TAB 2 TABLET: TAB at 08:20

## 2019-05-13 RX ADMIN — NITROFURANTOIN (MONOHYDRATE/MACROCRYSTALS) 100 MG: 75; 25 CAPSULE ORAL at 21:33

## 2019-05-13 RX ADMIN — LACTOBACILLUS TAB 2 TABLET: TAB at 21:32

## 2019-05-13 RX ADMIN — ACETAMINOPHEN 500 MG: 500 TABLET ORAL at 13:16

## 2019-05-13 RX ADMIN — ACETAMINOPHEN 500 MG: 500 TABLET ORAL at 21:33

## 2019-05-13 RX ADMIN — LACTOBACILLUS TAB 2 TABLET: TAB at 13:16

## 2019-05-13 RX ADMIN — ASPIRIN 81 MG: 81 TABLET, COATED ORAL at 08:20

## 2019-05-13 RX ADMIN — PANTOPRAZOLE SODIUM 40 MG: 40 TABLET, DELAYED RELEASE ORAL at 06:34

## 2019-05-13 ASSESSMENT — PAIN SCALES - GENERAL
PAINLEVEL_OUTOF10: 7
PAINLEVEL_OUTOF10: 8
PAINLEVEL_OUTOF10: 5
PAINLEVEL_OUTOF10: 1
PAINLEVEL_OUTOF10: 7
PAINLEVEL_OUTOF10: 0

## 2019-05-13 ASSESSMENT — PAIN DESCRIPTION - DESCRIPTORS: DESCRIPTORS: ACHING

## 2019-05-13 ASSESSMENT — PAIN DESCRIPTION - ORIENTATION: ORIENTATION: RIGHT

## 2019-05-13 ASSESSMENT — PAIN DESCRIPTION - FREQUENCY: FREQUENCY: INTERMITTENT

## 2019-05-13 ASSESSMENT — PAIN DESCRIPTION - PAIN TYPE: TYPE: ACUTE PAIN

## 2019-05-13 ASSESSMENT — PAIN DESCRIPTION - LOCATION: LOCATION: HIP

## 2019-05-13 NOTE — PROGRESS NOTES
stride length with distance as pt reported decreased hip pain/tightness. Exercises  Hip Flexion: x20  Knee Long Arc Quad: x20  Ankle Pumps: x20  Comments: Pt screams out in pain with seated exercises on R LE. ROM limited by pain levels. Other exercises  Other exercises 3: Seated HS curls YTB x 20. ASSESSMENT/COMMENTS:  Body structures, Functions, Activity limitations: Decreased functional mobility ; Decreased strength;Decreased endurance;Decreased balance; Increased Pain;Decreased coordination;Decreased safe awareness;Decreased ADL status; Decreased ROM; Decreased sensation  Assessment: Pt with no safety awarness with pain when performing Bed mobility pt threw herself in an uncontroled mannor that required Max A to prevent pt from rolling out of bed and onto the floor. Pt with limited tolerance with seated TherEx and declined to perform supine exercises. Pt displayed decreased pain with functional gait activities. Pt forgetful and continues to repeat herself throughout tx session. PLAN OF CARE/Safety:   Safety Devices  Type of devices: All fall risk precautions in place; Chair alarm in place;Gait belt      Therapy Time:   Individual   Time In 1400   Time Out 1500   Minutes 60     Minutes:60      Transfer/Bed mobility training:15      Gait trainin      Neuro re education:10     Therapeutic ex:10      Jacquelyn Khan  19 at 4:23 PM

## 2019-05-13 NOTE — PROGRESS NOTES
Occupational Therapy  Facility/Department: [facility]  Daily Treatment Note  NAME: Paul Stoner  : 1918  MRN: [id number]    Date of Service: 2019    Discharge Recommendations:  Continue to assess pending progress       Assessment   Assessment: Pt. demonstrated decreased cognition and sequencing throughout. Pt. with G safety for mobility of ADL. Increased time needed throughout. Pt. continues to benefit from skilled OT to maximize independence. Safety Devices  Safety Devices in place: Yes  Type of devices: All fall risk precautions in place         Patient Diagnosis(es): There were no encounter diagnoses. has a past medical history of Compression fracture of T12 vertebra ([address] DJD (degenerative joint disease) of hip, DM (diabetes mellitus), type 2 with peripheral vascular complications ([address] GERD (gastroesophageal reflux disease), Glaucoma, Lower leg edema, Lumbar stenosis, Macular degeneration, Pain of left scapula, PVD (peripheral vascular disease) ([facility] [address] Stress incontinence, and Thoracic back pain. has a past surgical history that includes Refractive surgery (538060); Appendectomy; brain surgery; Hysterectomy; vertebroplasty (); Total hip arthroplasty (Right, 9/25/15); Upper gastrointestinal endoscopy (10/07/15); Arterial bypass surgry (); and cyst removal (2016). Restrictions  Restrictions/Precautions  Restrictions/Precautions: Fall Risk  Subjective   General  Chart Reviewed: Yes  Patient assessed for rehabilitation services?: Yes  Referring Practitioner: Dr. Pratt Human   Diagnosis: Impaired mobility 2° to flare up of DJD/O. A  Pain Assessment  Pain Assessment: 0-10  Pain Level: 0(Reports some pain with movement in R hip)  Vital Signs  Patient Currently in Pain: No   Orientation     Objective    ADL  Toileting: Contact guard assistance  Additional Comments: CGA for toileting; pt. reporting increased discomfort with weight shifting.  Verbal cues throughout for how to sequence toileting tasks. Standing Balance  Time: 2 minutes  Activity: Stood at sink to wash hands after toileting  Comment: Required increased time for sequencing toileting and grooming tasks. Functional Mobility  Functional - Mobility Device: 4-Wheeled Walker  Activity: To/from bathroom  Assist Level: Contact guard assistance  Functional Mobility Comments: Verbal cues, varied SBA to CGA  Toilet Transfers  Toilet - Technique: Ambulating  Equipment Used: Grab bars  Toilet Transfer: Stand by assistance  Toilet Transfers Comments: Verbal cues for positioning     Transfers  Sit to stand: Stand by assistance  Stand to sit: Stand by assistance     Cognition  Cognition Comment: Pt. appeared mildly confused this PM. Pt. states \"I haven't seen my room in days, this will be nice. \" Pt. required multiple reorientations for location. Also required reorientation to the time x2. Pt. saw her lipstick tube on her table and states \"what's that? \" When therapist replied that it was her lipstick pt. states \"It's not\" and was not convinced until therapist opened the container and showed her. Plan   Plan  Times per week: 5-7x/wk   Plan weeks: 1-2  Current Treatment Recommendations: Strengthening, Balance Training, Functional Mobility Training, Pain Management, Safety Education & Training, Self-Care / ADL, Equipment Evaluation, Education, & procurement, Patient/Caregiver Education & Training, Positioning, Home Management Training, Endurance Training  Plan Comment: cont current POC  G-Code     OutComes Score     AM-PAC Score       Goals  Patient Goals   Patient goals : \"To get stronger or something. \"        Therapy Time   Individual Concurrent Group Co-treatment   Time In 1200         Time Out 1210         Minutes [address]/ALEXIS Electronically signed by Collette Bolder, OTR/L on 5/13/2019 at 1:05 PM

## 2019-05-13 NOTE — PROGRESS NOTES
find beads and place into the container. Pt. required significant increased time to complete the task but was able to find all but 1 bead. Pt. pushed bean bags a crossed the table with a angela and 2 lb weight. Pt. pushed with no hip flexion and yelled out in pain in her R hip. Pt. was encouraged to reach as far as what is comfortable for her. Pt. required maximal verbal cues on directions for the task and where to place the bean bags in order to complete the task. Pt. would automatically reach half way a crossed the table with no complaints of pain multiple times. Pt. reached to place rings on various height poles alternating hands. Pt. was able to reach 3 out of 4 poles with no signs or symptoms of pain. Plan   Plan  Times per week: 5-7x/wk   Plan weeks: 1-2  Current Treatment Recommendations: Strengthening, Balance Training, Functional Mobility Training, Pain Management, Safety Education & Training, Self-Care / ADL, Equipment Evaluation, Education, & procurement, Patient/Caregiver Education & Training, Positioning, Home Management Training, Endurance Training  Plan Comment: cont current POC    Goals  Patient Goals   Patient goals : \"To get stronger or something. \"        Therapy Time   Individual Concurrent Group Co-treatment   Time In    1100     Time Out    1200     Minutes    2 EDMUNDO Antoine Electronically signed by EDMUNDO Quinn on 5/13/2019 at 1:37 PM

## 2019-05-13 NOTE — PROGRESS NOTES
convenience. Caregiver completes I/ADL's. Pt would benefit from skilled OT to increase safety and performance with ADL's to highest level of IND. Speech therapy: FIMS: Comprehension: 5 - Patient understands basic needs (hungry/hot/pain)  Expression: 5 - Expresses basic ideas/needs only (hungry/hot/pain)  Social Interaction: 5 - Patient is appropriate with supervision/cues  Problem Solvin - Patient able to solve simple/routine tasks  Memory: 5 - Patient requires prompting with stress/unfamiliar situations      Lab/X-ray studies reviewed, analyzed and discussed with patient and staff:   No results found for this or any previous visit (from the past 24 hour(s)). Xr Hip Right  2019   EXAMINATION: RIGHT  HIP, TWO VIEWS CLINICAL HISTORY: Right hip pain for 6 hours COMPARISONS: None available TECHNIQUE: AP film of the pelvis to include both hips and lateral views of the right  hip were obtained. FINDINGS: Decreased bone mineralization. Postsurgical changes of right total hip arthroplasty. No periprosthetic lucency or fracture. No acute displaced fracture of the pelvis. Degenerative changes of the sacroiliac joints, lower lumbar spine, and left hip. Vascular stent noted within the proximal right lower extremity soft tissues. No acute osseous abnormality. Ct Pelvis  2019    Grade one L4 spondylolisthesis. Right hip replacement. No fracture identified. Marked atherosclerotic disease with remote right superficial femoral artery vascular stenting. All CT scans at this facility use dose modulation, iterative reconstruction, and/or weight based dosing when appropriate to reduce radiation dose to as low as reasonably achievable. Xr Chest  2019  Portable chest radiograph History: Cough Technique: AP portable view of the chest obtained. Comparison: Chest    No acute intrathoracic process. Findings suggesting COPD.        Previous extensive, complex labs, notes and diagnostics reviewed and Brown Bomb prn, Glycerin suppository prn, enema prn.  3. Severe right hip pain at site of old prosthesis and at site of sciatica generalized OA pain: reassess pain every shift and prior to and after each therapy session, give scheduled 3 times a day Tylenol and consider scheduling Ultram or Norco in the a.m. prn Tylenol and  Norco, Ultram, modalities prn in therapy, Lidoderm, K-pad prn. Consider low-dose Duragesic patch if needed  4. Skin healing and breakdown risk:  continue pressure relief program.  Daily skin exams and reports from nursing. 5. Fatigue due to nutritional and hydration deficiency:  continue to monitor I&Os, calorie counts prn, dietary consult prn.  6. Acute episodic insomnia with situational adjustment disorder:  prn Ambien, monitor for day time sedation. 7. Falls risk elevated:  patient to use call light to get nursing assistance to get up, bed and chair alarm. 8. Elevated DVT risk: progressive activities in PT, continue prophylaxis GERSON hose, elevation  . 9. Complex discharge planning:  Weekly team meeting every Monday to assess progress towards goals, discuss and address social, psychological and medical comorbidities and to address difficulties they may be having progressing in therapy. Patient and family education is in progress. The patient is to follow-up with their family physician after discharge. Complex Active General Medical Issues that complicate care Assess & Plan:    1. Poor vision, poor man memory and  Hearing loss-A she has hearing aids and I'll add assistive devices for hearing add assistive devices at home and hired caregivers. 2.   Acute leg pain, left due to sciatica secondary to spondylolisthesis of the lumbar spine versus loosening of the right hip replacement   History of total hip replacement, right, -the far right hip x-rays are unremarkable try soft tissue injections and treatment of sciatica that does not work will need bone scan  3.    PVD (peripheral

## 2019-05-13 NOTE — PROGRESS NOTES
Patient attending therapy as schedule. Pivot transfer to wheelchair. Still c/o pain in right hip and back.

## 2019-05-14 PROCEDURE — 99232 SBSQ HOSP IP/OBS MODERATE 35: CPT | Performed by: PHYSICAL MEDICINE & REHABILITATION

## 2019-05-14 PROCEDURE — 1180000000 HC REHAB R&B

## 2019-05-14 PROCEDURE — 6370000000 HC RX 637 (ALT 250 FOR IP): Performed by: INTERNAL MEDICINE

## 2019-05-14 PROCEDURE — 97110 THERAPEUTIC EXERCISES: CPT

## 2019-05-14 PROCEDURE — 97150 GROUP THERAPEUTIC PROCEDURES: CPT

## 2019-05-14 PROCEDURE — 6370000000 HC RX 637 (ALT 250 FOR IP): Performed by: PHYSICAL MEDICINE & REHABILITATION

## 2019-05-14 PROCEDURE — 97535 SELF CARE MNGMENT TRAINING: CPT

## 2019-05-14 PROCEDURE — 97530 THERAPEUTIC ACTIVITIES: CPT

## 2019-05-14 PROCEDURE — 97116 GAIT TRAINING THERAPY: CPT

## 2019-05-14 PROCEDURE — 92526 ORAL FUNCTION THERAPY: CPT

## 2019-05-14 RX ADMIN — NITROFURANTOIN (MONOHYDRATE/MACROCRYSTALS) 100 MG: 75; 25 CAPSULE ORAL at 08:08

## 2019-05-14 RX ADMIN — ACETAMINOPHEN 500 MG: 500 TABLET ORAL at 19:58

## 2019-05-14 RX ADMIN — ACETAMINOPHEN 500 MG: 500 TABLET ORAL at 08:08

## 2019-05-14 RX ADMIN — DOCUSATE SODIUM 100 MG: 100 CAPSULE, LIQUID FILLED ORAL at 08:08

## 2019-05-14 RX ADMIN — LACTOBACILLUS TAB 2 TABLET: TAB at 08:08

## 2019-05-14 RX ADMIN — NITROFURANTOIN (MONOHYDRATE/MACROCRYSTALS) 100 MG: 75; 25 CAPSULE ORAL at 19:58

## 2019-05-14 RX ADMIN — ASPIRIN 81 MG: 81 TABLET, COATED ORAL at 08:08

## 2019-05-14 RX ADMIN — AZITHROMYCIN 250 MG: 250 TABLET, FILM COATED ORAL at 08:08

## 2019-05-14 RX ADMIN — PANTOPRAZOLE SODIUM 40 MG: 40 TABLET, DELAYED RELEASE ORAL at 06:43

## 2019-05-14 RX ADMIN — PREDNISONE 20 MG: 20 TABLET ORAL at 08:08

## 2019-05-14 RX ADMIN — HYDROCODONE BITARTRATE AND ACETAMINOPHEN 1 TABLET: 5; 325 TABLET ORAL at 03:29

## 2019-05-14 RX ADMIN — HYDROCODONE BITARTRATE AND ACETAMINOPHEN 0.5 TABLET: 5; 325 TABLET ORAL at 19:59

## 2019-05-14 RX ADMIN — LACTOBACILLUS TAB 2 TABLET: TAB at 19:58

## 2019-05-14 RX ADMIN — LACTOBACILLUS TAB 2 TABLET: TAB at 12:34

## 2019-05-14 RX ADMIN — ACETAMINOPHEN 500 MG: 500 TABLET ORAL at 12:35

## 2019-05-14 ASSESSMENT — PAIN SCALES - GENERAL
PAINLEVEL_OUTOF10: 0
PAINLEVEL_OUTOF10: 2
PAINLEVEL_OUTOF10: 0
PAINLEVEL_OUTOF10: 4
PAINLEVEL_OUTOF10: 0
PAINLEVEL_OUTOF10: 9

## 2019-05-14 NOTE — PROGRESS NOTES
Occupational Therapy  Facility/Department: [facility]  Daily Treatment Note  NAME: Amber Mccall  : 1918  MRN: [id number]    Date of Service: 2019    Discharge Recommendations:  Continue to assess pending progress       Assessment      Activity Tolerance  Activity Tolerance: Patient Tolerated treatment well  Safety Devices  Safety Devices in place: Yes  Type of devices: All fall risk precautions in place         Patient Diagnosis(es): There were no encounter diagnoses. has a past medical history of Compression fracture of T12 vertebra ([facility] [address] DJD (degenerative joint disease) of hip, DM (diabetes mellitus), type 2 with peripheral vascular complications ([facility] [address] GERD (gastroesophageal reflux disease), Glaucoma, Lower leg edema, Lumbar stenosis, Macular degeneration, Pain of left scapula, PVD (peripheral vascular disease) ([facility] [address] Stress incontinence, and Thoracic back pain. has a past surgical history that includes Refractive surgery ([id number]); Appendectomy; brain surgery; Hysterectomy; vertebroplasty (); Total hip arthroplasty (Right, 9/25/15); Upper gastrointestinal endoscopy (10/07/15); Arterial bypass surgry (); and cyst removal (2016). Restrictions  Restrictions/Precautions  Restrictions/Precautions: Fall Risk  Subjective   General  Chart Reviewed: Yes  Patient assessed for rehabilitation services?: Yes  Referring Practitioner: Dr. Scott Ulloa   Diagnosis: Impaired mobility 2° to flare up of DJD/O. A  Pain Assessment  Pain Assessment: 0-10  Pain Level: 0  Vital Signs  Patient Currently in Pain: No   Orientation     Objective    Pt. seen for make up time. Pt. completed strengthening exercises at below status for transfers.    Type of ROM/Therapeutic Exercise  Type of ROM/Therapeutic Exercise: Free weights  Comment: 1 lb weight  Exercises  Shoulder Flexion: 1 set x 10 reps  Shoulder Extension: 1 set x 10 reps  Horizontal ABduction: 1 set x 10 reps  Horizontal ADduction: 1 set x 10 reps  Elbow Flexion: 1 set x 10 reps  Elbow Extension: 1 set x 10 reps  Supination: 1 set x 10 reps  Pronation: 1 set x 10 reps  Wrist Flexion: 1 set x 10 reps  Wrist Extension: 1 set x 10 reps        Plan   Plan  Times per week: 5-7x/wk   Plan weeks: 1-2  Current Treatment Recommendations: Strengthening, Balance Training, Functional Mobility Training, Pain Management, Safety Education & Training, Self-Care / ADL, Equipment Evaluation, Education, & procurement, Patient/Caregiver Education & Training, Positioning, Home Management Training, Endurance Training  Plan Comment: cont current POC    Goals  Patient Goals   Patient goals : \"To get stronger or something. \"        Therapy Time   Individual Concurrent Group Co-treatment   Time In 0268        Time Out 1523        Minutes [facility] Electronically signed by EDMUNDO Iqbal on 5/14/2019 at 3:27 PM

## 2019-05-15 PROCEDURE — 6370000000 HC RX 637 (ALT 250 FOR IP): Performed by: INTERNAL MEDICINE

## 2019-05-15 PROCEDURE — 99232 SBSQ HOSP IP/OBS MODERATE 35: CPT | Performed by: PHYSICAL MEDICINE & REHABILITATION

## 2019-05-15 PROCEDURE — 97110 THERAPEUTIC EXERCISES: CPT

## 2019-05-15 PROCEDURE — 97116 GAIT TRAINING THERAPY: CPT

## 2019-05-15 PROCEDURE — 1180000000 HC REHAB R&B

## 2019-05-15 PROCEDURE — 97535 SELF CARE MNGMENT TRAINING: CPT

## 2019-05-15 PROCEDURE — 6370000000 HC RX 637 (ALT 250 FOR IP): Performed by: PHYSICAL MEDICINE & REHABILITATION

## 2019-05-15 PROCEDURE — 97530 THERAPEUTIC ACTIVITIES: CPT

## 2019-05-15 RX ADMIN — PANTOPRAZOLE SODIUM 40 MG: 40 TABLET, DELAYED RELEASE ORAL at 06:10

## 2019-05-15 RX ADMIN — NITROFURANTOIN (MONOHYDRATE/MACROCRYSTALS) 100 MG: 75; 25 CAPSULE ORAL at 10:09

## 2019-05-15 RX ADMIN — ACETAMINOPHEN 500 MG: 500 TABLET ORAL at 20:04

## 2019-05-15 RX ADMIN — NITROFURANTOIN (MONOHYDRATE/MACROCRYSTALS) 100 MG: 75; 25 CAPSULE ORAL at 20:05

## 2019-05-15 RX ADMIN — HYDROCODONE BITARTRATE AND ACETAMINOPHEN 0.5 TABLET: 5; 325 TABLET ORAL at 20:09

## 2019-05-15 RX ADMIN — DOCUSATE SODIUM 100 MG: 100 CAPSULE, LIQUID FILLED ORAL at 10:06

## 2019-05-15 RX ADMIN — ASPIRIN 81 MG: 81 TABLET, COATED ORAL at 10:12

## 2019-05-15 RX ADMIN — LACTOBACILLUS TAB 2 TABLET: TAB at 20:04

## 2019-05-15 RX ADMIN — HYDROCODONE BITARTRATE AND ACETAMINOPHEN 0.5 TABLET: 5; 325 TABLET ORAL at 06:10

## 2019-05-15 RX ADMIN — ACETAMINOPHEN 500 MG: 500 TABLET ORAL at 10:05

## 2019-05-15 RX ADMIN — LACTOBACILLUS TAB 2 TABLET: TAB at 10:04

## 2019-05-15 RX ADMIN — ACETAMINOPHEN 500 MG: 500 TABLET ORAL at 14:46

## 2019-05-15 RX ADMIN — LACTOBACILLUS TAB 2 TABLET: TAB at 14:44

## 2019-05-15 ASSESSMENT — PAIN SCALES - GENERAL
PAINLEVEL_OUTOF10: 0
PAINLEVEL_OUTOF10: 4
PAINLEVEL_OUTOF10: 5
PAINLEVEL_OUTOF10: 8
PAINLEVEL_OUTOF10: 0
PAINLEVEL_OUTOF10: 1
PAINLEVEL_OUTOF10: 6
PAINLEVEL_OUTOF10: 6
PAINLEVEL_OUTOF10: 0

## 2019-05-15 ASSESSMENT — PAIN DESCRIPTION - PAIN TYPE
TYPE: ACUTE PAIN
TYPE: ACUTE PAIN

## 2019-05-15 ASSESSMENT — PAIN DESCRIPTION - ORIENTATION
ORIENTATION: RIGHT
ORIENTATION: RIGHT

## 2019-05-15 ASSESSMENT — PAIN DESCRIPTION - LOCATION
LOCATION: HIP
LOCATION: HIP

## 2019-05-15 ASSESSMENT — PAIN DESCRIPTION - FREQUENCY: FREQUENCY: INTERMITTENT

## 2019-05-15 ASSESSMENT — PAIN DESCRIPTION - DESCRIPTORS
DESCRIPTORS: ACHING;SHARP
DESCRIPTORS: SHARP

## 2019-05-15 NOTE — PROGRESS NOTES
Occupational Therapy  Facility/Department: [facility]  Daily Treatment Note  NAME: Norma De Luna  : 1918  MRN: [id number]    Date of Service: 5/15/2019    Discharge Recommendations:  Continue to assess pending progress       Assessment      Activity Tolerance  Activity Tolerance: Patient Tolerated treatment well  Safety Devices  Safety Devices in place: Yes  Type of devices: All fall risk precautions in place  Restraints  Initially in place: No         Patient Diagnosis(es): There were no encounter diagnoses. has a past medical history of Compression fracture of T12 vertebra ([address] DJD (degenerative joint disease) of hip, DM (diabetes mellitus), type 2 with peripheral vascular complications ([address] GERD (gastroesophageal reflux disease), Glaucoma, Lower leg edema, Lumbar stenosis, Macular degeneration, Pain of left scapula, PVD (peripheral vascular disease) ([address] Stress incontinence, and Thoracic back pain. has a past surgical history that includes Refractive surgery ([id number]); Appendectomy; brain surgery; Hysterectomy; vertebroplasty (); Total hip arthroplasty (Right, 9/25/15); Upper gastrointestinal endoscopy (10/07/15); Arterial bypass surgry (); and cyst removal (2016). Restrictions  Restrictions/Precautions  Restrictions/Precautions: Fall Risk  Subjective   General  Chart Reviewed: Yes  Patient assessed for rehabilitation services?: Yes  Referring Practitioner: Dr. Mariaelena Villa   Diagnosis: Impaired mobility 2° to flare up of DJD/O. A  Pain Assessment  Pain Assessment: 0-10  Pain Level: 0  Vital Signs  Patient Currently in Pain: No   Orientation     Objective     Pt. engaged in B hand strengthening task with pinching and pulling through blue theraputty to find beads. Pt. required increased time to complete the task and took rest breaks due to back pain.       Plan   Plan  Times per week: 5-7x/wk   Plan weeks: 1-2  Current Treatment Recommendations: Strengthening, Balance Training,

## 2019-05-15 NOTE — PROGRESS NOTES
peripheral vascular complications,   Type 2 diabetes mellitus with ophthalmic complication, without long-term current use of insulin   5.    GI diarrhea risk Gastroesophageal reflux disease without esophagitis-titrate Protonix, Colace, elevate head of bed after meals add lactobacillus  6. Hyponatremia-recheck BMP discontinue sodium restriction from diet. 7.   Glaucoma, Macular degeneration of left eye-assistive device for vision  8. 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 DJD (degenerative joint disease), S/P kyphoplasty,   Lumbar stenosis-inject sciatic nerve, trigger point injections, Lidoderm patches, add Norco scheduled Tylenol, alternate eye ice and heat  10. Anemia-add iron, vitamin B12, recheck CBC  11. Acute enterococcal UTI-start Macrobid  sensitive to Macrobid on CNS, check postvoid residuals monitor for overflow incontinence improved peroneal cleansing and care add a hand-held shower and a tub bench at home  12. Bronchitis patient is on Zithromax  13.  Oral pharyngeal dysphagia-consultation lingers pathology continued dysphagia 3 with thins and no straws up at [facility], D.O., PM&R     Attending    [address]

## 2019-05-16 PROCEDURE — 97110 THERAPEUTIC EXERCISES: CPT

## 2019-05-16 PROCEDURE — 1180000000 HC REHAB R&B

## 2019-05-16 PROCEDURE — 6360000002 HC RX W HCPCS: Performed by: PHYSICAL MEDICINE & REHABILITATION

## 2019-05-16 PROCEDURE — 6370000000 HC RX 637 (ALT 250 FOR IP): Performed by: INTERNAL MEDICINE

## 2019-05-16 PROCEDURE — 99232 SBSQ HOSP IP/OBS MODERATE 35: CPT | Performed by: PHYSICAL MEDICINE & REHABILITATION

## 2019-05-16 PROCEDURE — 97150 GROUP THERAPEUTIC PROCEDURES: CPT

## 2019-05-16 PROCEDURE — 6370000000 HC RX 637 (ALT 250 FOR IP): Performed by: PHYSICAL MEDICINE & REHABILITATION

## 2019-05-16 PROCEDURE — 97535 SELF CARE MNGMENT TRAINING: CPT

## 2019-05-16 PROCEDURE — 97530 THERAPEUTIC ACTIVITIES: CPT

## 2019-05-16 PROCEDURE — 97116 GAIT TRAINING THERAPY: CPT

## 2019-05-16 RX ADMIN — ACETAMINOPHEN 500 MG: 500 TABLET ORAL at 13:39

## 2019-05-16 RX ADMIN — CYANOCOBALAMIN 1000 MCG: 1000 INJECTION INTRAMUSCULAR; SUBCUTANEOUS at 08:58

## 2019-05-16 RX ADMIN — PANTOPRAZOLE SODIUM 40 MG: 40 TABLET, DELAYED RELEASE ORAL at 05:05

## 2019-05-16 RX ADMIN — ACETAMINOPHEN 500 MG: 500 TABLET ORAL at 08:56

## 2019-05-16 RX ADMIN — LACTOBACILLUS TAB 2 TABLET: TAB at 13:38

## 2019-05-16 RX ADMIN — LACTOBACILLUS TAB 2 TABLET: TAB at 08:54

## 2019-05-16 RX ADMIN — ASPIRIN 81 MG: 81 TABLET, COATED ORAL at 08:56

## 2019-05-16 RX ADMIN — NITROFURANTOIN (MONOHYDRATE/MACROCRYSTALS) 100 MG: 75; 25 CAPSULE ORAL at 08:55

## 2019-05-16 RX ADMIN — DOCUSATE SODIUM 100 MG: 100 CAPSULE, LIQUID FILLED ORAL at 08:55

## 2019-05-16 ASSESSMENT — PAIN DESCRIPTION - DESCRIPTORS: DESCRIPTORS: SHARP;SHOOTING

## 2019-05-16 ASSESSMENT — PAIN SCALES - GENERAL
PAINLEVEL_OUTOF10: 3
PAINLEVEL_OUTOF10: 10
PAINLEVEL_OUTOF10: 2
PAINLEVEL_OUTOF10: 3
PAINLEVEL_OUTOF10: 2

## 2019-05-16 ASSESSMENT — PAIN DESCRIPTION - ORIENTATION: ORIENTATION: RIGHT

## 2019-05-16 ASSESSMENT — PAIN DESCRIPTION - LOCATION: LOCATION: LEG;HIP

## 2019-05-16 ASSESSMENT — PAIN DESCRIPTION - PAIN TYPE: TYPE: CHRONIC PAIN;ACUTE PAIN

## 2019-05-16 ASSESSMENT — PAIN - FUNCTIONAL ASSESSMENT: PAIN_FUNCTIONAL_ASSESSMENT: PREVENTS OR INTERFERES SOME ACTIVE ACTIVITIES AND ADLS

## 2019-05-16 NOTE — PROGRESS NOTES
Pt resting in bed. VSS. Assessment complete. Pt has 4/10 rt hip pain. Medicated with 1/2 a [place]. Call light in reach. Will continue to monitor.

## 2019-05-16 NOTE — PROGRESS NOTES
RRR.  No loud murmurs. Abdomen:  Soft, non-tender, no enlargement of liver or spleen. Extremities:  No significant lower extremity edema or tenderness. Skin:   Intact to general survey, no visualized or palpated problems. Rehabilitation:  Physical therapy: FIMS:  Bed Mobility:      Transfers: Sit to Stand: Stand by assistance  Stand to sit: Stand by assistance, Contact guard assistance  Bed to Chair: Contact guard assistance, Ambulation 1  Surface: level tile, carpet, ramp  Device: Rollator  Assistance: Contact guard assistance, Stand by assistance  Quality of Gait: Fwd flexed posture, with decreased stride length and minimal heel strike with limited foot clearance  Distance: 200'  Comments: Pt improved with speed and stride length with distance as pt reported decreased hip pain/tightness. , Stairs  # Steps : 4  Stairs Height: 6\"  Rails: Bilateral  Assistance: Minimal assistance  Comment: non reciprocal pattern; 1 occurance of shooting pain on descend; close guarding    FIMS: Bed, Chair, Wheel Chair: 4 - Requires steadying assistance only <25% assist  and/or requires assist with one leg only  Walk: 4 - Contact Guard/Minimal Assistance Requires up to Contact Guard or Minimal Assistance to walk at least 150 feet  Distance Walked: 200 ft  Wheel Chair: 0 - Activity Not Assessed/Does Not Occur  Stairs: 0 - Activity Does not Occur ( 0 only for the admission assessment),  , Assessment: Initiated stairs with Min A; 1 occurance of shooting pain.      Occupational therapy: FIMS:  Eatin - Feeds self with setup/supervision/cues and/or requires only setup/supervision/cues to perform tube feedings  Groomin - Requires setup/cues to do all tasks  Bathing: 3 - Able to bathe 5-7 areas  Dressing-Upper: 4 - Requires assist with buttons/zippers only and/or requires assist with one arm only  Dressing-Lower: 2 - Requires assist with 4-5 parts of dressing  Toiletin - Requires setup/supervision/cues  Toilet Transfer: 5 - need assistance with them at home. 4. Skin healing and breakdown risk:  continue pressure relief program.  Daily skin exams and reports from nursing. 5. Fatigue due to nutritional and hydration deficiency:  continue to monitor I&Os, calorie counts prn, dietary consult prn.  6. Acute episodic insomnia with situational adjustment disorder:  prn Ambien, monitor for day time sedation. 7. Falls risk elevated:  patient to use call light to get nursing assistance to get up, bed and chair alarm. 8. Elevated DVT risk: progressive activities in PT, continue prophylaxis GERSON hose, elevation  . 9. Complex discharge planning:  Discharge May 24, 2019 home alone with help from family friends and home health care PT OT RN aide and . Final weekly team meeting  Monday to assess progress towards goals, discuss and address social, psychological and medical comorbidities and to address difficulties they may be having progressing in therapy. Patient and family education is in progress. The patient is to follow-up with their family physician after discharge. Complex Active General Medical Issues that complicate care Assess & Plan:    1. Poor vision, poor man memory and  Hearing loss-A she has hearing aids and I'll add assistive devices for hearing add assistive devices at home and hired caregivers. 2.   Acute leg pain, left due to sciatica secondary to spondylolisthesis of the lumbar spine versus loosening of the right hip replacement   History of total hip replacement, right, -the far right hip x-rays are unremarkable try soft tissue injections and treatment of sciatica that does not work will need bone scan  3. PVD (peripheral vascular disease) H/O vascular surgery -add aspirin, monitor for bleeding  4.    DM (diabetes mellitus), type 2 with peripheral vascular complications,   Type 2 diabetes mellitus with ophthalmic complication, without long-term current use of insulin   5.    GI diarrhea risk

## 2019-05-16 NOTE — PROGRESS NOTES
Physical Therapy Rehab Treatment Note  Facility/Department: [facility]  Room: R262/R262-01       NAME: Pauline Harrison  : 1918 (80 y.o.)  MRN: [id number]  CODE STATUS: DNR-CCA    Date of Service: 2019  Chart Reviewed: Yes  Family / Caregiver Present: No    Restrictions:  Restrictions/Precautions: Fall Risk       SUBJECTIVE: Subjective: im in a lot of pain from my belly down  Pain Screening  Patient Currently in Pain: Yes  Pre Treatment Pain Screening  Pain at present: 0  Scale Used: Numeric Score  Intervention List: Patient able to continue with treatment    Post Treatment Pain Screening:  Pain Assessment  Pain Assessment: 0-10(\"so bad\" no number given )    OBJECTIVE:        Transfers  Sit to Stand: Stand by assistance  Stand to sit: Stand by assistance  Comment: increased time to complete     Ambulation  Ambulation?: Yes  Ambulation 1  Surface: level tile;carpet  Device: Rollator  Assistance: Contact guard assistance;Stand by assistance  Quality of Gait: FF posture, fatigue following, decreased step length bilaterally, decreased foot clearance  Distance: 250'  Comments: seated break needed due to fatigue. pt instructed on how to lock rollator against wall and sit. with poor carryover due to memory              Exercises  Hamstring Sets: x20 YTB   Heelslides: x10  Hip Flexion: x20 RLE with limited ROM, x20 LLE, MRE LLE x10  Hip Abduction: MRE LLE x10   Knee Long Arc Quad: 2 x 10 RLE with limited ROM, x20 LLE, MRE x10 LLE  Ankle Pumps: x20  Comments: ice pack applied during seated exercise. Limited ROM RLE with pain increase during seated thereex. Other exercises  Other exercises 4: MRE hip ADD x10      ASSESSMENT/COMMENTS:   Pt needed occasional seated rest break during ambulation due to fatigue. Pt was able to tolerate longer ambulation distance this date. Minimal movement and activity tolerance in RLE during seated thereex due to pain. PLAN OF CARE/Safety:   Safety Devices  Type of devices:  All

## 2019-05-16 NOTE — PROGRESS NOTES
Occupational Therapy  Facility/Department: [facility]  Daily Treatment Note  NAME: Tomasa Presley  : 1918  MRN: [id number]  Patient Diagnosis(es): There were no encounter diagnoses. has a past medical history of Compression fracture of T12 vertebra ([facility] [address] DJD (degenerative joint disease) of hip, DM (diabetes mellitus), type 2 with peripheral vascular complications ([facility] [address] GERD (gastroesophageal reflux disease), Glaucoma, Lower leg edema, Lumbar stenosis, Macular degeneration, Pain of left scapula, PVD (peripheral vascular disease) ([facility] [address] Stress incontinence, and Thoracic back pain. has a past surgical history that includes Refractive surgery ([id number]); Appendectomy; brain surgery; Hysterectomy; vertebroplasty (); Total hip arthroplasty (Right, 9/25/15); Upper gastrointestinal endoscopy (10/07/15); Arterial bypass surgry (); and cyst removal (2016). Restrictions  Restrictions/Precautions  Restrictions/Precautions: Fall Risk  Subjective   General  Chart Reviewed: Yes  Patient assessed for rehabilitation services?: Yes  Referring Practitioner: Dr. Cami Brown   Diagnosis: Impaired mobility 2° to flare up of DJD/O. A  Pain Assessment  Pain Assessment: 0-10  Pain Level: 10  Patient's Stated Pain Goal: No pain  Pain Type: Chronic pain;Acute pain  Pain Location: Leg;Hip  Pain Orientation: Right  Pain Descriptors: [place]jo Justino; Shooting  Functional Pain Assessment: Prevents or interferes some active activities and ADLs  Non-Pharmaceutical Pain Intervention(s): (Moist heat pack applied to ant hip and leg 20 min )  Vital Signs  Patient Currently in Pain: Yes   Orientation  Orientation  Overall Orientation Status: Within Normal Limits  Objective  Pt reports that both hearing aids require batteries. OT technician and OT attempted to change. Pt reports continued difficulty hearing from both. On return to room OT technician reports pt's friend was present and had new batteries.       Type of ROM/Therapeutic

## 2019-05-16 NOTE — PROGRESS NOTES
Physical Therapy Rehab Treatment Note  [facility]Department[facility]  [facility]       NAME: Paul Stoner  : 1918 (80 y.o.)  MRN: [id number]  CODE STATUS: DNR-CCA    Date of Service: 2019  Chart Reviewed: Yes  Family / Caregiver Present: No    Restrictions:  Restrictions/Precautions: Fall Risk       SUBJECTIVE: Subjective: if i dont move my leg it doesnt hurt but when i do move it it is a 15/10. I dont want to get on any bed ill tell you that right now. Pain Screening  Patient Currently in Pain: Yes  Pre Treatment Pain Screening  Pain at present: 0  Scale Used: Numeric Score  Intervention List: Patient able to continue with treatment    Post Treatment Pain Screening:  Pain Assessment  Pain Assessment: 0-10(\"so bad\" no number given )    OBJECTIVE:        Bed mobility  Comment: NT declined to lay down on mat    Transfers  Sit to Stand: Stand by assistance  Stand to sit: Stand by assistance  Comment: increased time to complete     Ambulation  Ambulation?: Yes  Ambulation 1  Surface: level tile;carpet  Device: Rollator  Assistance: Contact guard assistance;Stand by assistance  Quality of Gait: FF posture, rollator too far ahead, VC given to correct but poor carryover, lateral sway, WBOS  Distance: 200'  Comments: decreased distance due to fatigue              Exercises  Hamstring Sets: x20 YTB   Heelslides: x10  Hip Flexion: x10 limited ROM RLE   Hip Abduction: MRE LLE x10   Knee Long Arc Quad: x15 limited ROM RLE   Ankle Pumps: x20, ankle circles x10  Comments: no ice pack this session  Other exercises  Other exercises 4: MRE hip ADD x10      ASSESSMENT/COMMENTS:   Limited AROM RLE during all seated thereex. Pt reporting increase in pain with all movements. Decreased ambulation this session due to an increase in fatigue. PLAN OF CARE/Safety:   Safety Devices  Type of devices: All fall risk precautions in place; Chair alarm in place;Gait belt      Therapy Time:   Individual   Time In 1400

## 2019-05-17 PROCEDURE — 6370000000 HC RX 637 (ALT 250 FOR IP): Performed by: INTERNAL MEDICINE

## 2019-05-17 PROCEDURE — 6370000000 HC RX 637 (ALT 250 FOR IP): Performed by: PHYSICAL MEDICINE & REHABILITATION

## 2019-05-17 PROCEDURE — 97535 SELF CARE MNGMENT TRAINING: CPT

## 2019-05-17 PROCEDURE — 1180000000 HC REHAB R&B

## 2019-05-17 PROCEDURE — 97150 GROUP THERAPEUTIC PROCEDURES: CPT

## 2019-05-17 PROCEDURE — 99231 SBSQ HOSP IP/OBS SF/LOW 25: CPT | Performed by: PHYSICAL MEDICINE & REHABILITATION

## 2019-05-17 PROCEDURE — 97530 THERAPEUTIC ACTIVITIES: CPT

## 2019-05-17 PROCEDURE — 97110 THERAPEUTIC EXERCISES: CPT

## 2019-05-17 PROCEDURE — 97116 GAIT TRAINING THERAPY: CPT

## 2019-05-17 RX ADMIN — LACTOBACILLUS TAB 2 TABLET: TAB at 14:25

## 2019-05-17 RX ADMIN — NITROFURANTOIN (MONOHYDRATE/MACROCRYSTALS) 100 MG: 75; 25 CAPSULE ORAL at 00:12

## 2019-05-17 RX ADMIN — NITROFURANTOIN (MONOHYDRATE/MACROCRYSTALS) 100 MG: 75; 25 CAPSULE ORAL at 20:06

## 2019-05-17 RX ADMIN — ACETAMINOPHEN 500 MG: 500 TABLET ORAL at 20:06

## 2019-05-17 RX ADMIN — LACTOBACILLUS TAB 2 TABLET: TAB at 20:06

## 2019-05-17 RX ADMIN — LACTOBACILLUS TAB 2 TABLET: TAB at 10:02

## 2019-05-17 RX ADMIN — ASPIRIN 81 MG: 81 TABLET, COATED ORAL at 10:02

## 2019-05-17 RX ADMIN — ACETAMINOPHEN 500 MG: 500 TABLET ORAL at 10:02

## 2019-05-17 RX ADMIN — PANTOPRAZOLE SODIUM 40 MG: 40 TABLET, DELAYED RELEASE ORAL at 07:18

## 2019-05-17 RX ADMIN — ACETAMINOPHEN 500 MG: 500 TABLET ORAL at 00:11

## 2019-05-17 RX ADMIN — ACETAMINOPHEN 500 MG: 500 TABLET ORAL at 14:25

## 2019-05-17 RX ADMIN — NITROFURANTOIN (MONOHYDRATE/MACROCRYSTALS) 100 MG: 75; 25 CAPSULE ORAL at 10:02

## 2019-05-17 RX ADMIN — LACTOBACILLUS TAB 2 TABLET: TAB at 00:11

## 2019-05-17 ASSESSMENT — PAIN SCALES - GENERAL
PAINLEVEL_OUTOF10: 4
PAINLEVEL_OUTOF10: 0
PAINLEVEL_OUTOF10: 5
PAINLEVEL_OUTOF10: 1
PAINLEVEL_OUTOF10: 3

## 2019-05-17 NOTE — PROGRESS NOTES
working on hand strengthening. Patient was able to return it to the container and put the lid on it. Patient then used 2 hands to fold 2 towels with no rest breaks needed. Plan   Plan  Times per week: 5-7x/wk   Plan weeks: 1-2  Current Treatment Recommendations: Strengthening, Balance Training, Functional Mobility Training, Pain Management, Safety Education & Training, Self-Care / ADL, Equipment Evaluation, Education, & procurement, Patient/Caregiver Education & Training, Positioning, Home Management Training, Endurance Training  Plan Comment: cont current POC    Goals  Patient Goals   Patient goals : \"To get stronger or something. \"        Therapy Time   Individual Concurrent Group Co-treatment   Time In    1330     Time Out    1400     Minutes    KIARA Stauffer/ALEXIS    Electronically signed by KIARA Kang/L on 5/17/2019 at 2:17 PM

## 2019-05-17 NOTE — PROGRESS NOTES
Physical Therapy Rehab Treatment Note  Facility/Department: [facility]  Room: A497/O490-04       NAME: Betsy Mcintyre  : 1918 (89 y.o.)  MRN: [id number]  CODE STATUS: DNR-CCA    Date of Service: 2019      SKILLED INTERVENTION:  Seated exercises: LAQ, March, abduction RTB, adduction with ball, hamstring curl RTB, hip extension with ball .  Patient completed x20 reps each exercise      Makeup Therapy Time:   Individual   Time In 1003   Time Out 1025   Minutes 22     Timed Code Treatment Minutes: 22 Minutes       Patient participated in above treatment session to complete previously scheduled minutes from earlier this AM       Ashtyn Seat, PTA

## 2019-05-17 NOTE — PROGRESS NOTES
Speech therapy: FIMS: Comprehension: 4 - Patient understands basic needs 75-90%+ of the time  Expression: 4 - Expresses basic ideas/needs 75-90%+ of the time  Social Interaction: 4 - Patient appropriate 75-90%+ of the time  Problem Solving: 3 - Patient solves simple/routine tasks 50%-74%  Memory: 2 - Patient remembers 25%-49% of the time      Lab/X-ray studies reviewed, analyzed and discussed with patient and staff:   No results found for this or any previous visit (from the past 24 hour(s)). Xr Hip Right  5/9/2019   Decreased bone mineralization. Postsurgical changes of right total hip arthroplasty. No periprosthetic lucency or fracture. No acute displaced fracture of the pelvis. Degenerative changes of the sacroiliac joints, lower lumbar spine, and left hip. Vascular stent noted within the proximal right lower extremity soft tissues. No acute osseous abnormality. Ct Pelvis  5/9/2019    Grade one L4 spondylolisthesis. Right hip replacement. No fracture identified. Marked atherosclerotic disease with remote right superficial femoral artery vascular stenting. All CT scans at this facility use dose modulation, iterative reconstruction, and/or weight based dosing when appropriate to reduce radiation dose to as low as reasonably achievable. Xr Chest  5/9/2019  Portable chest radiograph History: Cough Technique: AP portable view of the chest obtained. Comparison: Chest    No acute intrathoracic process. Findings suggesting COPD. Previous extensive, complex labs, notes and diagnostics reviewed and analyzed. ALLERGIES:    Allergies as of 05/09/2019    (No Known Allergies)      (please also verify by checking MAR)      I reviewed her [facility] prescription monitoring service data sheets in hopes of eliminating polypharmacy and weaning to the lowest effective dose of pain medications and eliminating the concomitant use of benzodiazepines. I see no medications of concern.   I see no habits of combining sedatives and narcotics. Complex Physical Medicine & Rehab Issues Assess & Plan:   1. Severe abnormality of gait and mobility and impaired self-care and ADL's secondary to progressive flare up of generalized osteoarthritis . Functional and medical status reassessed regarding patients ability to participate in therapies and patient found to be able to participate in acute intensive comprehensive inpatient rehabilitation program including PT/OT to improve balance, ambulation, ADLs, and to improve the P/AROM. Therapeutic modifications regarding activities in therapies, place, amount of time per day and intensity of therapy made daily. In bed therapies or bedside therapies prn.   2. Bowel severe opiate-related constipation and Bladder dysfunction overactive bladder with occasional stress incontinence:  frequent toileting, ambulate to bathroom with assistance, check post void residuals. Check for C.difficile x1 if >2 loose stools in 24 hours, continue bowel & bladder program.  Monitor bowel and bladder function. Lactinex 2 PO every AC. MOM prn, Brown Bomb prn, Glycerin suppository prn, enema prn.  3. Severe right hip pain at site of old prosthesis and at site of sciatica generalized OA pain: reassess pain every shift and prior to and after each therapy session, give scheduled 3 times a day Tylenol and consider scheduling Ultram or Norco in the a.m. prn Tylenol and  Norco, Ultram, modalities prn in therapy, Lidoderm, K-pad prn. Consider low-dose Duragesic patch if needed add low-dose Neurontin. Transition to patient and family education regarding low-dose opiate medications as she will need assistance with them at home. 4. Skin healing and breakdown risk:  continue pressure relief program.  Daily skin exams and reports from nursing.   5. Fatigue due to nutritional and hydration deficiency:  continue to monitor I&Os, calorie counts prn, dietary consult prn.  6. Acute episodic insomnia with

## 2019-05-17 NOTE — PROGRESS NOTES
Physical Therapy Rehab Treatment Note  Facility/Department: [facility]  Room: N839/O248-73       NAME: Alexia White  : 1918 (89 y.o.)  MRN: [id number]  CODE STATUS: DNR-CCA    Date of Service: 2019  Chart Reviewed: Yes  Additional Pertinent Hx: R hip pain - began when pt stood up one day. She felt sharp pain in R hip. Pain radiates around lateral R hip. General Comment  Comments: Pt delayed tx by 20 min today whiling trying to confrim pt's availability for tx or if on hold per Nursing report. Restrictions:  Restrictions/Precautions: Fall Risk       SUBJECTIVE: Subjective: When I'm just sitting pain is a 1 or 2 and about a 5 when I'm walking. But don't ask me to lift this Right leg because it hurts. Pain Screening  Patient Currently in Pain: Yes       Post Treatment Pain Screenin/10  Pain Assessment  Pain Assessment: 0-10  Pain Level: 5    OBJECTIVE:                      Bed mobility  Comment: NT at this time as pt refused to try to get into bed. Transfers  Sit to Stand: Stand by assistance  Stand to sit: Stand by assistance  Car Transfer: Stand by assistance  Comment: Pt allowed extra time to complete Car Transfer today, pt able to complete while yelling out in pain with transfer of Right Leg into car. Ambulation  Ambulation?: Yes  Ambulation 1  Surface: level tile;carpet  Device: Rollator  Assistance: Stand by assistance  Quality of Gait: Slow recirpocal gait pattern with forward flexed posture with rinku decreased stride length and flat foot gait pattern. Distance: 200'  Stairs/Curb  Stairs?: Yes   Stairs  # Steps : 4  Stairs Height: 6\"  Rails: Bilateral  Assistance: Minimal assistance  Comment: Non-reciprocal pattern, no complaints with ascending stairs and pt c/o pain with descending with Loading WB onto Right LE.                ASSESSMENT/COMMENTS:  Body structures, Functions, Activity limitations: Decreased functional mobility ; Decreased strength;Decreased

## 2019-05-18 PROCEDURE — 97116 GAIT TRAINING THERAPY: CPT

## 2019-05-18 PROCEDURE — 1180000000 HC REHAB R&B

## 2019-05-18 PROCEDURE — 6370000000 HC RX 637 (ALT 250 FOR IP): Performed by: PHYSICAL MEDICINE & REHABILITATION

## 2019-05-18 PROCEDURE — 6370000000 HC RX 637 (ALT 250 FOR IP): Performed by: INTERNAL MEDICINE

## 2019-05-18 RX ADMIN — HYDROCODONE BITARTRATE AND ACETAMINOPHEN 0.5 TABLET: 5; 325 TABLET ORAL at 22:02

## 2019-05-18 RX ADMIN — HYDROCODONE BITARTRATE AND ACETAMINOPHEN 0.5 TABLET: 5; 325 TABLET ORAL at 06:56

## 2019-05-18 RX ADMIN — LACTOBACILLUS TAB 2 TABLET: TAB at 14:29

## 2019-05-18 RX ADMIN — PANTOPRAZOLE SODIUM 40 MG: 40 TABLET, DELAYED RELEASE ORAL at 06:52

## 2019-05-18 RX ADMIN — NITROFURANTOIN (MONOHYDRATE/MACROCRYSTALS) 100 MG: 75; 25 CAPSULE ORAL at 09:33

## 2019-05-18 RX ADMIN — ACETAMINOPHEN 500 MG: 500 TABLET ORAL at 14:30

## 2019-05-18 RX ADMIN — DOCUSATE SODIUM 100 MG: 100 CAPSULE, LIQUID FILLED ORAL at 09:33

## 2019-05-18 RX ADMIN — NITROFURANTOIN (MONOHYDRATE/MACROCRYSTALS) 100 MG: 75; 25 CAPSULE ORAL at 22:04

## 2019-05-18 RX ADMIN — LACTOBACILLUS TAB 2 TABLET: TAB at 09:34

## 2019-05-18 RX ADMIN — ACETAMINOPHEN 500 MG: 500 TABLET ORAL at 09:34

## 2019-05-18 RX ADMIN — ACETAMINOPHEN 500 MG: 500 TABLET ORAL at 22:03

## 2019-05-18 RX ADMIN — LACTOBACILLUS TAB 2 TABLET: TAB at 22:03

## 2019-05-18 RX ADMIN — ASPIRIN 81 MG: 81 TABLET, COATED ORAL at 09:34

## 2019-05-18 ASSESSMENT — PAIN SCALES - GENERAL
PAINLEVEL_OUTOF10: 5
PAINLEVEL_OUTOF10: 3
PAINLEVEL_OUTOF10: 5
PAINLEVEL_OUTOF10: 3

## 2019-05-18 NOTE — PROGRESS NOTES
precautions in place; Chair alarm in place;Gait belt      Therapy Time:   Individual   Time In 1440   Time Out 1500   Minutes 20     Minutes:40      Transfer/Bed mobility trainin      Gait training:10      Neuro re education:2     Yong Essex, PTA, 19 at 4:42 PM

## 2019-05-18 NOTE — FLOWSHEET NOTE
Pt awake in bed. Accepted Am meds without problem. Assisted to BR with walker. Back to bed. Call light in reach. Bed alarm engaged.

## 2019-05-19 PROCEDURE — 1180000000 HC REHAB R&B

## 2019-05-19 PROCEDURE — 6370000000 HC RX 637 (ALT 250 FOR IP): Performed by: INTERNAL MEDICINE

## 2019-05-19 PROCEDURE — 6370000000 HC RX 637 (ALT 250 FOR IP): Performed by: PHYSICAL MEDICINE & REHABILITATION

## 2019-05-19 RX ADMIN — DOCUSATE SODIUM 100 MG: 100 CAPSULE, LIQUID FILLED ORAL at 09:53

## 2019-05-19 RX ADMIN — NITROFURANTOIN (MONOHYDRATE/MACROCRYSTALS) 100 MG: 75; 25 CAPSULE ORAL at 09:53

## 2019-05-19 RX ADMIN — HYDROCODONE BITARTRATE AND ACETAMINOPHEN 0.5 TABLET: 5; 325 TABLET ORAL at 23:03

## 2019-05-19 RX ADMIN — ACETAMINOPHEN 500 MG: 500 TABLET ORAL at 23:02

## 2019-05-19 RX ADMIN — PANTOPRAZOLE SODIUM 40 MG: 40 TABLET, DELAYED RELEASE ORAL at 05:12

## 2019-05-19 RX ADMIN — ASPIRIN 81 MG: 81 TABLET, COATED ORAL at 09:53

## 2019-05-19 RX ADMIN — HYDROCODONE BITARTRATE AND ACETAMINOPHEN 0.5 TABLET: 5; 325 TABLET ORAL at 16:36

## 2019-05-19 RX ADMIN — LACTOBACILLUS TAB 2 TABLET: TAB at 23:02

## 2019-05-19 RX ADMIN — NITROFURANTOIN (MONOHYDRATE/MACROCRYSTALS) 100 MG: 75; 25 CAPSULE ORAL at 23:02

## 2019-05-19 RX ADMIN — LACTOBACILLUS TAB 2 TABLET: TAB at 13:23

## 2019-05-19 RX ADMIN — ACETAMINOPHEN 500 MG: 500 TABLET ORAL at 13:24

## 2019-05-19 RX ADMIN — HYDROCODONE BITARTRATE AND ACETAMINOPHEN 0.5 TABLET: 5; 325 TABLET ORAL at 05:15

## 2019-05-19 RX ADMIN — ACETAMINOPHEN 500 MG: 500 TABLET ORAL at 09:52

## 2019-05-19 RX ADMIN — LACTOBACILLUS TAB 2 TABLET: TAB at 09:53

## 2019-05-19 ASSESSMENT — PAIN SCALES - GENERAL
PAINLEVEL_OUTOF10: 6
PAINLEVEL_OUTOF10: 5
PAINLEVEL_OUTOF10: 0
PAINLEVEL_OUTOF10: 5
PAINLEVEL_OUTOF10: 6

## 2019-05-20 PROCEDURE — 92526 ORAL FUNCTION THERAPY: CPT

## 2019-05-20 PROCEDURE — 6370000000 HC RX 637 (ALT 250 FOR IP): Performed by: PHYSICAL MEDICINE & REHABILITATION

## 2019-05-20 PROCEDURE — 6370000000 HC RX 637 (ALT 250 FOR IP): Performed by: INTERNAL MEDICINE

## 2019-05-20 PROCEDURE — 99233 SBSQ HOSP IP/OBS HIGH 50: CPT | Performed by: PHYSICAL MEDICINE & REHABILITATION

## 2019-05-20 PROCEDURE — 97535 SELF CARE MNGMENT TRAINING: CPT

## 2019-05-20 PROCEDURE — 97112 NEUROMUSCULAR REEDUCATION: CPT

## 2019-05-20 PROCEDURE — 97530 THERAPEUTIC ACTIVITIES: CPT

## 2019-05-20 PROCEDURE — 97110 THERAPEUTIC EXERCISES: CPT

## 2019-05-20 PROCEDURE — 97150 GROUP THERAPEUTIC PROCEDURES: CPT

## 2019-05-20 PROCEDURE — 1180000000 HC REHAB R&B

## 2019-05-20 PROCEDURE — 97116 GAIT TRAINING THERAPY: CPT

## 2019-05-20 RX ADMIN — DOCUSATE SODIUM 100 MG: 100 CAPSULE, LIQUID FILLED ORAL at 09:29

## 2019-05-20 RX ADMIN — LACTOBACILLUS TAB 2 TABLET: TAB at 09:28

## 2019-05-20 RX ADMIN — PANTOPRAZOLE SODIUM 40 MG: 40 TABLET, DELAYED RELEASE ORAL at 06:22

## 2019-05-20 RX ADMIN — LACTOBACILLUS TAB 2 TABLET: TAB at 13:38

## 2019-05-20 RX ADMIN — NITROFURANTOIN (MONOHYDRATE/MACROCRYSTALS) 100 MG: 75; 25 CAPSULE ORAL at 09:29

## 2019-05-20 RX ADMIN — NITROFURANTOIN (MONOHYDRATE/MACROCRYSTALS) 100 MG: 75; 25 CAPSULE ORAL at 20:34

## 2019-05-20 RX ADMIN — LACTOBACILLUS TAB 2 TABLET: TAB at 20:34

## 2019-05-20 RX ADMIN — ASPIRIN 81 MG: 81 TABLET, COATED ORAL at 09:29

## 2019-05-20 RX ADMIN — ACETAMINOPHEN 500 MG: 500 TABLET ORAL at 09:29

## 2019-05-20 RX ADMIN — ACETAMINOPHEN 500 MG: 500 TABLET ORAL at 20:34

## 2019-05-20 RX ADMIN — ACETAMINOPHEN 500 MG: 500 TABLET ORAL at 13:38

## 2019-05-20 ASSESSMENT — PAIN DESCRIPTION - ORIENTATION: ORIENTATION: RIGHT

## 2019-05-20 ASSESSMENT — PAIN SCALES - GENERAL
PAINLEVEL_OUTOF10: 5
PAINLEVEL_OUTOF10: 0
PAINLEVEL_OUTOF10: 1
PAINLEVEL_OUTOF10: 0
PAINLEVEL_OUTOF10: 3
PAINLEVEL_OUTOF10: 5
PAINLEVEL_OUTOF10: 3

## 2019-05-20 ASSESSMENT — PAIN DESCRIPTION - FREQUENCY: FREQUENCY: INTERMITTENT

## 2019-05-20 ASSESSMENT — PAIN DESCRIPTION - LOCATION: LOCATION: LEG;HIP

## 2019-05-20 ASSESSMENT — PAIN DESCRIPTION - DESCRIPTORS: DESCRIPTORS: ACHING

## 2019-05-20 ASSESSMENT — PAIN DESCRIPTION - PAIN TYPE: TYPE: ACUTE PAIN

## 2019-05-20 NOTE — PROGRESS NOTES
Speech Language Pathology Treatment Note  [facility]ility/Department: [facility]  5/20/2019    Rehab Dx/Hx: IMPAIRED MOBILITY 2ND TO FLARE UP OF D.TIARRA/O.A. Precautions: falls    ST Dx: Dysphagia     Date of Admit: 5/9/2019  Room #: R262/R262-01    Time in: 1200      Time out: 1210    Subjective:  Alert, Cooperative and Distractible        Interventions used this date:  Dysphagia Treatment and Instruction in Compensatory Strategies    Objective/Assessment:  Patient progressing towards goals:  Short-term Goals  Timeframe for Short-term Goals: 2-4 weeks   Goal 1: Patient will swallow soft solids 10/10 trials without overt s/s of aspiration. Goal 2: Pt will swallow thin liquids via cup sip 10/10 trials without overt s/s of aspiration. Compensatory Swallowing Strategies: Small bites/sips, No straws, Remain upright for 30-45 minutes after meals, Effortful swallow      Pt was seen for therapeutic meal monitor. Pt was able to clear a cohesive bolus per soft consistencies, given extra time to complete the task. Pt demonstrated good safety judgment and prompted this SLP to wait and allow her to chew so as not to speak with food in her mouth. No further dysphagia intervention is warranted at this time, as highest practical level has been achieved. Treatment/Activity Tolerance:  Patient tolerated treatment well    Plan:  Discharge    Pain:  Patient demonstrated no s/s of pain. Patient/Caregiver Education:  Patient educated on session and progression towards goals. and Education needs reinforcement. Safety Devices:   All fall risk precautions in place      Signature: Electronically signed by CHEIKH Joyce on 5/20/2019 at 12:36 PM

## 2019-05-20 NOTE — PROGRESS NOTES
Occupational Therapy  Facility/Department: [facility]  Daily Treatment Note  NAME: Selina Ascencio  : 1918  MRN: [id number]    Date of Service: 2019    Discharge Recommendations:  Continue to assess pending progress       Assessment      Activity Tolerance  Activity Tolerance: Patient Tolerated treatment well  Safety Devices  Safety Devices in place: Yes  Type of devices: All fall risk precautions in place         Patient Diagnosis(es): There were no encounter diagnoses. has a past medical history of Compression fracture of T12 vertebra ([address] DJD (degenerative joint disease) of hip, DM (diabetes mellitus), type 2 with peripheral vascular complications ([address] GERD (gastroesophageal reflux disease), Glaucoma, Lower leg edema, Lumbar stenosis, Macular degeneration, Pain of left scapula, PVD (peripheral vascular disease) ([address] Stress incontinence, and Thoracic back pain. has a past surgical history that includes Refractive surgery (017441); Appendectomy; brain surgery; Hysterectomy; vertebroplasty (); Total hip arthroplasty (Right, 9/25/15); Upper gastrointestinal endoscopy (10/07/15); Arterial bypass surgry (); and cyst removal (2016). Restrictions  Restrictions/Precautions  Restrictions/Precautions: Fall Risk  Subjective   General  Chart Reviewed: Yes  Patient assessed for rehabilitation services?: Yes  Referring Practitioner: Dr. Kenn Jones   Diagnosis: Impaired mobility 2° to flare up of DJD/O. A  Pain Assessment  Pain Assessment: 0-10  Pain Level: 0  Vital Signs  Patient Currently in Pain: No   Orientation     Objective    Pt. engaged in fine motor and strengthening group to promote independence with her ADLs. Pt. reached to  marbles from a pegboard and place them into the container. Pt. had no difficulty with removing the marbles with either hand. Pt. picked up pennies from table top surface and placed into a slit in the container lid.  Pt. had maximal difficulty with

## 2019-05-20 NOTE — PROGRESS NOTES
Speech Language Pathology    NAME:  Dale Cantor  ROOM: S142/N773-21  :  1918  DATE: 2019      Speech Therapy attempted to see Dale Cantor on this date for a/an:    [] Bedside Swallow Evaluation   [] Speech/Language Evaluation   [] Modified Barium Swallow   [x] Treatment    Pt was unable to be seen due to patient:   [] Currently off unit   [] Refused   [] Too lethargic to participate    [] NPO for testing   [] On Bipap   [] Nursing Deferred:   [x] Other:  Attempted to see pt for therapeutic meal monitor this AM for breakfast. Upon SLP arrival, pt had just completed her breakfast. Pt voiced no complaints re: recommended diet textures. ST will continue to monitor, as able.        Electronically signed by CHEIKH Bonilla on 19 at 8:15 AM

## 2019-05-20 NOTE — PROGRESS NOTES
unremarkable try soft tissue injections and treatment of sciatica that does not work will need bone scan  3. PVD (peripheral vascular disease) H/O vascular surgery -add aspirin, monitor for bleeding  4. DM (diabetes mellitus), type 2 with peripheral vascular complications,   Type 2 diabetes mellitus with ophthalmic complication, without long-term current use of insulin   5.    GI diarrhea risk Gastroesophageal reflux disease without esophagitis-titrate Protonix, Colace, elevate head of bed after meals add lactobacillus  6. Hyponatremia-recheck BMP discontinue sodium restriction from diet. 7.   Glaucoma, Macular degeneration of left eye-assistive device for vision  8. 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 DJD (degenerative joint disease), S/P kyphoplasty,   Lumbar stenosis-inject sciatic nerve, trigger point injections, Lidoderm patches, add Norco scheduled Tylenol, alternate eye ice and heat  10. Anemia-add iron, vitamin B12, recheck CBC  11. Acute enterococcal UTI-start Macrobid  sensitive to Macrobid on CNS, check postvoid residuals monitor for overflow incontinence improved peroneal cleansing and care add a hand-held shower and a tub bench at home  12. Bronchitis patient is on Zithromax  13.  Oral pharyngeal dysphagia-consultation lingers pathology continued dysphagia 3 with thins and no straws up at [id number] [place], D.O., PM&R     Attending    [address] [facility]

## 2019-05-20 NOTE — PROGRESS NOTES
combining sedatives and narcotics. Complex Physical Medicine & Rehab Issues Assess & Plan:   1. Severe abnormality of gait and mobility and impaired self-care and ADL's secondary to progressive flare up of generalized osteoarthritis . Functional and medical status reassessed regarding patients ability to participate in therapies and patient found to be able to participate in acute intensive comprehensive inpatient rehabilitation program including PT/OT to improve balance, ambulation, ADLs, and to improve the P/AROM. Therapeutic modifications regarding activities in therapies, place, amount of time per day and intensity of therapy made daily. In bed therapies or bedside therapies prn.   2. Bowel severe opiate-related constipation and Bladder dysfunction overactive bladder with occasional stress incontinence:  frequent toileting, ambulate to bathroom with assistance, check post void residuals. Check for C.difficile x1 if >2 loose stools in 24 hours, continue bowel & bladder program.  Monitor bowel and bladder function. Lactinex 2 PO every AC. MOM prn, Brown Bomb prn, Glycerin suppository prn, enema prn.  3. Severe right hip pain at site of old prosthesis and at site of sciatica generalized OA pain: reassess pain every shift and prior to and after each therapy session, give scheduled 3 times a day Tylenol and consider scheduling Ultram or Norco in the a.m. prn Tylenol and  Norco, Ultram, modalities prn in therapy, Lidoderm, K-pad prn. Consider low-dose Duragesic patch if needed add low-dose Neurontin. Transition to patient and family education regarding low-dose opiate medications as she will need assistance with them at home. 4. Skin healing and breakdown risk:  continue pressure relief program.  Daily skin exams and reports from nursing.   5. Fatigue due to nutritional and hydration deficiency:  continue to monitor I&Os, calorie counts prn, dietary consult prn.  6. Acute episodic insomnia with situational adjustment disorder:  prn Ambien, monitor for day time sedation. 7. Falls risk elevated:  patient to use call light to get nursing assistance to get up, bed and chair alarm. 8. Elevated DVT risk: progressive activities in PT, continue prophylaxis GERSON hose, elevation  . 9. Complex discharge planning:  Discharge May 24, 2019 home alone with help from family friends and home health care PT OT RN aide and . Final weekly team meeting  Monday to assess progress towards goals, discuss and address social, psychological and medical comorbidities and to address difficulties they may be having progressing in therapy. Patient and family education is in progress. The patient is to follow-up with their family physician after discharge. Complex Active General Medical Issues that complicate care Assess & Plan:    1. Poor vision, poor man memory and  Hearing loss-A she has hearing aids and I'll add assistive devices for hearing add assistive devices at home and hired caregivers. 2.   Acute leg pain, left due to sciatica secondary to spondylolisthesis of the lumbar spine versus loosening of the right hip replacement   History of total hip replacement, right, -the far right hip x-rays are unremarkable try soft tissue injections and treatment of sciatica that does not work will need bone scan  3. PVD (peripheral vascular disease) H/O vascular surgery -add aspirin, monitor for bleeding  4. DM (diabetes mellitus), type 2 with peripheral vascular complications,   Type 2 diabetes mellitus with ophthalmic complication, without long-term current use of insulin   5.    GI diarrhea risk Gastroesophageal reflux disease without esophagitis-titrate Protonix, Colace, elevate head of bed after meals add lactobacillus  6. Hyponatremia-recheck BMP discontinue sodium restriction from diet. 7.   Glaucoma, Macular degeneration of left eye-assistive device for vision  8.    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 DJD (degenerative joint disease), S/P kyphoplasty,   Lumbar stenosis-inject sciatic nerve, trigger point injections, Lidoderm patches, add Norco scheduled Tylenol, alternate eye ice and heat  10. Anemia-add iron, vitamin B12, recheck CBC  11. Acute enterococcal UTI-start Macrobid  sensitive to Macrobid on CNS, check postvoid residuals monitor for overflow incontinence improved peroneal cleansing and care add a hand-held shower and a tub bench at home  12. Bronchitis patient is on Zithromax  13.  Oral pharyngeal dysphagia-consultation lingers pathology continued dysphagia 3 with thins and no straws up at [id number] [facility] [place] D.O., PM&R     Attending    [address]

## 2019-05-21 ENCOUNTER — APPOINTMENT (OUTPATIENT)
Dept: GENERAL RADIOLOGY | Age: 84
DRG: 552 | End: 2019-05-21
Attending: PHYSICAL MEDICINE & REHABILITATION
Payer: MEDICARE

## 2019-05-21 PROCEDURE — 6370000000 HC RX 637 (ALT 250 FOR IP): Performed by: INTERNAL MEDICINE

## 2019-05-21 PROCEDURE — 97535 SELF CARE MNGMENT TRAINING: CPT

## 2019-05-21 PROCEDURE — 97112 NEUROMUSCULAR REEDUCATION: CPT

## 2019-05-21 PROCEDURE — 97530 THERAPEUTIC ACTIVITIES: CPT

## 2019-05-21 PROCEDURE — 97110 THERAPEUTIC EXERCISES: CPT

## 2019-05-21 PROCEDURE — 99232 SBSQ HOSP IP/OBS MODERATE 35: CPT | Performed by: PHYSICAL MEDICINE & REHABILITATION

## 2019-05-21 PROCEDURE — 6370000000 HC RX 637 (ALT 250 FOR IP): Performed by: PHYSICAL MEDICINE & REHABILITATION

## 2019-05-21 PROCEDURE — 97116 GAIT TRAINING THERAPY: CPT

## 2019-05-21 PROCEDURE — 1180000000 HC REHAB R&B

## 2019-05-21 PROCEDURE — 73502 X-RAY EXAM HIP UNI 2-3 VIEWS: CPT

## 2019-05-21 RX ADMIN — HYDROCODONE BITARTRATE AND ACETAMINOPHEN 0.5 TABLET: 5; 325 TABLET ORAL at 20:18

## 2019-05-21 RX ADMIN — LACTOBACILLUS TAB 2 TABLET: TAB at 07:55

## 2019-05-21 RX ADMIN — NITROFURANTOIN (MONOHYDRATE/MACROCRYSTALS) 100 MG: 75; 25 CAPSULE ORAL at 20:18

## 2019-05-21 RX ADMIN — ACETAMINOPHEN 500 MG: 500 TABLET ORAL at 20:18

## 2019-05-21 RX ADMIN — ASPIRIN 81 MG: 81 TABLET, COATED ORAL at 07:56

## 2019-05-21 RX ADMIN — LACTOBACILLUS TAB 2 TABLET: TAB at 14:46

## 2019-05-21 RX ADMIN — PANTOPRAZOLE SODIUM 40 MG: 40 TABLET, DELAYED RELEASE ORAL at 05:17

## 2019-05-21 RX ADMIN — LACTOBACILLUS TAB 2 TABLET: TAB at 20:18

## 2019-05-21 RX ADMIN — ACETAMINOPHEN 500 MG: 500 TABLET ORAL at 07:56

## 2019-05-21 RX ADMIN — DOCUSATE SODIUM 100 MG: 100 CAPSULE, LIQUID FILLED ORAL at 07:56

## 2019-05-21 RX ADMIN — NITROFURANTOIN (MONOHYDRATE/MACROCRYSTALS) 100 MG: 75; 25 CAPSULE ORAL at 07:55

## 2019-05-21 RX ADMIN — ACETAMINOPHEN 500 MG: 500 TABLET ORAL at 14:46

## 2019-05-21 ASSESSMENT — PAIN DESCRIPTION - ORIENTATION
ORIENTATION: LOWER
ORIENTATION: RIGHT

## 2019-05-21 ASSESSMENT — PAIN SCALES - GENERAL
PAINLEVEL_OUTOF10: 1
PAINLEVEL_OUTOF10: 5
PAINLEVEL_OUTOF10: 3
PAINLEVEL_OUTOF10: 2
PAINLEVEL_OUTOF10: 2
PAINLEVEL_OUTOF10: 1
PAINLEVEL_OUTOF10: 2
PAINLEVEL_OUTOF10: 1

## 2019-05-21 ASSESSMENT — PAIN DESCRIPTION - DESCRIPTORS: DESCRIPTORS: ACHING

## 2019-05-21 ASSESSMENT — PAIN DESCRIPTION - LOCATION
LOCATION: HIP
LOCATION: BACK

## 2019-05-21 ASSESSMENT — PAIN DESCRIPTION - FREQUENCY: FREQUENCY: INTERMITTENT

## 2019-05-21 ASSESSMENT — PAIN DESCRIPTION - PAIN TYPE
TYPE: ACUTE PAIN
TYPE: ACUTE PAIN

## 2019-05-21 NOTE — PROGRESS NOTES
task completion. Pt noted to make pyramid 8 rows high. Pt completed disassembly of stack. Pt tolerated. Pt reports 0/10 pain during session. Plan   Plan  Times per week: 5-7x/wk   Plan weeks: 1-2  Current Treatment Recommendations: Strengthening, Balance Training, Functional Mobility Training, Pain Management, Safety Education & Training, Self-Care / ADL, Equipment Evaluation, Education, & procurement, Patient/Caregiver Education & Training, Positioning, Home Management Training, Endurance Training  Plan Comment: cont current POC  G-Code     OutComes Score                                                  AM-PAC Score             Goals  Patient Goals   Patient goals : \"To get stronger or something. \"        Therapy Time   Individual Concurrent Group Co-treatment   Time In 1330         Time Out 1400         Minutes [address] Gabino Osborn OTR/L Electronically signed by Donny Ramirez OTR/L on 9/73/27 at 4:28 PM

## 2019-05-21 NOTE — CARE COORDINATION
Spoke with patient and POA regarding discharge planning. At this time, given freedom of choice, patient is requesting [facility] upon discharge. Order placed.  Electronically signed by Cyn Drake RN on 5/21/2019 at 12:46 PM

## 2019-05-21 NOTE — PROGRESS NOTES
Alert and oriented to person, place and     situation. No significant change in deep tendon reflexes or     sensation  Lungs:  Diminished, CTA-B. Respiration effort is normal at rest.     Heart:   S1 = S2, RRR. No loud murmurs. Abdomen:  Soft, non-tender, no enlargement of liver or spleen. Extremities:  No significant lower extremity edema right hip tenderness. Skin:   Intact to general survey, no visualized or palpated problems. Rehabilitation:  Physical therapy: FIMS:  Bed Mobility: Scooting: Stand by assistance    Transfers: Sit to Stand: Supervision  Stand to sit: Supervision  Bed to Chair: Supervision, Ambulation 1  Surface: level tile, ramp  Device: Rollator  Assistance: Stand by assistance  Quality of Gait: Improved posture this PM tx session, pt able to self correct. Steady reciprocal gait pattern  Distance: 250ft. x 2   Comments: decreased distance due to fatigue , Stairs  # Steps : 4  Stairs Height: 6\"  Rails: Bilateral  Assistance: Minimal assistance  Comment: Non-reciprocal pattern, no complaints with ascending stairs and pt c/o pain with descending with Loading WB onto Right LE.     FIMS: Bed, Chair, Wheel Chair: 5 - Requires setup/supervision/cues  Walk: 6 - Modified [place]  Walks at least 150 feet with an ambulatory device, orthosis or prosthesis OR requires extra amount of time OR there is concern for safety  Distance Walked: 250ft   Wheel Chair: 0 - Activity Not Assessed/Does Not Occur  Stairs: 0 - Activity Does not Occur ( 0 only for the admission assessment),  , Assessment: TUG performed in 25 sec. Pt performed x5 sit- 28 sec.      Occupational therapy: FIMS:  Eatin - Feeds self with setup/supervision/cues and/or requires only setup/supervision/cues to perform tube feedings  Grooming: 3 - Able to perform 2-3 tasks (mod assist)  Bathing: 3 - Able to bathe 5-7 areas  Dressing-Upper: 3 - Requires assist with 2 tasks  Dressing-Lower: 2 - Requires assist with 4-5 parts of dressing  Toileting: 3 - Able to perform 2 tasks  Toilet Transfer: 4 - Requires steadying assistance only < 25% assist  Shower Transfer: 0 - Activity does not occur(sponge bath),  , Assessment: Pt. demonstrated decreased cognition and sequencing throughout. Pt. with G safety for mobility of ADL. Increased time needed throughout. Pt. continues to benefit from skilled OT to maximize independence. Speech therapy: FIMS: Comprehension: 5 - Patient understands basic needs (hungry/hot/pain)  Expression: 5 - Expresses basic ideas/needs only (hungry/hot/pain)  Social Interaction: 5 - Patient is appropriate with supervision/cues  Problem Solvin - Patient solves simple/routine tasks 75-90%+   Memory: 4 - Patient remembers 75-90%+ of the time      Lab/X-ray studies reviewed, analyzed and discussed with patient and staff:   No results found for this or any previous visit (from the past 24 hour(s)). Xr Hip Right  2019   Decreased bone mineralization. Postsurgical changes of right total hip arthroplasty. No periprosthetic lucency or fracture. No acute displaced fracture of the pelvis. Degenerative changes of the sacroiliac joints, lower lumbar spine, and left hip. Vascular stent noted within the proximal right lower extremity soft tissues. No acute osseous abnormality. Ct Pelvis  2019    Grade one L4 spondylolisthesis. Right hip replacement. No fracture identified. Marked atherosclerotic disease with remote right superficial femoral artery vascular stenting. All CT scans at this facility use dose modulation, iterative reconstruction, and/or weight based dosing when appropriate to reduce radiation dose to as low as reasonably achievable. Xr Chest  2019  Portable chest radiograph History: Cough Technique: AP portable view of the chest obtained. Comparison: Chest    No acute intrathoracic process. Findings suggesting COPD.        Previous extensive, complex labs, notes and diagnostics reviewed and analyzed. ALLERGIES:    Allergies as of 05/09/2019    (No Known Allergies)      (please also verify by checking MAR)      Yesterday I evaluated this patient for periodic reassessment of medical and functional status. The patient was discussed in detail at the treatment team meeting focusing on current medical issues, progress in therapies, social issues, psychological issues, barriers to progress and strategies to address these barriers, and discharge planning. See the hand written addendum to rehab progress note. The patient continues to be high risk for future disability and their medical and rehabilitation prognosis continue to be good and therefore, we will continue the patient's rehabilitation course as planned. The patient's tentative discharge date was set. Patient and family education was discussed. The patient was made aware of the team discussion regarding their progress. Discharge plans were discussed along with barriers to progress and strategies to address these barriers, patient encouraged to continue to discuss discharge plans with . Complex Physical Medicine & Rehab Issues Assess & Plan:   1. Severe abnormality of gait and mobility and impaired self-care and ADL's secondary to progressive flare up of generalized osteoarthritis . Functional and medical status reassessed regarding patients ability to participate in therapies and patient found to be able to participate in acute intensive comprehensive inpatient rehabilitation program including PT/OT to improve balance, ambulation, ADLs, and to improve the P/AROM. Therapeutic modifications regarding activities in therapies, place, amount of time per day and intensity of therapy made daily.   In bed therapies or bedside therapies prn.   2. Bowel severe opiate-related constipation and Bladder dysfunction overactive bladder with occasional stress incontinence:  frequent toileting, ambulate to bathroom with assistance, check

## 2019-05-21 NOTE — PROGRESS NOTES
Pt in bed resting. Yells out from time to time. meds given per orders. Turned and repositioned. Fall precautions in place. Call light in reach.

## 2019-05-21 NOTE — DISCHARGE INSTR - COC
Agency   · Name: [facility]   · Address:  · OZRLJ:[phone]  · Fax:    Dialysis Facility (if applicable)   · Name:  · Address:  · Dialysis Schedule:  · Phone:  · Fax:    / signature: Electronically signed by Jono Higginbotham RN on 5/21/19 at 12:49 PM    PHYSICIAN SECTION    Prognosis: Good    Condition at Discharge: Stable    Rehab Potential (if transferring to Rehab): Good    Recommended Labs or Other Treatments After Discharge:     Physician Certification: I certify the above information and transfer of Can Heck  is necessary for the continuing treatment of the diagnosis listed and that she requires Home Care    Update Admission H&P: No change in H&P    PHYSICIAN SIGNATURE:Dr Mai 15    Electronically signed by Jono Higginbotham RN on 5/21/19 at 12:49 PM

## 2019-05-21 NOTE — PROGRESS NOTES
Occupational Therapy  Facility/Department: [facility]  Daily Treatment Note  NAME: Kendy Simons  : 1918  MRN: [id number]    Date of Service: 2019    Discharge Recommendations:  Continue to assess pending progress       Assessment      Activity Tolerance  Activity Tolerance: Patient Tolerated treatment well  Safety Devices  Safety Devices in place: Yes  Type of devices: All fall risk precautions in place         Patient Diagnosis(es): There were no encounter diagnoses. has a past medical history of Compression fracture of T12 vertebra ([facility] [address] DJD (degenerative joint disease) of hip, DM (diabetes mellitus), type 2 with peripheral vascular complications ([facility] [address] GERD (gastroesophageal reflux disease), Glaucoma, Lower leg edema, Lumbar stenosis, Macular degeneration, Pain of left scapula, PVD (peripheral vascular disease) ([facility] [address] Stress incontinence, and Thoracic back pain. has a past surgical history that includes Refractive surgery (817847); Appendectomy; brain surgery; Hysterectomy; vertebroplasty (); Total hip arthroplasty (Right, 9/25/15); Upper gastrointestinal endoscopy (10/07/15); Arterial bypass surgry (); and cyst removal (2016). Restrictions  Restrictions/Precautions  Restrictions/Precautions: Fall Risk  Subjective   General  Chart Reviewed: Yes  Patient assessed for rehabilitation services?: Yes  Referring Practitioner: Dr. Charles Yang   Diagnosis: Impaired mobility 2° to flare up of DJD/O. A  Pain Assessment  Pain Assessment: 0-10  Pain Level: 3  Pain Type: Acute pain  Pain Location: Hip  Pain Orientation: Right  Pain Descriptors: Aching  Pain Frequency: Intermittent  Vital Signs  Patient Currently in Pain: Yes   Orientation     Objective     Pt. engaged in B hand strengthening task with pinching through blue theraputty to find beads. Pt. was to pinch and pull through the beads and then place them in the container.    Pt. completed toileting at Supervision and toilet transfer

## 2019-05-22 PROCEDURE — 6370000000 HC RX 637 (ALT 250 FOR IP): Performed by: PHYSICAL MEDICINE & REHABILITATION

## 2019-05-22 PROCEDURE — 97116 GAIT TRAINING THERAPY: CPT

## 2019-05-22 PROCEDURE — 97530 THERAPEUTIC ACTIVITIES: CPT

## 2019-05-22 PROCEDURE — 97110 THERAPEUTIC EXERCISES: CPT

## 2019-05-22 PROCEDURE — 6370000000 HC RX 637 (ALT 250 FOR IP): Performed by: INTERNAL MEDICINE

## 2019-05-22 PROCEDURE — 99232 SBSQ HOSP IP/OBS MODERATE 35: CPT | Performed by: PHYSICAL MEDICINE & REHABILITATION

## 2019-05-22 PROCEDURE — 1180000000 HC REHAB R&B

## 2019-05-22 PROCEDURE — 97535 SELF CARE MNGMENT TRAINING: CPT

## 2019-05-22 RX ADMIN — PANTOPRAZOLE SODIUM 40 MG: 40 TABLET, DELAYED RELEASE ORAL at 06:23

## 2019-05-22 RX ADMIN — DOCUSATE SODIUM 100 MG: 100 CAPSULE, LIQUID FILLED ORAL at 10:24

## 2019-05-22 RX ADMIN — ACETAMINOPHEN 500 MG: 500 TABLET ORAL at 20:11

## 2019-05-22 RX ADMIN — LACTOBACILLUS TAB 2 TABLET: TAB at 10:24

## 2019-05-22 RX ADMIN — NITROFURANTOIN (MONOHYDRATE/MACROCRYSTALS) 100 MG: 75; 25 CAPSULE ORAL at 10:24

## 2019-05-22 RX ADMIN — ACETAMINOPHEN 500 MG: 500 TABLET ORAL at 10:24

## 2019-05-22 RX ADMIN — LACTOBACILLUS TAB 2 TABLET: TAB at 14:41

## 2019-05-22 RX ADMIN — ASPIRIN 81 MG: 81 TABLET, COATED ORAL at 10:24

## 2019-05-22 RX ADMIN — ACETAMINOPHEN 500 MG: 500 TABLET ORAL at 14:41

## 2019-05-22 RX ADMIN — LACTOBACILLUS TAB 2 TABLET: TAB at 20:11

## 2019-05-22 RX ADMIN — NITROFURANTOIN (MONOHYDRATE/MACROCRYSTALS) 100 MG: 75; 25 CAPSULE ORAL at 20:10

## 2019-05-22 ASSESSMENT — PAIN DESCRIPTION - PAIN TYPE: TYPE: ACUTE PAIN

## 2019-05-22 ASSESSMENT — PAIN SCALES - GENERAL
PAINLEVEL_OUTOF10: 5
PAINLEVEL_OUTOF10: 1
PAINLEVEL_OUTOF10: 0
PAINLEVEL_OUTOF10: 0
PAINLEVEL_OUTOF10: 5

## 2019-05-22 NOTE — PROGRESS NOTES
Subjective: The patient complains of severe  acute  right hip pain and generalized back and sciatica on the right partially relieved by PT, OT, right sciatic block, trigger point injections, rest, Toradol, Norco and relieved with Tylenol and exacerbated by  palpation range of motion and weightbearing. I am concerned about patients  advanced age poor vision poor hearing. Her family decided in the a.m. if they did want to recheck her hip x-ray. I ordered a hip x-ray yesterday. It is still normal with no signs of fracture. ROS x10: The patient also complains of severely impaired mobility and activities of daily living. Otherwise no new problems with vision, hearing, nose, mouth, throat, dermal, cardiovascular, GI, , pulmonary, musculoskeletal, psychiatric or neurological. See Rehab H&P on Rehab chart dated . Vital signs:  /60   Pulse 74   Temp 97 °F (36.1 °C) (Oral)   Resp 18   Ht 5' 1\" (1.549 m)   Wt 140 lb (63.5 kg)   LMP  (LMP Unknown)   SpO2 97%   BMI 26.45 kg/m²   I/O:   PO/Intake:  fair PO intake,  dysphagia 3 with thins no straws    Bowel/Bladder:  continent,  Enterococcus faecalis UTI on Macrobid  General:  Patient is well developed, adequately nourished, non-obese and     well kempt. HEENT:    PERRLA but poor vision, hearing intact to loud voice with hearing aids, external inspection of ear     and nose benign. Inspection of lips, tongue and gums benign  Musculoskeletal: No significant change in strength or tone. All joints stable. Inspection and palpation of digits and nails show no clubbing,       cyanosis or inflammatory conditions. Neuro/Psychiatric: Affect: flat but pleasant. Alert and oriented to person, place and     situation. No significant change in deep tendon reflexes or     sensation  Lungs:  Diminished, CTA-B. Respiration effort is normal at rest.     Heart:   S1 = S2, RRR. No loud murmurs.     Abdomen:  Soft, non-tender, no enlargement of occur(sponge bath),  , Assessment: Pt. demonstrated decreased cognition and sequencing throughout. Pt. with G safety for mobility of ADL. Increased time needed throughout. Pt. continues to benefit from skilled OT to maximize independence. Speech therapy: FIMS: Comprehension: 2 - Patient understands basic needs 25-49% of the time  Expression: 4 - Expresses basic ideas/needs 75-90%+ of the time  Social Interaction: 5 - Patient is appropriate with supervision/cues  Problem Solving: 3 - Patient solves simple/routine tasks 50%-74%  Memory: 2 - Patient remembers 25%-49% of the time      Lab/X-ray studies reviewed, analyzed and discussed with patient and staff:   No results found for this or any previous visit (from the past 24 hour(s)). EXAMINATION: XR HIP RIGHT (2-3 VIEWS):       DATE AND TIME: 5/21/2019 at 9:15 AM.       CLINICAL HISTORY: ACUTE RIGHT HIP PAIN.        COMPARISON: May 8, 2019.       FINDINGS: Right total hip replacement in satisfactory position, with no change. No acute fracture or dislocation. Remaining bones intact. In the face of persisting clinical concern, further follow-up imaging with CT is not precluded.                                                                                        Impression       NO ACUTE OSSEOUS ABNORMALITY OF THE RIGHT HIP.              Xr Hip Right  5/9/2019   Decreased bone mineralization. Postsurgical changes of right total hip arthroplasty. No periprosthetic lucency or fracture. No acute displaced fracture of the pelvis. Degenerative changes of the sacroiliac joints, lower lumbar spine, and left hip. Vascular stent noted within the proximal right lower extremity soft tissues. No acute osseous abnormality. Ct Pelvis  5/9/2019    Grade one L4 spondylolisthesis. Right hip replacement. No fracture identified. Marked atherosclerotic disease with remote right superficial femoral artery vascular stenting.  All CT scans at this facility use dose modulation, iterative reconstruction, and/or weight based dosing when appropriate to reduce radiation dose to as low as reasonably achievable. Xr Chest  5/9/2019  Portable chest radiograph History: Cough Technique: AP portable view of the chest obtained. Comparison: Chest    No acute intrathoracic process. Findings suggesting COPD. Previous extensive, complex labs, notes and diagnostics reviewed and analyzed. ALLERGIES:    Allergies as of 05/09/2019    (No Known Allergies)      (please also verify by checking MAR)       I reviewed her [facility] prescription monitoring service data sheets in hopes of eliminating polypharmacy and weaning to the lowest effective dose of pain medications and eliminating the concomitant use of benzodiazepines. I see no medications of concern. I see no habits of combining sedatives and narcotics. Complex Physical Medicine & Rehab Issues Assess & Plan:   1. Severe abnormality of gait and mobility and impaired self-care and ADL's secondary to progressive flare up of generalized osteoarthritis . Functional and medical status reassessed regarding patients ability to participate in therapies and patient found to be able to participate in acute intensive comprehensive inpatient rehabilitation program including PT/OT to improve balance, ambulation, ADLs, and to improve the P/AROM. Therapeutic modifications regarding activities in therapies, place, amount of time per day and intensity of therapy made daily. In bed therapies or bedside therapies prn.   2. Bowel severe opiate-related constipation and Bladder dysfunction overactive bladder with occasional stress incontinence:  frequent toileting, ambulate to bathroom with assistance, check post void residuals. Check for C.difficile x1 if >2 loose stools in 24 hours, continue bowel & bladder program.  Monitor bowel and bladder function. Lactinex 2 PO every AC.   MOM prn, Brown Bomb prn, Glycerin suppository prn, enema prn.  3. Severe right

## 2019-05-22 NOTE — PROGRESS NOTES
Occupational Therapy  Facility/Department: [facility]  Daily Treatment Note  NAME: Germaine Aragon  : 1918  MRN: [id number]    Date of Service: 2019    Discharge Recommendations:  Continue to assess pending progress       Assessment      Activity Tolerance  Activity Tolerance: Patient Tolerated treatment well  Safety Devices  Safety Devices in place: Yes  Type of devices: All fall risk precautions in place         Patient Diagnosis(es): There were no encounter diagnoses. has a past medical history of Compression fracture of T12 vertebra ([facility] [address] DJD (degenerative joint disease) of hip, DM (diabetes mellitus), type 2 with peripheral vascular complications ([facility] [address] GERD (gastroesophageal reflux disease), Glaucoma, Lower leg edema, Lumbar stenosis, Macular degeneration, Pain of left scapula, PVD (peripheral vascular disease) ([facility] [address] Stress incontinence, and Thoracic back pain. has a past surgical history that includes Refractive surgery (388400); Appendectomy; brain surgery; Hysterectomy; vertebroplasty (); Total hip arthroplasty (Right, 9/25/15); Upper gastrointestinal endoscopy (10/07/15); Arterial bypass surgry (); and cyst removal (2016). Restrictions  Restrictions/Precautions  Restrictions/Precautions: Fall Risk  Subjective   General  Chart Reviewed: Yes  Patient assessed for rehabilitation services?: Yes  Referring Practitioner: Dr. Juaquin Walker   Diagnosis: Impaired mobility 2° to flare up of DJD/O. A  Pain Assessment  Pain Assessment: 0-10  Pain Level: 0  Vital Signs  Patient Currently in Pain: No   Orientation     Objective     Pt. engaged in B hand fine motor task with threading beads on a leather lace. Pt. stabilized the lace with her R hand and manipulated the beads with her L hand. Pt. able to complete with min difficulty. Pt. then removed the beads and placed back into the container with no difficulty.    Pt. engaged in B UE endurance task with repetitively reaching to place dowels into a wooden post with her R Hand mostly. Pt. had no difficulty or symptoms of pain when reaching to place the dowels in at the top level. Plan   Plan  Times per week: 5-7x/wk   Plan weeks: 1-2  Current Treatment Recommendations: Strengthening, Balance Training, Functional Mobility Training, Pain Management, Safety Education & Training, Self-Care / ADL, Equipment Evaluation, Education, & procurement, Patient/Caregiver Education & Training, Positioning, Home Management Training, Endurance Training  Plan Comment: cont current POC    Goals  Patient Goals   Patient goals : \"To get stronger or something. \"        Therapy Time   Individual Concurrent Group Co-treatment   Time In 1330         Time Out 1400         Minutes [id number] Electronically signed by EDMUNDO Velazquez on 5/22/2019 at 2:16 PM

## 2019-05-23 PROCEDURE — 6370000000 HC RX 637 (ALT 250 FOR IP): Performed by: PHYSICAL MEDICINE & REHABILITATION

## 2019-05-23 PROCEDURE — 6360000002 HC RX W HCPCS: Performed by: PHYSICAL MEDICINE & REHABILITATION

## 2019-05-23 PROCEDURE — 6370000000 HC RX 637 (ALT 250 FOR IP): Performed by: INTERNAL MEDICINE

## 2019-05-23 PROCEDURE — 97110 THERAPEUTIC EXERCISES: CPT

## 2019-05-23 PROCEDURE — 97116 GAIT TRAINING THERAPY: CPT

## 2019-05-23 PROCEDURE — 99231 SBSQ HOSP IP/OBS SF/LOW 25: CPT | Performed by: PHYSICAL MEDICINE & REHABILITATION

## 2019-05-23 PROCEDURE — 97535 SELF CARE MNGMENT TRAINING: CPT

## 2019-05-23 PROCEDURE — 97150 GROUP THERAPEUTIC PROCEDURES: CPT

## 2019-05-23 PROCEDURE — 97112 NEUROMUSCULAR REEDUCATION: CPT

## 2019-05-23 PROCEDURE — 1180000000 HC REHAB R&B

## 2019-05-23 RX ADMIN — ASPIRIN 81 MG: 81 TABLET, COATED ORAL at 08:33

## 2019-05-23 RX ADMIN — PANTOPRAZOLE SODIUM 40 MG: 40 TABLET, DELAYED RELEASE ORAL at 05:33

## 2019-05-23 RX ADMIN — LACTOBACILLUS TAB 2 TABLET: TAB at 08:32

## 2019-05-23 RX ADMIN — NITROFURANTOIN (MONOHYDRATE/MACROCRYSTALS) 100 MG: 75; 25 CAPSULE ORAL at 08:32

## 2019-05-23 RX ADMIN — ACETAMINOPHEN 500 MG: 500 TABLET ORAL at 21:06

## 2019-05-23 RX ADMIN — ACETAMINOPHEN 500 MG: 500 TABLET ORAL at 08:33

## 2019-05-23 RX ADMIN — NITROFURANTOIN (MONOHYDRATE/MACROCRYSTALS) 100 MG: 75; 25 CAPSULE ORAL at 21:06

## 2019-05-23 RX ADMIN — CYANOCOBALAMIN 1000 MCG: 1000 INJECTION INTRAMUSCULAR; SUBCUTANEOUS at 08:33

## 2019-05-23 RX ADMIN — ACETAMINOPHEN 500 MG: 500 TABLET ORAL at 14:42

## 2019-05-23 RX ADMIN — LACTOBACILLUS TAB 2 TABLET: TAB at 14:42

## 2019-05-23 RX ADMIN — DOCUSATE SODIUM 100 MG: 100 CAPSULE, LIQUID FILLED ORAL at 08:32

## 2019-05-23 RX ADMIN — LACTOBACILLUS TAB 2 TABLET: TAB at 21:06

## 2019-05-23 ASSESSMENT — PAIN SCALES - GENERAL
PAINLEVEL_OUTOF10: 1
PAINLEVEL_OUTOF10: 0
PAINLEVEL_OUTOF10: 1
PAINLEVEL_OUTOF10: 0
PAINLEVEL_OUTOF10: 1
PAINLEVEL_OUTOF10: 3
PAINLEVEL_OUTOF10: 1

## 2019-05-23 NOTE — FLOWSHEET NOTE
Patient assessment completed earlier this shift. The patient up to the bathroom with assistance. Will continue to monitor.

## 2019-05-23 NOTE — PROGRESS NOTES
performed correctly x20 reps of each. ASSESSMENT/COMMENTS:  Body structures, Functions, Activity limitations: Decreased functional mobility ; Decreased strength;Decreased endurance;Decreased balance; Increased Pain;Decreased coordination;Decreased safe awareness;Decreased ADL status; Decreased ROM; Decreased sensation  Assessment: Pt improved TUG test to 16 sec. Pt able to self identify posture position and AD Location without VCs this tx session. Pt unable to safely pick object up from floor level. PLAN OF CARE/Safety:   Safety Devices  Type of devices: All fall risk precautions in place; Chair alarm in place;Gait belt      Therapy Time:   Individual   Time In 1400   Time Out 1430   Minutes 30     Minutes:30      Transfer/Bed mobility trainin      Gait training:15      Neuro re education:10     Therapeutic ex:      Lashawn Boothe  19 at 4:00 PM

## 2019-05-24 VITALS
HEIGHT: 61 IN | HEART RATE: 63 BPM | BODY MASS INDEX: 26.43 KG/M2 | WEIGHT: 140 LBS | OXYGEN SATURATION: 97 % | DIASTOLIC BLOOD PRESSURE: 61 MMHG | RESPIRATION RATE: 18 BRPM | SYSTOLIC BLOOD PRESSURE: 129 MMHG | TEMPERATURE: 97 F

## 2019-05-24 PROCEDURE — 6370000000 HC RX 637 (ALT 250 FOR IP): Performed by: PHYSICAL MEDICINE & REHABILITATION

## 2019-05-24 PROCEDURE — 6370000000 HC RX 637 (ALT 250 FOR IP): Performed by: INTERNAL MEDICINE

## 2019-05-24 PROCEDURE — 99239 HOSP IP/OBS DSCHRG MGMT >30: CPT | Performed by: PHYSICAL MEDICINE & REHABILITATION

## 2019-05-24 RX ORDER — NITROFURANTOIN 25; 75 MG/1; MG/1
100 CAPSULE ORAL DAILY
Qty: 30 CAPSULE | Refills: 2 | Status: SHIPPED | OUTPATIENT
Start: 2019-05-24 | End: 2019-06-23

## 2019-05-24 RX ORDER — NITROFURANTOIN 25; 75 MG/1; MG/1
100 CAPSULE ORAL DAILY
Status: DISCONTINUED | OUTPATIENT
Start: 2019-05-24 | End: 2019-05-24

## 2019-05-24 RX ORDER — NITROFURANTOIN 25; 75 MG/1; MG/1
100 CAPSULE ORAL DAILY
Status: DISCONTINUED | OUTPATIENT
Start: 2019-05-24 | End: 2019-05-24 | Stop reason: HOSPADM

## 2019-05-24 RX ORDER — TRAMADOL HYDROCHLORIDE 50 MG/1
25 TABLET ORAL EVERY 6 HOURS PRN
Qty: 60 TABLET | Refills: 0 | Status: SHIPPED | OUTPATIENT
Start: 2019-05-24 | End: 2019-06-23

## 2019-05-24 RX ADMIN — DOCUSATE SODIUM 100 MG: 100 CAPSULE, LIQUID FILLED ORAL at 10:29

## 2019-05-24 RX ADMIN — PANTOPRAZOLE SODIUM 40 MG: 40 TABLET, DELAYED RELEASE ORAL at 06:24

## 2019-05-24 RX ADMIN — NITROFURANTOIN (MONOHYDRATE/MACROCRYSTALS) 100 MG: 75; 25 CAPSULE ORAL at 10:29

## 2019-05-24 RX ADMIN — LACTOBACILLUS TAB 2 TABLET: TAB at 10:29

## 2019-05-24 RX ADMIN — ACETAMINOPHEN 500 MG: 500 TABLET ORAL at 10:29

## 2019-05-24 RX ADMIN — ASPIRIN 81 MG: 81 TABLET, COATED ORAL at 10:29

## 2019-05-24 RX ADMIN — TRAMADOL HYDROCHLORIDE 25 MG: 50 TABLET, FILM COATED ORAL at 02:43

## 2019-05-24 ASSESSMENT — PAIN SCALES - GENERAL
PAINLEVEL_OUTOF10: 2
PAINLEVEL_OUTOF10: 7

## 2019-05-24 ASSESSMENT — PAIN DESCRIPTION - ONSET: ONSET: ON-GOING

## 2019-05-24 ASSESSMENT — PAIN DESCRIPTION - PAIN TYPE: TYPE: ACUTE PAIN

## 2019-05-24 ASSESSMENT — PAIN DESCRIPTION - ORIENTATION: ORIENTATION: LOWER

## 2019-05-24 ASSESSMENT — PAIN DESCRIPTION - LOCATION: LOCATION: BACK

## 2019-05-24 ASSESSMENT — PAIN DESCRIPTION - PROGRESSION: CLINICAL_PROGRESSION: NOT CHANGED

## 2019-05-24 ASSESSMENT — PAIN DESCRIPTION - FREQUENCY: FREQUENCY: INTERMITTENT

## 2019-05-24 ASSESSMENT — PAIN DESCRIPTION - DESCRIPTORS: DESCRIPTORS: ACHING

## 2019-05-24 NOTE — PROGRESS NOTES
Balance Training, Endurance Training, Stair training, Modalities, Manual Therapy - Soft Tissue Mobilization, Pain Management, Home Exercise Program, Positioning (Please refer to daily notes for all treatment details)    ASSESSMENT: Pt achieving all goals as outlined above, except requiring occasional CGA for curb negotiation with rollator. Completes TUG in 16seconds improved from 30 upon admission. Appropriate for DC from acute rehab PT program.     Discharge Plan: DC home with [facility] PT recommended.      Electronically signed by Dominick Swanson PT on 5/24/2019 at 4:38 PM

## 2019-05-24 NOTE — PROGRESS NOTES
Lab/X-ray studies reviewed, analyzed and discussed with patient and staff:   No results found for this or any previous visit (from the past 24 hour(s)). EXAMINATION: XR HIP RIGHT (2-3 VIEWS):       DATE AND TIME: 5/21/2019 at 9:15 AM.       CLINICAL HISTORY: ACUTE RIGHT HIP PAIN.        COMPARISON: May 8, 2019.       FINDINGS: Right total hip replacement in satisfactory position, with no change. No acute fracture or dislocation. Remaining bones intact. In the face of persisting clinical concern, further follow-up imaging with CT is not precluded.                                                                                        Impression       NO ACUTE OSSEOUS ABNORMALITY OF THE RIGHT HIP.              Xr Hip Right  5/9/2019   Decreased bone mineralization. Postsurgical changes of right total hip arthroplasty. No periprosthetic lucency or fracture. No acute displaced fracture of the pelvis. Degenerative changes of the sacroiliac joints, lower lumbar spine, and left hip. Vascular stent noted within the proximal right lower extremity soft tissues. No acute osseous abnormality. Previous extensive, complex labs, notes and diagnostics reviewed and analyzed. ALLERGIES:    Allergies as of 05/09/2019    (No Known Allergies)      (please also verify by checking [facility])       Complex Physical Medicine & Rehab Issues Assess & Plan:   1. Severe abnormality of gait and mobility and impaired self-care and ADL's secondary to progressive flare up of generalized osteoarthritis . Functional and medical status  had improved status post acute rehab at [facility] therefore patient is scheduled for  Discharge May 24, 2019 home alone with help from family friends and home health care PT OT RN aide and .    SP weekly team meeting  Mondays to assess progress towards goals, discuss and address social, psychological and medical comorbidities and to address difficulties they may be having progressing

## 2019-05-25 NOTE — DISCHARGE SUMMARY
Subjective: The patient complains of severe  acute  right hip pain and generalized back and sciatica on the right partially relieved by PT, OT, right sciatic block, trigger point injections, rest, Toradol, Norco and relieved with Tylenol and exacerbated by  palpation range of motion and weightbearing. I am concerned about patients  advanced age poor vision poor hearing. [id number] Course: The patient was admitted to the Rehabilitation Unit to address ADL and mobility deficits. The patient was enrolled in acute PT, OT program.  Weekly team meetings were held to assess functional progress toward their goals. The patient's medical issues were addressed. The patient progressed in the rehab program and is now ready for discharge. Refer to FIM scores summary report for detailed functional status. Greater than 35 minutes was spent on coordinating patients discharge including follow-up care, medications and patient/family education. Extended time needed in order to review not only her regular medications but also her pain medications. She is over 89 does well with a low suppressive doses of Tylenol combined with low dose Ultram half of a pill daily or twice daily. She at times may need to take more but only if supervised by her family. She has memory issues. She understands that this may be a permanent medication as she has severe osteoarthritis and her life expectancy is limited due to her age. If she is to stay on his medications she will need to follow-up with her family doctor and/or consider palliative care consult. She is also interested answer chronic suppressive Macrobid. I have written daily dose she can follow up with her family doctor as to whether she stays on this. ROS x10: The patient also complains of severely impaired mobility and activities of daily living.   Otherwise no new problems with vision, hearing, nose, mouth, throat, dermal, cardiovascular, GI,

## 2019-05-29 ENCOUNTER — OFFICE VISIT (OUTPATIENT)
Dept: FAMILY MEDICINE CLINIC | Age: 84
End: 2019-05-29
Payer: MEDICARE

## 2019-05-29 ENCOUNTER — TELEPHONE (OUTPATIENT)
Dept: FAMILY MEDICINE CLINIC | Age: 84
End: 2019-05-29

## 2019-05-29 VITALS
TEMPERATURE: 97.5 F | DIASTOLIC BLOOD PRESSURE: 52 MMHG | HEART RATE: 58 BPM | SYSTOLIC BLOOD PRESSURE: 108 MMHG | RESPIRATION RATE: 16 BRPM | OXYGEN SATURATION: 97 %

## 2019-05-29 DIAGNOSIS — M25.551 ACUTE RIGHT HIP PAIN: Chronic | ICD-10-CM

## 2019-05-29 DIAGNOSIS — Z78.9 IMPAIRED MOBILITY AND ACTIVITIES OF DAILY LIVING: Primary | ICD-10-CM

## 2019-05-29 DIAGNOSIS — G30.1 LATE ONSET ALZHEIMER'S DISEASE WITHOUT BEHAVIORAL DISTURBANCE (HCC): Chronic | ICD-10-CM

## 2019-05-29 DIAGNOSIS — E11.51 DM (DIABETES MELLITUS), TYPE 2 WITH PERIPHERAL VASCULAR COMPLICATIONS (HCC): Chronic | ICD-10-CM

## 2019-05-29 DIAGNOSIS — F02.80 LATE ONSET ALZHEIMER'S DISEASE WITHOUT BEHAVIORAL DISTURBANCE (HCC): Chronic | ICD-10-CM

## 2019-05-29 DIAGNOSIS — Z74.09 IMPAIRED MOBILITY AND ACTIVITIES OF DAILY LIVING: Primary | ICD-10-CM

## 2019-05-29 PROBLEM — Z98.890: Status: RESOLVED | Noted: 2019-05-09 | Resolved: 2019-05-29

## 2019-05-29 PROBLEM — Z96.641 HISTORY OF TOTAL HIP REPLACEMENT, RIGHT: Status: RESOLVED | Noted: 2019-05-09 | Resolved: 2019-05-29

## 2019-05-29 PROBLEM — E87.1 HYPONATREMIA: Chronic | Status: RESOLVED | Noted: 2019-04-17 | Resolved: 2019-05-29

## 2019-05-29 PROCEDURE — 1111F DSCHRG MED/CURRENT MED MERGE: CPT | Performed by: FAMILY MEDICINE

## 2019-05-29 PROCEDURE — 99215 OFFICE O/P EST HI 40 MIN: CPT | Performed by: FAMILY MEDICINE

## 2019-05-29 ASSESSMENT — ENCOUNTER SYMPTOMS
TROUBLE SWALLOWING: 0
CHOKING: 0
CHEST TIGHTNESS: 0
NAUSEA: 0
CONSTIPATION: 0
SHORTNESS OF BREATH: 0
DIARRHEA: 0
VOICE CHANGE: 0
COUGH: 0
ABDOMINAL PAIN: 0

## 2019-05-29 NOTE — TELEPHONE ENCOUNTER
[address] [place] [address] stating that patient is to start PT and OT and that they will be sending out a 485 for Dr. Eric Jimenez to sign.

## 2019-05-29 NOTE — PROGRESS NOTES
Subjective  Venus Pepper, 80 y.o. female presents today with:  Chief Complaint   Patient presents with    Follow-Up from [facility]     05/08/19 hip pain, has appts with PT/OT, pt states resting helps, any movement aggravates the pain.  Other     was told the 2 eye drops she is using are the same thing-would like to discuss.  Hyperlipidemia     would like to discuss if simvastatin is needed, requesting lab           HPI    Per Dr. Nixon Butler on 05/10/2019:    8 year old female who presented to the ER for evaluation of right hip pain. She reports right hip pain that began 2 hours prior to admission when she was going to stand up from her chair, afterwards she was unable to ambulate.       In the ER x-rays were negative for acute processes. She described the pain as moderate to severe with movement. CT Pelvis 05/09/19 revealed grade one L4 spondylolisthesis. Right Hip Replacement . No fracture. Marked atherosclerosis disease with remote right superficial femoral artery stenting. On exam she has severe right sciatica as well as tenderness at the prosthesis laterally where the greater trochanter would be.     The patient has been found to have severe abnormality of gait and mobility with impaired self care due to Impaired Mobility secondary to flare up of DJD/OA and is admitted to the acute inpatient rehab program.     Patient was discharged with concerns regarding persistent gait instability, deficiencies in hearing and memory and well as recurrent UTI and chronic pain. Per Dr. Tiffanie Goldstein D/C summary:     She is over 89 does well with a low suppressive doses of Tylenol combined with low dose Ultram half of a pill daily or twice daily. She at times may need to take more but only if supervised by her family. She has memory issues. She understands that this may be a permanent medication as she has severe osteoarthritis and her life expectancy is limited due to her age.   If she is to stay on his medications she will need to follow-up with her family doctor and/or consider palliative care consult. She is also interested answer chronic suppressive Macrobid. I have written daily dose she can follow up with her family doctor as to whether she stays on this. Today March is accompanied by Taylor, her neighbor and the fact to caregiver. They're concerned about memory issues. She notes that her short-term memory is poor and that she occasionally has clouds of confusion. Taylor states that if she is alone for prolonged periods of time she seems to get disoriented. She is receiving PT and OT in the home and her right hip range of motion is improving. Regarding diabetes, hemoglobin A1c is very well-controlled and patient has not been on Glucotrol for a couple of years. She would like to stop her statin. No other questions and or concerns for today's visit      Review of Systems   Constitutional: Positive for activity change (realted to pain). Negative for appetite change, fatigue, fever and unexpected weight change. HENT: Negative for trouble swallowing and voice change. Respiratory: Negative for cough, choking, chest tightness and shortness of breath. Cardiovascular: Positive for leg swelling (chronic). Negative for chest pain and palpitations. Gastrointestinal: Negative for abdominal pain, constipation, diarrhea and nausea. Endocrine: Negative for cold intolerance and heat intolerance. Genitourinary: Negative for dysuria and frequency. Musculoskeletal: Positive for arthralgias. Skin: Negative for rash. Allergic/Immunologic: Negative for immunocompromised state. Neurological: Negative for dizziness. Psychiatric/Behavioral: Positive for confusion and decreased concentration.          Past Medical History:   Diagnosis Date    Compression fracture of T12 vertebra [facility]) 2013    kyphoplasty Dr Vinicius Muñoz    DJD (degenerative joint disease) of hip 3/20/2014    Dr Bree Hutchison DM (diabetes Diagnoses and Associated Orders     Impaired mobility and activities of daily living    -  Primary    Related to acute on chronic hip pain. Continue to engage in OT and PT. CT DISCHARGE MEDS RECONCILED W/ CURRENT OUTPATIENT MED LIST [9813R CPT(R)]           Late onset Alzheimer's disease without behavioral disturbance        Probable. Given high risk of side effects from medication and low level benefit of Aricept, recommend against medical intervention. Anticipatory guidance          DM (diabetes mellitus), type 2 with peripheral vascular complications (HCC)        Stable and well-controlled with diet only    Hemoglobin A1C [13546 Custom]   - Future Order         Acute right hip pain        continue with PT and OT; f/u with Dr. Magdy Echols prn    CT DISCHARGE MEDS RECONCILED W/ CURRENT OUTPATIENT MED LIST [5292F CPT(R)]                   Reviewed with the patient: all disease processes, current clinical status, medications, activities and diet.      Side effects, adverse effects of the medication prescribed today, as well as treatment plan/ rationale and result expectations have been discussed with the patient who expresses understanding and desires to proceed.     Close follow up to evaluate treatment results and for coordination of care. I have reviewed the patient's medical history in detail and updated the computerized patient record. More than 50% of the appointment was spent in face-to-face counseling, education and care coordination. Orders Placed This Encounter   Procedures    Hemoglobin A1C     Standing Status:   Future     Standing Expiration Date:   7/29/2019    CT DISCHARGE MEDS RECONCILED W/ CURRENT OUTPATIENT MED LIST     No orders of the defined types were placed in this encounter.     Medications Discontinued During This Encounter   Medication Reason    glipiZIDE (GLUCOTROL XL) 10 MG extended release tablet Therapy completed    simvastatin (ZOCOR) 20 MG tablet Therapy completed    aspirin EC 81 MG EC tablet Therapy completed     Return in about 3 months (around 8/29/2019) for DM, dementia. Controlled Substances Monitoring:     RX Monitoring 5/24/2019   Attestation -   Chronic Pain Routine Monitoring Possible medication side effects, risk of tolerance/dependence & alternative treatments discussed. ;Obtaining appropriate analgesic effect of treatment. ;No signs of potential drug abuse or diversion identified: otherwise, see note documentation   Chronic Pain > 50 MEDD Obtained or confirmened \"Consent of Opioid Use\" on file.;Considered consultation with a specialist.;Re-evaluated the status of the patient's underlying condition causing pain.    Chronic Pain > 80 MEDD -       Colt SCHULTE MD

## 2019-05-30 ENCOUNTER — TELEPHONE (OUTPATIENT)
Dept: FAMILY MEDICINE CLINIC | Age: 84
End: 2019-05-30

## 2019-05-30 ENCOUNTER — CARE COORDINATION (OUTPATIENT)
Dept: CASE MANAGEMENT | Age: 84
End: 2019-05-30

## 2019-05-30 NOTE — TELEPHONE ENCOUNTER
Mary Sy from [facility] called stating that a request was previously sent over for an order for a  for this patient. They need an order for social work put in for this patient. Please advise.

## 2019-05-30 NOTE — TELEPHONE ENCOUNTER
I called home care and gave verbal approval for social work. On a patient that already has home care I usually just fax over a prescription that says to add social work to current plan of care.   Let me know if you need anything else,  Thanks,  Fan Corado

## 2019-06-05 ENCOUNTER — TELEPHONE (OUTPATIENT)
Dept: FAMILY MEDICINE CLINIC | Age: 84
End: 2019-06-05

## 2019-06-05 NOTE — TELEPHONE ENCOUNTER
LVM for [facility] [employer] giving consent and to give office a call in the event of any questions.

## 2019-06-05 NOTE — TELEPHONE ENCOUNTER
Honey Amezquita, an occupational therapist is doing a home visit with the patient and is wanting a verbal consent for speech therapy for the patient. She says that the patient has an ongoing cough and dysphagia. Gives a callback number of [phone]. Please advise.

## 2019-06-20 ENCOUNTER — TELEPHONE (OUTPATIENT)
Dept: FAMILY MEDICINE CLINIC | Age: 84
End: 2019-06-20

## 2019-08-13 ENCOUNTER — CARE COORDINATION (OUTPATIENT)
Dept: CARE COORDINATION | Age: 84
End: 2019-08-13

## 2019-08-14 ENCOUNTER — CARE COORDINATION (OUTPATIENT)
Dept: CARE COORDINATION | Age: 84
End: 2019-08-14

## 2019-08-16 ENCOUNTER — HOSPITAL ENCOUNTER (OUTPATIENT)
Age: 84
Setting detail: OBSERVATION
Discharge: HOME | End: 2019-08-17
Attending: EMERGENCY MEDICINE | Admitting: INTERNAL MEDICINE
Payer: MEDICARE
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
## 2019-08-16 DIAGNOSIS — N30.00 ACUTE CYSTITIS WITHOUT HEMATURIA: ICD-10-CM

## 2019-08-16 PROCEDURE — 99285 EMERGENCY DEPT VISIT HI MDM: CPT

## 2019-08-16 PROCEDURE — 6830039000 HC L3 TRAUMA ALERT

## 2019-08-17 ENCOUNTER — APPOINTMENT (OUTPATIENT)
Dept: CT IMAGING | Age: 84
End: 2019-08-17
Payer: MEDICARE

## 2019-08-17 ENCOUNTER — APPOINTMENT (OUTPATIENT)
Dept: GENERAL RADIOLOGY | Age: 84
End: 2019-08-17
Payer: MEDICARE

## 2019-08-17 VITALS
RESPIRATION RATE: 18 BRPM | OXYGEN SATURATION: 96 % | TEMPERATURE: 97.5 F | WEIGHT: 144.3 LBS | BODY MASS INDEX: 27.24 KG/M2 | HEIGHT: 61 IN | SYSTOLIC BLOOD PRESSURE: 102 MMHG | HEART RATE: 100 BPM | DIASTOLIC BLOOD PRESSURE: 84 MMHG

## 2019-08-17 PROBLEM — N30.01 ACUTE CYSTITIS WITH HEMATURIA: Status: ACTIVE | Noted: 2019-08-17
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
## 2019-08-17 PROBLEM — R26.9 ABNORMALITY OF GAIT AND MOBILITY: Status: ACTIVE | Noted: 2019-08-17

## 2019-08-17 LAB
ALBUMIN SERPL-MCNC: 3.9 G/DL (ref 3.5–4.6)
ALP BLD-CCNC: 120 U/L (ref 40–130)
ALT SERPL-CCNC: 13 U/L (ref 0–33)
ANION GAP SERPL CALCULATED.3IONS-SCNC: 11 MEQ/L (ref 9–15)
ANION GAP SERPL CALCULATED.3IONS-SCNC: 15 MEQ/L (ref 9–15)
APTT: 31.3 SEC (ref 24.4–36.8)
AST SERPL-CCNC: 14 U/L (ref 0–35)
BACTERIA: ABNORMAL /HPF
BASOPHILS ABSOLUTE: 0 K/UL (ref 0–0.2)
BASOPHILS RELATIVE PERCENT: 1.1 %
BILIRUB SERPL-MCNC: 0.3 MG/DL (ref 0.2–0.7)
BILIRUBIN URINE: NEGATIVE
BLOOD, URINE: ABNORMAL
BUN BLDV-MCNC: 10 MG/DL (ref 8–23)
BUN BLDV-MCNC: 13 MG/DL (ref 8–23)
CALCIUM SERPL-MCNC: 10.1 MG/DL (ref 8.5–9.9)
CALCIUM SERPL-MCNC: 10.3 MG/DL (ref 8.5–9.9)
CHLORIDE BLD-SCNC: 91 MEQ/L (ref 95–107)
CHLORIDE BLD-SCNC: 95 MEQ/L (ref 95–107)
CLARITY: ABNORMAL
CO2: 24 MEQ/L (ref 20–31)
CO2: 26 MEQ/L (ref 20–31)
COLOR: YELLOW
CREAT SERPL-MCNC: 0.52 MG/DL (ref 0.5–0.9)
CREAT SERPL-MCNC: 0.53 MG/DL (ref 0.5–0.9)
EOSINOPHILS ABSOLUTE: 0.2 K/UL (ref 0–0.7)
EOSINOPHILS RELATIVE PERCENT: 2 %
EPITHELIAL CELLS, UA: ABNORMAL /HPF (ref 0–5)
GFR AFRICAN AMERICAN: >60
GFR AFRICAN AMERICAN: >60
GFR NON-AFRICAN AMERICAN: >60
GFR NON-AFRICAN AMERICAN: >60
GLOBULIN: 3.1 G/DL (ref 2.3–3.5)
GLUCOSE BLD-MCNC: 143 MG/DL (ref 70–99)
GLUCOSE BLD-MCNC: 147 MG/DL (ref 70–99)
GLUCOSE URINE: NEGATIVE MG/DL
HCT VFR BLD CALC: 38.1 % (ref 37–47)
HEMOGLOBIN: 13.4 G/DL (ref 12–16)
HYALINE CASTS: ABNORMAL /HPF (ref 0–5)
INR BLD: 0.9
KETONES, URINE: NEGATIVE MG/DL
LEUKOCYTE ESTERASE, URINE: ABNORMAL
LYMPHOCYTES ABSOLUTE: 1.4 K/UL (ref 1–4.8)
LYMPHOCYTES RELATIVE PERCENT: 16 %
MCH RBC QN AUTO: 31.4 PG (ref 27–31.3)
MCHC RBC AUTO-ENTMCNC: 35.3 % (ref 33–37)
MCV RBC AUTO: 89.1 FL (ref 82–100)
MONOCYTES ABSOLUTE: 0.7 K/UL (ref 0.2–0.8)
MONOCYTES RELATIVE PERCENT: 7.5 %
MYELOCYTE PERCENT: 1 %
NEUTROPHILS ABSOLUTE: 6.8 K/UL (ref 1.4–6.5)
NEUTROPHILS RELATIVE PERCENT: 74 %
NITRITE, URINE: POSITIVE
PDW BLD-RTO: 13.2 % (ref 11.5–14.5)
PH UA: 6.5 (ref 5–9)
PLATELET # BLD: 280 K/UL (ref 130–400)
POC CREATININE WHOLE BLOOD: 0.6
POTASSIUM REFLEX MAGNESIUM: 4.1 MEQ/L (ref 3.4–4.9)
POTASSIUM SERPL-SCNC: 4.2 MEQ/L (ref 3.4–4.9)
PROTEIN UA: NEGATIVE MG/DL
PROTHROMBIN TIME: 12.8 SEC (ref 12.3–14.9)
RBC # BLD: 4.28 M/UL (ref 4.2–5.4)
RBC UA: ABNORMAL /HPF (ref 0–5)
SODIUM BLD-SCNC: 130 MEQ/L (ref 135–144)
SODIUM BLD-SCNC: 132 MEQ/L (ref 135–144)
SPECIFIC GRAVITY UA: 1.01 (ref 1–1.03)
TOTAL PROTEIN: 7 G/DL (ref 6.3–8)
UROBILINOGEN, URINE: 0.2 E.U./DL
WBC # BLD: 9 K/UL (ref 4.8–10.8)
WBC UA: ABNORMAL /HPF (ref 0–5)

## 2019-08-17 PROCEDURE — 2580000003 HC RX 258: Performed by: EMERGENCY MEDICINE

## 2019-08-17 PROCEDURE — G0378 HOSPITAL OBSERVATION PER HR: HCPCS

## 2019-08-17 PROCEDURE — 73090 X-RAY EXAM OF FOREARM: CPT

## 2019-08-17 PROCEDURE — 72125 CT NECK SPINE W/O DYE: CPT

## 2019-08-17 PROCEDURE — 74177 CT ABD & PELVIS W/CONTRAST: CPT

## 2019-08-17 PROCEDURE — 6360000002 HC RX W HCPCS: Performed by: EMERGENCY MEDICINE

## 2019-08-17 PROCEDURE — 1210000000 HC MED SURG R&B

## 2019-08-17 PROCEDURE — 85025 COMPLETE CBC W/AUTO DIFF WBC: CPT

## 2019-08-17 PROCEDURE — 6370000000 HC RX 637 (ALT 250 FOR IP): Performed by: HOSPITALIST

## 2019-08-17 PROCEDURE — 36415 COLL VENOUS BLD VENIPUNCTURE: CPT

## 2019-08-17 PROCEDURE — 85730 THROMBOPLASTIN TIME PARTIAL: CPT

## 2019-08-17 PROCEDURE — 94760 N-INVAS EAR/PLS OXIMETRY 1: CPT

## 2019-08-17 PROCEDURE — 70450 CT HEAD/BRAIN W/O DYE: CPT

## 2019-08-17 PROCEDURE — 71260 CT THORAX DX C+: CPT

## 2019-08-17 PROCEDURE — 96365 THER/PROPH/DIAG IV INF INIT: CPT

## 2019-08-17 PROCEDURE — 6360000004 HC RX CONTRAST MEDICATION: Performed by: PHYSICIAN ASSISTANT

## 2019-08-17 PROCEDURE — 81001 URINALYSIS AUTO W/SCOPE: CPT

## 2019-08-17 PROCEDURE — 80053 COMPREHEN METABOLIC PANEL: CPT

## 2019-08-17 PROCEDURE — 97162 PT EVAL MOD COMPLEX 30 MIN: CPT

## 2019-08-17 PROCEDURE — 85610 PROTHROMBIN TIME: CPT

## 2019-08-17 PROCEDURE — 96375 TX/PRO/DX INJ NEW DRUG ADDON: CPT

## 2019-08-17 PROCEDURE — 73502 X-RAY EXAM HIP UNI 2-3 VIEWS: CPT

## 2019-08-17 PROCEDURE — 6360000002 HC RX W HCPCS: Performed by: PHYSICIAN ASSISTANT

## 2019-08-17 RX ORDER — ONDANSETRON 2 MG/ML
4 INJECTION INTRAMUSCULAR; INTRAVENOUS EVERY 6 HOURS PRN
Status: DISCONTINUED | OUTPATIENT
Start: 2019-08-17 | End: 2019-08-17 | Stop reason: HOSPADM

## 2019-08-17 RX ORDER — TRAMADOL HYDROCHLORIDE 50 MG/1
50 TABLET ORAL EVERY 6 HOURS PRN
Status: DISCONTINUED | OUTPATIENT
Start: 2019-08-17 | End: 2019-08-17 | Stop reason: HOSPADM

## 2019-08-17 RX ORDER — SODIUM CHLORIDE 0.9 % (FLUSH) 0.9 %
10 SYRINGE (ML) INJECTION EVERY 12 HOURS SCHEDULED
Status: DISCONTINUED | OUTPATIENT
Start: 2019-08-17 | End: 2019-08-17 | Stop reason: HOSPADM

## 2019-08-17 RX ORDER — FENTANYL CITRATE 50 UG/ML
12.5 INJECTION, SOLUTION INTRAMUSCULAR; INTRAVENOUS ONCE
Status: COMPLETED | OUTPATIENT
Start: 2019-08-17 | End: 2019-08-17

## 2019-08-17 RX ORDER — ACETAMINOPHEN 325 MG/1
650 TABLET ORAL EVERY 4 HOURS PRN
Status: DISCONTINUED | OUTPATIENT
Start: 2019-08-17 | End: 2019-08-17 | Stop reason: HOSPADM

## 2019-08-17 RX ORDER — PANTOPRAZOLE SODIUM 40 MG/1
40 TABLET, DELAYED RELEASE ORAL
Status: DISCONTINUED | OUTPATIENT
Start: 2019-08-17 | End: 2019-08-17 | Stop reason: HOSPADM

## 2019-08-17 RX ORDER — ONDANSETRON 2 MG/ML
4 INJECTION INTRAMUSCULAR; INTRAVENOUS
Status: DISCONTINUED | OUTPATIENT
Start: 2019-08-17 | End: 2019-08-17

## 2019-08-17 RX ORDER — NITROFURANTOIN 25; 75 MG/1; MG/1
100 CAPSULE ORAL 2 TIMES DAILY
Qty: 10 CAPSULE | Refills: 0 | Status: SHIPPED | OUTPATIENT
Start: 2019-08-17 | End: 2019-08-22

## 2019-08-17 RX ORDER — SODIUM CHLORIDE 0.9 % (FLUSH) 0.9 %
10 SYRINGE (ML) INJECTION PRN
Status: DISCONTINUED | OUTPATIENT
Start: 2019-08-17 | End: 2019-08-17 | Stop reason: HOSPADM

## 2019-08-17 RX ADMIN — CEFTRIAXONE SODIUM 1 G: 1 INJECTION, POWDER, FOR SOLUTION INTRAMUSCULAR; INTRAVENOUS at 02:29

## 2019-08-17 RX ADMIN — IOPAMIDOL 100 ML: 612 INJECTION, SOLUTION INTRAVENOUS at 01:51

## 2019-08-17 RX ADMIN — FENTANYL CITRATE 12.5 MCG: 50 INJECTION, SOLUTION INTRAMUSCULAR; INTRAVENOUS at 01:30

## 2019-08-17 RX ADMIN — TRAMADOL HYDROCHLORIDE 50 MG: 50 TABLET, FILM COATED ORAL at 05:49

## 2019-08-17 RX ADMIN — PANTOPRAZOLE SODIUM 40 MG: 40 TABLET, DELAYED RELEASE ORAL at 05:49

## 2019-08-17 ASSESSMENT — PAIN SCALES - GENERAL
PAINLEVEL_OUTOF10: 2
PAINLEVEL_OUTOF10: 10
PAINLEVEL_OUTOF10: 4

## 2019-08-17 ASSESSMENT — ENCOUNTER SYMPTOMS
NAUSEA: 0
SHORTNESS OF BREATH: 0
ANAL BLEEDING: 0
COUGH: 0
EYE DISCHARGE: 0
PHOTOPHOBIA: 0
ABDOMINAL DISTENTION: 0
VOICE CHANGE: 0
VOMITING: 0
APNEA: 0

## 2019-08-17 ASSESSMENT — PAIN DESCRIPTION - PAIN TYPE: TYPE: CHRONIC PAIN

## 2019-08-17 ASSESSMENT — PAIN DESCRIPTION - LOCATION: LOCATION: BACK

## 2019-08-17 ASSESSMENT — PAIN DESCRIPTION - ORIENTATION: ORIENTATION: LOWER

## 2019-08-17 NOTE — ED NOTES
Bed: 17  Expected date: 8/16/19  Expected time: 11:15 PM  Means of arrival: [facility]  Comments:  [address] + LOC   CAT 6411 Mariusz García RN  08/16/19 [id number]

## 2019-08-17 NOTE — ED NOTES
This RN placed pt on bed pan and removed pt. Pt had a moderate amount of malodorous yellow urine.      Sherine Ferguson RN  08/17/19 [id number]

## 2019-08-17 NOTE — PROGRESS NOTES
Physical Therapy Med Surg Initial Assessment  Facility/Department: [facility]  Room: [place] [address]       NAME: Dale Cantor  : 1918 (89 y.o.)  MRN: [id number]  CODE STATUS: Full Code    Date of Service: 2019    Patient Diagnosis(es): Fall [W19. XXXA]  Abnormality of gait and mobility [R26.9]   Chief Complaint   Patient presents with    Fall     from standing      Patient Active Problem List    Diagnosis Date Noted    Fall 2019    Acute cystitis with hematuria 2019    Abnormality of gait and mobility 2019    Late onset Alzheimer's disease without behavioral disturbance 2019    Ambulatory dysfunction 2019    Type 2 diabetes mellitus with ophthalmic complication, without long-term current use of insulin ([address] 2019    Weakness 2019    Gait abnormality due to Impaired Mobility secondary to flare up of DJD/OA.   [facility] admit 19. 2019    Ataxia 2019    Glaucoma 2019    DJD (degenerative joint disease) 2019    S/P kyphoplasty 2019    Gastroesophageal reflux disease without esophagitis 04/15/2019    Lumbar stenosis     DM (diabetes mellitus), type 2 with peripheral vascular complications ([address]     Nuclear senile cataract 2017    Nonexudative age-related macular degeneration 2017    Primary open angle glaucoma 2017    Impaired mobility and activities of daily living 2014    Acute leg pain, left 2014    Acute right hip pain 2014    Spinal stenosis of lumbar region 2012    Stress incontinence 10/28/2011    Hearing loss 10/28/2011    PVD (peripheral vascular disease) ([address] 10/28/2006        Past Medical History:   Diagnosis Date    Compression fracture of T12 vertebra [facility]) 2013    kyphoplasty Dr Carley López    DJD (degenerative joint disease) of hip 3/20/2014    Dr Mireille Phan DM (diabetes mellitus), type 2 with peripheral vascular complications ([address] Strengthening, Gait Training, Patient/Caregiver Education & Training, Balance Training, Neuromuscular Re-education, Functional Mobility Training, Endurance Training, Home Exercise Program, Transfer Training, Safety Education & Training  Plan Comment: Initiate POC  Safety Devices  Type of devices: Call light within reach, Chair alarm in place, Patient at risk for falls, Left in chair    Goals:  Patient goals : return home  Short term goals  Time Frame for Short term goals: 10 days  Short term goal 1: Initiate HEP and progress as tolerated. Short term goal 2: Pt will be independent with bed mobility without use of rails  Short term goal 3: Pt will be independent with transfers with RW and good balance  Short term goal 4: Pt will be independent with ambulation for up to 50 feet for functional walking at home.      [facility] (6 CLICK) BASIC MOBILITY  AM-PAC Inpatient Mobility Raw Score : 19     Therapy Time:   Individual   Time In 0850   Time Out 0926   Minutes 100 Alisa Monroy, CAT        08/17/19 at 9:29 AM
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
## 2019-08-17 NOTE — ED PROVIDER NOTES
[address] [facility] ED  eMERGENCY dEPARTMENT eNCOUnter      Pt Name: Lilli Fallon  MRN: [id number]  Teresitagfquirino 2/27/1918  Date of evaluation: 8/16/2019  Provider: Elayne Echeverria MD    CHIEF COMPLAINT       Chief Complaint   Patient presents with    Fall     from standing          HISTORY OF PRESENT ILLNESS   (Location/Symptom, Timing/Onset,Context/Setting, Quality, Duration, Modifying Factors, Severity)  Note limiting factors. Lilli Fallon is a 80 y.o. female who presents to the emergency department she fell from a standing position forward hit her head on the floor. She denies headache neck pain back pain chest pain abdominal pain.,  It is hard of hearing has hearing aids in place. Has hearing aids in place and is hard of hearing. Patient is able to recite her name age and date of birth. 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 Symptoms are moderate severity worse with motion. Nothing improves symptoms. HPI    NursingNotes were reviewed. REVIEW OF SYSTEMS    (2-9 systems for level 4, 10 or more for level 5)     Review of Systems   Constitutional: Negative for activity change, appetite change, fever and unexpected weight change. HENT: Negative for ear discharge, nosebleeds and voice change. Eyes: Negative for photophobia and discharge. Respiratory: Negative for apnea, cough and shortness of breath. Cardiovascular: Negative for chest pain. Gastrointestinal: Negative for abdominal distention, anal bleeding, nausea and vomiting. Endocrine: Negative for cold intolerance, heat intolerance and polyphagia. Genitourinary: Negative for flank pain and hematuria. Musculoskeletal: Positive for arthralgias. Negative for joint swelling, neck pain and neck stiffness. Skin: Negative for pallor. Allergic/Immunologic: Negative for immunocompromised state. Neurological: Negative for seizures and facial asymmetry. Hematological: Does not bruise/bleed easily. Psychiatric/Behavioral: Negative for behavioral problems, self-injury and sleep disturbance. All other systems reviewed and are negative. Except as noted above the remainder of the review of systems was reviewed and negative. PAST MEDICAL HISTORY     Past Medical History:   Diagnosis Date    Compression fracture of T12 vertebra [facility]) 2013    kyphoplasty Dr Bridgette Hester DJD (degenerative joint disease) of hip 3/20/2014    Dr Ajay Michael DM (diabetes mellitus), type 2 with peripheral vascular complications (HCC)     diet controlled    GERD (gastroesophageal reflux disease) 8/31/10    Glaucoma     bilateral    H/O total hip arthroplasty     Dr Ajay Michael H/O vascular surgery 5/9/2019    Stens LE 2006    History of total hip replacement, right 5/9/2019    Hyponatremia 4/17/2019    Lower leg edema     Lumbar stenosis     Macular degeneration     left eye    Pain of left scapula 3/25/2019    PVD (peripheral vascular disease) ([address] 10/28/2006    Dr Judge Palacio, stents LE    Stress incontinence 10/28/2011    Thoracic back pain 5/29/2014    MILD COMPRESSION FRACTURE OF T12 WITHOUT CANAL COMPROMISE. CENTRAL DISK HERNIATION AT T8-9 WITH MILD CANAL NARROWING AT THIS LEVEL. SURGICALHISTORY       Past Surgical History:   Procedure Laterality Date    APPENDECTOMY      CYST REMOVAL  06/27/2016    DR SLOAN  RT THUMB MUCOUS CYST    HYSTERECTOMY      REFRACTIVE SURGERY  [id number]    left     TOTAL HIP ARTHROPLASTY Right 9/25/15    DR. NARANJO    UPPER GASTROINTESTINAL ENDOSCOPY  10/07/15    DR Roberta Diaz    VERTEBROPLASTY  2013    T12, Dr Anupama Moy       Previous Medications    ACETAMINOPHEN (TYLENOL) 325 MG TABLET    Take 1 tablet by mouth 3 times daily as needed for Pain (DO NOT EXCEED 4GM IN 24 HOURS)    BRIMONIDINE (ALPHAGAN P) 0.15 % OPHTHALMIC SOLUTION    USE 1 DROP IN BOTH EYES TWICE DAILY.     LATANOPROST (XALATAN) 0.005 % OPHTHALMIC SOLUTION    Place 1 drop into the left eye nightly    LUTEIN PO    Take by mouth    MULTIPLE VITAMIN (MULTI VITAMIN DAILY) TABS    Take 1 tablet by mouth every morning    OMEPRAZOLE (PRILOSEC) 20 MG DELAYED RELEASE CAPSULE    TAKE ONE CAPSULE BY MOUTH EVERY DAY    TIMOLOL (TIMOPTIC-XE) 0.5 % OPHTHALMIC GEL-FORMING    USE 1 DROP IN BOTH EYES TWICE DAILY. VITAMIN D (CHOLECALCIFEROL) 400 UNITS TABS TABLET    Take 400 Units by mouth daily       ALLERGIES     Patient has no known allergies.     FAMILY HISTORY       Family History   Problem Relation Age of Onset    Heart Failure Mother         dec age 77    Hypertension Mother     Diabetes Father         dec age 67          SOCIAL HISTORY       Social History     Socioeconomic History    Marital status: Single     Spouse name: None    Number of children: 0    Years of education: None    Highest education level: None   Occupational History    Occupation: retired [employer]   Social Needs    Financial resource strain: None    Food insecurity:     Worry: None     Inability: None    Transportation needs:     Medical: None     Non-medical: None   Tobacco Use    Smoking status: Former Smoker     Types: Cigarettes     Last attempt to quit: 1980     Years since quittin.2    Smokeless tobacco: Never Used   Substance and Sexual Activity    Alcohol use: No     Alcohol/week: 0.0 standard drinks    Drug use: No    Sexual activity: Not Currently     Partners: Male   Lifestyle    Physical activity:     Days per week: None     Minutes per session: None    Stress: None   Relationships    Social connections:     Talks on phone: None     Gets together: None     Attends Protestant service: None     Active member of club or organization: None     Attends meetings of clubs or organizations: None     Relationship status: None    Intimate partner violence:     Fear of current or ex partner: None     Emotionally abused: None     Physically abused: None     Forced sexual activity: None   Other Topics Concern    None   Social History Narrative    Retired [employer] alone in a house, no children    Former  at [employer] and [address] [place] [address] for [employer] for many years        Beth Noel [address] Opening: Spontaneous  Best Verbal Response: Oriented  Best Motor Response: Obeys commands  Ton Coma Scale Score: 15 @FLOW([id number])@      PHYSICAL EXAM    (up to 7 for level 4, 8 or more for level 5)     ED Triage Vitals [08/16/19 2339]   BP Temp Temp Source Pulse Resp SpO2 Height Weight   (!) 161/110 97.7 °F (36.5 °C) Oral 80 18 98 % 5' 1\" (1.549 m) 140 lb (63.5 kg)       Physical Exam   Constitutional: She is oriented to person, place, and time. She appears well-developed and well-nourished. No distress. HENT:   Head: Normocephalic. Hearing aids in place   Eyes: Pupils are equal, round, and reactive to light. Conjunctivae and EOM are normal. Right eye exhibits no discharge. Left eye exhibits no discharge. Neck: Normal range of motion. Neck supple. Cardiovascular: Normal rate, regular rhythm, normal heart sounds and intact distal pulses. Pulmonary/Chest: Effort normal and breath sounds normal. No stridor. No respiratory distress. She has no wheezes. She has no rales. She exhibits tenderness. Abdominal: Soft. Bowel sounds are normal. She exhibits no distension and no mass. There is tenderness. There is no guarding. Musculoskeletal: She exhibits edema and tenderness. Arms:       Legs:  Neurological: She is alert and oriented to person, place, and time. She exhibits normal muscle tone. Skin: Skin is warm. No erythema. Psychiatric: She has a normal mood and affect. Nursing note and vitals reviewed.       DIAGNOSTIC RESULTS     EKG: All EKG's are interpreted by the Emergency Department Physician who either signs or Co-signsthis chart in the absence of a cardiologist.         RADIOLOGY:   Jereld Duck such as CT, Ultrasound and MRI

## 2019-08-18 LAB
GFR AFRICAN AMERICAN: >60
GFR NON-AFRICAN AMERICAN: >60
PERFORMED ON: NORMAL
POC CREATININE: 0.6 MG/DL (ref 0.6–1.2)
POC SAMPLE TYPE: NORMAL

## 2019-08-23 DIAGNOSIS — E87.1 HYPONATREMIA: Chronic | ICD-10-CM

## 2019-08-23 LAB
ANION GAP SERPL CALCULATED.3IONS-SCNC: 13 MEQ/L (ref 9–15)
BUN BLDV-MCNC: 17 MG/DL (ref 8–23)
CALCIUM SERPL-MCNC: 10.1 MG/DL (ref 8.5–9.9)
CHLORIDE BLD-SCNC: 100 MEQ/L (ref 95–107)
CO2: 22 MEQ/L (ref 20–31)
CREAT SERPL-MCNC: 0.53 MG/DL (ref 0.5–0.9)
GFR AFRICAN AMERICAN: >60
GFR NON-AFRICAN AMERICAN: >60
GLUCOSE BLD-MCNC: 191 MG/DL (ref 70–99)
POTASSIUM SERPL-SCNC: 4.6 MEQ/L (ref 3.4–4.9)
SODIUM BLD-SCNC: 135 MEQ/L (ref 135–144)

## 2019-08-28 ENCOUNTER — OFFICE VISIT (OUTPATIENT)
Dept: FAMILY MEDICINE CLINIC | Age: 84
End: 2019-08-28
Payer: MEDICARE

## 2019-08-28 VITALS
WEIGHT: 140 LBS | SYSTOLIC BLOOD PRESSURE: 118 MMHG | RESPIRATION RATE: 16 BRPM | HEART RATE: 68 BPM | OXYGEN SATURATION: 98 % | DIASTOLIC BLOOD PRESSURE: 68 MMHG | BODY MASS INDEX: 26.43 KG/M2 | HEIGHT: 61 IN | TEMPERATURE: 97.4 F

## 2019-08-28 DIAGNOSIS — R60.0 LEG EDEMA, RIGHT: Chronic | ICD-10-CM
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
## 2019-08-28 DIAGNOSIS — R27.0 ATAXIA: ICD-10-CM

## 2019-08-28 DIAGNOSIS — F02.80 LATE ONSET ALZHEIMER'S DISEASE WITHOUT BEHAVIORAL DISTURBANCE (HCC): Chronic | ICD-10-CM

## 2019-08-28 DIAGNOSIS — Z78.0 POSTMENOPAUSAL: ICD-10-CM

## 2019-08-28 DIAGNOSIS — E11.51 DM (DIABETES MELLITUS), TYPE 2 WITH PERIPHERAL VASCULAR COMPLICATIONS (HCC): ICD-10-CM

## 2019-08-28 DIAGNOSIS — I73.9 PVD (PERIPHERAL VASCULAR DISEASE) (HCC): Primary | ICD-10-CM

## 2019-08-28 DIAGNOSIS — K21.9 GASTROESOPHAGEAL REFLUX DISEASE WITHOUT ESOPHAGITIS: Chronic | ICD-10-CM

## 2019-08-28 DIAGNOSIS — G30.1 LATE ONSET ALZHEIMER'S DISEASE WITHOUT BEHAVIORAL DISTURBANCE (HCC): Chronic | ICD-10-CM

## 2019-08-28 DIAGNOSIS — R26.9 ABNORMALITY OF GAIT AND MOBILITY: ICD-10-CM

## 2019-08-28 LAB — HBA1C MFR BLD: 6.4 % (ref 4.8–5.9)

## 2019-08-28 PROCEDURE — 99214 OFFICE O/P EST MOD 30 MIN: CPT | Performed by: FAMILY MEDICINE

## 2019-08-28 PROCEDURE — 4040F PNEUMOC VAC/ADMIN/RCVD: CPT | Performed by: FAMILY MEDICINE

## 2019-08-28 PROCEDURE — G8598 ASA/ANTIPLAT THER USED: HCPCS | Performed by: FAMILY MEDICINE

## 2019-08-28 PROCEDURE — 1090F PRES/ABSN URINE INCON ASSESS: CPT | Performed by: FAMILY MEDICINE

## 2019-08-28 PROCEDURE — 1036F TOBACCO NON-USER: CPT | Performed by: FAMILY MEDICINE

## 2019-08-28 PROCEDURE — G8427 DOCREV CUR MEDS BY ELIG CLIN: HCPCS | Performed by: FAMILY MEDICINE

## 2019-08-28 PROCEDURE — 1123F ACP DISCUSS/DSCN MKR DOCD: CPT | Performed by: FAMILY MEDICINE

## 2019-08-28 PROCEDURE — G8417 CALC BMI ABV UP PARAM F/U: HCPCS | Performed by: FAMILY MEDICINE

## 2019-08-28 RX ORDER — OMEPRAZOLE 20 MG/1
CAPSULE, DELAYED RELEASE ORAL
Qty: 90 CAPSULE | Refills: 1 | Status: SHIPPED | OUTPATIENT
Start: 2019-08-28 | End: 2020-03-26
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
## 2019-09-05 ENCOUNTER — HOSPITAL ENCOUNTER (OUTPATIENT)
Dept: WOMENS IMAGING | Age: 84
Discharge: HOME | End: 2019-09-07
Payer: MEDICARE

## 2019-09-05 DIAGNOSIS — Z78.0 POSTMENOPAUSAL: ICD-10-CM

## 2019-09-05 PROCEDURE — 77080 DXA BONE DENSITY AXIAL: CPT

## 2019-09-16 DIAGNOSIS — M81.0 AGE-RELATED OSTEOPOROSIS WITHOUT CURRENT PATHOLOGICAL FRACTURE: Chronic | ICD-10-CM
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
## 2019-09-20 ENCOUNTER — HOSPITAL ENCOUNTER (INPATIENT)
Age: 84
LOS: 3 days | Discharge: HOME | DRG: 389 | End: 2019-09-25
Attending: EMERGENCY MEDICINE | Admitting: INTERNAL MEDICINE
Payer: MEDICARE

## 2019-09-20 ENCOUNTER — APPOINTMENT (OUTPATIENT)
Dept: GENERAL RADIOLOGY | Age: 84
DRG: 389 | End: 2019-09-20
Payer: MEDICARE

## 2019-09-20 DIAGNOSIS — E87.1 HYPONATREMIA: Primary | ICD-10-CM

## 2019-09-20 DIAGNOSIS — N10 ACUTE PYELONEPHRITIS: ICD-10-CM

## 2019-09-20 PROBLEM — R11.2 NAUSEA AND VOMITING: Status: ACTIVE | Noted: 2019-09-20

## 2019-09-20 LAB
ALBUMIN SERPL-MCNC: 3.9 G/DL (ref 3.5–4.6)
ALP BLD-CCNC: 94 U/L (ref 40–130)
ALT SERPL-CCNC: 11 U/L (ref 0–33)
ANION GAP SERPL CALCULATED.3IONS-SCNC: 12 MEQ/L (ref 9–15)
ANION GAP SERPL CALCULATED.3IONS-SCNC: 14 MEQ/L (ref 9–15)
AST SERPL-CCNC: 15 U/L (ref 0–35)
BACTERIA: ABNORMAL /HPF
BASOPHILS ABSOLUTE: 0 K/UL (ref 0–0.2)
BASOPHILS RELATIVE PERCENT: 0.1 %
BILIRUB SERPL-MCNC: 0.6 MG/DL (ref 0.2–0.7)
BILIRUBIN URINE: NEGATIVE
BLOOD, URINE: ABNORMAL
BUN BLDV-MCNC: 13 MG/DL (ref 8–23)
BUN BLDV-MCNC: 15 MG/DL (ref 8–23)
CALCIUM SERPL-MCNC: 9.7 MG/DL (ref 8.5–9.9)
CALCIUM SERPL-MCNC: 9.9 MG/DL (ref 8.5–9.9)
CHLORIDE BLD-SCNC: 91 MEQ/L (ref 95–107)
CHLORIDE BLD-SCNC: 95 MEQ/L (ref 95–107)
CLARITY: ABNORMAL
CO2: 21 MEQ/L (ref 20–31)
CO2: 25 MEQ/L (ref 20–31)
COLOR: YELLOW
CREAT SERPL-MCNC: 0.49 MG/DL (ref 0.5–0.9)
CREAT SERPL-MCNC: 0.53 MG/DL (ref 0.5–0.9)
EKG ATRIAL RATE: 91 BPM
EKG P AXIS: 86 DEGREES
EKG P-R INTERVAL: 136 MS
EKG Q-T INTERVAL: 408 MS
EKG QRS DURATION: 84 MS
EKG QTC CALCULATION (BAZETT): 501 MS
EKG R AXIS: 8 DEGREES
EKG T AXIS: 60 DEGREES
EKG VENTRICULAR RATE: 91 BPM
EOSINOPHILS ABSOLUTE: 0 K/UL (ref 0–0.7)
EOSINOPHILS RELATIVE PERCENT: 0 %
EPITHELIAL CELLS, UA: ABNORMAL /HPF (ref 0–5)
GFR AFRICAN AMERICAN: >60
GFR AFRICAN AMERICAN: >60
GFR NON-AFRICAN AMERICAN: >60
GFR NON-AFRICAN AMERICAN: >60
GLOBULIN: 2.9 G/DL (ref 2.3–3.5)
GLUCOSE BLD-MCNC: 168 MG/DL (ref 60–115)
GLUCOSE BLD-MCNC: 186 MG/DL (ref 70–99)
GLUCOSE BLD-MCNC: 190 MG/DL (ref 70–99)
GLUCOSE URINE: 100 MG/DL
HCT VFR BLD CALC: 40.3 % (ref 37–47)
HEMOGLOBIN: 13.5 G/DL (ref 12–16)
HYALINE CASTS: ABNORMAL /HPF (ref 0–5)
KETONES, URINE: 40 MG/DL
LEUKOCYTE ESTERASE, URINE: ABNORMAL
LYMPHOCYTES ABSOLUTE: 0.8 K/UL (ref 1–4.8)
LYMPHOCYTES RELATIVE PERCENT: 7.2 %
MCH RBC QN AUTO: 30.3 PG (ref 27–31.3)
MCHC RBC AUTO-ENTMCNC: 33.4 % (ref 33–37)
MCV RBC AUTO: 90.9 FL (ref 82–100)
MONOCYTES ABSOLUTE: 0.7 K/UL (ref 0.2–0.8)
MONOCYTES RELATIVE PERCENT: 6.3 %
NEUTROPHILS ABSOLUTE: 9.9 K/UL (ref 1.4–6.5)
NEUTROPHILS RELATIVE PERCENT: 86.4 %
NITRITE, URINE: NEGATIVE
PDW BLD-RTO: 13.7 % (ref 11.5–14.5)
PERFORMED ON: ABNORMAL
PH UA: 6 (ref 5–9)
PLATELET # BLD: 237 K/UL (ref 130–400)
POTASSIUM SERPL-SCNC: 4 MEQ/L (ref 3.4–4.9)
POTASSIUM SERPL-SCNC: 4.2 MEQ/L (ref 3.4–4.9)
PROTEIN UA: 100 MG/DL
RBC # BLD: 4.43 M/UL (ref 4.2–5.4)
RBC UA: ABNORMAL /HPF (ref 0–5)
SODIUM BLD-SCNC: 128 MEQ/L (ref 135–144)
SODIUM BLD-SCNC: 130 MEQ/L (ref 135–144)
SPECIFIC GRAVITY UA: 1.02 (ref 1–1.03)
TOTAL PROTEIN: 6.8 G/DL (ref 6.3–8)
TROPONIN: <0.01 NG/ML (ref 0–0.01)
URINE REFLEX TO CULTURE: YES
UROBILINOGEN, URINE: 0.2 E.U./DL
WBC # BLD: 11.4 K/UL (ref 4.8–10.8)
WBC UA: >100 /HPF (ref 0–5)

## 2019-09-20 PROCEDURE — 6370000000 HC RX 637 (ALT 250 FOR IP): Performed by: INTERNAL MEDICINE

## 2019-09-20 PROCEDURE — G0378 HOSPITAL OBSERVATION PER HR: HCPCS

## 2019-09-20 PROCEDURE — 2580000003 HC RX 258: Performed by: INTERNAL MEDICINE

## 2019-09-20 PROCEDURE — 6360000002 HC RX W HCPCS: Performed by: EMERGENCY MEDICINE

## 2019-09-20 PROCEDURE — 74018 RADEX ABDOMEN 1 VIEW: CPT

## 2019-09-20 PROCEDURE — 87186 SC STD MICRODIL/AGAR DIL: CPT

## 2019-09-20 PROCEDURE — 81001 URINALYSIS AUTO W/SCOPE: CPT

## 2019-09-20 PROCEDURE — 96365 THER/PROPH/DIAG IV INF INIT: CPT

## 2019-09-20 PROCEDURE — 87086 URINE CULTURE/COLONY COUNT: CPT

## 2019-09-20 PROCEDURE — 6360000002 HC RX W HCPCS: Performed by: INTERNAL MEDICINE

## 2019-09-20 PROCEDURE — 80053 COMPREHEN METABOLIC PANEL: CPT

## 2019-09-20 PROCEDURE — 84484 ASSAY OF TROPONIN QUANT: CPT

## 2019-09-20 PROCEDURE — 87077 CULTURE AEROBIC IDENTIFY: CPT

## 2019-09-20 PROCEDURE — 85025 COMPLETE CBC W/AUTO DIFF WBC: CPT

## 2019-09-20 PROCEDURE — 93005 ELECTROCARDIOGRAM TRACING: CPT | Performed by: EMERGENCY MEDICINE

## 2019-09-20 PROCEDURE — 96372 THER/PROPH/DIAG INJ SC/IM: CPT

## 2019-09-20 PROCEDURE — 96375 TX/PRO/DX INJ NEW DRUG ADDON: CPT

## 2019-09-20 PROCEDURE — 2580000003 HC RX 258: Performed by: EMERGENCY MEDICINE

## 2019-09-20 PROCEDURE — 99285 EMERGENCY DEPT VISIT HI MDM: CPT

## 2019-09-20 PROCEDURE — 36415 COLL VENOUS BLD VENIPUNCTURE: CPT

## 2019-09-20 PROCEDURE — 71045 X-RAY EXAM CHEST 1 VIEW: CPT

## 2019-09-20 RX ORDER — BRIMONIDINE TARTRATE 2 MG/ML
1 SOLUTION/ DROPS OPHTHALMIC 2 TIMES DAILY
Status: DISCONTINUED | OUTPATIENT
Start: 2019-09-20 | End: 2019-09-25 | Stop reason: HOSPADM

## 2019-09-20 RX ORDER — ONDANSETRON 2 MG/ML
4 INJECTION INTRAMUSCULAR; INTRAVENOUS EVERY 6 HOURS PRN
Status: DISCONTINUED | OUTPATIENT
Start: 2019-09-20 | End: 2019-09-25 | Stop reason: HOSPADM

## 2019-09-20 RX ORDER — LATANOPROST 50 UG/ML
1 SOLUTION/ DROPS OPHTHALMIC NIGHTLY
Status: DISCONTINUED | OUTPATIENT
Start: 2019-09-20 | End: 2019-09-25 | Stop reason: HOSPADM

## 2019-09-20 RX ORDER — ACETAMINOPHEN 325 MG/1
325 TABLET ORAL EVERY 8 HOURS PRN
Status: DISCONTINUED | OUTPATIENT
Start: 2019-09-20 | End: 2019-09-25 | Stop reason: HOSPADM

## 2019-09-20 RX ORDER — PANTOPRAZOLE SODIUM 40 MG/1
40 TABLET, DELAYED RELEASE ORAL
Status: DISCONTINUED | OUTPATIENT
Start: 2019-09-21 | End: 2019-09-25 | Stop reason: HOSPADM

## 2019-09-20 RX ORDER — 0.9 % SODIUM CHLORIDE 0.9 %
500 INTRAVENOUS SOLUTION INTRAVENOUS ONCE
Status: COMPLETED | OUTPATIENT
Start: 2019-09-20 | End: 2019-09-20

## 2019-09-20 RX ORDER — SODIUM CHLORIDE 0.9 % (FLUSH) 0.9 %
10 SYRINGE (ML) INJECTION EVERY 12 HOURS SCHEDULED
Status: DISCONTINUED | OUTPATIENT
Start: 2019-09-20 | End: 2019-09-25 | Stop reason: HOSPADM

## 2019-09-20 RX ORDER — SODIUM CHLORIDE 0.9 % (FLUSH) 0.9 %
10 SYRINGE (ML) INJECTION PRN
Status: DISCONTINUED | OUTPATIENT
Start: 2019-09-20 | End: 2019-09-25 | Stop reason: HOSPADM

## 2019-09-20 RX ORDER — SODIUM CHLORIDE 9 MG/ML
INJECTION, SOLUTION INTRAVENOUS CONTINUOUS
Status: DISCONTINUED | OUTPATIENT
Start: 2019-09-20 | End: 2019-09-25 | Stop reason: HOSPADM

## 2019-09-20 RX ADMIN — BRIMONIDINE TARTRATE 1 DROP: 2 SOLUTION OPHTHALMIC at 20:38

## 2019-09-20 RX ADMIN — CEFTRIAXONE SODIUM 1 G: 1 INJECTION, POWDER, FOR SOLUTION INTRAMUSCULAR; INTRAVENOUS at 15:07

## 2019-09-20 RX ADMIN — SODIUM CHLORIDE: 9 INJECTION, SOLUTION INTRAVENOUS at 17:38

## 2019-09-20 RX ADMIN — LATANOPROST 1 DROP: 50 SOLUTION OPHTHALMIC at 20:38

## 2019-09-20 RX ADMIN — ENOXAPARIN SODIUM 40 MG: 40 INJECTION SUBCUTANEOUS at 20:45

## 2019-09-20 RX ADMIN — ONDANSETRON 4 MG: 2 INJECTION INTRAMUSCULAR; INTRAVENOUS at 17:37

## 2019-09-20 RX ADMIN — SODIUM CHLORIDE 500 ML: 9 INJECTION, SOLUTION INTRAVENOUS at 15:07

## 2019-09-20 ASSESSMENT — ENCOUNTER SYMPTOMS
STRIDOR: 0
EYE DISCHARGE: 0
BACK PAIN: 0
NAUSEA: 1
WHEEZING: 0
COUGH: 0
PHOTOPHOBIA: 0
EYE PAIN: 0
SORE THROAT: 0
SHORTNESS OF BREATH: 0
ABDOMINAL PAIN: 0
DIARRHEA: 0
EYE REDNESS: 0
VOMITING: 0
BLOOD IN STOOL: 0
CONSTIPATION: 0

## 2019-09-20 ASSESSMENT — PAIN SCALES - GENERAL: PAINLEVEL_OUTOF10: 0

## 2019-09-20 NOTE — ED NOTES
Pt has no co pain, nausea, or vomiting at this time. Pt states she vomited twice after breakfast. Pt lungs are clear throughout, abd is firm and distended with active bowel sounds throughout. Pt states her abd is like that she \" is just fat\".  Pt state she has a bm everyday and its is normal.     Liz Houston RN  09/20/19 [id number]

## 2019-09-21 LAB
ANION GAP SERPL CALCULATED.3IONS-SCNC: 10 MEQ/L (ref 9–15)
BASOPHILS ABSOLUTE: 0 K/UL (ref 0–0.2)
BASOPHILS RELATIVE PERCENT: 0.2 %
BUN BLDV-MCNC: 17 MG/DL (ref 8–23)
CALCIUM SERPL-MCNC: 9.2 MG/DL (ref 8.5–9.9)
CHLORIDE BLD-SCNC: 102 MEQ/L (ref 95–107)
CO2: 21 MEQ/L (ref 20–31)
CREAT SERPL-MCNC: 0.59 MG/DL (ref 0.5–0.9)
EOSINOPHILS ABSOLUTE: 0 K/UL (ref 0–0.7)
EOSINOPHILS RELATIVE PERCENT: 0.3 %
GFR AFRICAN AMERICAN: >60
GFR NON-AFRICAN AMERICAN: >60
GLUCOSE BLD-MCNC: 120 MG/DL (ref 70–99)
GLUCOSE BLD-MCNC: 140 MG/DL (ref 60–115)
HCT VFR BLD CALC: 37.4 % (ref 37–47)
HEMOGLOBIN: 12.4 G/DL (ref 12–16)
LYMPHOCYTES ABSOLUTE: 1.6 K/UL (ref 1–4.8)
LYMPHOCYTES RELATIVE PERCENT: 15.1 %
MCH RBC QN AUTO: 30.7 PG (ref 27–31.3)
MCHC RBC AUTO-ENTMCNC: 33.1 % (ref 33–37)
MCV RBC AUTO: 92.9 FL (ref 82–100)
MONOCYTES ABSOLUTE: 1.2 K/UL (ref 0.2–0.8)
MONOCYTES RELATIVE PERCENT: 11 %
NEUTROPHILS ABSOLUTE: 7.8 K/UL (ref 1.4–6.5)
NEUTROPHILS RELATIVE PERCENT: 73.4 %
PDW BLD-RTO: 14 % (ref 11.5–14.5)
PERFORMED ON: ABNORMAL
PLATELET # BLD: 213 K/UL (ref 130–400)
POTASSIUM REFLEX MAGNESIUM: 4 MEQ/L (ref 3.4–4.9)
RBC # BLD: 4.03 M/UL (ref 4.2–5.4)
SODIUM BLD-SCNC: 133 MEQ/L (ref 135–144)
WBC # BLD: 10.6 K/UL (ref 4.8–10.8)

## 2019-09-21 PROCEDURE — 6370000000 HC RX 637 (ALT 250 FOR IP): Performed by: INTERNAL MEDICINE

## 2019-09-21 PROCEDURE — 36415 COLL VENOUS BLD VENIPUNCTURE: CPT

## 2019-09-21 PROCEDURE — 85025 COMPLETE CBC W/AUTO DIFF WBC: CPT

## 2019-09-21 PROCEDURE — 6360000002 HC RX W HCPCS: Performed by: INTERNAL MEDICINE

## 2019-09-21 PROCEDURE — 96366 THER/PROPH/DIAG IV INF ADDON: CPT

## 2019-09-21 PROCEDURE — 2580000003 HC RX 258: Performed by: INTERNAL MEDICINE

## 2019-09-21 PROCEDURE — 96372 THER/PROPH/DIAG INJ SC/IM: CPT

## 2019-09-21 PROCEDURE — 80048 BASIC METABOLIC PNL TOTAL CA: CPT

## 2019-09-21 PROCEDURE — G0378 HOSPITAL OBSERVATION PER HR: HCPCS

## 2019-09-21 RX ORDER — BISACODYL 10 MG
10 SUPPOSITORY, RECTAL RECTAL ONCE
Status: COMPLETED | OUTPATIENT
Start: 2019-09-21 | End: 2019-09-21

## 2019-09-21 RX ADMIN — SODIUM CHLORIDE: 9 INJECTION, SOLUTION INTRAVENOUS at 21:04

## 2019-09-21 RX ADMIN — PANTOPRAZOLE SODIUM 40 MG: 40 TABLET, DELAYED RELEASE ORAL at 06:02

## 2019-09-21 RX ADMIN — BRIMONIDINE TARTRATE 1 DROP: 2 SOLUTION OPHTHALMIC at 21:05

## 2019-09-21 RX ADMIN — LATANOPROST 1 DROP: 50 SOLUTION OPHTHALMIC at 21:05

## 2019-09-21 RX ADMIN — ENOXAPARIN SODIUM 40 MG: 40 INJECTION SUBCUTANEOUS at 21:04

## 2019-09-21 RX ADMIN — BRIMONIDINE TARTRATE 1 DROP: 2 SOLUTION OPHTHALMIC at 11:13

## 2019-09-21 RX ADMIN — CEFTRIAXONE SODIUM 1 G: 1 INJECTION, POWDER, FOR SOLUTION INTRAMUSCULAR; INTRAVENOUS at 15:05

## 2019-09-21 RX ADMIN — BISACODYL 10 MG: 10 SUPPOSITORY RECTAL at 11:10

## 2019-09-21 ASSESSMENT — PAIN SCALES - GENERAL
PAINLEVEL_OUTOF10: 0

## 2019-09-21 NOTE — PROGRESS NOTES
Department of Internal Medicine  Progress Note      SUBJECTIVE: No nausea or vomiting over night. Last BM was 36 hours ago. Afebrile and HDS. Encouraged PO intake and ambulation.      ROS:  All 12 systems reviewed and negative except mentioned in HPI and Assessment and plan    MEDICATIONS:  Current Facility-Administered Medications   Medication Dose Route Frequency Provider Last Rate Last Dose    bisacodyl (DULCOLAX) suppository 10 mg  10 mg Rectal Once Albertina Mancia MD        acetaminophen (TYLENOL) tablet 325 mg  325 mg Oral Q8H PRN Albertina Mancia MD        brimonidine (ALPHAGAN) 0.2 % ophthalmic solution 1 drop  1 drop Both Eyes BID Albertina Garza MD   1 drop at 09/20/19 2038    latanoprost (XALATAN) 0.005 % ophthalmic solution 1 drop  1 drop Left Eye Nightly Albertina Mancia MD   1 drop at 09/20/19 2038    pantoprazole (PROTONIX) tablet 40 mg  40 mg Oral QAM AC Albertina Mancia MD   40 mg at 09/21/19 0602    sodium chloride flush 0.9 % injection 10 mL  10 mL Intravenous 2 times per day Albertina Mancia MD        sodium chloride flush 0.9 % injection 10 mL  10 mL Intravenous PRN Albertina Mancia MD        enoxaparin (LOVENOX) injection 40 mg  40 mg Subcutaneous Daily Albertina Mancia MD   40 mg at 09/20/19 2045    cefTRIAXone (ROCEPHIN) 1 g IVPB in 50 mL D5W minibag  1 g Intravenous Q24H Albertina Garza MD        0.9 % sodium chloride infusion   Intravenous Continuous Albertina Garza MD 50 mL/hr at 09/20/19 1738      ondansetron (ZOFRAN) injection 4 mg  4 mg Intravenous Q6H PRN Albertina Garza MD   4 mg at 09/20/19 1737         OBJECTIVE:  Vital Signs:  Vitals:    09/21/19 0738   BP: (!) 124/49   Pulse: 73   Resp: 16   Temp: 98.8 °F (37.1 °C)   SpO2: 97%       Focal exam:  No abdominal tenderness  Has positive BS    General Exam (except as mentioned above):  CONSTITUTIONAL: Awake, alert, no apparent distress  EYES:  PERRL, conjunctiva normal  ENT:  Normocephalic, atraumatic  NECK:  Supple  BACK:  Symmetric  LUNGS:  CTAB except bilateral basilar crackles. CARDIOVASCULAR:  S1S2 present  ABDOMEN:  soft, non-distended, non-tender  MUSCULOSKELETAL:  There is no redness, warmth, or swelling of the joints. NEUROLOGIC:  Alert, awake, oriented x 3. No FND  EXTREMITIES: Warm and well perfused. LABS  Recent Labs     09/20/19  1334 09/21/19  0616   WBC 11.4* 10.6   RBC 4.43 4.03*   HGB 13.5 12.4   HCT 40.3 37.4   MCV 90.9 92.9   MCH 30.3 30.7   MCHC 33.4 33.1   RDW 13.7 14.0    213       Recent Labs     09/20/19  1334 09/20/19  1803 09/21/19  0616   * 130* 133*   K 4.2 4.0 4.0   CL 91* 95 102   CO2 25 21 21   BUN 13 15 17   CREATININE 0.53 0.49* 0.59   GLUCOSE 186* 190* 120*   CALCIUM 9.9 9.7 9.2       No results for input(s): MG in the last 72 hours. ASSESSMENT AND PLAN    Active Hospital Problems    Diagnosis Date Noted    Nausea and vomiting [R11.2] 09/20/2019     Nausea and vomiting:KUB shows ileus vs partial SBO. Patient is not nauseous or vomiting. Has positive BS. Encouraged PO intake. Last BM prior to admission. Will give Dulolax suppository x 1 IVF at slow rate, Zofran as needed. Hyponatremia: Due to nausea and poor oral intake. resolving with Slow rate hydration. Repeat BMP in AM    Possible UTI: follow urine cultures. Has dysuria Ceftriaxone until final cultures.      DVT prophylaxis: Lovenox  Amanda Canela MD  Pager : [phone]

## 2019-09-22 ENCOUNTER — APPOINTMENT (OUTPATIENT)
Dept: CT IMAGING | Age: 84
DRG: 389 | End: 2019-09-22
Payer: MEDICARE

## 2019-09-22 PROBLEM — K56.609 BOWEL OBSTRUCTION (HCC): Status: ACTIVE | Noted: 2019-09-22

## 2019-09-22 LAB
ANION GAP SERPL CALCULATED.3IONS-SCNC: 9 MEQ/L (ref 9–15)
BUN BLDV-MCNC: 15 MG/DL (ref 8–23)
CALCIUM SERPL-MCNC: 8.9 MG/DL (ref 8.5–9.9)
CHLORIDE BLD-SCNC: 99 MEQ/L (ref 95–107)
CO2: 22 MEQ/L (ref 20–31)
CREAT SERPL-MCNC: 0.6 MG/DL (ref 0.5–0.9)
GFR AFRICAN AMERICAN: >60
GFR NON-AFRICAN AMERICAN: >60
GLUCOSE BLD-MCNC: 103 MG/DL (ref 70–99)
ORGANISM: ABNORMAL
POTASSIUM REFLEX MAGNESIUM: 3.9 MEQ/L (ref 3.4–4.9)
SODIUM BLD-SCNC: 130 MEQ/L (ref 135–144)
URINE CULTURE, ROUTINE: ABNORMAL

## 2019-09-22 PROCEDURE — 74177 CT ABD & PELVIS W/CONTRAST: CPT

## 2019-09-22 PROCEDURE — 6360000004 HC RX CONTRAST MEDICATION: Performed by: COLON & RECTAL SURGERY

## 2019-09-22 PROCEDURE — 6360000002 HC RX W HCPCS: Performed by: INTERNAL MEDICINE

## 2019-09-22 PROCEDURE — 1210000000 HC MED SURG R&B

## 2019-09-22 PROCEDURE — 99221 1ST HOSP IP/OBS SF/LOW 40: CPT | Performed by: COLON & RECTAL SURGERY

## 2019-09-22 PROCEDURE — 6370000000 HC RX 637 (ALT 250 FOR IP): Performed by: INTERNAL MEDICINE

## 2019-09-22 PROCEDURE — 36415 COLL VENOUS BLD VENIPUNCTURE: CPT

## 2019-09-22 PROCEDURE — 2580000003 HC RX 258: Performed by: INTERNAL MEDICINE

## 2019-09-22 PROCEDURE — 80048 BASIC METABOLIC PNL TOTAL CA: CPT

## 2019-09-22 RX ORDER — SULFAMETHOXAZOLE AND TRIMETHOPRIM 800; 160 MG/1; MG/1
1 TABLET ORAL EVERY 12 HOURS SCHEDULED
Status: COMPLETED | OUTPATIENT
Start: 2019-09-22 | End: 2019-09-24

## 2019-09-22 RX ADMIN — SODIUM CHLORIDE: 9 INJECTION, SOLUTION INTRAVENOUS at 19:46

## 2019-09-22 RX ADMIN — LATANOPROST 1 DROP: 50 SOLUTION OPHTHALMIC at 19:47

## 2019-09-22 RX ADMIN — BRIMONIDINE TARTRATE 1 DROP: 2 SOLUTION OPHTHALMIC at 11:28

## 2019-09-22 RX ADMIN — SULFAMETHOXAZOLE AND TRIMETHOPRIM 1 TABLET: 800; 160 TABLET ORAL at 19:46

## 2019-09-22 RX ADMIN — CEFTRIAXONE SODIUM 1 G: 1 INJECTION, POWDER, FOR SOLUTION INTRAMUSCULAR; INTRAVENOUS at 10:39

## 2019-09-22 RX ADMIN — IOPAMIDOL 100 ML: 612 INJECTION, SOLUTION INTRAVENOUS at 11:03

## 2019-09-22 RX ADMIN — ENOXAPARIN SODIUM 40 MG: 40 INJECTION SUBCUTANEOUS at 19:46

## 2019-09-22 RX ADMIN — BRIMONIDINE TARTRATE 1 DROP: 2 SOLUTION OPHTHALMIC at 19:47

## 2019-09-22 ASSESSMENT — PAIN SCALES - GENERAL: PAINLEVEL_OUTOF10: 0

## 2019-09-22 NOTE — FLOWSHEET NOTE
Pt c/o abdominal distension and no BM despite suppository. Spoke with Dr. [facility] regarding xray of abdomen and pt;s complaints. Gen surg consult with Dr. Lefty León ordered and NPO, no PO meds. Pt is very [place] and writer explained plan but pt needs reinforcement, no evidence of learning.

## 2019-09-22 NOTE — CONSULTS
pupils equal, round and reactive to light, extra ocular muscles intact, sclera clear, conjunctiva normal  NECK:  Supple, symmetrical, trachea midline, no adenopathy, thyroid symmetric, not enlarged and no tenderness, skin normal  BACK:  Symmetric, no curvature, spinous processes are non-tender on palpation, paraspinous muscles are non-tender on palpation, no costal vertebral tenderness  LUNGS:  No increased work of breathing, good air exchange, clear to auscultation bilaterally, no crackles or wheezing  CARDIOVASCULAR:  Normal apical impulse, regular rate and rhythm, normal S1 and S2, no S3 or S4, and no murmur noted  ABDOMEN: Scars from previous hysterectomy and appendectomy present without evidence of abdominal wall hernia. , hypoactive bowel sounds, soft, non-distended, non-tender, voluntary guarding absent, no masses palpated and hernia absent  MUSCULOSKELETAL:  There is no redness, warmth, or swelling of the joints. Full range of motion noted. Motor strength is 5 out of 5 all extremities bilaterally. Tone is normal.  SKIN:  no bruising or bleeding  DATA:    CBC:   Lab Results   Component Value Date    WBC 10.6 09/21/2019    RBC 4.03 09/21/2019    RBC 4.31 05/09/2012    HGB 12.4 09/21/2019    HCT 37.4 09/21/2019    MCV 92.9 09/21/2019    MCH 30.7 09/21/2019    MCHC 33.1 09/21/2019    RDW 14.0 09/21/2019     09/21/2019    MPV 8.0 10/16/2015     BMP:    Lab Results   Component Value Date     09/22/2019    K 3.9 09/22/2019    CL 99 09/22/2019    CO2 22 09/22/2019    BUN 15 09/22/2019    LABALBU 3.9 09/20/2019    LABALBU 4.1 05/09/2012    CREATININE 0.60 09/22/2019    CALCIUM 8.9 09/22/2019    GFRAA >60.0 09/22/2019    LABGLOM >60.0 09/22/2019    GLUCOSE 103 09/22/2019    GLUCOSE 156 05/09/2012     Radiology Review: Abdominal x-ray reviewed which shows dilated small bowel with air-fluid levels    IMPRESSION/RECOMMENDATIONS:      Patient has evidence of an ileus versus small bowel obstruction.   I cannot find any evidence of CAT scans of the abdomen but did see a CAT scan of the pelvis few years ago. I can order a CAT scan of the pelvis to see if there is any significant obstruction from a possible tumor. She does not need oral contrast for this study. Following completion, I would be reluctant to proceed with any type of surgery given her age unless a clearly identified problems can be fixed family members can be found to have a real discussion regarding her morbidity.

## 2019-09-23 LAB
ANION GAP SERPL CALCULATED.3IONS-SCNC: 7 MEQ/L (ref 9–15)
BUN BLDV-MCNC: 10 MG/DL (ref 8–23)
CALCIUM SERPL-MCNC: 9.4 MG/DL (ref 8.5–9.9)
CHLORIDE BLD-SCNC: 107 MEQ/L (ref 95–107)
CO2: 25 MEQ/L (ref 20–31)
CREAT SERPL-MCNC: 0.52 MG/DL (ref 0.5–0.9)
GFR AFRICAN AMERICAN: >60
GFR NON-AFRICAN AMERICAN: >60
GLUCOSE BLD-MCNC: 104 MG/DL (ref 70–99)
POTASSIUM REFLEX MAGNESIUM: 4.1 MEQ/L (ref 3.4–4.9)
SODIUM BLD-SCNC: 139 MEQ/L (ref 135–144)

## 2019-09-23 PROCEDURE — 80048 BASIC METABOLIC PNL TOTAL CA: CPT

## 2019-09-23 PROCEDURE — 6370000000 HC RX 637 (ALT 250 FOR IP): Performed by: INTERNAL MEDICINE

## 2019-09-23 PROCEDURE — 93010 ELECTROCARDIOGRAM REPORT: CPT | Performed by: INTERNAL MEDICINE

## 2019-09-23 PROCEDURE — 1210000000 HC MED SURG R&B

## 2019-09-23 PROCEDURE — 2580000003 HC RX 258: Performed by: INTERNAL MEDICINE

## 2019-09-23 PROCEDURE — 36415 COLL VENOUS BLD VENIPUNCTURE: CPT

## 2019-09-23 PROCEDURE — 99232 SBSQ HOSP IP/OBS MODERATE 35: CPT | Performed by: COLON & RECTAL SURGERY

## 2019-09-23 PROCEDURE — 6360000002 HC RX W HCPCS: Performed by: INTERNAL MEDICINE

## 2019-09-23 RX ADMIN — BRIMONIDINE TARTRATE 1 DROP: 2 SOLUTION OPHTHALMIC at 21:37

## 2019-09-23 RX ADMIN — SULFAMETHOXAZOLE AND TRIMETHOPRIM 1 TABLET: 800; 160 TABLET ORAL at 08:06

## 2019-09-23 RX ADMIN — SULFAMETHOXAZOLE AND TRIMETHOPRIM 1 TABLET: 800; 160 TABLET ORAL at 21:37

## 2019-09-23 RX ADMIN — ENOXAPARIN SODIUM 40 MG: 40 INJECTION SUBCUTANEOUS at 21:38

## 2019-09-23 RX ADMIN — PANTOPRAZOLE SODIUM 40 MG: 40 TABLET, DELAYED RELEASE ORAL at 06:17

## 2019-09-23 RX ADMIN — Medication 10 ML: at 21:39

## 2019-09-23 RX ADMIN — SODIUM CHLORIDE: 9 INJECTION, SOLUTION INTRAVENOUS at 18:58

## 2019-09-23 RX ADMIN — BRIMONIDINE TARTRATE 1 DROP: 2 SOLUTION OPHTHALMIC at 08:06

## 2019-09-23 RX ADMIN — LATANOPROST 1 DROP: 50 SOLUTION OPHTHALMIC at 21:37

## 2019-09-23 ASSESSMENT — PAIN SCALES - GENERAL: PAINLEVEL_OUTOF10: 0

## 2019-09-23 NOTE — PROGRESS NOTES
Pt Name: Abner Calero  Medical Record Number: [id number]  Date of Birth 1918   Admit date 2019 12:37 PM  Today's Date: 2019     ASSESSMENT  1. Hospital day # 1  2 hospital day #3 partial small bowel obstruction    PLAN  1. Continue clear liquids  2. No operative plans    SUBJECTIVE  Chief complaint: Follow-up bowel obstruction  Afebrile, vital signs are stable. She denies any nausea or vomiting, passing flatus, no bowel movement She is tolerating a DIET CLEAR LIQUID;. Her pain is well controlled on current medications. She has been ambulating in the halls. has a past medical history of Compression fracture of T12 vertebra ([address] DJD (degenerative joint disease) of hip, DM (diabetes mellitus), type 2 with peripheral vascular complications ([address] GERD (gastroesophageal reflux disease), Glaucoma, H/O total hip arthroplasty, H/O vascular surgery, History of total hip replacement, right, Hyponatremia, Lower leg edema, Lumbar stenosis, Macular degeneration, Pain of left scapula, PVD (peripheral vascular disease) ([address] Stress incontinence, and Thoracic back pain. CURRENT MEDS  Scheduled Meds:   sulfamethoxazole-trimethoprim  1 tablet Oral 2 times per day    brimonidine  1 drop Both Eyes BID    latanoprost  1 drop Left Eye Nightly    pantoprazole  40 mg Oral QAM AC    sodium chloride flush  10 mL Intravenous 2 times per day    enoxaparin  40 mg Subcutaneous Daily     Continuous Infusions:   sodium chloride 50 mL/hr at 19     PRN Meds:.acetaminophen, sodium chloride flush, ondansetron    OBJECTIVE  CURRENT VITALS:  height is 5' 1\" (1.549 m) and weight is 140 lb (63.5 kg). Her oral temperature is 97.9 °F (36.6 °C). Her blood pressure is 149/57 (abnormal) and her pulse is 70. Her respiration is 16 and oxygen saturation is 99%.    Temperature Range (24h):Temp: 97.9 °F (36.6 °C) Temp  Av.4 °F (36.9 °C)  Min: 97.9 °F (36.6 °C)  Max: 98.8 °F (37.1 °C)  BP Range (29Y): Systolic

## 2019-09-23 NOTE — PROGRESS NOTES
crackles. CARDIOVASCULAR:  S1S2 present  ABDOMEN:  soft, non-distended, non-tender  MUSCULOSKELETAL:  There is no redness, warmth, or swelling of the joints. NEUROLOGIC:  Alert, awake, oriented x 3. No FND  EXTREMITIES: Warm and well perfused. LABS  Recent Labs     09/21/19  0616   WBC 10.6   RBC 4.03*   HGB 12.4   HCT 37.4   MCV 92.9   MCH 30.7   MCHC 33.1   RDW 14.0          Recent Labs     09/21/19  0616 09/22/19  0608 09/23/19  0620   * 130* 139   K 4.0 3.9 4.1    99 107   CO2 21 22 25   BUN 17 15 10   CREATININE 0.59 0.60 0.52   GLUCOSE 120* 103* 104*   CALCIUM 9.2 8.9 9.4       No results for input(s): MG in the last 72 hours. ASSESSMENT AND PLAN    Active Hospital Problems    Diagnosis Date Noted    Bowel obstruction ([facility] [I88.134] 09/22/2019    Nausea and vomiting [R11.2] 09/20/2019     - Ileus/Partial SBO: Clear liquid diet.  No surgical intervention planned  - E coli UTI: bactrim completed  - Hyponatremia: resolved  - CT abdomen does not show any mass    DVT prophylaxis: Rach Mcneill MD  Pager : [phone]

## 2019-09-23 NOTE — DISCHARGE INSTR - COC
Assisted  Toileting  Assisted  Feeding  Assisted  Med Admin  Assisted  Med Delivery   whole    Wound Care Documentation and Therapy:  Wound 11/09/16 Abrasion(s) Elbow Outer abrasion from fall, no bleeding noted (Active)   Number of days: 1047        Elimination:  Continence:   · Bowel: Yes  · Bladder: No  Urinary Catheter: None   Colostomy/Ileostomy/Ileal Conduit: No       Date of Last BM: 9/25/2019    Intake/Output Summary (Last 24 hours) at 9/23/2019 1050  Last data filed at 9/23/2019 0518  Gross per 24 hour   Intake 1371 ml   Output 750 ml   Net 621 ml     I/O last 3 completed shifts: In: 2332 [P.O.:360; I.V.:1011]  Out: 750 [Urine:750]    Safety Concerns: At Risk for Falls    Impairments/Disabilities:      Hearing    Nutrition Therapy:  Current Nutrition Therapy:   - Oral Diet:  General    Routes of Feeding: Oral  Liquids: Thin Liquids  Daily Fluid Restriction: no  Last Modified Barium Swallow with Video (Video Swallowing Test): not done    Treatments at the Time of Hospital Discharge:   Respiratory Treatments: none    Oxygen Therapy:  is not on home oxygen therapy.   Ventilator:    - No ventilator support    Rehab Therapies: Physical Therapy and Occupational Therapy  Weight Bearing Status/Restrictions: No weight bearing restirctions  Other Medical Equipment (for information only, NOT a DME order):  walker and bedside commode  Other Treatments: none      Patient's personal belongings (please select all that are sent with patient):  Glasses, Dentures upper and lower    RN SIGNATURE:  Electronically signed by Mitchel Munoz RN on 9/25/19 at 1:44 PM    CASE MANAGEMENT/SOCIAL WORK SECTION    Inpatient Status Date: ***    Readmission Risk Assessment Score:  Readmission Risk              Risk of Unplanned Readmission:        16           Discharging to Facility/ Agency   · Name: [address] [place] [address]  · Address:  · Phone:[phone]  · Fax:    Dialysis Facility (if applicable)

## 2019-09-24 LAB
ANION GAP SERPL CALCULATED.3IONS-SCNC: 10 MEQ/L (ref 9–15)
BUN BLDV-MCNC: 8 MG/DL (ref 8–23)
CALCIUM SERPL-MCNC: 9.2 MG/DL (ref 8.5–9.9)
CHLORIDE BLD-SCNC: 106 MEQ/L (ref 95–107)
CO2: 21 MEQ/L (ref 20–31)
CREAT SERPL-MCNC: 0.57 MG/DL (ref 0.5–0.9)
GFR AFRICAN AMERICAN: >60
GFR NON-AFRICAN AMERICAN: >60
GLUCOSE BLD-MCNC: 102 MG/DL (ref 70–99)
POTASSIUM REFLEX MAGNESIUM: 4.4 MEQ/L (ref 3.4–4.9)
SODIUM BLD-SCNC: 137 MEQ/L (ref 135–144)

## 2019-09-24 PROCEDURE — 6370000000 HC RX 637 (ALT 250 FOR IP): Performed by: INTERNAL MEDICINE

## 2019-09-24 PROCEDURE — 1210000000 HC MED SURG R&B

## 2019-09-24 PROCEDURE — 6360000002 HC RX W HCPCS: Performed by: INTERNAL MEDICINE

## 2019-09-24 PROCEDURE — 2580000003 HC RX 258: Performed by: INTERNAL MEDICINE

## 2019-09-24 PROCEDURE — 6370000000 HC RX 637 (ALT 250 FOR IP): Performed by: COLON & RECTAL SURGERY

## 2019-09-24 PROCEDURE — 80048 BASIC METABOLIC PNL TOTAL CA: CPT

## 2019-09-24 PROCEDURE — 36415 COLL VENOUS BLD VENIPUNCTURE: CPT

## 2019-09-24 PROCEDURE — 97166 OT EVAL MOD COMPLEX 45 MIN: CPT

## 2019-09-24 PROCEDURE — 97162 PT EVAL MOD COMPLEX 30 MIN: CPT

## 2019-09-24 PROCEDURE — 99231 SBSQ HOSP IP/OBS SF/LOW 25: CPT | Performed by: COLON & RECTAL SURGERY

## 2019-09-24 RX ADMIN — MAGNESIUM HYDROXIDE 30 ML: 400 SUSPENSION ORAL at 16:57

## 2019-09-24 RX ADMIN — BRIMONIDINE TARTRATE 1 DROP: 2 SOLUTION OPHTHALMIC at 21:57

## 2019-09-24 RX ADMIN — PANTOPRAZOLE SODIUM 40 MG: 40 TABLET, DELAYED RELEASE ORAL at 07:48

## 2019-09-24 RX ADMIN — ACETAMINOPHEN 325 MG: 325 TABLET ORAL at 21:57

## 2019-09-24 RX ADMIN — LATANOPROST 1 DROP: 50 SOLUTION OPHTHALMIC at 21:57

## 2019-09-24 RX ADMIN — SULFAMETHOXAZOLE AND TRIMETHOPRIM 1 TABLET: 800; 160 TABLET ORAL at 09:05

## 2019-09-24 RX ADMIN — ENOXAPARIN SODIUM 40 MG: 40 INJECTION SUBCUTANEOUS at 09:05

## 2019-09-24 RX ADMIN — ONDANSETRON 4 MG: 2 INJECTION INTRAMUSCULAR; INTRAVENOUS at 17:47

## 2019-09-24 RX ADMIN — SODIUM CHLORIDE: 9 INJECTION, SOLUTION INTRAVENOUS at 14:03

## 2019-09-24 RX ADMIN — BRIMONIDINE TARTRATE 1 DROP: 2 SOLUTION OPHTHALMIC at 09:06

## 2019-09-24 RX ADMIN — Medication 10 ML: at 09:05

## 2019-09-24 ASSESSMENT — PAIN SCALES - GENERAL
PAINLEVEL_OUTOF10: 4
PAINLEVEL_OUTOF10: 2
PAINLEVEL_OUTOF10: 0
PAINLEVEL_OUTOF10: 0

## 2019-09-24 ASSESSMENT — PAIN DESCRIPTION - LOCATION: LOCATION: ABDOMEN

## 2019-09-24 ASSESSMENT — PAIN DESCRIPTION - DESCRIPTORS: DESCRIPTORS: PRESSURE

## 2019-09-24 ASSESSMENT — PAIN DESCRIPTION - ORIENTATION: ORIENTATION: LOWER

## 2019-09-24 ASSESSMENT — PAIN DESCRIPTION - PAIN TYPE: TYPE: ACUTE PAIN

## 2019-09-24 NOTE — PROGRESS NOTES
James Romero DM (diabetes mellitus), type 2 with peripheral vascular complications (HCC)     diet controlled    GERD (gastroesophageal reflux disease) 8/31/10    Glaucoma     bilateral    H/O total hip arthroplasty     Dr James Romero H/O vascular surgery 5/9/2019    Jeres DONYA 2006    History of total hip replacement, right 5/9/2019    Hyponatremia 4/17/2019    Lower leg edema     Lumbar stenosis     Macular degeneration     left eye    Pain of left scapula 3/25/2019    PVD (peripheral vascular disease) ([address] 10/28/2006    Dr Gordon Leung, stents LE    Stress incontinence 10/28/2011    Thoracic back pain 5/29/2014    MILD COMPRESSION FRACTURE OF T12 WITHOUT CANAL COMPROMISE. CENTRAL DISK HERNIATION AT T8-9 WITH MILD CANAL NARROWING AT THIS LEVEL. Past Surgical History:   Procedure Laterality Date    APPENDECTOMY      CYST REMOVAL  06/27/2016    DR SLOAN  RT THUMB MUCOUS CYST    HYSTERECTOMY      REFRACTIVE SURGERY  [id number]    left     TOTAL HIP ARTHROPLASTY Right 9/25/15    DR. NARANJO    UPPER GASTROINTESTINAL ENDOSCOPY  10/07/15    DR Dee Diaz    VERTEBROPLASTY  2013    T12, Dr Penni Litten        Restrictions  Restrictions/Precautions: Fall Risk     Safety Devices: Safety Devices  Safety Devices in place: Yes  Type of devices:  All fall risk precautions in place    Subjective  Pre Treatment Pain Screening  Pain at present: 0  Scale Used: Numeric Score  Intervention List: Patient able to continue with treatment  Comments / Details: Pt. states she has pressure in her abdomen from 'needing a BM' but denies pain except with turning in bed    Pain Reassessment:   Pain Assessment  Patient Currently in Pain: Denies  Pain Assessment: 0-10  Pain Level: 0       Prior Level of Function:  Social/Functional History  Lives With: Alone(has 24 hour caregivers)  Home Layout: Two level(\"Big rec room\" in lower level, 10 steps down)  Home Access: Level entry  Bathroom Shower/Tub: Tub/Shower unit, Walk-in shower  Home Equipment: 4 wheeled walker  ADL Assistance: Needs assistance  Homemaking Assistance: Needs assistance  Ambulation Assistance: Needs assistance  Transfer Assistance: Needs assistance  Additional Comments: Pts caregiver present and states pt has someone walking with her each time she is up    OBJECTIVE:     Orientation Status:  Orientation  Overall Orientation Status: Within Functional Limits    Observation:  Observation/Palpation  Posture: Good  Observation: Pt. alert and attentive    Cognition Status:  Cognition  Overall Cognitive Status: Exceptions  Arousal/Alertness: Appropriate responses to stimuli  Following Commands:  Follows one step commands with increased time  Attention Span: Attends with cues to redirect  Memory: Decreased short term memory, Decreased long term memory, Decreased recall of precautions  Safety Judgement: Decreased awareness of need for assistance, Decreased awareness of need for safety  Problem Solving: Assistance required to implement solutions, Assistance required to identify errors made, Assistance required to generate solutions, Assistance required to correct errors made  Insights: Decreased awareness of deficits  Initiation: Requires cues for some  Sequencing: Requires cues for some  Cognition Comment: Minimal memory deficits but they do impact sequencing and problem solving    Perception Status:  Perception  Overall Perceptual Status: WFL    Sensation Status:  Sensation  Overall Sensation Status: [facility]    Vision and Hearing Status:  Vision  Vision: Impaired  Vision Exceptions: Wears glasses at all times  Hearing  Hearing: Exceptions to [facility]  Hearing Exceptions: Hard of hearing/hearing concerns, Bilateral hearing aid     ROM:   LUE AROM (degrees)  LUE AROM : WFL  Left Hand AROM (degrees)  Left Hand AROM: WFL  RUE AROM (degrees)  RUE AROM : WFL  Right Hand AROM (degrees)  Right Hand AROM: WFL    Strength:  LUE Strength  Gross LUE Strength: Exceptions to [facility]  L Hand General: 3/5  LUE

## 2019-09-24 NOTE — PROGRESS NOTES
Pt Name: Sarah Perez  Medical Record Number: [id number]  Date of Birth 1918   Admit date 2019 12:37 PM  Today's Date: 2019     ASSESSMENT  1. Hospital day # 4, partial small bowel obstruction    PLAN  1. We will give some milk of magnesia for stimulation  2. Suppositories as needed    SUBJECTIVE  Chief complaint: Follow-up small bowel obstruction  Afebrile, vital signs are stable. She denies any nausea or vomiting, has not passed flatus or had a bowel movement. She denies any abdominal pain She is tolerating a DIET CLEAR LIQUID;. Her pain is well controlled on current medications. has a past medical history of Compression fracture of T12 vertebra ([address] DJD (degenerative joint disease) of hip, DM (diabetes mellitus), type 2 with peripheral vascular complications ([address] GERD (gastroesophageal reflux disease), Glaucoma, H/O total hip arthroplasty, H/O vascular surgery, History of total hip replacement, right, Hyponatremia, Lower leg edema, Lumbar stenosis, Macular degeneration, Pain of left scapula, PVD (peripheral vascular disease) ([address] Stress incontinence, and Thoracic back pain. CURRENT MEDS  Scheduled Meds:   brimonidine  1 drop Both Eyes BID    latanoprost  1 drop Left Eye Nightly    pantoprazole  40 mg Oral QAM AC    sodium chloride flush  10 mL Intravenous 2 times per day    enoxaparin  40 mg Subcutaneous Daily     Continuous Infusions:   sodium chloride 50 mL/hr at 19 1403     PRN Meds:.acetaminophen, sodium chloride flush, ondansetron    OBJECTIVE  CURRENT VITALS:  height is 5' 1\" (1.549 m) and weight is 140 lb (63.5 kg). Her oral temperature is 98.6 °F (37 °C). Her blood pressure is 168/54 (abnormal) and her pulse is 71. Her respiration is 18 and oxygen saturation is 98%.    Temperature Range (24h):Temp: 98.6 °F (37 °C) Temp  Av.1 °F (36.7 °C)  Min: 97.8 °F (36.6 °C)  Max: 98.6 °F (37 °C)  BP Range (52M): Systolic (82IMD), KGI:423 , Min:168 , Max:172 Diastolic (34HXC), RHY:89, Min:51, Max:54    Pulse Range (24h): Pulse  Av.7  Min: 71  Max: 79  Respiration Range (24h): Resp  Av  Min: 18  Max: 18    GENERAL: alert, no distress  LUNGS: clear to ausculation, without wheezes, rales or rhonci  HEART: normal rate and regular rhythm  ABDOMEN: soft, non-tender, bowel sounds present in all 4 quadrants and no guarding or peritoneal signs  EXTERMITY: no cyanosis, clubbing or edema  In: 1760 [P.O.:1200; I.V.:560]  Out: 200 [Urine:200]  Date 19 0000 - 199   Shift 5373-6018 9557-9932 3654-0268 24 Hour Total   INTAKE   P.O.(mL/kg/hr) 200(0.4) 1000(2)  1200   I. V.(mL/kg) 560(8.8)   560(8.8)   Shift Total(mL/kg) 841(60) 1000(15.7)  0226(61.9)   OUTPUT   Urine(mL/kg/hr) 200(0.4)   200   Emesis/NG output(mL/kg) 0(0)   0(0)   Other(mL/kg) 0(0)   0(0)   Stool(mL/kg) 0(0)   0(0)   Blood(mL/kg) 0(0)   0(0)   Shift Total(mL/kg) 200(3.1)   200(3.1)   Weight (kg) 63.5 63.5 63.5 63.5       LABS  Recent Labs     19  0608 19  0620 19  0634   * 139 137   K 3.9 4.1 4.4   CL 99 107 106   CO2 22 25 21   BUN 15 10 8   CREATININE 0.60 0.52 0.57   CALCIUM 8.9 9.4 9.2      No results for input(s): PTT, INR in the last 72 hours. Invalid input(s): PT  No results for input(s): AST, ALT, BILITOT, BILIDIR, AMYLASE, LIPASE, LDH, LACTA in the last 72 hours. RADIOLOGY  Xr Abdomen (kub) (single Ap View)    Result Date: 2019  EXAMINATION: XR ABDOMEN (KUB) (SINGLE AP VIEW) CLINICAL HISTORY:  r/o SBO . Abdominal pain. Nausea. COMPARISONS: None available. FINDINGS: AP abdominal radiography was obtained. The multiple gas-filled, dilated small bowel. There is gas in small volume of stool in colon and rectum. There is no mass effect. There is no sign of organomegaly. The stomach is nondilated. CONCLUSION: ILEUS VERSUS PARTIAL SMALL BOWEL OBSTRUCTION.     Dexa Bone Density Axial Skeleton    Result Date: 9/10/2019  FOR CHARGE PURPOSES ONLY REPORT TO FOLLOW

## 2019-09-24 NOTE — PROGRESS NOTES
Department of Internal Medicine  Progress Note      SUBJECTIVE: No complains. No BMs. Mild nasuea. Has poor appetite. On Clear liquids    ROS:  All 12 systems reviewed and negative except mentioned in HPI and Assessment and plan    MEDICATIONS:  Current Facility-Administered Medications   Medication Dose Route Frequency Provider Last Rate Last Dose    acetaminophen (TYLENOL) tablet 325 mg  325 mg Oral Q8H PRN Albertina Garza MD        brimonidine (ALPHAGAN) 0.2 % ophthalmic solution 1 drop  1 drop Both Eyes BID Albertina Ding MD   1 drop at 09/24/19 0906    latanoprost (XALATAN) 0.005 % ophthalmic solution 1 drop  1 drop Left Eye Nightly Albertina Garza MD   1 drop at 09/23/19 2137    pantoprazole (PROTONIX) tablet 40 mg  40 mg Oral QAM AC Albertina Garza MD   40 mg at 09/24/19 0748    sodium chloride flush 0.9 % injection 10 mL  10 mL Intravenous 2 times per day Albertina Ding MD   10 mL at 09/24/19 0905    sodium chloride flush 0.9 % injection 10 mL  10 mL Intravenous PRN Albertina Garza MD        enoxaparin (LOVENOX) injection 40 mg  40 mg Subcutaneous Daily Albertina Garza MD   40 mg at 09/24/19 0905    0.9 % sodium chloride infusion   Intravenous Continuous Albertina Garza MD 50 mL/hr at 09/23/19 1858      ondansetron (ZOFRAN) injection 4 mg  4 mg Intravenous Q6H PRN Albertina Garza MD   4 mg at 09/20/19 1737         OBJECTIVE:  Vital Signs:  Vitals:    09/24/19 0800   BP: (!) 168/54   Pulse: 71   Resp: 18   Temp: 98.6 °F (37 °C)   SpO2: 98%       Focal exam:  Distended abdomen. BS present. General Exam (except as mentioned above):  CONSTITUTIONAL: Awake, alert, no apparent distress  EYES:  PERRL, conjunctiva normal  ENT:  Normocephalic, atraumatic  NECK:  Supple  BACK:  Symmetric  LUNGS:  CTAB except bilateral basilar crackles. CARDIOVASCULAR:  S1S2 present  ABDOMEN:  soft, non-distended, non-tender  MUSCULOSKELETAL:  There is no redness, warmth, or swelling of the joints.   NEUROLOGIC:  Alert, awake, oriented x

## 2019-09-25 VITALS
RESPIRATION RATE: 20 BRPM | OXYGEN SATURATION: 96 % | HEART RATE: 67 BPM | HEIGHT: 61 IN | SYSTOLIC BLOOD PRESSURE: 157 MMHG | DIASTOLIC BLOOD PRESSURE: 57 MMHG | BODY MASS INDEX: 26.43 KG/M2 | TEMPERATURE: 98.2 F | WEIGHT: 140 LBS

## 2019-09-25 LAB
ANION GAP SERPL CALCULATED.3IONS-SCNC: 10 MEQ/L (ref 9–15)
BUN BLDV-MCNC: 7 MG/DL (ref 8–23)
CALCIUM SERPL-MCNC: 9.3 MG/DL (ref 8.5–9.9)
CHLORIDE BLD-SCNC: 106 MEQ/L (ref 95–107)
CO2: 21 MEQ/L (ref 20–31)
CREAT SERPL-MCNC: 0.69 MG/DL (ref 0.5–0.9)
GFR AFRICAN AMERICAN: >60
GFR NON-AFRICAN AMERICAN: >60
GLUCOSE BLD-MCNC: 120 MG/DL (ref 70–99)
POTASSIUM REFLEX MAGNESIUM: 4.6 MEQ/L (ref 3.4–4.9)
SODIUM BLD-SCNC: 137 MEQ/L (ref 135–144)

## 2019-09-25 PROCEDURE — 2580000003 HC RX 258: Performed by: INTERNAL MEDICINE

## 2019-09-25 PROCEDURE — 36415 COLL VENOUS BLD VENIPUNCTURE: CPT

## 2019-09-25 PROCEDURE — 80048 BASIC METABOLIC PNL TOTAL CA: CPT

## 2019-09-25 PROCEDURE — 6370000000 HC RX 637 (ALT 250 FOR IP): Performed by: INTERNAL MEDICINE

## 2019-09-25 PROCEDURE — 6360000002 HC RX W HCPCS: Performed by: INTERNAL MEDICINE

## 2019-09-25 PROCEDURE — 99231 SBSQ HOSP IP/OBS SF/LOW 25: CPT | Performed by: COLON & RECTAL SURGERY

## 2019-09-25 RX ADMIN — BRIMONIDINE TARTRATE 1 DROP: 2 SOLUTION OPHTHALMIC at 11:24

## 2019-09-25 RX ADMIN — ENOXAPARIN SODIUM 40 MG: 40 INJECTION SUBCUTANEOUS at 09:07

## 2019-09-25 RX ADMIN — PANTOPRAZOLE SODIUM 40 MG: 40 TABLET, DELAYED RELEASE ORAL at 07:59

## 2019-09-25 RX ADMIN — Medication 10 ML: at 09:07

## 2019-09-25 RX ADMIN — SODIUM CHLORIDE: 9 INJECTION, SOLUTION INTRAVENOUS at 08:00

## 2019-09-25 ASSESSMENT — PAIN SCALES - GENERAL
PAINLEVEL_OUTOF10: 0
PAINLEVEL_OUTOF10: 0

## 2019-09-25 NOTE — PROGRESS NOTES
Pt Name: Adriel Villanueva  Medical Record Number: [id number]  Date of Birth 1918   Admit date 2019 12:37 PM  Today's Date: 2019     ASSESSMENT  1. Hospital day # 3  2 partial small bowel obstruction, resolved    PLAN  1. Advance diet  2. Please call if any questions    SUBJECTIVE  Chief complaint: Follow-up small bowel obstruction  Afebrile, vital signs are stable. She denies any nausea or vomiting, bowels working. She is tolerating a DIET GENERAL;. Her pain is well controlled on current medications. has a past medical history of Compression fracture of T12 vertebra ([address] DJD (degenerative joint disease) of hip, DM (diabetes mellitus), type 2 with peripheral vascular complications ([address] GERD (gastroesophageal reflux disease), Glaucoma, H/O total hip arthroplasty, H/O vascular surgery, History of total hip replacement, right, Hyponatremia, Lower leg edema, Lumbar stenosis, Macular degeneration, Pain of left scapula, PVD (peripheral vascular disease) ([address] Stress incontinence, and Thoracic back pain. CURRENT MEDS  Scheduled Meds:   brimonidine  1 drop Both Eyes BID    latanoprost  1 drop Left Eye Nightly    pantoprazole  40 mg Oral QAM AC    sodium chloride flush  10 mL Intravenous 2 times per day    enoxaparin  40 mg Subcutaneous Daily     Continuous Infusions:   sodium chloride 50 mL/hr at 19 0800     PRN Meds:.magnesium hydroxide, acetaminophen, sodium chloride flush, ondansetron    OBJECTIVE  CURRENT VITALS:  height is 5' 1\" (1.549 m) and weight is 140 lb (63.5 kg). Her oral temperature is 98.2 °F (36.8 °C). Her blood pressure is 157/57 (abnormal) and her pulse is 67. Her respiration is 20 and oxygen saturation is 96%.    Temperature Range (24h):Temp: 98.2 °F (36.8 °C) Temp  Av.5 °F (36.9 °C)  Min: 98.2 °F (36.8 °C)  Max: 98.8 °F (37.1 °C)  BP Range (26K): Systolic (77GRJ), STANLEY:230 , Min:157 , IO     Diastolic (87VUN), NSF:92, Min:57, Max:61    Pulse Range (24h): 9, 2019 FINDINGS: Remote healed right seventh rib fracture. Cardiopericardial silhouette normal. Left diaphragm elevated and unchanged. No focal airspace disease. NO ACUTE CARDIOPULMONARY DISEASE.     Electronically signed by Stacy Fraser MD on 9/25/2019 at 11:46 AM

## 2019-09-26 ENCOUNTER — APPOINTMENT (OUTPATIENT)
Dept: CT IMAGING | Age: 84
DRG: 389 | End: 2019-09-26
Payer: MEDICARE

## 2019-09-26 ENCOUNTER — HOSPITAL ENCOUNTER (INPATIENT)
Age: 84
LOS: 6 days | Discharge: HOME HEALTH | DRG: 389 | End: 2019-10-02
Attending: FAMILY MEDICINE | Admitting: FAMILY MEDICINE
Payer: MEDICARE

## 2019-09-26 DIAGNOSIS — K56.609 SBO (SMALL BOWEL OBSTRUCTION) (HCC): Primary | ICD-10-CM

## 2019-09-26 DIAGNOSIS — R11.2 NON-INTRACTABLE VOMITING WITH NAUSEA, UNSPECIFIED VOMITING TYPE: ICD-10-CM

## 2019-09-26 LAB
ALBUMIN SERPL-MCNC: 3.7 G/DL (ref 3.5–4.6)
ALP BLD-CCNC: 72 U/L (ref 40–130)
ALT SERPL-CCNC: 14 U/L (ref 0–33)
ANION GAP SERPL CALCULATED.3IONS-SCNC: 11 MEQ/L (ref 9–15)
ANISOCYTOSIS: ABNORMAL
AST SERPL-CCNC: 16 U/L (ref 0–35)
BASOPHILS ABSOLUTE: 0 K/UL (ref 0–0.2)
BASOPHILS RELATIVE PERCENT: 0.9 %
BILIRUB SERPL-MCNC: 0.4 MG/DL (ref 0.2–0.7)
BUN BLDV-MCNC: 9 MG/DL (ref 8–23)
CALCIUM SERPL-MCNC: 10.1 MG/DL (ref 8.5–9.9)
CHLORIDE BLD-SCNC: 98 MEQ/L (ref 95–107)
CO2: 22 MEQ/L (ref 20–31)
CREAT SERPL-MCNC: 0.63 MG/DL (ref 0.5–0.9)
EOSINOPHILS ABSOLUTE: 0 K/UL (ref 0–0.7)
EOSINOPHILS RELATIVE PERCENT: 0.3 %
GFR AFRICAN AMERICAN: >60
GFR NON-AFRICAN AMERICAN: >60
GLOBULIN: 2.3 G/DL (ref 2.3–3.5)
GLUCOSE BLD-MCNC: 162 MG/DL (ref 70–99)
HCT VFR BLD CALC: 38.9 % (ref 37–47)
HEMOGLOBIN: 13 G/DL (ref 12–16)
LACTIC ACID: 1 MMOL/L (ref 0.5–2.2)
LIPASE: 16 U/L (ref 12–95)
LYMPHOCYTES ABSOLUTE: 1.1 K/UL (ref 1–4.8)
LYMPHOCYTES RELATIVE PERCENT: 17 %
MCH RBC QN AUTO: 30.9 PG (ref 27–31.3)
MCHC RBC AUTO-ENTMCNC: 33.4 % (ref 33–37)
MCV RBC AUTO: 92.5 FL (ref 82–100)
METAMYELOCYTES RELATIVE PERCENT: 1 %
MONOCYTES ABSOLUTE: 0.1 K/UL (ref 0.2–0.8)
MONOCYTES RELATIVE PERCENT: 1.9 %
NEUTROPHILS ABSOLUTE: 5.3 K/UL (ref 1.4–6.5)
NEUTROPHILS RELATIVE PERCENT: 80 %
PDW BLD-RTO: 14 % (ref 11.5–14.5)
PLATELET # BLD: 266 K/UL (ref 130–400)
PLATELET SLIDE REVIEW: NORMAL
POTASSIUM SERPL-SCNC: 4.7 MEQ/L (ref 3.4–4.9)
RBC # BLD: 4.2 M/UL (ref 4.2–5.4)
SODIUM BLD-SCNC: 131 MEQ/L (ref 135–144)
TOTAL PROTEIN: 6 G/DL (ref 6.3–8)
WBC # BLD: 6.5 K/UL (ref 4.8–10.8)

## 2019-09-26 PROCEDURE — 85025 COMPLETE CBC W/AUTO DIFF WBC: CPT

## 2019-09-26 PROCEDURE — 1210000000 HC MED SURG R&B

## 2019-09-26 PROCEDURE — 83605 ASSAY OF LACTIC ACID: CPT

## 2019-09-26 PROCEDURE — 74177 CT ABD & PELVIS W/CONTRAST: CPT

## 2019-09-26 PROCEDURE — 2580000003 HC RX 258: Performed by: PHYSICIAN ASSISTANT

## 2019-09-26 PROCEDURE — 87077 CULTURE AEROBIC IDENTIFY: CPT

## 2019-09-26 PROCEDURE — 80053 COMPREHEN METABOLIC PANEL: CPT

## 2019-09-26 PROCEDURE — 6360000004 HC RX CONTRAST MEDICATION: Performed by: PHYSICIAN ASSISTANT

## 2019-09-26 PROCEDURE — 99285 EMERGENCY DEPT VISIT HI MDM: CPT

## 2019-09-26 PROCEDURE — 87086 URINE CULTURE/COLONY COUNT: CPT

## 2019-09-26 PROCEDURE — 6360000002 HC RX W HCPCS: Performed by: PHYSICIAN ASSISTANT

## 2019-09-26 PROCEDURE — C9113 INJ PANTOPRAZOLE SODIUM, VIA: HCPCS | Performed by: NURSE PRACTITIONER

## 2019-09-26 PROCEDURE — 6360000002 HC RX W HCPCS: Performed by: NURSE PRACTITIONER

## 2019-09-26 PROCEDURE — 2580000003 HC RX 258: Performed by: NURSE PRACTITIONER

## 2019-09-26 PROCEDURE — 83690 ASSAY OF LIPASE: CPT

## 2019-09-26 PROCEDURE — 87186 SC STD MICRODIL/AGAR DIL: CPT

## 2019-09-26 PROCEDURE — 36415 COLL VENOUS BLD VENIPUNCTURE: CPT

## 2019-09-26 PROCEDURE — 96374 THER/PROPH/DIAG INJ IV PUSH: CPT

## 2019-09-26 RX ORDER — PANTOPRAZOLE SODIUM 40 MG/10ML
40 INJECTION, POWDER, LYOPHILIZED, FOR SOLUTION INTRAVENOUS DAILY
Status: DISCONTINUED | OUTPATIENT
Start: 2019-09-26 | End: 2019-10-02 | Stop reason: HOSPADM

## 2019-09-26 RX ORDER — ONDANSETRON 2 MG/ML
4 INJECTION INTRAMUSCULAR; INTRAVENOUS EVERY 6 HOURS PRN
Status: DISCONTINUED | OUTPATIENT
Start: 2019-09-26 | End: 2019-10-02 | Stop reason: HOSPADM

## 2019-09-26 RX ORDER — 0.9 % SODIUM CHLORIDE 0.9 %
10 VIAL (ML) INJECTION DAILY
Status: DISCONTINUED | OUTPATIENT
Start: 2019-09-26 | End: 2019-10-02 | Stop reason: HOSPADM

## 2019-09-26 RX ORDER — METOCLOPRAMIDE HYDROCHLORIDE 5 MG/ML
10 INJECTION INTRAMUSCULAR; INTRAVENOUS EVERY 6 HOURS PRN
Status: DISCONTINUED | OUTPATIENT
Start: 2019-09-26 | End: 2019-10-02 | Stop reason: HOSPADM

## 2019-09-26 RX ORDER — SODIUM CHLORIDE 0.9 % (FLUSH) 0.9 %
10 SYRINGE (ML) INJECTION PRN
Status: DISCONTINUED | OUTPATIENT
Start: 2019-09-26 | End: 2019-10-02 | Stop reason: HOSPADM

## 2019-09-26 RX ORDER — SODIUM CHLORIDE 9 MG/ML
INJECTION, SOLUTION INTRAVENOUS CONTINUOUS
Status: DISCONTINUED | OUTPATIENT
Start: 2019-09-26 | End: 2019-10-02

## 2019-09-26 RX ORDER — SODIUM CHLORIDE 0.9 % (FLUSH) 0.9 %
10 SYRINGE (ML) INJECTION EVERY 12 HOURS SCHEDULED
Status: DISCONTINUED | OUTPATIENT
Start: 2019-09-26 | End: 2019-10-02 | Stop reason: HOSPADM

## 2019-09-26 RX ORDER — MORPHINE SULFATE 2 MG/ML
2 INJECTION, SOLUTION INTRAMUSCULAR; INTRAVENOUS
Status: DISCONTINUED | OUTPATIENT
Start: 2019-09-26 | End: 2019-10-02 | Stop reason: HOSPADM

## 2019-09-26 RX ORDER — ACETAMINOPHEN 325 MG/1
650 TABLET ORAL EVERY 4 HOURS PRN
Status: DISCONTINUED | OUTPATIENT
Start: 2019-09-26 | End: 2019-10-02 | Stop reason: HOSPADM

## 2019-09-26 RX ORDER — MORPHINE SULFATE 4 MG/ML
4 INJECTION, SOLUTION INTRAMUSCULAR; INTRAVENOUS
Status: DISCONTINUED | OUTPATIENT
Start: 2019-09-26 | End: 2019-10-02 | Stop reason: HOSPADM

## 2019-09-26 RX ORDER — ONDANSETRON 2 MG/ML
4 INJECTION INTRAMUSCULAR; INTRAVENOUS ONCE
Status: COMPLETED | OUTPATIENT
Start: 2019-09-26 | End: 2019-09-26

## 2019-09-26 RX ADMIN — SODIUM CHLORIDE: 9 INJECTION, SOLUTION INTRAVENOUS at 15:34

## 2019-09-26 RX ADMIN — IOPAMIDOL 100 ML: 612 INJECTION, SOLUTION INTRAVENOUS at 10:24

## 2019-09-26 RX ADMIN — SODIUM CHLORIDE: 9 INJECTION, SOLUTION INTRAVENOUS at 10:06

## 2019-09-26 RX ADMIN — ONDANSETRON 4 MG: 2 INJECTION INTRAMUSCULAR; INTRAVENOUS at 10:06

## 2019-09-26 RX ADMIN — PANTOPRAZOLE SODIUM 40 MG: 40 INJECTION, POWDER, LYOPHILIZED, FOR SOLUTION INTRAVENOUS at 15:34

## 2019-09-26 RX ADMIN — MORPHINE SULFATE 2 MG: 2 INJECTION, SOLUTION INTRAMUSCULAR; INTRAVENOUS at 16:59

## 2019-09-26 RX ADMIN — Medication 10 ML: at 15:35

## 2019-09-26 RX ADMIN — Medication 10 ML: at 20:44

## 2019-09-26 ASSESSMENT — ENCOUNTER SYMPTOMS
DIARRHEA: 0
ALLERGIC/IMMUNOLOGIC NEGATIVE: 1
NAUSEA: 1
SORE THROAT: 0
ABDOMINAL DISTENTION: 1
VOMITING: 1
RHINORRHEA: 0
CONSTIPATION: 0
SHORTNESS OF BREATH: 0
RESPIRATORY NEGATIVE: 1
ABDOMINAL DISTENTION: 0
COLOR CHANGE: 0
EYES NEGATIVE: 1
ABDOMINAL PAIN: 1
EYE DISCHARGE: 0

## 2019-09-26 ASSESSMENT — PAIN SCALES - GENERAL
PAINLEVEL_OUTOF10: 6
PAINLEVEL_OUTOF10: 0
PAINLEVEL_OUTOF10: 3

## 2019-09-26 ASSESSMENT — PAIN DESCRIPTION - PAIN TYPE: TYPE: ACUTE PAIN

## 2019-09-26 ASSESSMENT — PAIN DESCRIPTION - DESCRIPTORS: DESCRIPTORS: SORE

## 2019-09-26 ASSESSMENT — PAIN DESCRIPTION - FREQUENCY: FREQUENCY: CONTINUOUS

## 2019-09-26 ASSESSMENT — PAIN DESCRIPTION - LOCATION: LOCATION: THROAT

## 2019-09-27 LAB
ALBUMIN SERPL-MCNC: 2.9 G/DL (ref 3.5–4.6)
ALP BLD-CCNC: 52 U/L (ref 40–130)
ALT SERPL-CCNC: 16 U/L (ref 0–33)
ANION GAP SERPL CALCULATED.3IONS-SCNC: 8 MEQ/L (ref 9–15)
AST SERPL-CCNC: 17 U/L (ref 0–35)
BASOPHILS ABSOLUTE: 0.1 K/UL (ref 0–0.2)
BASOPHILS RELATIVE PERCENT: 0.9 %
BILIRUB SERPL-MCNC: 0.5 MG/DL (ref 0.2–0.7)
BUN BLDV-MCNC: 6 MG/DL (ref 8–23)
CALCIUM SERPL-MCNC: 9.1 MG/DL (ref 8.5–9.9)
CHLORIDE BLD-SCNC: 108 MEQ/L (ref 95–107)
CO2: 21 MEQ/L (ref 20–31)
CREAT SERPL-MCNC: 0.53 MG/DL (ref 0.5–0.9)
EOSINOPHILS ABSOLUTE: 0.2 K/UL (ref 0–0.7)
EOSINOPHILS RELATIVE PERCENT: 2.8 %
GFR AFRICAN AMERICAN: >60
GFR AFRICAN AMERICAN: >60
GFR NON-AFRICAN AMERICAN: >60
GFR NON-AFRICAN AMERICAN: >60
GLOBULIN: 1.9 G/DL (ref 2.3–3.5)
GLUCOSE BLD-MCNC: 97 MG/DL (ref 70–99)
HCT VFR BLD CALC: 36.4 % (ref 37–47)
HEMOGLOBIN: 11.7 G/DL (ref 12–16)
LYMPHOCYTES ABSOLUTE: 1.5 K/UL (ref 1–4.8)
LYMPHOCYTES RELATIVE PERCENT: 22.8 %
MCH RBC QN AUTO: 30.2 PG (ref 27–31.3)
MCHC RBC AUTO-ENTMCNC: 32.1 % (ref 33–37)
MCV RBC AUTO: 94.2 FL (ref 82–100)
MONOCYTES ABSOLUTE: 0.7 K/UL (ref 0.2–0.8)
MONOCYTES RELATIVE PERCENT: 11.5 %
NEUTROPHILS ABSOLUTE: 4 K/UL (ref 1.4–6.5)
NEUTROPHILS RELATIVE PERCENT: 62 %
PDW BLD-RTO: 14.2 % (ref 11.5–14.5)
PERFORMED ON: NORMAL
PLATELET # BLD: 242 K/UL (ref 130–400)
POC CREATININE: 0.7 MG/DL (ref 0.6–1.2)
POC SAMPLE TYPE: NORMAL
POTASSIUM REFLEX MAGNESIUM: 3.9 MEQ/L (ref 3.4–4.9)
RBC # BLD: 3.87 M/UL (ref 4.2–5.4)
SODIUM BLD-SCNC: 137 MEQ/L (ref 135–144)
TOTAL PROTEIN: 4.8 G/DL (ref 6.3–8)
WBC # BLD: 6.5 K/UL (ref 4.8–10.8)

## 2019-09-27 PROCEDURE — 6360000002 HC RX W HCPCS: Performed by: NURSE PRACTITIONER

## 2019-09-27 PROCEDURE — C9113 INJ PANTOPRAZOLE SODIUM, VIA: HCPCS | Performed by: NURSE PRACTITIONER

## 2019-09-27 PROCEDURE — 2580000003 HC RX 258: Performed by: NURSE PRACTITIONER

## 2019-09-27 PROCEDURE — 36415 COLL VENOUS BLD VENIPUNCTURE: CPT

## 2019-09-27 PROCEDURE — 80053 COMPREHEN METABOLIC PANEL: CPT

## 2019-09-27 PROCEDURE — 85025 COMPLETE CBC W/AUTO DIFF WBC: CPT

## 2019-09-27 PROCEDURE — 99221 1ST HOSP IP/OBS SF/LOW 40: CPT | Performed by: COLON & RECTAL SURGERY

## 2019-09-27 PROCEDURE — 1210000000 HC MED SURG R&B

## 2019-09-27 RX ADMIN — SODIUM CHLORIDE: 9 INJECTION, SOLUTION INTRAVENOUS at 12:30

## 2019-09-27 RX ADMIN — Medication 10 ML: at 09:07

## 2019-09-27 RX ADMIN — ENOXAPARIN SODIUM 40 MG: 40 INJECTION SUBCUTANEOUS at 09:06

## 2019-09-27 RX ADMIN — PANTOPRAZOLE SODIUM 40 MG: 40 INJECTION, POWDER, LYOPHILIZED, FOR SOLUTION INTRAVENOUS at 09:05

## 2019-09-27 RX ADMIN — SODIUM CHLORIDE: 9 INJECTION, SOLUTION INTRAVENOUS at 21:47

## 2019-09-27 RX ADMIN — Medication 10 ML: at 09:06

## 2019-09-27 ASSESSMENT — PAIN SCALES - GENERAL
PAINLEVEL_OUTOF10: 0
PAINLEVEL_OUTOF10: 0

## 2019-09-28 ENCOUNTER — APPOINTMENT (OUTPATIENT)
Dept: GENERAL RADIOLOGY | Age: 84
DRG: 389 | End: 2019-09-28
Payer: MEDICARE

## 2019-09-28 PROCEDURE — 71045 X-RAY EXAM CHEST 1 VIEW: CPT

## 2019-09-28 PROCEDURE — 6360000002 HC RX W HCPCS: Performed by: NURSE PRACTITIONER

## 2019-09-28 PROCEDURE — 6360000004 HC RX CONTRAST MEDICATION: Performed by: SURGERY

## 2019-09-28 PROCEDURE — 74250 X-RAY XM SM INT 1CNTRST STD: CPT

## 2019-09-28 PROCEDURE — 6370000000 HC RX 637 (ALT 250 FOR IP): Performed by: SURGERY

## 2019-09-28 PROCEDURE — 2580000003 HC RX 258: Performed by: NURSE PRACTITIONER

## 2019-09-28 PROCEDURE — C9113 INJ PANTOPRAZOLE SODIUM, VIA: HCPCS | Performed by: NURSE PRACTITIONER

## 2019-09-28 PROCEDURE — 1210000000 HC MED SURG R&B

## 2019-09-28 PROCEDURE — 99232 SBSQ HOSP IP/OBS MODERATE 35: CPT | Performed by: SURGERY

## 2019-09-28 RX ORDER — BISACODYL 10 MG
10 SUPPOSITORY, RECTAL RECTAL DAILY
Status: COMPLETED | OUTPATIENT
Start: 2019-09-28 | End: 2019-09-30

## 2019-09-28 RX ADMIN — ENOXAPARIN SODIUM 40 MG: 40 INJECTION SUBCUTANEOUS at 09:27

## 2019-09-28 RX ADMIN — SODIUM CHLORIDE: 9 INJECTION, SOLUTION INTRAVENOUS at 09:27

## 2019-09-28 RX ADMIN — DIATRIZOATE MEGLUMINE AND DIATRIZOATE SODIUM 150 ML: 660; 100 LIQUID ORAL; RECTAL at 14:35

## 2019-09-28 RX ADMIN — SODIUM CHLORIDE: 9 INJECTION, SOLUTION INTRAVENOUS at 23:29

## 2019-09-28 RX ADMIN — Medication 10 MG: at 16:36

## 2019-09-28 RX ADMIN — PANTOPRAZOLE SODIUM 40 MG: 40 INJECTION, POWDER, LYOPHILIZED, FOR SOLUTION INTRAVENOUS at 09:27

## 2019-09-29 LAB
ANION GAP SERPL CALCULATED.3IONS-SCNC: 17 MEQ/L (ref 9–15)
BASOPHILS ABSOLUTE: 0 K/UL (ref 0–0.2)
BASOPHILS RELATIVE PERCENT: 0.6 %
BUN BLDV-MCNC: 11 MG/DL (ref 8–23)
CALCIUM SERPL-MCNC: 9.1 MG/DL (ref 8.5–9.9)
CHLORIDE BLD-SCNC: 111 MEQ/L (ref 95–107)
CO2: 12 MEQ/L (ref 20–31)
CREAT SERPL-MCNC: 0.45 MG/DL (ref 0.5–0.9)
EOSINOPHILS ABSOLUTE: 0 K/UL (ref 0–0.7)
EOSINOPHILS RELATIVE PERCENT: 0.5 %
GFR AFRICAN AMERICAN: >60
GFR NON-AFRICAN AMERICAN: >60
GLUCOSE BLD-MCNC: 112 MG/DL (ref 70–99)
HCT VFR BLD CALC: 38.4 % (ref 37–47)
HEMOGLOBIN: 12.2 G/DL (ref 12–16)
LYMPHOCYTES ABSOLUTE: 1.1 K/UL (ref 1–4.8)
LYMPHOCYTES RELATIVE PERCENT: 13.9 %
MCH RBC QN AUTO: 31.2 PG (ref 27–31.3)
MCHC RBC AUTO-ENTMCNC: 31.8 % (ref 33–37)
MCV RBC AUTO: 98 FL (ref 82–100)
MONOCYTES ABSOLUTE: 0.7 K/UL (ref 0.2–0.8)
MONOCYTES RELATIVE PERCENT: 8.9 %
NEUTROPHILS ABSOLUTE: 6.3 K/UL (ref 1.4–6.5)
NEUTROPHILS RELATIVE PERCENT: 76.1 %
PDW BLD-RTO: 15 % (ref 11.5–14.5)
PLATELET # BLD: 251 K/UL (ref 130–400)
POTASSIUM REFLEX MAGNESIUM: 3.8 MEQ/L (ref 3.4–4.9)
RBC # BLD: 3.92 M/UL (ref 4.2–5.4)
SODIUM BLD-SCNC: 140 MEQ/L (ref 135–144)
WBC # BLD: 8.2 K/UL (ref 4.8–10.8)

## 2019-09-29 PROCEDURE — 2580000003 HC RX 258: Performed by: NURSE PRACTITIONER

## 2019-09-29 PROCEDURE — 99232 SBSQ HOSP IP/OBS MODERATE 35: CPT | Performed by: SURGERY

## 2019-09-29 PROCEDURE — 6360000002 HC RX W HCPCS: Performed by: NURSE PRACTITIONER

## 2019-09-29 PROCEDURE — 6370000000 HC RX 637 (ALT 250 FOR IP): Performed by: SURGERY

## 2019-09-29 PROCEDURE — 80048 BASIC METABOLIC PNL TOTAL CA: CPT

## 2019-09-29 PROCEDURE — 85025 COMPLETE CBC W/AUTO DIFF WBC: CPT

## 2019-09-29 PROCEDURE — 1210000000 HC MED SURG R&B

## 2019-09-29 PROCEDURE — 36415 COLL VENOUS BLD VENIPUNCTURE: CPT

## 2019-09-29 PROCEDURE — C9113 INJ PANTOPRAZOLE SODIUM, VIA: HCPCS | Performed by: NURSE PRACTITIONER

## 2019-09-29 RX ADMIN — PANTOPRAZOLE SODIUM 40 MG: 40 INJECTION, POWDER, LYOPHILIZED, FOR SOLUTION INTRAVENOUS at 10:16

## 2019-09-29 RX ADMIN — SODIUM CHLORIDE: 9 INJECTION, SOLUTION INTRAVENOUS at 20:10

## 2019-09-29 RX ADMIN — Medication 10 MG: at 10:12

## 2019-09-29 RX ADMIN — SODIUM CHLORIDE: 9 INJECTION, SOLUTION INTRAVENOUS at 10:12

## 2019-09-29 RX ADMIN — ENOXAPARIN SODIUM 40 MG: 40 INJECTION SUBCUTANEOUS at 10:16

## 2019-09-30 LAB
BACTERIA: NEGATIVE /HPF
BILIRUBIN URINE: NEGATIVE
BLOOD, URINE: NEGATIVE
CLARITY: CLEAR
COLOR: YELLOW
EKG ATRIAL RATE: 73 BPM
EKG Q-T INTERVAL: 426 MS
EKG QRS DURATION: 90 MS
EKG QTC CALCULATION (BAZETT): 460 MS
EKG R AXIS: 12 DEGREES
EKG T AXIS: 3 DEGREES
EKG VENTRICULAR RATE: 70 BPM
EPITHELIAL CELLS, UA: ABNORMAL /HPF (ref 0–5)
GLUCOSE URINE: NEGATIVE MG/DL
HYALINE CASTS: ABNORMAL /HPF (ref 0–5)
KETONES, URINE: 40 MG/DL
LEUKOCYTE ESTERASE, URINE: ABNORMAL
NITRITE, URINE: NEGATIVE
PH UA: 5.5 (ref 5–9)
PROTEIN UA: NEGATIVE MG/DL
RBC UA: ABNORMAL /HPF (ref 0–5)
SPECIFIC GRAVITY UA: 1.01 (ref 1–1.03)
URINE REFLEX TO CULTURE: YES
UROBILINOGEN, URINE: 0.2 E.U./DL
WBC UA: ABNORMAL /HPF (ref 0–5)

## 2019-09-30 PROCEDURE — 2580000003 HC RX 258: Performed by: NURSE PRACTITIONER

## 2019-09-30 PROCEDURE — C9113 INJ PANTOPRAZOLE SODIUM, VIA: HCPCS | Performed by: NURSE PRACTITIONER

## 2019-09-30 PROCEDURE — 6360000002 HC RX W HCPCS: Performed by: NURSE PRACTITIONER

## 2019-09-30 PROCEDURE — 6370000000 HC RX 637 (ALT 250 FOR IP): Performed by: SURGERY

## 2019-09-30 PROCEDURE — 1210000000 HC MED SURG R&B

## 2019-09-30 PROCEDURE — 81001 URINALYSIS AUTO W/SCOPE: CPT

## 2019-09-30 PROCEDURE — 99222 1ST HOSP IP/OBS MODERATE 55: CPT | Performed by: INTERNAL MEDICINE

## 2019-09-30 PROCEDURE — 93005 ELECTROCARDIOGRAM TRACING: CPT | Performed by: INTERNAL MEDICINE

## 2019-09-30 RX ADMIN — Medication 10 MG: at 10:55

## 2019-09-30 RX ADMIN — SODIUM CHLORIDE: 9 INJECTION, SOLUTION INTRAVENOUS at 17:57

## 2019-09-30 RX ADMIN — ENOXAPARIN SODIUM 40 MG: 40 INJECTION SUBCUTANEOUS at 10:54

## 2019-09-30 RX ADMIN — Medication 10 ML: at 10:55

## 2019-09-30 RX ADMIN — PANTOPRAZOLE SODIUM 40 MG: 40 INJECTION, POWDER, LYOPHILIZED, FOR SOLUTION INTRAVENOUS at 10:53

## 2019-09-30 RX ADMIN — Medication 10 ML: at 11:04

## 2019-09-30 ASSESSMENT — PAIN SCALES - GENERAL: PAINLEVEL_OUTOF10: 0

## 2019-09-30 ASSESSMENT — ENCOUNTER SYMPTOMS
APNEA: 0
ABDOMINAL DISTENTION: 0
COLOR CHANGE: 0
ABDOMINAL PAIN: 0
CHOKING: 0
BACK PAIN: 0
CHEST TIGHTNESS: 0

## 2019-10-01 LAB
LV EF: 55 %
LVEF MODALITY: NORMAL

## 2019-10-01 PROCEDURE — 2580000003 HC RX 258: Performed by: INTERNAL MEDICINE

## 2019-10-01 PROCEDURE — 99232 SBSQ HOSP IP/OBS MODERATE 35: CPT | Performed by: INTERNAL MEDICINE

## 2019-10-01 PROCEDURE — 1210000000 HC MED SURG R&B

## 2019-10-01 PROCEDURE — C9113 INJ PANTOPRAZOLE SODIUM, VIA: HCPCS | Performed by: NURSE PRACTITIONER

## 2019-10-01 PROCEDURE — 2580000003 HC RX 258: Performed by: NURSE PRACTITIONER

## 2019-10-01 PROCEDURE — 93306 TTE W/DOPPLER COMPLETE: CPT

## 2019-10-01 PROCEDURE — 6360000002 HC RX W HCPCS: Performed by: NURSE PRACTITIONER

## 2019-10-01 RX ADMIN — Medication 10 ML: at 08:18

## 2019-10-01 RX ADMIN — SODIUM CHLORIDE: 9 INJECTION, SOLUTION INTRAVENOUS at 03:29

## 2019-10-01 RX ADMIN — ENOXAPARIN SODIUM 40 MG: 40 INJECTION SUBCUTANEOUS at 08:15

## 2019-10-01 RX ADMIN — Medication 10 ML: at 23:31

## 2019-10-01 RX ADMIN — Medication 10 ML: at 08:15

## 2019-10-01 RX ADMIN — SODIUM CHLORIDE: 9 INJECTION, SOLUTION INTRAVENOUS at 17:37

## 2019-10-01 RX ADMIN — PANTOPRAZOLE SODIUM 40 MG: 40 INJECTION, POWDER, LYOPHILIZED, FOR SOLUTION INTRAVENOUS at 08:15

## 2019-10-01 ASSESSMENT — PAIN SCALES - GENERAL
PAINLEVEL_OUTOF10: 0

## 2019-10-02 VITALS
WEIGHT: 140.5 LBS | OXYGEN SATURATION: 96 % | SYSTOLIC BLOOD PRESSURE: 174 MMHG | TEMPERATURE: 97.9 F | HEIGHT: 63 IN | RESPIRATION RATE: 16 BRPM | HEART RATE: 88 BPM | BODY MASS INDEX: 24.89 KG/M2 | DIASTOLIC BLOOD PRESSURE: 68 MMHG

## 2019-10-02 LAB
ORGANISM: ABNORMAL
URINE CULTURE, ROUTINE: ABNORMAL

## 2019-10-02 PROCEDURE — 93010 ELECTROCARDIOGRAM REPORT: CPT | Performed by: INTERNAL MEDICINE

## 2019-10-02 PROCEDURE — C9113 INJ PANTOPRAZOLE SODIUM, VIA: HCPCS | Performed by: NURSE PRACTITIONER

## 2019-10-02 PROCEDURE — 99232 SBSQ HOSP IP/OBS MODERATE 35: CPT | Performed by: INTERNAL MEDICINE

## 2019-10-02 PROCEDURE — 6360000002 HC RX W HCPCS: Performed by: NURSE PRACTITIONER

## 2019-10-02 PROCEDURE — 2580000003 HC RX 258: Performed by: NURSE PRACTITIONER

## 2019-10-02 RX ADMIN — Medication 10 ML: at 09:16

## 2019-10-02 RX ADMIN — PANTOPRAZOLE SODIUM 40 MG: 40 INJECTION, POWDER, LYOPHILIZED, FOR SOLUTION INTRAVENOUS at 09:25

## 2019-10-02 RX ADMIN — ENOXAPARIN SODIUM 40 MG: 40 INJECTION SUBCUTANEOUS at 09:15

## 2019-10-02 RX ADMIN — Medication 10 ML: at 09:25

## 2019-10-02 ASSESSMENT — PAIN SCALES - GENERAL
PAINLEVEL_OUTOF10: 0

## 2019-10-03 ENCOUNTER — OFFICE VISIT (OUTPATIENT)
Dept: FAMILY MEDICINE CLINIC | Age: 84
End: 2019-10-03
Payer: MEDICARE

## 2019-10-03 ENCOUNTER — CARE COORDINATION (OUTPATIENT)
Dept: CASE MANAGEMENT | Age: 84
End: 2019-10-03

## 2019-10-03 ENCOUNTER — TELEPHONE (OUTPATIENT)
Dept: FAMILY MEDICINE CLINIC | Age: 84
End: 2019-10-03

## 2019-10-03 VITALS
TEMPERATURE: 97.6 F | SYSTOLIC BLOOD PRESSURE: 124 MMHG | DIASTOLIC BLOOD PRESSURE: 64 MMHG | OXYGEN SATURATION: 97 % | RESPIRATION RATE: 16 BRPM | HEART RATE: 67 BPM

## 2019-10-03 DIAGNOSIS — Z23 FLU VACCINE NEED: ICD-10-CM

## 2019-10-03 DIAGNOSIS — I48.91 ATRIAL FIBRILLATION, UNSPECIFIED TYPE (HCC): Chronic | ICD-10-CM

## 2019-10-03 DIAGNOSIS — K56.609 INTESTINAL OBSTRUCTION, UNSPECIFIED CAUSE, UNSPECIFIED WHETHER PARTIAL OR COMPLETE (HCC): Primary | ICD-10-CM

## 2019-10-03 DIAGNOSIS — K59.09 OTHER CONSTIPATION: Chronic | ICD-10-CM

## 2019-10-03 PROCEDURE — 90653 IIV ADJUVANT VACCINE IM: CPT | Performed by: FAMILY MEDICINE

## 2019-10-03 PROCEDURE — 99496 TRANSJ CARE MGMT HIGH F2F 7D: CPT | Performed by: FAMILY MEDICINE

## 2019-10-03 PROCEDURE — 1111F DSCHRG MED/CURRENT MED MERGE: CPT | Performed by: FAMILY MEDICINE

## 2019-10-03 PROCEDURE — G0008 ADMIN INFLUENZA VIRUS VAC: HCPCS | Performed by: FAMILY MEDICINE

## 2019-10-03 RX ORDER — IBUPROFEN 200 MG
1 CAPSULE ORAL DAILY
COMMUNITY
End: 2019-10-14

## 2019-10-03 RX ORDER — BISACODYL 10 MG
10 SUPPOSITORY, RECTAL RECTAL DAILY PRN
Qty: 30 SUPPOSITORY | Refills: 0 | Status: CANCELLED | OUTPATIENT
Start: 2019-10-03 | End: 2019-11-02

## 2019-10-03 RX ORDER — DOCUSATE SODIUM 100 MG/1
100 CAPSULE, LIQUID FILLED ORAL 2 TIMES DAILY PRN
Qty: 60 CAPSULE | Refills: 0 | Status: ON HOLD | OUTPATIENT
Start: 2019-10-03 | End: 2020-02-18 | Stop reason: SDUPTHER

## 2019-10-11 ENCOUNTER — CARE COORDINATION (OUTPATIENT)
Dept: CASE MANAGEMENT | Age: 84
End: 2019-10-11

## 2019-10-11 ENCOUNTER — TELEPHONE (OUTPATIENT)
Dept: FAMILY MEDICINE CLINIC | Age: 84
End: 2019-10-11

## 2019-10-14 ENCOUNTER — CARE COORDINATION (OUTPATIENT)
Dept: CASE MANAGEMENT | Age: 84
End: 2019-10-14

## 2019-10-14 ENCOUNTER — OFFICE VISIT (OUTPATIENT)
Dept: GERIATRIC MEDICINE | Age: 84
End: 2019-10-14
Payer: MEDICARE

## 2019-10-14 ENCOUNTER — CARE COORDINATION (OUTPATIENT)
Dept: CARE COORDINATION | Age: 84
End: 2019-10-14

## 2019-10-14 VITALS
RESPIRATION RATE: 16 BRPM | SYSTOLIC BLOOD PRESSURE: 144 MMHG | HEART RATE: 80 BPM | DIASTOLIC BLOOD PRESSURE: 82 MMHG | TEMPERATURE: 98.1 F | OXYGEN SATURATION: 95 % | WEIGHT: 137 LBS | BODY MASS INDEX: 24.27 KG/M2

## 2019-10-14 DIAGNOSIS — R53.81 PHYSICAL DECONDITIONING: ICD-10-CM

## 2019-10-14 DIAGNOSIS — Z86.19 HISTORY OF INFECTION WITH VANCOMYCIN RESISTANT ENTEROCOCCUS (VRE): ICD-10-CM

## 2019-10-14 DIAGNOSIS — Z87.19 HX OF SMALL BOWEL OBSTRUCTION: ICD-10-CM

## 2019-10-14 DIAGNOSIS — M48.061 DEGENERATIVE LUMBAR SPINAL STENOSIS: ICD-10-CM

## 2019-10-14 DIAGNOSIS — Z78.9 IMPAIRED MOBILITY AND ADLS: Primary | ICD-10-CM

## 2019-10-14 DIAGNOSIS — I48.0 PAF (PAROXYSMAL ATRIAL FIBRILLATION) (HCC): ICD-10-CM

## 2019-10-14 DIAGNOSIS — Z74.09 IMPAIRED MOBILITY AND ADLS: Primary | ICD-10-CM

## 2019-10-14 PROCEDURE — 1123F ACP DISCUSS/DSCN MKR DOCD: CPT | Performed by: INTERNAL MEDICINE

## 2019-10-14 PROCEDURE — G8482 FLU IMMUNIZE ORDER/ADMIN: HCPCS | Performed by: INTERNAL MEDICINE

## 2019-10-14 PROCEDURE — 99304 1ST NF CARE SF/LOW MDM 25: CPT | Performed by: INTERNAL MEDICINE

## 2019-10-14 ASSESSMENT — ENCOUNTER SYMPTOMS
EYE PAIN: 0
BLOATING: 1
ABDOMINAL PAIN: 0
CHEST TIGHTNESS: 0
SHORTNESS OF BREATH: 0
TROUBLE SWALLOWING: 0
DIARRHEA: 0
VOMITING: 0
COUGH: 0
CONSTIPATION: 1
WHEEZING: 0
HEMATOCHEZIA: 0
RECTAL PAIN: 0
BACK PAIN: 1

## 2019-10-17 ENCOUNTER — OFFICE VISIT (OUTPATIENT)
Dept: GERIATRIC MEDICINE | Age: 84
End: 2019-10-17
Payer: MEDICARE

## 2019-10-17 DIAGNOSIS — R27.0 ATAXIA: Primary | ICD-10-CM

## 2019-10-17 DIAGNOSIS — K56.609 SBO (SMALL BOWEL OBSTRUCTION) (HCC): ICD-10-CM

## 2019-10-17 DIAGNOSIS — M48.061 SPINAL STENOSIS OF LUMBAR REGION, UNSPECIFIED WHETHER NEUROGENIC CLAUDICATION PRESENT: ICD-10-CM

## 2019-10-17 DIAGNOSIS — I48.20 CHRONIC ATRIAL FIBRILLATION (HCC): Chronic | ICD-10-CM

## 2019-10-17 PROCEDURE — 99309 SBSQ NF CARE MODERATE MDM 30: CPT | Performed by: NURSE PRACTITIONER

## 2019-10-17 PROCEDURE — 1123F ACP DISCUSS/DSCN MKR DOCD: CPT | Performed by: NURSE PRACTITIONER

## 2019-10-17 PROCEDURE — G8482 FLU IMMUNIZE ORDER/ADMIN: HCPCS | Performed by: NURSE PRACTITIONER

## 2019-10-22 ENCOUNTER — OFFICE VISIT (OUTPATIENT)
Dept: GERIATRIC MEDICINE | Age: 84
End: 2019-10-22
Payer: MEDICARE

## 2019-10-22 DIAGNOSIS — R53.83 LETHARGY: Primary | ICD-10-CM

## 2019-10-22 DIAGNOSIS — R27.0 ATAXIA: ICD-10-CM

## 2019-10-22 DIAGNOSIS — N39.0 CHRONIC UTI: ICD-10-CM

## 2019-10-22 DIAGNOSIS — K56.609 SBO (SMALL BOWEL OBSTRUCTION) (HCC): ICD-10-CM

## 2019-10-22 PROCEDURE — 99309 SBSQ NF CARE MODERATE MDM 30: CPT | Performed by: NURSE PRACTITIONER

## 2019-10-22 PROCEDURE — G8482 FLU IMMUNIZE ORDER/ADMIN: HCPCS | Performed by: NURSE PRACTITIONER

## 2019-10-22 PROCEDURE — 1123F ACP DISCUSS/DSCN MKR DOCD: CPT | Performed by: NURSE PRACTITIONER

## 2019-10-23 LAB
BILIRUBIN, URINE: NEGATIVE
BLOOD, URINE: NEGATIVE
CLARITY: CLEAR
COLOR: YELLOW
GLUCOSE URINE: NEGATIVE
KETONES, URINE: NEGATIVE
LEUKOCYTE ESTERASE, URINE: NORMAL
NITRITE, URINE: NEGATIVE
PH UA: 6 (ref 4.5–8)
PROTEIN UA: NEGATIVE
SPECIFIC GRAVITY, URINE: 1.01
UROBILINOGEN, URINE: NORMAL

## 2019-10-25 ENCOUNTER — OFFICE VISIT (OUTPATIENT)
Dept: GERIATRIC MEDICINE | Age: 84
End: 2019-10-25
Payer: MEDICARE

## 2019-10-25 ENCOUNTER — NURSE ONLY (OUTPATIENT)
Dept: PHYSICAL MEDICINE AND REHAB | Age: 84
End: 2019-10-25

## 2019-10-25 ENCOUNTER — CARE COORDINATION (OUTPATIENT)
Dept: CARE COORDINATION | Age: 84
End: 2019-10-25

## 2019-10-25 DIAGNOSIS — K56.609 SBO (SMALL BOWEL OBSTRUCTION) (HCC): Primary | ICD-10-CM

## 2019-10-25 DIAGNOSIS — G30.1 LATE ONSET ALZHEIMER'S DISEASE WITHOUT BEHAVIORAL DISTURBANCE (HCC): Chronic | ICD-10-CM

## 2019-10-25 DIAGNOSIS — M15.9 PRIMARY OSTEOARTHRITIS INVOLVING MULTIPLE JOINTS: ICD-10-CM

## 2019-10-25 DIAGNOSIS — F02.80 LATE ONSET ALZHEIMER'S DISEASE WITHOUT BEHAVIORAL DISTURBANCE (HCC): Chronic | ICD-10-CM

## 2019-10-25 DIAGNOSIS — N30.01 ACUTE CYSTITIS WITH HEMATURIA: ICD-10-CM

## 2019-10-25 DIAGNOSIS — R26.9 ABNORMALITY OF GAIT AND MOBILITY: ICD-10-CM

## 2019-10-25 DIAGNOSIS — I48.91 ATRIAL FIBRILLATION, UNSPECIFIED TYPE (HCC): ICD-10-CM

## 2019-10-25 PROCEDURE — 99316 NF DSCHRG MGMT 30 MIN+: CPT | Performed by: NURSE PRACTITIONER

## 2019-10-25 PROCEDURE — G8482 FLU IMMUNIZE ORDER/ADMIN: HCPCS | Performed by: NURSE PRACTITIONER

## 2019-11-04 ENCOUNTER — OFFICE VISIT (OUTPATIENT)
Dept: FAMILY MEDICINE CLINIC | Age: 84
End: 2019-11-04
Payer: MEDICARE

## 2019-11-04 VITALS
RESPIRATION RATE: 16 BRPM | DIASTOLIC BLOOD PRESSURE: 62 MMHG | OXYGEN SATURATION: 98 % | HEART RATE: 59 BPM | SYSTOLIC BLOOD PRESSURE: 118 MMHG | TEMPERATURE: 97.8 F

## 2019-11-04 DIAGNOSIS — E11.51 DM (DIABETES MELLITUS), TYPE 2 WITH PERIPHERAL VASCULAR COMPLICATIONS (HCC): ICD-10-CM

## 2019-11-04 DIAGNOSIS — I48.91 ATRIAL FIBRILLATION, UNSPECIFIED TYPE (HCC): Chronic | ICD-10-CM

## 2019-11-04 DIAGNOSIS — Z78.9 IMPAIRED MOBILITY AND ACTIVITIES OF DAILY LIVING: ICD-10-CM

## 2019-11-04 DIAGNOSIS — R53.1 WEAKNESS: ICD-10-CM

## 2019-11-04 DIAGNOSIS — Z74.09 IMPAIRED MOBILITY AND ACTIVITIES OF DAILY LIVING: ICD-10-CM

## 2019-11-04 DIAGNOSIS — I73.9 PVD (PERIPHERAL VASCULAR DISEASE) (HCC): Primary | ICD-10-CM

## 2019-11-04 PROBLEM — K56.609 BOWEL OBSTRUCTION (HCC): Status: RESOLVED | Noted: 2019-09-22 | Resolved: 2019-11-04

## 2019-11-04 PROBLEM — R11.2 NAUSEA AND VOMITING: Status: RESOLVED | Noted: 2019-09-20 | Resolved: 2019-11-04

## 2019-11-04 PROBLEM — N30.01 ACUTE CYSTITIS WITH HEMATURIA: Status: RESOLVED | Noted: 2019-08-17 | Resolved: 2019-11-04

## 2019-11-04 PROCEDURE — 1111F DSCHRG MED/CURRENT MED MERGE: CPT | Performed by: FAMILY MEDICINE

## 2019-11-04 PROCEDURE — 99495 TRANSJ CARE MGMT MOD F2F 14D: CPT | Performed by: FAMILY MEDICINE

## 2019-11-06 ENCOUNTER — CARE COORDINATION (OUTPATIENT)
Dept: CARE COORDINATION | Age: 84
End: 2019-11-06

## 2019-11-14 VITALS
SYSTOLIC BLOOD PRESSURE: 136 MMHG | TEMPERATURE: 98.3 F | BODY MASS INDEX: 24.23 KG/M2 | WEIGHT: 136.8 LBS | DIASTOLIC BLOOD PRESSURE: 87 MMHG | RESPIRATION RATE: 18 BRPM | OXYGEN SATURATION: 98 % | HEART RATE: 63 BPM

## 2019-11-14 PROBLEM — R27.0 ATAXIA: Status: RESOLVED | Noted: 2019-05-09 | Resolved: 2019-11-14

## 2019-11-14 PROBLEM — R26.9 GAIT ABNORMALITY: Status: RESOLVED | Noted: 2019-05-09 | Resolved: 2019-11-14

## 2019-11-19 LAB
BILIRUBIN URINE: NEGATIVE
BLOOD, URINE: NEGATIVE
CLARITY: CLEAR
COLOR: YELLOW
GLUCOSE URINE: NEGATIVE MG/DL
KETONES, URINE: NEGATIVE MG/DL
LEUKOCYTE ESTERASE, URINE: NEGATIVE
NITRITE, URINE: NEGATIVE
PH UA: 7 (ref 5–9)
PROTEIN UA: NEGATIVE MG/DL
SPECIFIC GRAVITY UA: 1.01 (ref 1–1.03)
UROBILINOGEN, URINE: 0.2 E.U./DL

## 2019-11-21 ENCOUNTER — TELEPHONE (OUTPATIENT)
Dept: FAMILY MEDICINE CLINIC | Age: 84
End: 2019-11-21

## 2019-11-21 LAB — URINE CULTURE, ROUTINE: NORMAL

## 2019-11-22 ENCOUNTER — OFFICE VISIT (OUTPATIENT)
Dept: FAMILY MEDICINE CLINIC | Age: 84
End: 2019-11-22
Payer: MEDICARE

## 2019-11-22 ENCOUNTER — CARE COORDINATION (OUTPATIENT)
Dept: CARE COORDINATION | Age: 84
End: 2019-11-22

## 2019-11-22 VITALS
HEART RATE: 61 BPM | SYSTOLIC BLOOD PRESSURE: 120 MMHG | TEMPERATURE: 98.7 F | OXYGEN SATURATION: 98 % | DIASTOLIC BLOOD PRESSURE: 60 MMHG | RESPIRATION RATE: 18 BRPM

## 2019-11-22 DIAGNOSIS — Z78.9 IMPAIRED MOBILITY AND ACTIVITIES OF DAILY LIVING: ICD-10-CM

## 2019-11-22 DIAGNOSIS — G30.1 LATE ONSET ALZHEIMER'S DISEASE WITHOUT BEHAVIORAL DISTURBANCE (HCC): Chronic | ICD-10-CM

## 2019-11-22 DIAGNOSIS — R54 AGE-RELATED PHYSICAL DEBILITY: Chronic | ICD-10-CM

## 2019-11-22 DIAGNOSIS — Z74.09 IMPAIRED MOBILITY AND ACTIVITIES OF DAILY LIVING: ICD-10-CM

## 2019-11-22 DIAGNOSIS — R26.9 ABNORMALITY OF GAIT AND MOBILITY: ICD-10-CM

## 2019-11-22 DIAGNOSIS — R09.82 POST-NASAL DRIP: Chronic | ICD-10-CM

## 2019-11-22 DIAGNOSIS — F02.80 LATE ONSET ALZHEIMER'S DISEASE WITHOUT BEHAVIORAL DISTURBANCE (HCC): Chronic | ICD-10-CM

## 2019-11-22 DIAGNOSIS — M15.9 PRIMARY OSTEOARTHRITIS INVOLVING MULTIPLE JOINTS: ICD-10-CM

## 2019-11-22 DIAGNOSIS — M48.061 SPINAL STENOSIS OF LUMBAR REGION, UNSPECIFIED WHETHER NEUROGENIC CLAUDICATION PRESENT: ICD-10-CM

## 2019-11-22 DIAGNOSIS — R53.1 WEAKNESS: Primary | ICD-10-CM

## 2019-11-22 PROCEDURE — 99213 OFFICE O/P EST LOW 20 MIN: CPT | Performed by: FAMILY MEDICINE

## 2019-11-22 PROCEDURE — 4040F PNEUMOC VAC/ADMIN/RCVD: CPT | Performed by: FAMILY MEDICINE

## 2019-11-22 PROCEDURE — G8482 FLU IMMUNIZE ORDER/ADMIN: HCPCS | Performed by: FAMILY MEDICINE

## 2019-11-22 PROCEDURE — 1090F PRES/ABSN URINE INCON ASSESS: CPT | Performed by: FAMILY MEDICINE

## 2019-11-22 PROCEDURE — 1123F ACP DISCUSS/DSCN MKR DOCD: CPT | Performed by: FAMILY MEDICINE

## 2019-11-22 PROCEDURE — G8420 CALC BMI NORM PARAMETERS: HCPCS | Performed by: FAMILY MEDICINE

## 2019-11-22 PROCEDURE — G8427 DOCREV CUR MEDS BY ELIG CLIN: HCPCS | Performed by: FAMILY MEDICINE

## 2019-11-22 PROCEDURE — G8598 ASA/ANTIPLAT THER USED: HCPCS | Performed by: FAMILY MEDICINE

## 2019-11-22 PROCEDURE — 1036F TOBACCO NON-USER: CPT | Performed by: FAMILY MEDICINE

## 2019-11-22 RX ORDER — GUAIFENESIN 600 MG/1
600 TABLET, EXTENDED RELEASE ORAL 2 TIMES DAILY
Qty: 60 TABLET | Refills: 5 | Status: SHIPPED | OUTPATIENT
Start: 2019-11-22 | End: 2020-01-06 | Stop reason: ALTCHOICE

## 2019-12-05 DIAGNOSIS — M48.061 SPINAL STENOSIS OF LUMBAR REGION, UNSPECIFIED WHETHER NEUROGENIC CLAUDICATION PRESENT: Primary | ICD-10-CM

## 2019-12-05 RX ORDER — LIDOCAINE 4 G/G
1 PATCH TOPICAL DAILY
Qty: 30 PATCH | Refills: 0 | Status: SHIPPED | OUTPATIENT
Start: 2019-12-05 | End: 2020-01-04

## 2019-12-06 VITALS
DIASTOLIC BLOOD PRESSURE: 76 MMHG | OXYGEN SATURATION: 97 % | RESPIRATION RATE: 18 BRPM | HEART RATE: 73 BPM | TEMPERATURE: 98.6 F | SYSTOLIC BLOOD PRESSURE: 130 MMHG

## 2019-12-30 ENCOUNTER — NURSE ONLY (OUTPATIENT)
Dept: PHYSICAL MEDICINE AND REHAB | Age: 84
End: 2019-12-30
Payer: MEDICARE

## 2019-12-30 DIAGNOSIS — N39.0 BACTERIAL UTI: Primary | ICD-10-CM

## 2019-12-30 DIAGNOSIS — A49.9 BACTERIAL UTI: Primary | ICD-10-CM

## 2019-12-30 PROCEDURE — G0180 MD CERTIFICATION HHA PATIENT: HCPCS | Performed by: PHYSICAL MEDICINE & REHABILITATION

## 2020-01-06 ENCOUNTER — OFFICE VISIT (OUTPATIENT)
Dept: FAMILY MEDICINE CLINIC | Age: 85
End: 2020-01-06
Payer: MEDICARE

## 2020-01-06 VITALS
DIASTOLIC BLOOD PRESSURE: 68 MMHG | HEIGHT: 62 IN | OXYGEN SATURATION: 96 % | SYSTOLIC BLOOD PRESSURE: 118 MMHG | RESPIRATION RATE: 14 BRPM | HEART RATE: 54 BPM | TEMPERATURE: 97.2 F | BODY MASS INDEX: 25.43 KG/M2

## 2020-01-06 DIAGNOSIS — R30.0 DYSURIA: ICD-10-CM

## 2020-01-06 PROBLEM — K56.609 SBO (SMALL BOWEL OBSTRUCTION) (HCC): Status: RESOLVED | Noted: 2019-09-22 | Resolved: 2020-01-06

## 2020-01-06 LAB
BILIRUBIN, POC: ABNORMAL
BLOOD URINE, POC: ABNORMAL
CLARITY, POC: ABNORMAL
COLOR, POC: ABNORMAL
GLUCOSE URINE, POC: ABNORMAL
KETONES, POC: ABNORMAL
LEUKOCYTE EST, POC: ABNORMAL
NITRITE, POC: ABNORMAL
PH, POC: 6
PROTEIN, POC: ABNORMAL
SPECIFIC GRAVITY, POC: 1.02
UROBILINOGEN, POC: 3.5

## 2020-01-06 PROCEDURE — 99214 OFFICE O/P EST MOD 30 MIN: CPT | Performed by: FAMILY MEDICINE

## 2020-01-06 PROCEDURE — 1090F PRES/ABSN URINE INCON ASSESS: CPT | Performed by: FAMILY MEDICINE

## 2020-01-06 PROCEDURE — G8427 DOCREV CUR MEDS BY ELIG CLIN: HCPCS | Performed by: FAMILY MEDICINE

## 2020-01-06 PROCEDURE — G8482 FLU IMMUNIZE ORDER/ADMIN: HCPCS | Performed by: FAMILY MEDICINE

## 2020-01-06 PROCEDURE — 1036F TOBACCO NON-USER: CPT | Performed by: FAMILY MEDICINE

## 2020-01-06 PROCEDURE — G8417 CALC BMI ABV UP PARAM F/U: HCPCS | Performed by: FAMILY MEDICINE

## 2020-01-06 PROCEDURE — 4040F PNEUMOC VAC/ADMIN/RCVD: CPT | Performed by: FAMILY MEDICINE

## 2020-01-06 PROCEDURE — 81003 URINALYSIS AUTO W/O SCOPE: CPT | Performed by: FAMILY MEDICINE

## 2020-01-06 PROCEDURE — 1123F ACP DISCUSS/DSCN MKR DOCD: CPT | Performed by: FAMILY MEDICINE

## 2020-01-06 RX ORDER — NITROFURANTOIN 25; 75 MG/1; MG/1
CAPSULE ORAL
COMMUNITY
Start: 2019-12-24 | End: 2020-01-06

## 2020-01-06 RX ORDER — TIMOLOL MALEATE 5 MG/ML
SOLUTION/ DROPS OPHTHALMIC
COMMUNITY
Start: 2019-12-06 | End: 2020-01-06

## 2020-01-06 NOTE — PROGRESS NOTES
, teacher,  and  in [place]     Never         Retired from [employer] alone in a house, no children    Former  at [employer] and [address] [place] [address] for [employer] for many years     [facility] caregiver      Family History   Problem Relation Age of Onset    Heart Failure Mother         dec age 77    Hypertension Mother     Diabetes Father         dec age 67     No Known Allergies  Current Outpatient Medications   Medication Sig [address] by Does not apply route 1 each 0    Misc. Devices (COMMODE BEDSIDE) MISC As directed 1 each 0    ELIQUIS 2.5 MG TABS tablet TAKE 1 TABLET BY MOUTH TWICE A  tablet 4    Calcium Carbonate Antacid (TUMS CHEWY DELIGHTS PO) Take by mouth      vitamin D (CHOLECALCIFEROL) 1000 UNIT TABS tablet Take 1,000 Units by mouth daily      docusate sodium (COLACE) 100 MG capsule Take 1 capsule by mouth 2 times daily as needed for Constipation 60 capsule 0    omeprazole (PRILOSEC) 20 MG delayed release capsule TAKE ONE CAPSULE BY MOUTH EVERY DAY 90 capsule 1    brimonidine (ALPHAGAN P) 0.15 % ophthalmic solution USE 1 DROP IN BOTH EYES TWICE DAILY. 11    timolol (TIMOPTIC-XE) 0.5 % ophthalmic gel-forming USE 1 DROP IN BOTH EYES TWICE DAILY. 11    acetaminophen (TYLENOL) 325 MG tablet Take 1 tablet by mouth 3 times daily as needed for Pain (DO NOT EXCEED 4GM IN 24 HOURS) 90 tablet 0    latanoprost (XALATAN) 0.005 % ophthalmic solution Place 1 drop into the left eye nightly      Multiple Vitamin (MULTI VITAMIN DAILY) TABS Take 1 tablet by mouth every morning       No current facility-administered medications for this visit. PMH, Surgical Hx, Family Hx, and Social Hxreviewed and updated. Health Maintenance reviewed.     Objective    Vitals:    01/06/20 1147   BP: 118/68   Site: Right Upper Arm   Position: Sitting   Cuff Size: Medium Adult   Pulse: 54   Resp: 14   Temp: 97.2 °F (36.2 °C)   TempSrc: Tympanic   SpO2: 96%   Height: 5' 1.5\" (1.562 m)        Physical Exam      Lab Results   Component Value Date    LABA1C 6.4 (H) 08/28/2019    LABA1C 5.7 08/20/2018    LABA1C 6.4 12/11/2017     Lab Results   Component Value Date    CREATININE 0.45 (L) 09/29/2019     Lab Results   Component Value Date    ALT 16 09/27/2019    AST 17 09/27/2019     Lab Results   Component Value Date    CHOL 161 08/16/2016    TRIG 93 08/16/2016    HDL 64 (H) 08/16/2016    LDLCALC 78 08/16/2016        Assessment & Plan   Visit Diagnoses and Associated Orders     PVD (peripheral vascular disease) ([facility]    -  Primary    Stable and well-controlled on current meds. DM (diabetes mellitus), type 2 with peripheral vascular complications (HCC)        Stable and well-controlled on current meds. Chronic atrial fibrillation        Stable and well-controlled on current meds. Late onset Alzheimer's disease without behavioral disturbance ([facility]        Worse and fairly well-controlled on current meds. Dysuria        POCT Urinalysis No Micro (Auto) [id number] Custom]      Urine Culture [[id number] Custom]   - Future Order         Age-related physical debility        progressive; safety reviewed. Abnormality of gait and mobility        See above                 Reviewed with the patient: all disease processes, current clinical status, medications, activities and diet.      Side effects, adverse effects of the medication prescribed today, as well as treatment plan/ rationale and result expectations have been discussed with the patient who expresses understanding and desires to proceed.     Close follow up to evaluate treatment results and for coordination of care. I have reviewed the patient's medical history in detail and updated the computerized patient record. More than 50% of the appointment was spent in face-to-face counseling, education and care coordination.     Please note this report has been partially produced using speech recognition software and may contain mistakes related to that system including errors in grammar, punctuation and spelling as well as words and phrases that may seem inappropriate. If there are questions or concerns, please feel free to contact me to clarify. Orders Placed This Encounter   Procedures    Urine Culture     Standing Status:   Future     Standing Expiration Date:   1/6/2021     Order Specific Question:   Specify (ex-cath, midstream, cysto, etc)? Answer:   mid stream    POCT Urinalysis No Micro (Auto)     No orders of the defined types were placed in this encounter. Medications Discontinued During This Encounter   Medication Reason    timolol (TIMOPTIC) 0.5 % ophthalmic solution LIST CLEANUP    nitrofurantoin, macrocrystal-monohydrate, (MACROBID) 100 MG capsule LIST CLEANUP    guaiFENesin (MUCINEX) 600 MG extended release tablet Therapy completed     No follow-ups on file. Controlled Substance Monitoring:    Acute and Chronic Pain Monitoring:   RX Monitoring 11/22/2019   Attestation -   Periodic Controlled Substance Monitoring Possible medication side effects, risk of tolerance/dependence & alternative treatments discussed.    Chronic Pain > 50 MEDD -   Chronic Pain > 80 MEDD -           Robert Bloch LA-CRUZ, MD

## 2020-01-09 LAB
ORGANISM: ABNORMAL
URINE CULTURE, ROUTINE: ABNORMAL

## 2020-01-09 RX ORDER — SULFAMETHOXAZOLE AND TRIMETHOPRIM 800; 160 MG/1; MG/1
1 TABLET ORAL 2 TIMES DAILY
Qty: 20 TABLET | Refills: 0 | Status: ON HOLD | OUTPATIENT
Start: 2020-01-09 | End: 2020-01-22 | Stop reason: HOSPADM

## 2020-01-18 ENCOUNTER — APPOINTMENT (OUTPATIENT)
Dept: GENERAL RADIOLOGY | Age: 85
DRG: 641 | End: 2020-01-18
Payer: MEDICARE

## 2020-01-18 ENCOUNTER — APPOINTMENT (OUTPATIENT)
Dept: CT IMAGING | Age: 85
DRG: 641 | End: 2020-01-18
Payer: MEDICARE

## 2020-01-18 ENCOUNTER — HOSPITAL ENCOUNTER (INPATIENT)
Age: 85
LOS: 4 days | Discharge: NURSING, REHAB OR LONG-TERM CARE | DRG: 641 | End: 2020-01-22
Attending: FAMILY MEDICINE | Admitting: INTERNAL MEDICINE
Payer: MEDICARE

## 2020-01-18 PROBLEM — R53.1 GENERALIZED WEAKNESS: Status: ACTIVE | Noted: 2020-01-18

## 2020-01-18 LAB
ALBUMIN SERPL-MCNC: 3.6 G/DL (ref 3.5–4.6)
ALP BLD-CCNC: 80 U/L (ref 40–130)
ALT SERPL-CCNC: 10 U/L (ref 0–33)
ANION GAP SERPL CALCULATED.3IONS-SCNC: 10 MEQ/L (ref 9–15)
AST SERPL-CCNC: 12 U/L (ref 0–35)
BACTERIA: ABNORMAL /HPF
BASOPHILS ABSOLUTE: 0.1 K/UL (ref 0–0.2)
BASOPHILS RELATIVE PERCENT: 1.1 %
BILIRUB SERPL-MCNC: 0.4 MG/DL (ref 0.2–0.7)
BILIRUBIN URINE: NEGATIVE
BLOOD, URINE: NEGATIVE
BUN BLDV-MCNC: 15 MG/DL (ref 8–23)
CALCIUM SERPL-MCNC: 10.2 MG/DL (ref 8.5–9.9)
CHLORIDE BLD-SCNC: 96 MEQ/L (ref 95–107)
CLARITY: CLEAR
CO2: 20 MEQ/L (ref 20–31)
COLOR: YELLOW
CREAT SERPL-MCNC: 0.73 MG/DL (ref 0.5–0.9)
EKG ATRIAL RATE: 69 BPM
EKG P AXIS: -4 DEGREES
EKG P-R INTERVAL: 142 MS
EKG Q-T INTERVAL: 420 MS
EKG QRS DURATION: 96 MS
EKG QTC CALCULATION (BAZETT): 450 MS
EKG R AXIS: -14 DEGREES
EKG T AXIS: 34 DEGREES
EKG VENTRICULAR RATE: 69 BPM
EOSINOPHILS ABSOLUTE: 0.1 K/UL (ref 0–0.7)
EOSINOPHILS RELATIVE PERCENT: 0.8 %
EPITHELIAL CELLS, UA: ABNORMAL /HPF (ref 0–5)
GFR AFRICAN AMERICAN: >60
GFR NON-AFRICAN AMERICAN: >60
GLOBULIN: 2.9 G/DL (ref 2.3–3.5)
GLUCOSE BLD-MCNC: 106 MG/DL (ref 70–99)
GLUCOSE URINE: NEGATIVE MG/DL
HCT VFR BLD CALC: 36.8 % (ref 37–47)
HEMOGLOBIN: 12.6 G/DL (ref 12–16)
HYALINE CASTS: ABNORMAL /HPF (ref 0–5)
INFLUENZA A BY PCR: NEGATIVE
INFLUENZA B BY PCR: NEGATIVE
KETONES, URINE: NEGATIVE MG/DL
LEUKOCYTE ESTERASE, URINE: ABNORMAL
LYMPHOCYTES ABSOLUTE: 1.3 K/UL (ref 1–4.8)
LYMPHOCYTES RELATIVE PERCENT: 18.5 %
MCH RBC QN AUTO: 30.9 PG (ref 27–31.3)
MCHC RBC AUTO-ENTMCNC: 34.1 % (ref 33–37)
MCV RBC AUTO: 90.7 FL (ref 82–100)
MONOCYTES ABSOLUTE: 1.1 K/UL (ref 0.2–0.8)
MONOCYTES RELATIVE PERCENT: 16.5 %
NEUTROPHILS ABSOLUTE: 4.3 K/UL (ref 1.4–6.5)
NEUTROPHILS RELATIVE PERCENT: 63.1 %
NITRITE, URINE: NEGATIVE
PDW BLD-RTO: 14.3 % (ref 11.5–14.5)
PH UA: 6.5 (ref 5–9)
PLATELET # BLD: 274 K/UL (ref 130–400)
POTASSIUM SERPL-SCNC: 4.8 MEQ/L (ref 3.4–4.9)
PROCALCITONIN: 0.11 NG/ML (ref 0–0.15)
PROTEIN UA: NEGATIVE MG/DL
RBC # BLD: 4.06 M/UL (ref 4.2–5.4)
RBC UA: ABNORMAL /HPF (ref 0–5)
REASON FOR REJECTION: NORMAL
REJECTED TEST: NORMAL
SODIUM BLD-SCNC: 126 MEQ/L (ref 135–144)
SPECIFIC GRAVITY UA: 1.01 (ref 1–1.03)
TOTAL CK: 64 U/L (ref 0–170)
TOTAL PROTEIN: 6.5 G/DL (ref 6.3–8)
URINE REFLEX TO CULTURE: YES
UROBILINOGEN, URINE: 1 E.U./DL
WBC # BLD: 6.8 K/UL (ref 4.8–10.8)
WBC UA: >100 /HPF (ref 0–5)

## 2020-01-18 PROCEDURE — 36415 COLL VENOUS BLD VENIPUNCTURE: CPT

## 2020-01-18 PROCEDURE — 84145 PROCALCITONIN (PCT): CPT

## 2020-01-18 PROCEDURE — 6370000000 HC RX 637 (ALT 250 FOR IP): Performed by: NURSE PRACTITIONER

## 2020-01-18 PROCEDURE — 80053 COMPREHEN METABOLIC PANEL: CPT

## 2020-01-18 PROCEDURE — 99285 EMERGENCY DEPT VISIT HI MDM: CPT

## 2020-01-18 PROCEDURE — 93005 ELECTROCARDIOGRAM TRACING: CPT | Performed by: FAMILY MEDICINE

## 2020-01-18 PROCEDURE — 82550 ASSAY OF CK (CPK): CPT

## 2020-01-18 PROCEDURE — 96360 HYDRATION IV INFUSION INIT: CPT

## 2020-01-18 PROCEDURE — 87502 INFLUENZA DNA AMP PROBE: CPT

## 2020-01-18 PROCEDURE — 87077 CULTURE AEROBIC IDENTIFY: CPT

## 2020-01-18 PROCEDURE — 2580000003 HC RX 258: Performed by: NURSE PRACTITIONER

## 2020-01-18 PROCEDURE — 87186 SC STD MICRODIL/AGAR DIL: CPT

## 2020-01-18 PROCEDURE — 96361 HYDRATE IV INFUSION ADD-ON: CPT

## 2020-01-18 PROCEDURE — 81001 URINALYSIS AUTO W/SCOPE: CPT

## 2020-01-18 PROCEDURE — 87086 URINE CULTURE/COLONY COUNT: CPT

## 2020-01-18 PROCEDURE — 70450 CT HEAD/BRAIN W/O DYE: CPT

## 2020-01-18 PROCEDURE — 71046 X-RAY EXAM CHEST 2 VIEWS: CPT

## 2020-01-18 PROCEDURE — 2580000003 HC RX 258: Performed by: FAMILY MEDICINE

## 2020-01-18 PROCEDURE — 87040 BLOOD CULTURE FOR BACTERIA: CPT

## 2020-01-18 PROCEDURE — 85025 COMPLETE CBC W/AUTO DIFF WBC: CPT

## 2020-01-18 PROCEDURE — 1210000000 HC MED SURG R&B

## 2020-01-18 RX ORDER — SODIUM CHLORIDE 9 MG/ML
INJECTION, SOLUTION INTRAVENOUS CONTINUOUS
Status: DISCONTINUED | OUTPATIENT
Start: 2020-01-18 | End: 2020-01-20

## 2020-01-18 RX ORDER — SULFAMETHOXAZOLE AND TRIMETHOPRIM 800; 160 MG/1; MG/1
1 TABLET ORAL 2 TIMES DAILY
Status: CANCELLED | OUTPATIENT
Start: 2020-01-18

## 2020-01-18 RX ORDER — ACETAMINOPHEN 325 MG/1
650 TABLET ORAL EVERY 4 HOURS PRN
Status: DISCONTINUED | OUTPATIENT
Start: 2020-01-18 | End: 2020-01-22 | Stop reason: HOSPADM

## 2020-01-18 RX ORDER — TIMOLOL MALEATE 5 MG/ML
1 SOLUTION/ DROPS OPHTHALMIC 2 TIMES DAILY
Status: DISCONTINUED | OUTPATIENT
Start: 2020-01-18 | End: 2020-01-22 | Stop reason: HOSPADM

## 2020-01-18 RX ORDER — FLUTICASONE PROPIONATE 50 MCG
2 SPRAY, SUSPENSION (ML) NASAL DAILY
Status: DISCONTINUED | OUTPATIENT
Start: 2020-01-18 | End: 2020-01-22 | Stop reason: HOSPADM

## 2020-01-18 RX ORDER — GUAIFENESIN 600 MG/1
600 TABLET, EXTENDED RELEASE ORAL 2 TIMES DAILY
Status: DISCONTINUED | OUTPATIENT
Start: 2020-01-18 | End: 2020-01-22 | Stop reason: HOSPADM

## 2020-01-18 RX ORDER — LATANOPROST 50 UG/ML
1 SOLUTION/ DROPS OPHTHALMIC NIGHTLY
Status: DISCONTINUED | OUTPATIENT
Start: 2020-01-18 | End: 2020-01-22 | Stop reason: HOSPADM

## 2020-01-18 RX ORDER — ACETAMINOPHEN 325 MG/1
325 TABLET ORAL EVERY 4 HOURS PRN
Status: DISCONTINUED | OUTPATIENT
Start: 2020-01-18 | End: 2020-01-18

## 2020-01-18 RX ORDER — SODIUM CHLORIDE 0.9 % (FLUSH) 0.9 %
10 SYRINGE (ML) INJECTION PRN
Status: DISCONTINUED | OUTPATIENT
Start: 2020-01-18 | End: 2020-01-22 | Stop reason: HOSPADM

## 2020-01-18 RX ORDER — ONDANSETRON 2 MG/ML
4 INJECTION INTRAMUSCULAR; INTRAVENOUS EVERY 6 HOURS PRN
Status: DISCONTINUED | OUTPATIENT
Start: 2020-01-18 | End: 2020-01-22 | Stop reason: HOSPADM

## 2020-01-18 RX ORDER — SODIUM CHLORIDE 0.9 % (FLUSH) 0.9 %
10 SYRINGE (ML) INJECTION EVERY 12 HOURS SCHEDULED
Status: DISCONTINUED | OUTPATIENT
Start: 2020-01-18 | End: 2020-01-22 | Stop reason: HOSPADM

## 2020-01-18 RX ORDER — 0.9 % SODIUM CHLORIDE 0.9 %
1000 INTRAVENOUS SOLUTION INTRAVENOUS ONCE
Status: COMPLETED | OUTPATIENT
Start: 2020-01-18 | End: 2020-01-18

## 2020-01-18 RX ORDER — BRIMONIDINE TARTRATE 2 MG/ML
1 SOLUTION/ DROPS OPHTHALMIC 2 TIMES DAILY
Status: DISCONTINUED | OUTPATIENT
Start: 2020-01-18 | End: 2020-01-22 | Stop reason: HOSPADM

## 2020-01-18 RX ADMIN — LATANOPROST 1 DROP: 50 SOLUTION OPHTHALMIC at 23:33

## 2020-01-18 RX ADMIN — APIXABAN 2.5 MG: 5 TABLET, FILM COATED ORAL at 23:35

## 2020-01-18 RX ADMIN — CARBAMIDE PEROXIDE 6.5% 5 DROP: 6.5 LIQUID AURICULAR (OTIC) at 23:33

## 2020-01-18 RX ADMIN — BRIMONIDINE TARTRATE 1 DROP: 2 SOLUTION OPHTHALMIC at 23:33

## 2020-01-18 RX ADMIN — Medication 10 ML: at 23:35

## 2020-01-18 RX ADMIN — SODIUM CHLORIDE: 9 INJECTION, SOLUTION INTRAVENOUS at 16:49

## 2020-01-18 RX ADMIN — GUAIFENESIN 600 MG: 600 TABLET, EXTENDED RELEASE ORAL at 23:35

## 2020-01-18 RX ADMIN — TIMOLOL MALEATE 1 DROP: 5 SOLUTION OPHTHALMIC at 23:33

## 2020-01-18 RX ADMIN — SODIUM CHLORIDE 1000 ML: 9 INJECTION, SOLUTION INTRAVENOUS at 12:05

## 2020-01-18 ASSESSMENT — ENCOUNTER SYMPTOMS
EYES NEGATIVE: 1
COUGH: 1
GASTROINTESTINAL NEGATIVE: 1
COLOR CHANGE: 1
RESPIRATORY NEGATIVE: 1

## 2020-01-18 NOTE — ED NOTES
Pt urinated in her brief at this time. This RN and Cheyenne Campbell, ED Tech at bedside for pt care. Nargis care and linen change completed. Clean brief applied. Pt tolerated well.       Francy Wong RN  01/18/20 9799

## 2020-01-18 NOTE — ED NOTES
This RN at bedside to reassess pt. Pt resting quietly in ED cot with no s/s of distress noted. Pt's niece at bedside. Will continue to monitor.       Vinicius Amaya RN  01/18/20 1966

## 2020-01-18 NOTE — ED NOTES
Pt placed on bedpan at this time in attempt to obtain urine specimen.      Lucero Rodriguez RN  01/18/20 8002

## 2020-01-18 NOTE — ED NOTES
Pt unable to provide urine at this time. Dr. Noriega Salts advised.      Roz Rouse, RN  01/18/20 6556

## 2020-01-18 NOTE — H&P
Pulmonary:      Effort: Pulmonary effort is normal.      Breath sounds: Normal breath sounds. Abdominal:      General: Abdomen is flat. Bowel sounds are normal.      Palpations: Abdomen is soft. Musculoskeletal: Normal range of motion. Skin:     General: Skin is warm and dry. Capillary Refill: Capillary refill takes less than 2 seconds. Findings: Bruising present. Comments: Jaw/neck contusion s/p fall   Neurological:      General: No focal deficit present. Mental Status: She is alert. She is confused. GCS: GCS eye subscore is 4. GCS verbal subscore is 5. GCS motor subscore is 6. Sensory: Sensation is intact. Motor: Weakness present. Psychiatric:         Mood and Affect: Mood normal.         Review of Systems   Constitutional: Negative. HENT: Negative. Eyes: Negative. Respiratory: Negative. Cardiovascular: Negative. Gastrointestinal: Negative. Endocrine: Negative. Genitourinary: Negative. Musculoskeletal: Positive for gait problem. Skin: Positive for color change. Neurological: Positive for weakness. Hematological: Negative. Psychiatric/Behavioral: Positive for confusion. Medications:  Reviewed    No current facility-administered medications on file prior to encounter. Current Outpatient Medications on File Prior to Encounter   Medication Sig Dispense Refill    sulfamethoxazole-trimethoprim (BACTRIM DS) 800-160 MG per tablet Take 1 tablet by mouth 2 times daily for 10 days 20 tablet 0   [address] by Does not apply route 1 each 0    Misc.  Devices (COMMODE BEDSIDE) MISC As directed 1 each 0    ELIQUIS 2.5 MG TABS tablet TAKE 1 TABLET BY MOUTH TWICE A  tablet 4    Calcium Carbonate Antacid (TUMS CHEWY DELIGHTS PO) Take by mouth      vitamin D (CHOLECALCIFEROL) 1000 UNIT TABS tablet Take 1,000 Units by mouth daily      docusate sodium (COLACE) 100 MG capsule Take 1 capsule by mouth 2 times daily as needed for Constipation 60 capsule 0    omeprazole (PRILOSEC) 20 MG delayed release capsule TAKE ONE CAPSULE BY MOUTH EVERY DAY 90 capsule 1    brimonidine (ALPHAGAN P) 0.15 % ophthalmic solution USE 1 DROP IN BOTH EYES TWICE DAILY. 11    timolol (TIMOPTIC-XE) 0.5 % ophthalmic gel-forming USE 1 DROP IN BOTH EYES TWICE DAILY. 11    acetaminophen (TYLENOL) 325 MG tablet Take 1 tablet by mouth 3 times daily as needed for Pain (DO NOT EXCEED 4GM IN 24 HOURS) 90 tablet 0    latanoprost (XALATAN) 0.005 % ophthalmic solution Place 1 drop into the left eye nightly      Multiple Vitamin (MULTI VITAMIN DAILY) TABS Take 1 tablet by mouth every morning         Past Medical History:   Diagnosis Date    Acute cystitis with hematuria 8/17/2019    Bowel obstruction (HCC) 9/22/2019    Chronic hyponatremia     Compression fracture [facility] T12 vertebra ([facility]) 2013    kyphoplasty Dr Soraya Gordon    DJD (degenerative joint disease) of hip 3/20/2014    Dr Venancio Calderón DM (diabetes mellitus), type 2 with peripheral vascular complications ([facility])     diet controlled    Gallbladder sludge 2019    GERD (gastroesophageal reflux disease) 8/31/10    Glaucoma     bilateral    H/O vascular surgery 5/9/2019    Josie NAQVI 2006    Hearing loss     History of total hip replacement, right 05/09/2019    Dr Orlando Nurse    Lower leg edema     chronic, R>L    Lumbar stenosis     Macular degeneration     left eye    PAF (paroxysmal atrial fibrillation) ([address] 2019    Pain of left scapula 3/25/2019    PVD (peripheral vascular disease) ([address] 10/28/2006    Dr Abbie Liriano, stents LE    Stress incontinence 10/28/2011       Past Surgical History:   Procedure Laterality Date    APPENDECTOMY      CYST REMOVAL  06/27/2016    DR SLOAN  RT THUMB MUCOUS CYST    HYSTERECTOMY      REFRACTIVE SURGERY  062001    left     TOTAL HIP ARTHROPLASTY Right 9/25/15    DR. NARANJO    UPPER GASTROINTESTINAL ENDOSCOPY  10/07/15    DR Charley Crisostomo    VERTEBROPLASTY  2013    T12,  Claude        Family History   Problem Relation Age of Onset    Heart Failure Mother         dec age 77    Hypertension Mother     Diabetes Father         dec age 67        Social History     Socioeconomic History    Marital status: Single     Spouse name: Not on file    Number of children: 0    Years of education: Not on file    Highest education level: Not on file   Occupational History    Occupation: retired [employer]   Social Needs    Financial resource strain: Not on file    Food insecurity:     Worry: Not on file     Inability: Not on file   [employer] needs:     Medical: Not on file     Non-medical: Not on file   Tobacco Use    Smoking status: Former Smoker     Types: Cigarettes     Last attempt to quit: 1980     Years since quittin.6    Smokeless tobacco: Never Used   Substance and Sexual Activity    Alcohol use: No     Alcohol/week: 0.0 standard drinks    Drug use: No    Sexual activity: Not Currently     Partners: Male   Lifestyle    Physical activity:     Days per week: Not on file     Minutes per session: Not on file    Stress: Not on file   Relationships    Social connections:     Talks on phone: Not on file     Gets together: Not on file     Attends Taoist service: Not on file     Active member of club or organization: Not on file     Attends meetings of clubs or organizations: Not on file     Relationship status: Not on file    Intimate partner violence:     Fear of current or ex partner: Not on file     Emotionally abused: Not on file     Physically abused: Not on file     Forced sexual activity: Not on file   Other Topics Concern    Not on file   Social History Narrative    Born in [place] background (both parents born in the [facility])    Father was a , teacher,  and  in [facility]     Never         Retired from [employer] alone in a house, no children    Former  at [employer] and [employer] and treated for UTI with Macrobid and switched to Bactrim DS. Since bactrim treatment was initiated patient reports recent falls and lower extremity weakness. Using wheel chair more often. Family states her confusion has also progressed. Patient and family educated on the effects of antibiotics on the elderly. Will discontinue current treatment as there is only one day left and no evidence of UTI. # Hyponatremia (chronic) likely due to poor fluid intake and Bactrim DS: IVFs, Repeat BMP in AM. PRN Zofran, encourage PO intake. # Gait Instability: Recent falls,  Fall precautions, Up with assist. PT OT. Maximize nutritional status. Consult to Rehabilitation medicine. # Dementia without behavioral disorder: memory deteriorating. reorient PRN. Avoiding BEERS Criteria meds    # A fib: rate controlled. Goal K and Mg 4.0 and 2.0, respectively. On Long term anticoagulation     # HTN: Stable. Continue home meds    # History of T2DM, diet controlled, no medications     I personally spent estimated 45 minutes with this patient today. Additional work up or/and treatment plan may be added today or then after based on clinical progression. I am managing a portion of pt care. Some medical issues are handled by other specialists. Additional work up and treatment should be done in out pt setting by pt PCP and other out pt providers. In addition to examining and evaluating pt, I spent additional time explaining care, normaland abnormal findings, and treatment plan. All of pt questions were answered. Counseling, diet and education were provided. Case will be discussed with nursing staff when appropriate. Family will be updated if and when appropriate.       Electronically signed by JADE Butler CNP on 1/18/2020 at 2:25 PM

## 2020-01-18 NOTE — ED NOTES
This RN at bedside to reassess pt. Pt resting quietly in ED cot with no s/s of distress noted.       Juliana Hinson RN  01/18/20 [id number]

## 2020-01-18 NOTE — CARE COORDINATION
I attempted to call pt's niece Josep Rizo regarding pt's room number, Dr Shyam Reese changing admission status and that Dr Maribel Basurto states pt has a uti as she had requested.

## 2020-01-18 NOTE — CARE COORDINATION
I discussed admission criteria with Dr Fei Johnson and he did give me permission to change admission status to inpatient.

## 2020-01-18 NOTE — ED NOTES
Pt's niece Romeroesteban Pair updated on pt's admission status.       Mahendra Camejo RN  01/18/20 [id number]

## 2020-01-18 NOTE — ED NOTES
Attempt to ambulate pt at this time w/this Ronnie MONTENEGRO, and Dr. Renetta Brunson at bedside. Pt demonstrated significant weakness and was unable to ambulate with x3 assistance. Urine sample collected and sent to lab w/yellow pt labels.      Judy Pump, LAN  01/18/20 [id number]

## 2020-01-18 NOTE — ED PROVIDER NOTES
 Acute cystitis with hematuria 8/17/2019    Bowel obstruction (HCC) 9/22/2019    Chronic hyponatremia     Compression fracture of T12 vertebra [facility]) 2013    kyphoplasty Dr Britney Gonzalez    DJD (degenerative joint disease) of hip 3/20/2014    Dr Treva Centeno DM (diabetes mellitus), type 2 with peripheral vascular complications (HCC)     diet controlled    Gallbladder sludge 2019    GERD (gastroesophageal reflux disease) 8/31/10    Glaucoma     bilateral    H/O vascular surgery 5/9/2019    Josie PEDERSON    Hearing loss     History of total hip replacement, right 05/09/2019    Dr Velvet Bertrand    Lower leg edema     chronic, R>L    Lumbar stenosis     Macular degeneration     left eye    PAF (paroxysmal atrial fibrillation) ([address] 2019    Pain of left scapula 3/25/2019    PVD (peripheral vascular disease) [facility] [address] 10/28/2006    Dr Eric [place], stents LE    Stress incontinence 10/28/2011         SURGICALHISTORY       Past Surgical History:   Procedure Laterality Date    APPENDECTOMY      CYST REMOVAL  06/27/2016    DR SLOAN  RT THUMB MUCOUS CYST    HYSTERECTOMY      REFRACTIVE SURGERY  062001    left     TOTAL HIP ARTHROPLASTY Right 9/25/15    DR. NARANJO    UPPER GASTROINTESTINAL ENDOSCOPY  10/07/15    DR Abhishek Gaston    VERTEBROPLASTY  2013    T12, Dr Janell Jerry       Previous Medications    ACETAMINOPHEN (TYLENOL) 325 MG TABLET    Take 1 tablet by mouth 3 times daily as needed for Pain (DO NOT EXCEED 4GM IN 24 HOURS)    BRIMONIDINE (ALPHAGAN P) 0.15 % OPHTHALMIC SOLUTION    USE 1 DROP IN BOTH EYES TWICE DAILY.     CALCIUM CARBONATE ANTACID (TUMS CHEWY DELIGHTS PO)    Take by mouth    DOCUSATE SODIUM (COLACE) 100 MG CAPSULE    Take 1 capsule by mouth 2 times daily as needed for Constipation    ELIQUIS 2.5 MG TABS TABLET    TAKE 1 TABLET BY MOUTH TWICE A DAY    HOSPITAL BED MISC    by Does not apply route    LATANOPROST (XALATAN) 0.005 % OPHTHALMIC SOLUTION    Place 1 drop into the left eye nightly    MISC. DEVICES (COMMODE BEDSIDE) MISC    As directed    MULTIPLE VITAMIN (MULTI VITAMIN DAILY) TABS    Take 1 tablet by mouth every morning    OMEPRAZOLE (PRILOSEC) 20 MG DELAYED RELEASE CAPSULE    TAKE ONE CAPSULE BY MOUTH EVERY DAY    SULFAMETHOXAZOLE-TRIMETHOPRIM (BACTRIM DS) 800-160 MG PER TABLET    Take 1 tablet by mouth 2 times daily for 10 days    TIMOLOL (TIMOPTIC-XE) 0.5 % OPHTHALMIC GEL-FORMING    USE 1 DROP IN BOTH EYES TWICE DAILY. VITAMIN D (CHOLECALCIFEROL) 1000 UNIT TABS TABLET    Take 1,000 Units by mouth daily       ALLERGIES     Patient has no known allergies.     FAMILY HISTORY       Family History   Problem Relation Age of Onset    Heart Failure Mother         dec age 77    Hypertension Mother     Diabetes Father         dec age 67          SOCIAL HISTORY       Social History     Socioeconomic History    Marital status: Single     Spouse name: None    Number of children: 0    Years of education: None    Highest education level: None   Occupational History    Occupation: [employer]   Social Needs    Financial resource strain: None    Food insecurity:     Worry: None     Inability: None    Transportation needs:     Medical: None     Non-medical: None   Tobacco Use    Smoking status: Former Smoker     Types: Cigarettes     Last attempt to quit: 1980     Years since quittin.6    Smokeless tobacco: Never Used   Substance and Sexual Activity    Alcohol use: No     Alcohol/week: 0.0 standard drinks    Drug use: No    Sexual activity: Not Currently     Partners: Male   Lifestyle    Physical activity:     Days per week: None     Minutes per session: None    Stress: None   Relationships    Social connections:     Talks on phone: None     Gets together: None     Attends Jain service: None     Active member of club or organization: None     Attends meetings of clubs or organizations: None     Relationship status: None    Intimate partner violence:     Fear unable to get off the bed even with 3 person assistance unable to stand on her own arm or move   Skin:     General: Skin is warm and dry. Neurological:      Mental Status: She is alert and oriented to person, place, and time. Cranial Nerves: No cranial nerve deficit. Sensory: No sensory deficit. Motor: Weakness present. No abnormal muscle tone. Coordination: Coordination normal.      Gait: Gait abnormal.      Deep Tendon Reflexes: Reflexes normal.   Psychiatric:         Behavior: Behavior normal.         Thought Content: Thought content normal.         Judgment: Judgment normal.         DIAGNOSTIC RESULTS     EKG: All EKG's are interpreted by the Emergency Department Physician who either signs or Co-signsthis chart in the absence of a cardiologist.    EKG: normal EKG, normal sinus rhythm. Rate 69 no acute ST or T wave    RADIOLOGY:   Non-plain filmimages such as CT, Ultrasound and MRI are read by the radiologist. Plain radiographic images are visualized and preliminarily interpreted by the emergency physician with the below findings:          Interpretation per the Radiologist below, if available at the time ofthis note:    XR CHEST STANDARD (2 VW)   Final Result      NO EVIDENCE OF ACTIVE CARDIOPULMONARY DISEASE. CT Head WO Contrast   Final Result      NO ACUTE INTRACRANIAL PROCESS IDENTIFIED. MILD PREDOMINANTLY RIGHT MAXILLARY SINUSITIS.                   ED BEDSIDE ULTRASOUND:   Performed by ED Physician - none    LABS:  Labs Reviewed   CBC WITH AUTO DIFFERENTIAL - Abnormal; Notable for the following components:       Result Value    RBC 4.06 (*)     Hematocrit 36.8 (*)     Monocytes Absolute 1.1 (*)     All other components within normal limits   COMPREHENSIVE METABOLIC PANEL - Abnormal; Notable for the following components:    Sodium 126 (*)     Glucose 106 (*)     Calcium 10.2 (*)     All other components within normal limits   URINE RT REFLEX TO CULTURE - Abnormal; Notable providing critical care: 30-74 minutes    Patient Progress  Patient progress: stable     IP CONSULT TO PHYSICAL MEDICINE REHAB    PROCEDURES:  Unless otherwise noted below, none     Procedures    FINAL IMPRESSION      1. Altered mental status, unspecified altered mental status type    2. Hyponatremia    3. Unable to ambulate    4. Acute maxillary sinusitis, recurrence not specified    5.  Recurrent UTI          DISPOSITION/PLAN   DISPOSITION Admitted 01/18/2020 02:20:36 PM      PATIENT REFERRED TO:  Jan Danielson MD  [address] [facility], [address]  [address] [place] [address]  [phone]            DISCHARGE MEDICATIONS:  New Prescriptions    No medications on file          (Please note thatportions of this note were completed with a voice recognition program.  Efforts were made to edit the dictations but occasionally words are mis-transcribed.)    Koffi Quiñones MD (electronically signed)  Attending Emergency Physician          Aakash Serrano MD  01/18/20 Mallory Park MD  01/18/20 [id number]

## 2020-01-19 PROBLEM — R53.1 WEAKNESS: Status: RESOLVED | Noted: 2019-05-09 | Resolved: 2020-01-19

## 2020-01-19 LAB
ANION GAP SERPL CALCULATED.3IONS-SCNC: 9 MEQ/L (ref 9–15)
BUN BLDV-MCNC: 14 MG/DL (ref 8–23)
CALCIUM SERPL-MCNC: 9.7 MG/DL (ref 8.5–9.9)
CHLORIDE BLD-SCNC: 100 MEQ/L (ref 95–107)
CO2: 20 MEQ/L (ref 20–31)
CREAT SERPL-MCNC: 0.66 MG/DL (ref 0.5–0.9)
GFR AFRICAN AMERICAN: >60
GFR NON-AFRICAN AMERICAN: >60
GLUCOSE BLD-MCNC: 106 MG/DL (ref 70–99)
GLUCOSE BLD-MCNC: 141 MG/DL (ref 60–115)
GLUCOSE BLD-MCNC: 91 MG/DL (ref 60–115)
GLUCOSE BLD-MCNC: 97 MG/DL (ref 60–115)
GLUCOSE BLD-MCNC: 99 MG/DL (ref 60–115)
HCT VFR BLD CALC: 35 % (ref 37–47)
HEMOGLOBIN: 11.8 G/DL (ref 12–16)
MCH RBC QN AUTO: 31.3 PG (ref 27–31.3)
MCHC RBC AUTO-ENTMCNC: 33.6 % (ref 33–37)
MCV RBC AUTO: 93 FL (ref 82–100)
PDW BLD-RTO: 13.7 % (ref 11.5–14.5)
PERFORMED ON: ABNORMAL
PERFORMED ON: NORMAL
PLATELET # BLD: 196 K/UL (ref 130–400)
POTASSIUM REFLEX MAGNESIUM: 4.8 MEQ/L (ref 3.4–4.9)
RBC # BLD: 3.77 M/UL (ref 4.2–5.4)
SODIUM BLD-SCNC: 129 MEQ/L (ref 135–144)
WBC # BLD: 4.2 K/UL (ref 4.8–10.8)

## 2020-01-19 PROCEDURE — 99222 1ST HOSP IP/OBS MODERATE 55: CPT | Performed by: PHYSICAL MEDICINE & REHABILITATION

## 2020-01-19 PROCEDURE — 80048 BASIC METABOLIC PNL TOTAL CA: CPT

## 2020-01-19 PROCEDURE — 85027 COMPLETE CBC AUTOMATED: CPT

## 2020-01-19 PROCEDURE — 6370000000 HC RX 637 (ALT 250 FOR IP): Performed by: INTERNAL MEDICINE

## 2020-01-19 PROCEDURE — 1210000000 HC MED SURG R&B

## 2020-01-19 PROCEDURE — 2580000003 HC RX 258: Performed by: NURSE PRACTITIONER

## 2020-01-19 PROCEDURE — 6370000000 HC RX 637 (ALT 250 FOR IP): Performed by: NURSE PRACTITIONER

## 2020-01-19 PROCEDURE — 97162 PT EVAL MOD COMPLEX 30 MIN: CPT

## 2020-01-19 PROCEDURE — 36415 COLL VENOUS BLD VENIPUNCTURE: CPT

## 2020-01-19 RX ORDER — AMOXICILLIN AND CLAVULANATE POTASSIUM 875; 125 MG/1; MG/1
1 TABLET, FILM COATED ORAL EVERY 12 HOURS SCHEDULED
Status: DISCONTINUED | OUTPATIENT
Start: 2020-01-19 | End: 2020-01-21

## 2020-01-19 RX ORDER — VITAMIN B COMPLEX
1000 TABLET ORAL DAILY
Status: DISCONTINUED | OUTPATIENT
Start: 2020-01-19 | End: 2020-01-22 | Stop reason: HOSPADM

## 2020-01-19 RX ORDER — DOCUSATE SODIUM 100 MG/1
100 CAPSULE, LIQUID FILLED ORAL 2 TIMES DAILY PRN
Status: DISCONTINUED | OUTPATIENT
Start: 2020-01-19 | End: 2020-01-22 | Stop reason: HOSPADM

## 2020-01-19 RX ADMIN — TIMOLOL MALEATE 1 DROP: 5 SOLUTION OPHTHALMIC at 09:28

## 2020-01-19 RX ADMIN — AMOXICILLIN AND CLAVULANATE POTASSIUM 1 TABLET: 875; 125 TABLET, FILM COATED ORAL at 11:46

## 2020-01-19 RX ADMIN — BRIMONIDINE TARTRATE 1 DROP: 2 SOLUTION OPHTHALMIC at 22:24

## 2020-01-19 RX ADMIN — VITAMIN D, TAB 1000IU (100/BT) 1000 UNITS: 25 TAB at 13:38

## 2020-01-19 RX ADMIN — Medication 10 ML: at 09:28

## 2020-01-19 RX ADMIN — LATANOPROST 1 DROP: 50 SOLUTION OPHTHALMIC at 22:23

## 2020-01-19 RX ADMIN — APIXABAN 2.5 MG: 5 TABLET, FILM COATED ORAL at 22:23

## 2020-01-19 RX ADMIN — GUAIFENESIN 600 MG: 600 TABLET, EXTENDED RELEASE ORAL at 09:23

## 2020-01-19 RX ADMIN — TIMOLOL MALEATE 1 DROP: 5 SOLUTION OPHTHALMIC at 22:24

## 2020-01-19 RX ADMIN — FLUTICASONE PROPIONATE 2 SPRAY: 50 SPRAY, METERED NASAL at 09:24

## 2020-01-19 RX ADMIN — CARBAMIDE PEROXIDE 6.5% 5 DROP: 6.5 LIQUID AURICULAR (OTIC) at 22:24

## 2020-01-19 RX ADMIN — CARBAMIDE PEROXIDE 6.5% 5 DROP: 6.5 LIQUID AURICULAR (OTIC) at 09:27

## 2020-01-19 RX ADMIN — AMOXICILLIN AND CLAVULANATE POTASSIUM 1 TABLET: 875; 125 TABLET, FILM COATED ORAL at 22:23

## 2020-01-19 RX ADMIN — APIXABAN 2.5 MG: 5 TABLET, FILM COATED ORAL at 09:23

## 2020-01-19 RX ADMIN — GUAIFENESIN 600 MG: 600 TABLET, EXTENDED RELEASE ORAL at 22:23

## 2020-01-19 SDOH — SOCIAL STABILITY: SOCIAL NETWORK: ARE YOU MARRIED, WIDOWED, DIVORCED, SEPARATED, NEVER MARRIED, OR LIVING WITH A PARTNER?: NEVER MARRIED

## 2020-01-19 SDOH — ECONOMIC STABILITY: TRANSPORTATION INSECURITY
IN THE PAST 12 MONTHS, HAS THE LACK OF TRANSPORTATION KEPT YOU FROM MEDICAL APPOINTMENTS OR FROM GETTING MEDICATIONS?: YES

## 2020-01-19 SDOH — HEALTH STABILITY: MENTAL HEALTH
STRESS IS WHEN SOMEONE FEELS TENSE, NERVOUS, ANXIOUS, OR CAN'T SLEEP AT NIGHT BECAUSE THEIR MIND IS TROUBLED. HOW STRESSED ARE YOU?: ONLY A LITTLE

## 2020-01-19 SDOH — ECONOMIC STABILITY: FOOD INSECURITY: WITHIN THE PAST 12 MONTHS, THE FOOD YOU BOUGHT JUST DIDN'T LAST AND YOU DIDN'T HAVE MONEY TO GET MORE.: NEVER TRUE

## 2020-01-19 SDOH — SOCIAL STABILITY: SOCIAL INSECURITY: WITHIN THE LAST YEAR, HAVE YOU BEEN HUMILIATED OR EMOTIONALLY ABUSED IN OTHER WAYS BY YOUR PARTNER OR EX-PARTNER?: NO

## 2020-01-19 SDOH — HEALTH STABILITY: PHYSICAL HEALTH: ON AVERAGE, HOW MANY DAYS PER WEEK DO YOU ENGAGE IN MODERATE TO STRENUOUS EXERCISE (LIKE A BRISK WALK)?: 0 DAYS

## 2020-01-19 SDOH — ECONOMIC STABILITY: TRANSPORTATION INSECURITY
IN THE PAST 12 MONTHS, HAS LACK OF TRANSPORTATION KEPT YOU FROM MEETINGS, WORK, OR FROM GETTING THINGS NEEDED FOR DAILY LIVING?: YES

## 2020-01-19 SDOH — SOCIAL STABILITY: SOCIAL INSECURITY
WITHIN THE LAST YEAR, HAVE YOU BEEN KICKED, HIT, SLAPPED, OR OTHERWISE PHYSICALLY HURT BY YOUR PARTNER OR EX-PARTNER?: NO

## 2020-01-19 SDOH — HEALTH STABILITY: PHYSICAL HEALTH: ON AVERAGE, HOW MANY MINUTES DO YOU ENGAGE IN EXERCISE AT THIS LEVEL?: 0 MIN

## 2020-01-19 SDOH — SOCIAL STABILITY: SOCIAL INSECURITY
WITHIN THE LAST YEAR, HAVE TO BEEN RAPED OR FORCED TO HAVE ANY KIND OF SEXUAL ACTIVITY BY YOUR PARTNER OR EX-PARTNER?: NO

## 2020-01-19 SDOH — SOCIAL STABILITY: SOCIAL NETWORK: HOW OFTEN DO YOU GET TOGETHER WITH FRIENDS OR RELATIVES?: MORE THAN THREE TIMES A WEEK

## 2020-01-19 SDOH — SOCIAL STABILITY: SOCIAL NETWORK: HOW OFTEN DO YOU ATTEND CHURCH OR RELIGIOUS SERVICES?: MORE THAN 4 TIMES PER YEAR

## 2020-01-19 SDOH — SOCIAL STABILITY: SOCIAL NETWORK
IN A TYPICAL WEEK, HOW MANY TIMES DO YOU TALK ON THE PHONE WITH FAMILY, FRIENDS, OR NEIGHBORS?: MORE THAN THREE TIMES A WEEK

## 2020-01-19 SDOH — SOCIAL STABILITY: SOCIAL NETWORK: HOW OFTEN DO YOU ATTENT MEETINGS OF THE CLUB OR ORGANIZATION YOU BELONG TO?: MORE THAN 4 TIMES PER YEAR

## 2020-01-19 SDOH — ECONOMIC STABILITY: INCOME INSECURITY: HOW HARD IS IT FOR YOU TO PAY FOR THE VERY BASICS LIKE FOOD, HOUSING, MEDICAL CARE, AND HEATING?: NOT HARD AT ALL

## 2020-01-19 SDOH — SOCIAL STABILITY: SOCIAL INSECURITY: WITHIN THE LAST YEAR, HAVE YOU BEEN AFRAID OF YOUR PARTNER OR EX-PARTNER?: NO

## 2020-01-19 SDOH — ECONOMIC STABILITY: FOOD INSECURITY: WITHIN THE PAST 12 MONTHS, YOU WORRIED THAT YOUR FOOD WOULD RUN OUT BEFORE YOU GOT MONEY TO BUY MORE.: NEVER TRUE

## 2020-01-19 SDOH — SOCIAL STABILITY: SOCIAL NETWORK
DO YOU BELONG TO ANY CLUBS OR ORGANIZATIONS SUCH AS CHURCH GROUPS UNIONS, FRATERNAL OR ATHLETIC GROUPS, OR SCHOOL GROUPS?: YES

## 2020-01-19 ASSESSMENT — PAIN SCALES - GENERAL: PAINLEVEL_OUTOF10: 0

## 2020-01-19 NOTE — PROGRESS NOTES
Hospitalist Progress Note      PCP: Chayo Davis MD    Date of Admission: 1/18/2020    Chief Complaint:    Chief Complaint   Patient presents with    Altered Mental Status     Pt's family wanted pt sent to ED d/t confusion - pt A/Ox4 on arrival to ED    Extremity Weakness     inability to stand up since yesterday     Subjective:  Patient denies fevers, chills, sweats, CP, SOB; still has a cough and nasal congestion. 12 point ROS negative other than mentioned above     Medications:  Reviewed    Infusion Medications    sodium chloride 100 mL/hr at 01/18/20 1649     Scheduled Medications    amoxicillin-clavulanate  1 tablet Oral 2 times per day    sodium chloride flush  10 mL Intravenous 2 times per day    brimonidine  1 drop Both Eyes BID    apixaban  2.5 mg Oral BID    latanoprost  1 drop Left Eye Nightly    timolol  1 drop Both Eyes BID    carbamide peroxide  5 drop Right Ear BID    guaiFENesin  600 mg Oral BID    fluticasone  2 spray Each Nostril Daily     PRN Meds: sodium chloride flush, magnesium hydroxide, ondansetron, acetaminophen    No intake or output data in the 24 hours ending 01/19/20 1120    Exam:    BP (!) 151/98   Pulse 59   Temp 97.5 °F (36.4 °C) (Oral)   Resp 20   Ht 5' 1.5\" (1.562 m)   Wt 140 lb (63.5 kg)   LMP  (LMP Unknown)   SpO2 100%   BMI 26.02 kg/m²     General appearance: No apparent distress, appears stated age and cooperative. HEENT: . Conjunctivae/corneas clear. Neck:  Trachea midline. Respiratory:  Normal respiratory effort. Clear to auscultation  Cardiovascular: Regular rate and rhythm   Abdomen: Soft, non-tender, non-distended with normal bowel sounds. Musculoskeletal: No clubbing, cyanosis or edema bilaterally.   Neuro: Non Focal.   Capillary Refill: Brisk,< 3 seconds   Peripheral Pulses: +2 palpable, equal bilaterally     Labs:   Recent Labs     01/18/20  1100 01/19/20  0514   WBC 6.8 4.2*   HGB 12.6 11.8*   HCT 36.8* 35.0*    196

## 2020-01-19 NOTE — FLOWSHEET NOTE
Kash kumar had concerns regarding eliquis and requested to speak with dr. Alison Tinajero. Dr. Alison Tinajero made aware and request a family meeting to be scheduled tomorrow at noon or at families convenience, SW/ made aware.

## 2020-01-19 NOTE — CONSULTS
Physical Medicine & Rehabilitation  Consult Note      Admitting Physician: Briana Thomas MD    Primary Care Provider: Melvin Oreilly MD     Reason for Consult:  Asses rehab needs,promote physical and mental function, and decrease likelihood of re-admit to the hospital after discharge. History of Present Illness:    Miguel Champion is a 80 y.o. female admitted to [facility] on 1/18/2020. Patient was admitted through the emergency room with increased weakness and falls over the past 2 weeks. She was found to have significant hyponatremia. She is 101 seem to be 102. Otherwise she does well at home with a caregiver. She lives independently in an apartment with assistance. Altered Mental Status   This is a recurrent problem. The current episode started in the past 7 days. The problem occurs constantly. The problem has been gradually improving. Associated symptoms include arthralgias, fatigue, myalgias and weakness. The symptoms are aggravated by walking. She has tried rest, relaxation and walking for the symptoms. The treatment provided mild relief. Extremity Weakness   This is a recurrent problem. The current episode started in the past 7 days. The problem occurs constantly. The problem has been gradually improving. Associated symptoms include arthralgias, fatigue, myalgias and weakness. The symptoms are aggravated by walking. She has tried walking, rest and heat for the symptoms. The treatment provided mild relief. Hip Pain    The incident occurred 3 to 5 days ago. The incident occurred at home. There was no injury mechanism. The pain is present in the right thigh and right hip. The pain is at a severity of 8/10. The pain is severe. The pain has been fluctuating since onset. She reports no foreign bodies present. The symptoms are aggravated by movement, palpation and weight bearing. She has tried acetaminophen for the symptoms.  The treatment provided mild deficits and return to previous function with decreased risk for falls. Occupational therapy: FIMS:   ,  ,             LTG:                 Speech therapy: FIMS:          Past Medical History:          Diagnosis Date    Acute cystitis with hematuria 8/17/2019    Bowel obstruction ([address] 9/22/2019    Chronic hyponatremia     Compression fracture of T12 vertebra (HCC) 2013    kyphoplasty Dr Pratima Correia DJD (degenerative joint disease) of hip 3/20/2014    Dr Treva Centeno DM (diabetes mellitus), type 2 with peripheral vascular complications (HCC)     diet controlled    Gallbladder sludge 2019    GERD (gastroesophageal reflux disease) 8/31/10    Glaucoma     bilateral    H/O vascular surgery 5/9/2019    Stens LE 2006    Hearing loss     History of total hip replacement, right 05/09/2019    Dr Velvet Bertrand    Lower leg edema     chronic, R>L    Lumbar stenosis     Macular degeneration     left eye    PAF (paroxysmal atrial fibrillation) ([address] 2019    Pain of left scapula 3/25/2019    PVD (peripheral vascular disease) [address] 10/28/2006    Dr Eric Bryan, stents LE    Stress incontinence 10/28/2011         PastSurgical History:          Procedure Laterality Date    APPENDECTOMY      CYST REMOVAL  06/27/2016    DR SLOAN  RT THUMB MUCOUS CYST    HYSTERECTOMY      REFRACTIVE SURGERY  062001    left     TOTAL HIP ARTHROPLASTY Right 9/25/15    DR. NARANJO    UPPER GASTROINTESTINAL ENDOSCOPY  10/07/15    DR Abhishek Gaston    VERTEBROPLASTY  2013    T12, Dr Britney Gonzalez         Allergies:   No Known Allergies     CurrentMedications:     Current Facility-Administered Medications: amoxicillin-clavulanate (AUGMENTIN) 875-125 MG per tablet 1 tablet, 1 tablet, Oral, 2 times per day  sodium chloride flush 0.9 % injection 10 mL, 10 mL, Intravenous, 2 times per day  sodium chloride flush 0.9 % injection 10 mL, 10 mL, Intravenous, PRN  magnesium hydroxide (MILK OF MAGNESIA) 400 MG/5ML suspension 30 mL, 30 mL, Oral, Daily times per year     Relationship status: Never     Intimate partner violence:     Fear of current or ex partner: No     Emotionally abused: No     Physically abused: No     Forced sexual activity: No   Other Topics Concern    Not on file   Social History Narrative    Born in [place], [facility] background (both parents born in the [facility])    Father was a [place] [employer] , teacher,  and  in [facility]     She never         Retired from [employer] alone in a house, no children    Former  at [address] for [address] [employer] for many years     [facility] caregiver          Lives [id number] [address] in [place] she states she would like [facility] if SNF appropriate    However has always done well at [facility]          Family History:         Problem Relation Age of Onset    Heart Failure Mother         dec age 77    Hypertension Mother     Diabetes Father         dec age 67       Review of Systems:    Review of Systems   Constitutional: Positive for fatigue. Musculoskeletal: Positive for arthralgias and myalgias. Neurological: Positive for weakness. Physical Exam:    BP (!) 151/98   Pulse 59   Temp 97.5 °F (36.4 °C) (Oral)   Resp 20   Ht 5' 1.5\" (1.562 m)   Wt 140 lb (63.5 kg)   LMP  (LMP Unknown)   SpO2 100%   BMI 26.02 kg/m²      Physical Exam  Constitutional:       General: She is not in acute distress. Appearance: She is well-developed. She is not ill-appearing, toxic-appearing or diaphoretic. HENT:      Head: Normocephalic. Not macrocephalic and not microcephalic. No raccoon eyes or Zimmer's sign. Right Ear: Decreased hearing noted. Left Ear: Decreased hearing noted. Mouth/Throat:      Pharynx: Oropharyngeal exudate present. Eyes:      General: No scleral icterus. Right eye: No discharge. Left eye: No discharge. Mood and Affect: Mood is not anxious or depressed. Affect is not angry. Speech: Speech is delayed. Speech is not rapid and pressured. Behavior: Behavior is slowed. Behavior is not withdrawn. Thought Content: Thought content normal.         Cognition and Memory: Memory is not impaired. She exhibits impaired recent memory. She does not exhibit impaired remote memory. Judgment: Judgment is not impulsive or inappropriate. Ortho Exam  Neurologic Exam     Mental Status   Oriented to person, place, and time. Attention: decreased. Concentration: decreased. Level of consciousness: alert  Knowledge: good and inconsistent with education. Able to name object. Unable to read. Able to repeat. Unable to write. Abnormal comprehension. Cranial Nerves     CN III, IV, VI   Pupils are equal, round, and reactive to light.     Motor Exam   Muscle bulk: decreased  Overall muscle tone: normal    Sensory Exam   Right arm light touch: decreased from fingers  Left arm light touch: decreased from fingers  Right leg light touch: decreased from knee  Left leg light touch: decreased from knee    Gait, Coordination, and Reflexes     Reflexes   Right brachioradialis: 1+  Left brachioradialis: 1+  Right biceps: 1+  Left biceps: 1+  Right triceps: 1+  Left triceps: 1+  Right patellar: 1+  Right achilles: 0  Left achilles: 0            Diagnostics:    Recent Results (from the past 24 hour(s))   Urine Reflex to Culture    Collection Time: 01/18/20 12:00 PM   Result Value Ref Range    Color, UA Yellow Straw/Yellow    Clarity, UA Clear Clear    Glucose, Ur Negative Negative mg/dL    Bilirubin Urine Negative Negative    Ketones, Urine Negative Negative mg/dL    Specific Gravity, UA 1.014 1.005 - 1.030    Blood, Urine Negative Negative    pH, UA 6.5 5.0 - 9.0    Protein, UA Negative Negative mg/dL    Urobilinogen, Urine 1.0 <2.0 E.U./dL    Nitrite, Urine Negative Negative    Leukocyte Esterase, Urine LARGE (A) Negative    Urine Reflex to Culture YES    PROCALCITONIN    Collection Time: 01/18/20 12:00 PM   Result Value Ref Range    Procalcitonin 0.11 0.00 - 0.15 ng/mL   CK    Collection Time: 01/18/20 12:00 PM   Result Value Ref Range    Total CK 64 0 - 170 U/L   Urine Culture    Collection Time: 01/18/20 12:00 PM   Result Value Ref Range    Urine Culture, Routine       Growth present in less than 12 hours. Unable to identify  organisms at present time, culture reincubated. Further  results to follow in 24 hours.      Microscopic Urinalysis    Collection Time: 01/18/20 12:00 PM   Result Value Ref Range    Bacteria, UA RARE (A) /HPF    Hyaline Casts, UA 1-3 0 - 5 /HPF    WBC, UA >100 (H) 0 - 5 /HPF    RBC, UA 3-5 (A) 0 - 5 /HPF    Epi Cells 0-2 0 - 5 /HPF   Rapid Influenza A/B Antigens    Collection Time: 01/18/20 12:28 PM   Result Value Ref Range    Influenza A by PCR Negative     Influenza B by PCR Negative    EKG 12 Lead - Chest Pain    Collection Time: 01/18/20  1:46 PM   Result Value Ref Range    Ventricular Rate 69 BPM    Atrial Rate 69 BPM    P-R Interval 142 ms    QRS Duration 96 ms    Q-T Interval 420 ms    QTc Calculation (Bazett) 450 ms    P Axis -4 degrees    R Axis -14 degrees    T Axis 34 degrees   Basic Metabolic Panel w/ Reflex to MG    Collection Time: 01/19/20  5:14 AM   Result Value Ref Range    Sodium 129 (L) 135 - 144 mEq/L    Potassium reflex Magnesium 4.8 3.4 - 4.9 mEq/L    Chloride 100 95 - 107 mEq/L    CO2 20 20 - 31 mEq/L    Anion Gap 9 9 - 15 mEq/L    Glucose 106 (H) 70 - 99 mg/dL    BUN 14 8 - 23 mg/dL    CREATININE 0.66 0.50 - 0.90 mg/dL    GFR Non-African American >60.0 >60    GFR  >60.0 >60    Calcium 9.7 8.5 - 9.9 mg/dL   CBC    Collection Time: 01/19/20  5:14 AM   Result Value Ref Range    WBC 4.2 (L) 4.8 - 10.8 K/uL    RBC 3.77 (L) 4.20 - 5.40 M/uL    Hemoglobin 11.8 (L) 12.0 - 16.0 g/dL    Hematocrit 35.0 (L) 37.0 - 47.0 %    MCV 93.0 82.0 - 100.0 fL    MCH 31.3 27.0 -

## 2020-01-19 NOTE — CARE COORDINATION
No beds at [facility] at this time so LSW called the Niece to inform her of this and ask for a second choice. The family would prefer [facility] if accepted. Second choice would be [facility]. [facility] to follow for DC.

## 2020-01-19 NOTE — PROGRESS NOTES
Physical Therapy Med Surg Initial Assessment  Facility/Department: [facility]  [facility]       NAME: Jigar Lama  : 1918 (80 y.o.)  MRN: [id number]  CODE STATUS: Full Code    Date of Service: 2020    Patient Diagnosis(es): Generalized weakness [R53.1]  Generalized weakness [R53.1]   Chief Complaint   Patient presents with    Altered Mental Status     Pt's family wanted pt sent to ED d/t confusion - pt A/Ox4 on arrival to ED    Extremity Weakness     inability to stand up since yesterday     Patient Active Problem List    Diagnosis Date Noted    Generalized weakness 2020    Age-related physical debility 2019    Post-nasal drip 2019    Other constipation 10/03/2019    Atrial fibrillation ([address] 10/03/2019    Age-related osteoporosis without current pathological fracture 2019    Leg edema, right 2019    Abnormality of gait and mobility due to severe spinal stenosis.  2019    Late onset Alzheimer's disease without behavioral disturbance ([address] 2019    Ambulatory dysfunction 2019    Glaucoma 2019    DJD (degenerative joint disease) 2019    S/P kyphoplasty 2019    Gastroesophageal reflux disease without esophagitis 04/15/2019    Lumbar stenosis     DM (diabetes mellitus), type 2 with peripheral vascular complications ([address]     Nuclear senile cataract 2017    Nonexudative age-related macular degeneration 2017    Primary open angle glaucoma 2017    Impaired mobility and activities of daily living 2014    Acute leg pain, left 2014    Acute right hip pain 2014    Spinal stenosis of lumbar region 2012    Stress incontinence 10/28/2011    Hearing loss 10/28/2011    PVD (peripheral vascular disease) ([address] 10/28/2006        Past Medical History:   Diagnosis Date    Acute cystitis with hematuria 2019    Bowel obstruction (HCC) 2019    Chronic hyponatremia     Compression fracture of T12 vertebra [facility]) 2013    kyphoplasty Dr Britney Gonzalez    DJD (degenerative joint disease) of hip 3/20/2014    Dr Treva Centneo DM (diabetes mellitus), type 2 with peripheral vascular complications (HCC)     diet controlled    Gallbladder sludge 2019    GERD (gastroesophageal reflux disease) 8/31/10    Glaucoma     bilateral    H/O vascular surgery 5/9/2019    Stens LE 2006    Hearing loss     History of total hip replacement, right 05/09/2019    Dr Velvet Bertrand    Lower leg edema     chronic, R>L    Lumbar stenosis     Macular degeneration     left eye    PAF (paroxysmal atrial fibrillation) ([facility] 2019    Pain of left scapula 3/25/2019    PVD (peripheral vascular disease) ([address] 10/28/2006    Dr Eric Bryan, stents LE    Stress incontinence 10/28/2011     Past Surgical History:   Procedure Laterality Date    APPENDECTOMY      CYST REMOVAL  06/27/2016    DR SLOAN  RT THUMB MUCOUS CYST    HYSTERECTOMY      REFRACTIVE SURGERY  062001    left     TOTAL HIP ARTHROPLASTY Right 9/25/15    DR. NARANJO    UPPER GASTROINTESTINAL ENDOSCOPY  10/07/15    DR Weiss Alek    VERTEBROPLASTY  2013    T12, Dr Britney Gonzalez       Chart Reviewed: Yes  Additional Pertinent Hx: PVD, PAF, compression fx, lumbar stenosis, DM, DJD, Rt LILLIAN  Family / Caregiver Present: Yes(friend)    Restrictions:  Restrictions/Precautions: Fall Risk     SUBJECTIVE: Subjective: Pt admitted with increasing diffculty with mobility, falls, and LE weakness. Pt was on antibiotics due to UTI, which may have been exacerbating symptoms.  Pt reports she is feeling much better today and agreeable to PT eval.     Pain  Pre Treatment Pain Screening  Pain at present: 0    Post Treatment Pain Screening:   Pain Screening  Patient Currently in Pain: No  Pain Assessment  Pain Assessment: 0-10  Pain Level: 0    Prior Level of Function:  Social/Functional History  Lives With: Alone(has 24 hour care)  Type of Home: Apartment  Home Layout: One level  Home Access: Level entry  ADL Assistance: Needs assistance  Homemaking Assistance: Needs assistance  Ambulation Assistance: Independent(normally independent with ww )  Transfer Assistance: Independent(normally independent with occ assist to stabilize ww )  Active : No  Additional Comments: Pt with increased assistance required and recent fall. OBJECTIVE:   Vision: Impaired(macular degeneration )  Vision Exceptions: Wears glasses at all times  Hearing: Exceptions to [facility]  Hearing Exceptions: Hard of hearing/hearing concerns    Cognition:  Overall Orientation Status: Within Functional Limits    Observation/Palpation  Posture: Fair(kyphosis )  Observation: resting comfortably in bed, no signs of distress or discomfort     ROM:  RLE AROM: WFL  LLE AROM : WFL  RUE AROM : WFL  LUE AROM : WFL    Strength:  Strength RLE  Strength RLE: WFL  Comment: age appropriate, able to hold against resistance   Strength LLE  Strength LLE: WFL  Comment: age appropriate, able to hold against resistance   Strength RUE  Strength RUE: WFL  Comment: age appropriate, able to hold against resistance   Strength LUE  Strength LUE: WFL  Comment: age appropriate, able to hold against resistance     Neuro:  Balance  Sitting - Static: Fair  Sitting - Dynamic: Fair  Standing - Static: Fair  Standing - Dynamic: Fair        Sensation  Overall Sensation Status: WFL    Bed mobility  Supine to Sit: Moderate assistance    Transfers  Sit to Stand: Minimal Assistance  Stand to sit: Minimal Assistance    Ambulation  Ambulation?: Yes  Ambulation 1  Surface: level tile  Device: Rolling Walker  Assistance: Contact guard assistance  Gait Deviations: Slow Noemi; Increased KOTA; Decreased step length;Decreased step height;Deviated path  Comments: VCs to decrease speed and stay within KOTA of ww, generalized decreased safety     Stairs/Curb  Stairs?: No         Activity Tolerance  Activity Tolerance: Patient Tolerated treatment well          PT

## 2020-01-19 NOTE — CARE COORDINATION
RN stating family needing to talk to CM d/t Dr. Alison Tinajero requesting family meeting about a medication. RN states Dr. Alison Tinajero requested a meeting at noon. Dtr Amauri Jeong states she is unable to meet at noon tomorrow d/t watching her 4 grandchildren. Further discussed with dtr Isamar and reviewed prior admission. Dtr just wanted clarification on when and why patient placed on Eliquis. Noted in Dr. Srinivasan Solid note from 10/2/19 that patient would be placed on Eliquis d/t new onset A-fib. Dtr understands use of medication. No further questions. Per dtr, if Rehab not appropriate for patient and no beds @ [facility] is 3rd choice.

## 2020-01-20 LAB
ANION GAP SERPL CALCULATED.3IONS-SCNC: 10 MEQ/L (ref 9–15)
BASOPHILS ABSOLUTE: 0 K/UL (ref 0–0.2)
BASOPHILS RELATIVE PERCENT: 1 %
BUN BLDV-MCNC: 18 MG/DL (ref 8–23)
CALCIUM SERPL-MCNC: 9.6 MG/DL (ref 8.5–9.9)
CHLORIDE BLD-SCNC: 98 MEQ/L (ref 95–107)
CO2: 20 MEQ/L (ref 20–31)
CREAT SERPL-MCNC: 0.53 MG/DL (ref 0.5–0.9)
EOSINOPHILS ABSOLUTE: 0.2 K/UL (ref 0–0.7)
EOSINOPHILS RELATIVE PERCENT: 4.7 %
GFR AFRICAN AMERICAN: >60
GFR NON-AFRICAN AMERICAN: >60
GLUCOSE BLD-MCNC: 148 MG/DL (ref 70–99)
HCT VFR BLD CALC: 37.1 % (ref 37–47)
HEMOGLOBIN: 12.5 G/DL (ref 12–16)
LYMPHOCYTES ABSOLUTE: 1.3 K/UL (ref 1–4.8)
LYMPHOCYTES RELATIVE PERCENT: 26 %
MCH RBC QN AUTO: 31.6 PG (ref 27–31.3)
MCHC RBC AUTO-ENTMCNC: 33.6 % (ref 33–37)
MCV RBC AUTO: 94.2 FL (ref 82–100)
MONOCYTES ABSOLUTE: 0.7 K/UL (ref 0.2–0.8)
MONOCYTES RELATIVE PERCENT: 13.5 %
NEUTROPHILS ABSOLUTE: 2.8 K/UL (ref 1.4–6.5)
NEUTROPHILS RELATIVE PERCENT: 54.8 %
PDW BLD-RTO: 14.1 % (ref 11.5–14.5)
PLATELET # BLD: 194 K/UL (ref 130–400)
POTASSIUM REFLEX MAGNESIUM: 4.4 MEQ/L (ref 3.4–4.9)
RBC # BLD: 3.94 M/UL (ref 4.2–5.4)
SODIUM BLD-SCNC: 128 MEQ/L (ref 135–144)
WBC # BLD: 5 K/UL (ref 4.8–10.8)

## 2020-01-20 PROCEDURE — 97166 OT EVAL MOD COMPLEX 45 MIN: CPT

## 2020-01-20 PROCEDURE — 6370000000 HC RX 637 (ALT 250 FOR IP): Performed by: NURSE PRACTITIONER

## 2020-01-20 PROCEDURE — 93010 ELECTROCARDIOGRAM REPORT: CPT | Performed by: INTERNAL MEDICINE

## 2020-01-20 PROCEDURE — 80048 BASIC METABOLIC PNL TOTAL CA: CPT

## 2020-01-20 PROCEDURE — 99231 SBSQ HOSP IP/OBS SF/LOW 25: CPT | Performed by: PHYSICAL MEDICINE & REHABILITATION

## 2020-01-20 PROCEDURE — 85025 COMPLETE CBC W/AUTO DIFF WBC: CPT

## 2020-01-20 PROCEDURE — 1210000000 HC MED SURG R&B

## 2020-01-20 PROCEDURE — 6370000000 HC RX 637 (ALT 250 FOR IP): Performed by: INTERNAL MEDICINE

## 2020-01-20 PROCEDURE — 36415 COLL VENOUS BLD VENIPUNCTURE: CPT

## 2020-01-20 PROCEDURE — 2580000003 HC RX 258: Performed by: NURSE PRACTITIONER

## 2020-01-20 RX ADMIN — APIXABAN 2.5 MG: 5 TABLET, FILM COATED ORAL at 09:03

## 2020-01-20 RX ADMIN — CARBAMIDE PEROXIDE 6.5% 5 DROP: 6.5 LIQUID AURICULAR (OTIC) at 20:54

## 2020-01-20 RX ADMIN — CARBAMIDE PEROXIDE 6.5% 5 DROP: 6.5 LIQUID AURICULAR (OTIC) at 09:08

## 2020-01-20 RX ADMIN — VITAMIN D, TAB 1000IU (100/BT) 1000 UNITS: 25 TAB at 09:03

## 2020-01-20 RX ADMIN — SODIUM CHLORIDE: 9 INJECTION, SOLUTION INTRAVENOUS at 02:21

## 2020-01-20 RX ADMIN — Medication 10 ML: at 20:55

## 2020-01-20 RX ADMIN — BRIMONIDINE TARTRATE 1 DROP: 2 SOLUTION OPHTHALMIC at 09:07

## 2020-01-20 RX ADMIN — TIMOLOL MALEATE 1 DROP: 5 SOLUTION OPHTHALMIC at 20:55

## 2020-01-20 RX ADMIN — Medication 10 ML: at 09:03

## 2020-01-20 RX ADMIN — BRIMONIDINE TARTRATE 1 DROP: 2 SOLUTION OPHTHALMIC at 20:55

## 2020-01-20 RX ADMIN — AMOXICILLIN AND CLAVULANATE POTASSIUM 1 TABLET: 875; 125 TABLET, FILM COATED ORAL at 20:54

## 2020-01-20 RX ADMIN — GUAIFENESIN 600 MG: 600 TABLET, EXTENDED RELEASE ORAL at 09:03

## 2020-01-20 RX ADMIN — LATANOPROST 1 DROP: 50 SOLUTION OPHTHALMIC at 20:55

## 2020-01-20 RX ADMIN — GUAIFENESIN 600 MG: 600 TABLET, EXTENDED RELEASE ORAL at 20:54

## 2020-01-20 RX ADMIN — FLUTICASONE PROPIONATE 2 SPRAY: 50 SPRAY, METERED NASAL at 09:05

## 2020-01-20 RX ADMIN — TIMOLOL MALEATE 1 DROP: 5 SOLUTION OPHTHALMIC at 09:06

## 2020-01-20 RX ADMIN — APIXABAN 2.5 MG: 5 TABLET, FILM COATED ORAL at 20:54

## 2020-01-20 RX ADMIN — AMOXICILLIN AND CLAVULANATE POTASSIUM 1 TABLET: 875; 125 TABLET, FILM COATED ORAL at 09:03

## 2020-01-20 ASSESSMENT — PAIN SCALES - GENERAL
PAINLEVEL_OUTOF10: 0
PAINLEVEL_OUTOF10: 0

## 2020-01-20 NOTE — PLAN OF CARE
See OT evaluation for all goals and OT POC.  Electronically signed by Gurinder Sellers OTR/L on 1/20/2020 at 11:30 AM

## 2020-01-20 NOTE — PROGRESS NOTES
Physical Therapy Missed Treatment   Facility/Department: [facility] MED SURG [id number]    NAME: Rasheed Velasquez  Patient Status:   : 1918 (80 y.o.)  MRN: [id number]  Account: [de-identified]  Gender: female        [x] Patient Declines PT Treatment            [] Patient Unavailable:     Pt refused any PT tx this afternoon. Said she was too tired. Asked for more water and crackers. Pt states she will try PT tomorrow. Will attempt PT Treatment again at earliest convenience.         Electronically signed by Rachele Contreras PTA on 20 at 3:28 PM

## 2020-01-20 NOTE — PROGRESS NOTES
Subjective: The patient complains of moderate to severe  acute on chronic recurrent fatigue partially relieved by hydration, rest, antibiotics she is currently on Augmentin and exacerbated by recurrent UTIs and bronchitis. I am concerned about patients immunosuppression on Flonase nasal spray I would rather her be on Afrin at this point she is to frail to be immunosuppressed and the Afrin nasal spray has limited side effects and the rebound nasal congestion is really not something that is well-documented or an issue with her however immunosuppression is. ROS x10: The patient also complains of severely impaired mobility and activities of daily living. Otherwise no new problems with vision, hearing, nose, mouth, throat, dermal, cardiovascular, GI, , pulmonary, musculoskeletal, psychiatric or neurological. See Rehab H&P on Rehab chart dated . Vital signs:  BP (!) 157/56   Pulse 65   Temp 97.3 °F (36.3 °C)   Resp 20   Ht 5' 1.5\" (1.562 m)   Wt 140 lb (63.5 kg)   LMP  (LMP Unknown)   SpO2 97%   BMI 26.02 kg/m²   I/O:   PO/Intake:    fair PO intake, no problems observed or reported. Bowel/Bladder:  continent, no problems noted. General:  Patient is well developed, adequately nourished, non-obese and     well kempt. HEENT:    Very poor vision very poor hearing PERRLA, hearing intact to loud voice, external inspection of ear     and nose benign. Inspection of lips, tongue and gums benign  Musculoskeletal: No significant change in strength or tone. All joints stable. Inspection and palpation of digits and nails show no clubbing,       cyanosis or inflammatory conditions. Neuro/Psychiatric: Affect: flat but pleasant. Alert and oriented to person, place and     situation. No significant change in deep tendon reflexes or     sensation  Lungs:  Diminished, CTA-B. Respiration effort is normal at rest.     Heart:   S1 = S2,  RRR. No loud murmurs.     Abdomen:  Soft, non-tender, no enlargement of liver or spleen. Extremities:  No significant lower extremity edema or tenderness. Skin:    BUE bruises dt blood draws, no visualized or palpated problems. Rehabilitation:  Physical therapy: FIMS:  Bed Mobility:      Transfers: Sit to Stand: Minimal Assistance  Stand to sit: Minimal Assistance, Ambulation 1  Surface: level tile  Device: Rolling Walker  Assistance: Contact guard assistance  Gait Deviations: Slow Noemi, Increased KOTA, Decreased step length, Decreased step height, Deviated path  Comments: VCs to decrease speed and stay within KOTA of ww, generalized decreased safety ,      FIMS:  ,  , Assessment: Pt presents with decreased functional mobility and imbalance affecting independence at home. pt would benefit from skilled PT to address deficits and return to previous function with decreased risk for falls. Occupational therapy: FIMS:   ,  ,      Speech therapy: FIMS:        Lab/X-ray studies reviewed, analyzed and discussed with patient and staff:   Recent Results (from the past 24 hour(s))   POCT Glucose    Collection Time: 01/19/20  8:10 AM   Result Value Ref Range    POC Glucose 99 60 - 115 mg/dl    Performed on ACCU-CHEK    POCT Glucose    Collection Time: 01/19/20 11:40 AM   Result Value Ref Range    POC Glucose 141 (H) 60 - 115 mg/dl    Performed on ACCU-CHEK    POCT Glucose    Collection Time: 01/19/20  4:19 PM   Result Value Ref Range    POC Glucose 91 60 - 115 mg/dl    Performed on ACCU-CHEK    POCT Glucose    Collection Time: 01/19/20  8:29 PM   Result Value Ref Range    POC Glucose 97 60 - 115 mg/dl    Performed on ACCU-CHEK        Previous extensive, complex labs, notes and diagnostics reviewed and analyzed. ALLERGIES:    Allergies as of 01/18/2020    (No Known Allergies)      (please also verify by checking [facility])     Complex Physical Medicine & Rehab Issues Assess & Plan:   1.  Severe abnormality of gait and mobility and impaired self-care and ADL's secondary to progressive hyponatremic encephalopathy. Functional and medical status reassessed regarding patients ability to participate in therapies and patient found to be able to participate in skilled versus acute intensive comprehensive inpatient rehabilitation program including PT/OT to improve balance, ambulation, ADLs, and to improve the P/AROM. 2. Bowel and Bladder dysfunction:  frequent toileting, ambulate to bathroom with assistance, check post void residuals. Check for C.difficile x1 if >2 loose stools in 24 hours, continue bowel & bladder program.   3. Severe generalized spinal stenosis cervical thoracic and lumbar and generalized OA pain: reassess pain every shift and prior to and after each therapy session, give prn  Tylenol, modalities prn in therapy, consider Lidoderm, K-pad prn.   4. Skin breakdown risk:  continue pressure relief program.  Daily skin exams and reports from nursing. 5. Severe fatigue due to immobility and nutritional deficits: Add vitamin B12 vitamin D and CoQ10 titrate dosing and add protein supplementation with low carb content. 6. Complex discharge planning: The patient is done well in acute rehab in the past she is 89 almost 89 and her limits to anticipate an acute rehab Abely outweigh her benefit and I agree that she should more likely consider her skilled placement. Complex Active General Medical Issues that complicate care Assess & Plan:     1. Active Problems:    Spinal stenosis of lumbar region    PVD (peripheral vascular disease) (HCC)    DM (diabetes mellitus), type 2 with peripheral vascular complications (HCC)    Gastroesophageal reflux disease without esophagitis    Abnormality of gait and mobility due to severe spinal stenosis. Atrial fibrillation (HCC)    Generalized weakness  Resolved Problems:    * No resolved hospital problems.  Nirmala Serna D.O., PM&R     Attending    [address]

## 2020-01-20 NOTE — PROGRESS NOTES
[facility] OCCUPATIONAL THERAPY EVALUATION - ACUTE     NAME: Rosemarie Oliver  : 1918 (80 y.o.)  MRN: [id number]  CODE STATUS: Full Code  Room: J949/B066-95    Date of Service: 2020    Patient Diagnosis(es): Generalized weakness [R53.1]  Generalized weakness [R53.1]   Chief Complaint   Patient presents with    Altered Mental Status     Pt's family wanted pt sent to ED d/t confusion - pt A/Ox4 on arrival to ED    Extremity Weakness     inability to stand up since yesterday     Patient Active Problem List    Diagnosis Date Noted    Generalized weakness 2020    Age-related physical debility 2019    Post-nasal drip 2019    Other constipation 10/03/2019    Atrial fibrillation ([address] 10/03/2019    Age-related osteoporosis without current pathological fracture 2019    Leg edema, right 2019    Abnormality of gait and mobility due to severe spinal stenosis.  2019    Late onset Alzheimer's disease without behavioral disturbance ([address] 2019    Ambulatory dysfunction 2019    Glaucoma 2019    DJD (degenerative joint disease) 2019    S/P kyphoplasty 2019    Gastroesophageal reflux disease without esophagitis 04/15/2019    Lumbar stenosis     DM (diabetes mellitus), type 2 with peripheral vascular complications ([address]     Nuclear senile cataract 2017    Nonexudative age-related macular degeneration 2017    Primary open angle glaucoma 2017    Impaired mobility and activities of daily living 2014    Acute leg pain, left 2014    Acute right hip pain 2014    Spinal stenosis of lumbar region 2012    Stress incontinence 10/28/2011    Hearing loss 10/28/2011    PVD (peripheral vascular disease) ([address] 10/28/2006        Past Medical History:   Diagnosis Date    Acute cystitis with hematuria 2019    Bowel obstruction (HCC) 2019    Chronic hyponatremia     Compression fracture of T12 vertebra [facility]) 2013    kyphoplasty Dr Lesa Montero    DJD (degenerative joint disease) of hip 3/20/2014    Dr Dong Bangura DM (diabetes mellitus), type 2 with peripheral vascular complications (HCC)     diet controlled    Gallbladder sludge 2019    GERD (gastroesophageal reflux disease) 8/31/10    Glaucoma     bilateral    H/O vascular surgery 5/9/2019    Josie NAQVI 2006    Hearing loss     History of total hip replacement, right 05/09/2019    Dr Annabel Oshea    Lower leg edema     chronic, R>L    Lumbar stenosis     Macular degeneration     left eye    PAF (paroxysmal atrial fibrillation) [facility] 2019    Pain of left scapula 3/25/2019    PVD (peripheral vascular disease) ([facility] 10/28/2006    Dr Herminio Gonzales, stents LE    Stress incontinence 10/28/2011     Past Surgical History:   Procedure Laterality Date    APPENDECTOMY      CYST REMOVAL  06/27/2016    DR SLOAN  RT THUMB MUCOUS CYST    HYSTERECTOMY      REFRACTIVE SURGERY  062001    left     TOTAL HIP ARTHROPLASTY Right 9/25/15    DR. NARANJO    UPPER GASTROINTESTINAL ENDOSCOPY  10/07/15    DR Ankita Chou    VERTEBROPLASTY  2013    T12, Dr Lesa Montero        Restrictions  Restrictions/Precautions: Fall Risk     Safety Devices: Safety Devices  Safety Devices in place: Yes  Type of devices:  All fall risk precautions in place    Subjective  Pre Treatment Pain Screening  Pain at present: 0  Scale Used: Numeric Score  Intervention List: Patient able to continue with treatment  Comments / Details: Pt. does report discomfort in B knees with mobility    Pain Reassessment:   Pain Assessment  Patient Currently in Pain: No  Pain Assessment: 0-10  Pain Level: 0       Prior Level of Function:  Social/Functional History  Lives With: Alone(has 24 hour care)  Type of Home: Apartment  Home Layout: One level  Home Access: Level entry  Bathroom Shower/Tub: Tub/Shower unit  Home Equipment: 4 wheeled walker  ADL Assistance: Needs assistance(Pt. is total assistance for all ADLs except feeding (degrees)  RUE AROM : WFL  RUE General AROM: Shoulders to 90°  Right Hand AROM (degrees)  Right Hand AROM: WFL  Right Hand General AROM: Arthritic changes    Strength:  LUE Strength  Gross LUE Strength: Exceptions to [facility]  L Hand General: 3/5  LUE Strength Comment: 3/5 all planes  RUE Strength  Gross RUE Strength: Exceptions to [facility]  R Hand General: 3/5  RUE Strength Comment: 3/5 all planes    Coordination, Tone, Quality of Movement: Tone RUE  RUE Tone: Normotonic  Tone LUE  LUE Tone: Normotonic  Coordination  Movements Are Fluid And Coordinated: No  Coordination and Movement description: Fine motor impairments, Gross motor impairments, Decreased speed, Decreased accuracy, Right UE, Left UE  Quality of Movement Other  Comment: Arthritic changes    Hand Dominance:  Hand Dominance  Hand Dominance: Right    ADL Status:  ADL  Feeding: Setup  Grooming: Dependent/Total  UE Bathing: Maximum assistance  LE Bathing: Dependent/Total  UE Dressing: Dependent/Total, Maximum assistance  LE Dressing: Dependent/Total  Toileting: Dependent/Total  Additional Comments: Pt is baseline dependent.  Simulated ADLs as above; pt. is able to reach to varying planes but is limited in her ability to engage in active tasks due to fatigue and some balance deficits  Toilet Transfers  Toilet Transfer: Unable to assess  Toilet Transfers Comments: Anticipate max A       Functional Mobility:  Functional Mobility  Functional Mobility Comments: Unable to attempt; per family, pt has assist at all times when ambulating but has required more assist and increased time  Transfers  Sit to stand: Unable to assess  Stand to sit: Unable to assess  Transfer Comments: Pt. states she is very tired    Bed Mobility  Bed mobility  Supine to Sit: Moderate assistance  Sit to Supine: Maximum assistance  Scooting: Maximal assistance  Comment: Significant increased time for all mobility    Seated and Standing Balance:  Balance  Sitting Balance: Supervision  Standing Balance: Unable to assess(comment)(Pt. declined to stand; too fatigued)    Functional Endurance:  Activity Tolerance  Activity Tolerance: Patient Tolerated treatment well    D/C Recommendations:  OT D/C RECOMMENDATIONS  REQUIRES OT FOLLOW UP: Yes    Equipment Recommendations:  OT Equipment Recommendations  Other: Continue to assess    OT Education:   OT Education  OT Education: Plan of Care, OT Role  Patient Education: Educated pt. on role of acute care OT  Barriers to Learning: Sun'aq    OT Follow Up:  OT D/C RECOMMENDATIONS  REQUIRES OT FOLLOW UP: Yes       Assessment/Discharge Disposition:  Assessment: Pt. is a 8 year old woman from home with family support who presents to [facility] with the above deficits which impact her ability to perform ADLs and IADLs. Pt. would benefit from skilled OT to maximize independence and safety with ADL tasks.    Performance deficits / Impairments: Decreased functional mobility , Decreased strength, Decreased endurance, Decreased ADL status, Decreased balance, Decreased high-level IADLs, Decreased fine motor control, Decreased safe awareness, Decreased cognition, Decreased coordination  Prognosis: Good  Discharge Recommendations: Continue to assess pending progress  Decision Making: Medium Complexity  History: Pt's medical history is moderately complex  Exam: Pt. has 10 performance deficits  Assistance / Modification: Pt. requires max-total A but this is likely baseline    Six Click Score   How much help for putting on and taking off regular lower body clothing?: Total  How much help for Bathing?: Total  How much help for Toileting?: Total  How much help for putting on and taking off regular upper body clothing?: Total  How much help for taking care of personal grooming?: Total  How much help for eating meals?: Total  AM-PAC Inpatient Daily Activity Raw Score: 6  AM-PAC Inpatient ADL T-Scale Score : 17.07  ADL Inpatient CMS 0-100% Score: 100    Plan:  Plan  Times per week: 1-3x  Plan weeks: Length of acute stay  Current Treatment Recommendations: Strengthening, Balance Training, Functional Mobility Training, Endurance Training, Patient/Caregiver Education & Training, Equipment Evaluation, Education, & procurement, Self-Care / ADL, Safety Education & Training, Neuromuscular Re-education, Cognitive/Perceptual Training, Positioning    Goals:   Patient will:    - Improve functional endurance to tolerate/complete 60 mins of ADL's  - Be Max A and able to actively participate in UB ADLs   - Be Max A and able to actively participate in LB ADLs  - Be Max A and able to actively participate in ADL transfers without LOB  - Be Max A and able to actively participate in toileting tasks  - Improve B hand fine motor coordination to [facility] in order to manage clothing fasteners/self-care containers in a timely manner  - Improve B UE strength and endurance to 3+/5 in order to participate in self-care activities as projected. - Access appropriate D/C site with as few architectural barriers as possible. - Sequence self-care tasks with no verbal cues for safety    Patient Goal: Patient goals : I want to get home     Discussed and agreed upon: Yes Comments:     Therapy Time:   OT Individual Minutes  Time In: 1105  Time Out: 1120  Minutes: 15    Eval: 15 minutes     Electronically signed by:     KIM Inman  1/20/2020, 11:28 AM

## 2020-01-21 LAB
ANION GAP SERPL CALCULATED.3IONS-SCNC: 13 MEQ/L (ref 9–15)
BASOPHILS ABSOLUTE: 0 K/UL (ref 0–0.2)
BASOPHILS RELATIVE PERCENT: 1.1 %
BUN BLDV-MCNC: 19 MG/DL (ref 8–23)
CALCIUM SERPL-MCNC: 9.9 MG/DL (ref 8.5–9.9)
CHLORIDE BLD-SCNC: 100 MEQ/L (ref 95–107)
CO2: 18 MEQ/L (ref 20–31)
CREAT SERPL-MCNC: 0.53 MG/DL (ref 0.5–0.9)
EOSINOPHILS ABSOLUTE: 0.3 K/UL (ref 0–0.7)
EOSINOPHILS RELATIVE PERCENT: 6.2 %
GFR AFRICAN AMERICAN: >60
GFR NON-AFRICAN AMERICAN: >60
GLUCOSE BLD-MCNC: 106 MG/DL (ref 70–99)
HCT VFR BLD CALC: 36.3 % (ref 37–47)
HEMOGLOBIN: 12.2 G/DL (ref 12–16)
LYMPHOCYTES ABSOLUTE: 1.5 K/UL (ref 1–4.8)
LYMPHOCYTES RELATIVE PERCENT: 32.5 %
MCH RBC QN AUTO: 31.2 PG (ref 27–31.3)
MCHC RBC AUTO-ENTMCNC: 33.7 % (ref 33–37)
MCV RBC AUTO: 92.6 FL (ref 82–100)
MONOCYTES ABSOLUTE: 0.8 K/UL (ref 0.2–0.8)
MONOCYTES RELATIVE PERCENT: 16.8 %
NEUTROPHILS ABSOLUTE: 1.9 K/UL (ref 1.4–6.5)
NEUTROPHILS RELATIVE PERCENT: 43.4 %
ORGANISM: ABNORMAL
PDW BLD-RTO: 13.3 % (ref 11.5–14.5)
PLATELET # BLD: 191 K/UL (ref 130–400)
POTASSIUM REFLEX MAGNESIUM: 4.4 MEQ/L (ref 3.4–4.9)
RBC # BLD: 3.92 M/UL (ref 4.2–5.4)
SODIUM BLD-SCNC: 131 MEQ/L (ref 135–144)
URIC ACID, SERUM: 5.1 MG/DL (ref 2.4–5.7)
URINE CULTURE, ROUTINE: ABNORMAL
WBC # BLD: 4.5 K/UL (ref 4.8–10.8)

## 2020-01-21 PROCEDURE — 85025 COMPLETE CBC W/AUTO DIFF WBC: CPT

## 2020-01-21 PROCEDURE — 99221 1ST HOSP IP/OBS SF/LOW 40: CPT | Performed by: INTERNAL MEDICINE

## 2020-01-21 PROCEDURE — 99232 SBSQ HOSP IP/OBS MODERATE 35: CPT | Performed by: PHYSICAL MEDICINE & REHABILITATION

## 2020-01-21 PROCEDURE — 1210000000 HC MED SURG R&B

## 2020-01-21 PROCEDURE — 97535 SELF CARE MNGMENT TRAINING: CPT

## 2020-01-21 PROCEDURE — 6370000000 HC RX 637 (ALT 250 FOR IP): Performed by: NURSE PRACTITIONER

## 2020-01-21 PROCEDURE — 36415 COLL VENOUS BLD VENIPUNCTURE: CPT

## 2020-01-21 PROCEDURE — 84550 ASSAY OF BLOOD/URIC ACID: CPT

## 2020-01-21 PROCEDURE — 6370000000 HC RX 637 (ALT 250 FOR IP): Performed by: INTERNAL MEDICINE

## 2020-01-21 PROCEDURE — 2580000003 HC RX 258: Performed by: NURSE PRACTITIONER

## 2020-01-21 PROCEDURE — 80048 BASIC METABOLIC PNL TOTAL CA: CPT

## 2020-01-21 RX ORDER — AMOXICILLIN 500 MG/1
500 CAPSULE ORAL EVERY 8 HOURS SCHEDULED
Status: DISCONTINUED | OUTPATIENT
Start: 2020-01-21 | End: 2020-01-22 | Stop reason: HOSPADM

## 2020-01-21 RX ADMIN — FLUTICASONE PROPIONATE 2 SPRAY: 50 SPRAY, METERED NASAL at 09:12

## 2020-01-21 RX ADMIN — Medication 10 ML: at 20:44

## 2020-01-21 RX ADMIN — BRIMONIDINE TARTRATE 1 DROP: 2 SOLUTION OPHTHALMIC at 20:44

## 2020-01-21 RX ADMIN — TIMOLOL MALEATE 1 DROP: 5 SOLUTION OPHTHALMIC at 09:12

## 2020-01-21 RX ADMIN — TIMOLOL MALEATE 1 DROP: 5 SOLUTION OPHTHALMIC at 20:44

## 2020-01-21 RX ADMIN — BRIMONIDINE TARTRATE 1 DROP: 2 SOLUTION OPHTHALMIC at 09:12

## 2020-01-21 RX ADMIN — AMOXICILLIN 500 MG: 500 CAPSULE ORAL at 20:43

## 2020-01-21 RX ADMIN — GUAIFENESIN 600 MG: 600 TABLET, EXTENDED RELEASE ORAL at 09:09

## 2020-01-21 RX ADMIN — LATANOPROST 1 DROP: 50 SOLUTION OPHTHALMIC at 20:44

## 2020-01-21 RX ADMIN — GUAIFENESIN 600 MG: 600 TABLET, EXTENDED RELEASE ORAL at 20:43

## 2020-01-21 RX ADMIN — CARBAMIDE PEROXIDE 6.5% 5 DROP: 6.5 LIQUID AURICULAR (OTIC) at 09:12

## 2020-01-21 RX ADMIN — APIXABAN 2.5 MG: 5 TABLET, FILM COATED ORAL at 09:09

## 2020-01-21 RX ADMIN — Medication 10 ML: at 09:12

## 2020-01-21 RX ADMIN — APIXABAN 2.5 MG: 5 TABLET, FILM COATED ORAL at 20:43

## 2020-01-21 RX ADMIN — CARBAMIDE PEROXIDE 6.5% 5 DROP: 6.5 LIQUID AURICULAR (OTIC) at 20:44

## 2020-01-21 RX ADMIN — AMOXICILLIN AND CLAVULANATE POTASSIUM 1 TABLET: 875; 125 TABLET, FILM COATED ORAL at 09:09

## 2020-01-21 RX ADMIN — VITAMIN D, TAB 1000IU (100/BT) 1000 UNITS: 25 TAB at 09:09

## 2020-01-21 RX ADMIN — AMOXICILLIN 500 MG: 500 CAPSULE ORAL at 16:29

## 2020-01-21 ASSESSMENT — ENCOUNTER SYMPTOMS: RESPIRATORY NEGATIVE: 1

## 2020-01-21 ASSESSMENT — PAIN SCALES - GENERAL: PAINLEVEL_OUTOF10: 0

## 2020-01-21 NOTE — PROGRESS NOTES
Hospitalist Progress Note      PCP: Charles Sims MD    Date of Admission: 1/18/2020    Chief Complaint:    Chief Complaint   Patient presents with    Altered Mental Status     Pt's family wanted pt sent to ED d/t confusion - pt A/Ox4 on arrival to ED    Extremity Weakness     inability to stand up since yesterday     Subjective:  Patient denies fevers, chills, sweats, CP, SOB. 12 point ROS negative other than mentioned above     Medications:  Reviewed    Infusion Medications     Scheduled Medications    amoxicillin-clavulanate  1 tablet Oral 2 times per day    Vitamin D  1,000 Units Oral Daily    sodium chloride flush  10 mL Intravenous 2 times per day    brimonidine  1 drop Both Eyes BID    apixaban  2.5 mg Oral BID    latanoprost  1 drop Left Eye Nightly    timolol  1 drop Both Eyes BID    carbamide peroxide  5 drop Right Ear BID    guaiFENesin  600 mg Oral BID    fluticasone  2 spray Each Nostril Daily     PRN Meds: docusate sodium, sodium chloride flush, magnesium hydroxide, ondansetron, acetaminophen      Intake/Output Summary (Last 24 hours) at 1/21/2020 1314  Last data filed at 1/20/2020 1819  Gross per 24 hour   Intake 789.12 ml   Output --   Net 789.12 ml       Exam:    BP (!) 130/54   Pulse 55   Temp 97.3 °F (36.3 °C) (Oral)   Resp 18   Ht 5' 1.5\" (1.562 m)   Wt 129 lb 11.2 oz (58.8 kg)   LMP  (LMP Unknown)   SpO2 99%   BMI 24.11 kg/m²     General appearance: No apparent distress, appears stated age and cooperative. HEENT: . Conjunctivae/corneas clear. Neck:  Trachea midline. Respiratory:  Normal respiratory effort. Clear to auscultation  Cardiovascular: Regular rate and rhythm   Abdomen: Soft, non-tender, non-distended with normal bowel sounds. Musculoskeletal: No clubbing, cyanosis or edema bilaterally.   Neuro: Non Focal.   Capillary Refill: Brisk,< 3 seconds   Peripheral Pulses: +2 palpable, equal bilaterally     Labs:   Recent Labs     01/19/20  0542

## 2020-01-21 NOTE — PROGRESS NOTES
Subjective: The patient complains of moderate to severe  acute on chronic recurrent fatigue partially relieved by hydration, rest, antibiotics she is currently on Augmentin and exacerbated by recurrent UTIs and bronchitis. I am still concerned about patients immunosuppression on Flonase nasal spray I would rather her be on Afrin at this point she is to frail to be immunosuppressed and the Afrin nasal spray has limited side effects and the rebound nasal congestion is really not something that is well-documented or an issue with her however immunosuppression is. ROS x10: The patient also complains of severely impaired mobility and activities of daily living. Otherwise no new problems with vision, hearing, nose, mouth, throat, dermal, cardiovascular, GI, , pulmonary, musculoskeletal, psychiatric or neurological. See Rehab H&P on Rehab chart dated . Vital signs:  BP (!) 130/54   Pulse 55   Temp 97.3 °F (36.3 °C) (Oral)   Resp 18   Ht 5' 1.5\" (1.562 m)   Wt 129 lb 11.2 oz (58.8 kg)   LMP  (LMP Unknown)   SpO2 99%   BMI 24.11 kg/m²   I/O:   PO/Intake:    fair PO intake, no problems observed or reported. Bowel/Bladder:  incontinent,    General:  Patient is well developed, adequately nourished, non-obese and     well kempt. HEENT:    Very poor vision very poor hearing PERRLA, hearing intact to loud voice, external inspection of ear     and nose benign. Inspection of lips, tongue and gums benign  Musculoskeletal: No significant change in strength or tone. All joints stable. Inspection and palpation of digits and nails show no clubbing,       cyanosis or inflammatory conditions. Neuro/Psychiatric: Affect: flat but pleasant. Alert and oriented to person, place and     situation. No significant change in deep tendon reflexes or     sensation  Lungs:  Diminished, CTA-B. Respiration effort is normal at rest.     Heart:   S1 = S2,  RRR. No loud murmurs.     Abdomen:  Soft, non-tender, no enlargement of liver or spleen. Extremities:  No significant lower extremity edema or tenderness. Skin:    BUE bruises dt blood draws, no visualized or palpated problems. Rehabilitation:  Physical therapy: FIMS:  Bed Mobility: Scooting: Maximal assistance    Transfers: Sit to Stand: Minimal Assistance  Stand to sit: Minimal Assistance, Ambulation 1  Surface: level tile  Device: Rolling Walker  Assistance: Contact guard assistance  Gait Deviations: Slow Noemi, Increased KOTA, Decreased step length, Decreased step height, Deviated path  Comments: VCs to decrease speed and stay within KOTA of ww, generalized decreased safety ,      FIMS:  ,  , Assessment: Pt presents with decreased functional mobility and imbalance affecting independence at home. pt would benefit from skilled PT to address deficits and return to previous function with decreased risk for falls. Occupational therapy: FIMS:   ,  , Assessment: Pt. is a 8 year old woman from home with family support who presents to [facility] with the above deficits which impact her ability to perform ADLs and IADLs. Pt. would benefit from skilled OT to maximize independence and safety with ADL tasks.      Speech therapy: FIMS:        Lab/X-ray studies reviewed, analyzed and discussed with patient and staff:   Recent Results (from the past 24 hour(s))   Basic Metabolic Panel w/ Reflex to MG    Collection Time: 01/20/20 10:27 AM   Result Value Ref Range    Sodium 128 (L) 135 - 144 mEq/L    Potassium reflex Magnesium 4.4 3.4 - 4.9 mEq/L    Chloride 98 95 - 107 mEq/L    CO2 20 20 - 31 mEq/L    Anion Gap 10 9 - 15 mEq/L    Glucose 148 (H) 70 - 99 mg/dL    BUN 18 8 - 23 mg/dL    CREATININE 0.53 0.50 - 0.90 mg/dL    GFR Non-African American >60.0 >60    GFR  >60.0 >60    Calcium 9.6 8.5 - 9.9 mg/dL   CBC Auto Differential    Collection Time: 01/20/20 10:27 AM   Result Value Ref Range    WBC 5.0 4.8 - 10.8 K/uL    RBC 3.94 (L) 4.20 - 5.40 M/uL (No Known Allergies)      (please also verify by checking [facility])     Complex Physical Medicine & Rehab Issues Assess & Plan:   1. Severe abnormality of gait and mobility and impaired self-care and ADL's secondary to progressive hyponatremic encephalopathy. Functional and medical status reassessed regarding patients ability to participate in therapies and patient found to be able to participate in skilled versus acute intensive comprehensive inpatient rehabilitation program including PT/OT to improve balance, ambulation, ADLs, and to improve the P/AROM. 2. Bowel and Bladder dysfunction:  frequent toileting, ambulate to bathroom with assistance, check post void residuals. Check for C.difficile x1 if >2 loose stools in 24 hours, continue bowel & bladder program.   3. Severe generalized spinal stenosis cervical thoracic and lumbar and generalized OA pain: reassess pain every shift and prior to and after each therapy session, give prn  Tylenol, modalities prn in therapy, consider Lidoderm, K-pad prn.   4. Skin breakdown risk:  continue pressure relief program.  Daily skin exams and reports from nursing. 5. Severe fatigue due to immobility and nutritional deficits: Add vitamin B12 vitamin D and CoQ10 titrate dosing and add protein supplementation with low carb content. 6. Complex discharge planning: The patient is done well in acute rehab in the past she is 89 almost 89 and her limits to anticipate an acute rehab Abely outweigh her benefit and I agree that she should more likely consider her skilled placement. Complex Active General Medical Issues that complicate care Assess & Plan:     1. Active Problems:    Spinal stenosis of lumbar region    PVD (peripheral vascular disease) (HCC)    DM (diabetes mellitus), type 2 with peripheral vascular complications (HCC)    Gastroesophageal reflux disease without esophagitis    Abnormality of gait and mobility due to severe spinal stenosis.     Atrial fibrillation

## 2020-01-21 NOTE — CARE COORDINATION
OSWALDOW spoke with Rj Donaldson regarding pt going to rehab. Awaiting interqual to be completed. Rehab is prepared to take the pt if approval is received. Still no beds at [facility] so [facility] is the second choice.

## 2020-01-21 NOTE — CONSULTS
faecalis VRE    Urine Culture, Routine Abnormal  2020 12:00 PM [facility]   >100,000 CFU/ml   CONTACT PRECAUTIONS INDICATED    Testing Performed By     Lab - [address] Name Director Address Valid Date Range   [address] [facility] Dillan Diaz MD [address] [place] [address] 18 0959-Present   Narrative   Performed by: [facility]   ORDER#: [id number]                          ORDERED BY: Tian Caraballo  SOURCE: Urine Clean Catch                  COLLECTED:  20 12:00  ANTIBIOTICS AT [facility]. :                      RECEIVED :  20 13:58   Susceptibility     Enterococcus faecalis vre (1)     Antibiotic Interpretation CRISTOBAL Status    ampicillin Sensitive <=2 mcg/mL     ciprofloxacin Resistant >=8 mcg/mL     levofloxacin Resistant >=8 mcg/mL     linezolid Sensitive 1 mcg/mL     nitrofurantoin Sensitive <=16 mcg/mL     vancomycin Resistant >=32 mcg/mL                 Review of Systems   Constitutional: Negative. Respiratory: Negative. Cardiovascular: Negative. Genitourinary: Negative. Musculoskeletal: Negative. Neurological: Negative.         Review of Systems: All 14 review of systems negative other than as stated above    Social History     Tobacco Use    Smoking status: Former Smoker     Types: Cigarettes     Last attempt to quit: 1980     Years since quittin.6    Smokeless tobacco: Never Used   Substance Use Topics    Alcohol use: No     Alcohol/week: 0.0 standard drinks    Drug use: No         Past Medical History:   Diagnosis Date    Acute cystitis with hematuria 2019    Bowel obstruction (HCC) 2019    Chronic hyponatremia     Compression fracture of T12 vertebra [facility]) 2013    kyphoplasty Dr Saddie Dandy DJD (degenerative joint disease) of hip 3/20/2014    Dr Nickerson Marrow DM (diabetes mellitus), type 2 with peripheral vascular complications (HCC)     diet controlled    Gallbladder sludge drop into the left eye nightly      Multiple Vitamin (MULTI VITAMIN DAILY) TABS Take 1 tablet by mouth every morning      acetaminophen (TYLENOL) 325 MG tablet Take 1 tablet by mouth 3 times daily as needed for Pain (DO NOT EXCEED 4GM IN 24 HOURS) 90 tablet 0       No Known Allergies      Family History   Problem Relation Age of Onset    Heart Failure Mother         dec age 77    Hypertension Mother     Diabetes Father         dec age 67         Physical Exam:      Physical Exam   Eyes: Pupils are equal, round, and reactive to light. Neck: Normal range of motion. Cardiovascular: Normal heart sounds. No murmur heard. Pulmonary/Chest: Effort normal and breath sounds normal. No respiratory distress. She has no wheezes. She has no rales. Abdominal: Soft. Bowel sounds are normal. She exhibits no distension and no mass. There is no rebound and no guarding. Musculoskeletal:         General: No tenderness or edema. Neurological: She is alert. Blood pressure (!) 130/54, pulse 55, temperature 97.3 °F (36.3 °C), temperature source Oral, resp. rate 18, height 5' 1.5\" (1.562 m), weight 129 lb 11.2 oz (58.8 kg), SpO2 99 %, not currently breastfeeding.       .   Lab Results   Component Value Date    WBC 4.5 (L) 01/21/2020    HGB 12.2 01/21/2020    HCT 36.3 (L) 01/21/2020    MCV 92.6 01/21/2020     01/21/2020     Lab Results   Component Value Date     01/21/2020    K 4.4 01/21/2020     01/21/2020    CO2 18 01/21/2020    BUN 19 01/21/2020    CREATININE 0.53 01/21/2020    GLUCOSE 106 01/21/2020    GLUCOSE 156 05/09/2012    CALCIUM 9.9 01/21/2020                ASSESSMENT:  Patient Active Problem List   Diagnosis    Stress incontinence    Hearing loss    Spinal stenosis of lumbar region    Impaired mobility and activities of daily living    Acute leg pain, left    Acute right hip pain    PVD (peripheral vascular disease) ([address]    Nuclear senile cataract    Nonexudative age-related

## 2020-01-21 NOTE — PROGRESS NOTES
Physical Therapy Med Surg Daily Treatment Note  Facility/Department: [facility]  Room: [place] [id number]       NAME: Rasheed Velasquez  : 1918 (80 y.o.)  MRN: [id number]  CODE STATUS: Full Code    Date of Service: 2020    Patient Diagnosis(es): Generalized weakness [R53.1]  Generalized weakness [R53.1]   Chief Complaint   Patient presents with    Altered Mental Status     Pt's family wanted pt sent to ED d/t confusion - pt A/Ox4 on arrival to ED    Extremity Weakness     inability to stand up since yesterday     Patient Active Problem List    Diagnosis Date Noted    Generalized weakness 2020    Age-related physical debility 2019    Post-nasal drip 2019    Other constipation 10/03/2019    Atrial fibrillation ([address] 10/03/2019    Age-related osteoporosis without current pathological fracture 2019    Leg edema, right 2019    Abnormality of gait and mobility due to severe spinal stenosis.  2019    Late onset Alzheimer's disease without behavioral disturbance ([address] 2019    Ambulatory dysfunction 2019    Glaucoma 2019    DJD (degenerative joint disease) 2019    S/P kyphoplasty 2019    Gastroesophageal reflux disease without esophagitis 04/15/2019    Lumbar stenosis     DM (diabetes mellitus), type 2 with peripheral vascular complications ([address]     Nuclear senile cataract 2017    Nonexudative age-related macular degeneration 2017    Primary open angle glaucoma 2017    Impaired mobility and activities of daily living 2014    Acute leg pain, left 2014    Acute right hip pain 2014    Spinal stenosis of lumbar region 2012    Stress incontinence 10/28/2011    Hearing loss 10/28/2011    PVD (peripheral vascular disease) ([address] 10/28/2006        Past Medical History:   Diagnosis Date    Acute cystitis with hematuria 2019    Bowel obstruction (HCC) 2019    Chronic hyponatremia     Compression fracture of T12 vertebra [facility]) 2013    kyphoplasty Dr Lesa Montero    DJD (degenerative joint disease) of hip 3/20/2014    Dr Dong Bangura DM (diabetes mellitus), type 2 with peripheral vascular complications (HCC)     diet controlled    Gallbladder sludge 2019    GERD (gastroesophageal reflux disease) 8/31/10    Glaucoma     bilateral    H/O vascular surgery 5/9/2019    Stenjune NAQVI 2006    Hearing loss     History of total hip replacement, right 05/09/2019    Dr Annabel Oshea    Lower leg edema     chronic, R>L    Lumbar stenosis     Macular degeneration     left eye    PAF (paroxysmal atrial fibrillation) [facility] 2019    Pain of left scapula 3/25/2019    PVD (peripheral vascular disease) ([facility] 10/28/2006    Dr Herminio Gonzales, stents LE    Stress incontinence 10/28/2011     Past Surgical History:   Procedure Laterality Date    APPENDECTOMY      CYST REMOVAL  06/27/2016    DR SLOAN  RT THUMB MUCOUS CYST    HYSTERECTOMY      REFRACTIVE SURGERY  062001    left     TOTAL HIP ARTHROPLASTY Right 9/25/15    DR. NARANJO    UPPER GASTROINTESTINAL ENDOSCOPY  10/07/15    DR Ankita Chou    VERTEBROPLASTY  2013    T12, Dr Lesa Montero         Restrictions  Restrictions/Precautions: Fall Risk    SUBJECTIVE   General  Chart Reviewed: Yes  Family / Caregiver Present: No  Subjective  Subjective: \"I do okay. \"     Pre-Session Pain Report  Pre Treatment Pain Screening  Pain at present: 0  Scale Used: Numeric Score  Intervention List: Patient able to continue with treatment  Pain Screening  Patient Currently in Pain: No       Post-Session Pain Report  Pain Assessment  Pain Assessment: 0-10  Pain Level: 0         OBJECTIVE        Bed mobility  Supine to Sit: Minimal assistance  Sit to Supine: (DNT pt into chair )  Comment: increased time to complete. Transfers  Sit to Stand: Minimal Assistance  Stand to sit: Minimal Assistance  Comment: pt pulls from [facility] increased time to complete, pt steady. Ambulation  Ambulation?: Yes  Ambulation 1  Surface: level tile  Device: Rolling Walker  Assistance: Contact guard assistance  Gait Deviations: Slow Noemi; Increased KOTA; Decreased step length;Decreased step height;Deviated path  Distance: 10' x2   Comments: VCs to decrease speed and stay within KOTA of ww, generalized decreased safety                                          Activity Tolerance  Activity Tolerance: Patient Tolerated treatment well          ASSESSMENT   Assessment: pt with decreased safety awareness, increased time to complete all tasks and is [place]. Discharge Recommendations:  Continue to assess pending progress, Patient would benefit from continued therapy after discharge    Goals  Short term goals  Short term goal 1: Bed mob with supervision   Short term goal 2: Transfers with SBA  Short term goal 3: Ambulate >/= 50 ft with ww with CGA   Long term goals  Long term goal 1: Improve strength and balance to safely achieve all goals   Patient Goals   Patient goals : return home     PLAN    Safety Devices  Type of devices: All fall risk precautions in place;Call light within reach; Chair alarm in place; Left in chair     AMPAC (6 CLICK) BASIC MOBILITY  AM-PAC Inpatient Mobility Raw Score : 17      Therapy Time   Individual   Time In 1127   Time Out 1144   Minutes 17      BM/Trsf: 10  Gait: 7        Wanda Ricardo PTA, 01/21/20 at 11:56 AM

## 2020-01-22 VITALS
SYSTOLIC BLOOD PRESSURE: 147 MMHG | HEIGHT: 62 IN | TEMPERATURE: 97.3 F | BODY MASS INDEX: 23.87 KG/M2 | WEIGHT: 129.7 LBS | DIASTOLIC BLOOD PRESSURE: 52 MMHG | RESPIRATION RATE: 20 BRPM | HEART RATE: 57 BPM | OXYGEN SATURATION: 98 %

## 2020-01-22 LAB
ANION GAP SERPL CALCULATED.3IONS-SCNC: 11 MEQ/L (ref 9–15)
BASOPHILS ABSOLUTE: 0 K/UL (ref 0–0.2)
BASOPHILS RELATIVE PERCENT: 0.6 %
BUN BLDV-MCNC: 17 MG/DL (ref 8–23)
CALCIUM SERPL-MCNC: 9.8 MG/DL (ref 8.5–9.9)
CHLORIDE BLD-SCNC: 101 MEQ/L (ref 95–107)
CO2: 18 MEQ/L (ref 20–31)
CREAT SERPL-MCNC: 0.43 MG/DL (ref 0.5–0.9)
EOSINOPHILS ABSOLUTE: 0.3 K/UL (ref 0–0.7)
EOSINOPHILS RELATIVE PERCENT: 6.1 %
GFR AFRICAN AMERICAN: >60
GFR NON-AFRICAN AMERICAN: >60
GLUCOSE BLD-MCNC: 100 MG/DL (ref 70–99)
HCT VFR BLD CALC: 36.2 % (ref 37–47)
HEMOGLOBIN: 12.4 G/DL (ref 12–16)
LYMPHOCYTES ABSOLUTE: 1.7 K/UL (ref 1–4.8)
LYMPHOCYTES RELATIVE PERCENT: 34.1 %
MCH RBC QN AUTO: 31.8 PG (ref 27–31.3)
MCHC RBC AUTO-ENTMCNC: 34.4 % (ref 33–37)
MCV RBC AUTO: 92.3 FL (ref 82–100)
MONOCYTES ABSOLUTE: 0.7 K/UL (ref 0.2–0.8)
MONOCYTES RELATIVE PERCENT: 13.6 %
NEUTROPHILS ABSOLUTE: 2.3 K/UL (ref 1.4–6.5)
NEUTROPHILS RELATIVE PERCENT: 45.6 %
PDW BLD-RTO: 13.4 % (ref 11.5–14.5)
PLATELET # BLD: 211 K/UL (ref 130–400)
POTASSIUM REFLEX MAGNESIUM: 4.3 MEQ/L (ref 3.4–4.9)
RBC # BLD: 3.92 M/UL (ref 4.2–5.4)
SODIUM BLD-SCNC: 130 MEQ/L (ref 135–144)
WBC # BLD: 5 K/UL (ref 4.8–10.8)

## 2020-01-22 PROCEDURE — 6370000000 HC RX 637 (ALT 250 FOR IP): Performed by: INTERNAL MEDICINE

## 2020-01-22 PROCEDURE — 85025 COMPLETE CBC W/AUTO DIFF WBC: CPT

## 2020-01-22 PROCEDURE — 36415 COLL VENOUS BLD VENIPUNCTURE: CPT

## 2020-01-22 PROCEDURE — 2580000003 HC RX 258: Performed by: NURSE PRACTITIONER

## 2020-01-22 PROCEDURE — 97116 GAIT TRAINING THERAPY: CPT

## 2020-01-22 PROCEDURE — 80048 BASIC METABOLIC PNL TOTAL CA: CPT

## 2020-01-22 PROCEDURE — 99231 SBSQ HOSP IP/OBS SF/LOW 25: CPT | Performed by: PHYSICAL MEDICINE & REHABILITATION

## 2020-01-22 PROCEDURE — 6370000000 HC RX 637 (ALT 250 FOR IP): Performed by: NURSE PRACTITIONER

## 2020-01-22 PROCEDURE — 99232 SBSQ HOSP IP/OBS MODERATE 35: CPT | Performed by: INTERNAL MEDICINE

## 2020-01-22 RX ORDER — AMOXICILLIN 500 MG/1
500 CAPSULE ORAL 3 TIMES DAILY
Qty: 21 CAPSULE | Refills: 0 | Status: SHIPPED | OUTPATIENT
Start: 2020-01-22 | End: 2020-01-29

## 2020-01-22 RX ADMIN — BRIMONIDINE TARTRATE 1 DROP: 2 SOLUTION OPHTHALMIC at 09:12

## 2020-01-22 RX ADMIN — Medication 10 ML: at 09:12

## 2020-01-22 RX ADMIN — ACETAMINOPHEN 650 MG: 325 TABLET ORAL at 17:47

## 2020-01-22 RX ADMIN — CARBAMIDE PEROXIDE 6.5% 5 DROP: 6.5 LIQUID AURICULAR (OTIC) at 09:13

## 2020-01-22 RX ADMIN — FLUTICASONE PROPIONATE 2 SPRAY: 50 SPRAY, METERED NASAL at 09:13

## 2020-01-22 RX ADMIN — APIXABAN 2.5 MG: 5 TABLET, FILM COATED ORAL at 09:10

## 2020-01-22 RX ADMIN — VITAMIN D, TAB 1000IU (100/BT) 1000 UNITS: 25 TAB at 09:09

## 2020-01-22 RX ADMIN — AMOXICILLIN 500 MG: 500 CAPSULE ORAL at 09:45

## 2020-01-22 RX ADMIN — TIMOLOL MALEATE 1 DROP: 5 SOLUTION OPHTHALMIC at 09:12

## 2020-01-22 RX ADMIN — AMOXICILLIN 500 MG: 500 CAPSULE ORAL at 17:47

## 2020-01-22 RX ADMIN — GUAIFENESIN 600 MG: 600 TABLET, EXTENDED RELEASE ORAL at 09:10

## 2020-01-22 ASSESSMENT — ENCOUNTER SYMPTOMS: RESPIRATORY NEGATIVE: 1

## 2020-01-22 ASSESSMENT — PAIN SCALES - GENERAL
PAINLEVEL_OUTOF10: 0
PAINLEVEL_OUTOF10: 0
PAINLEVEL_OUTOF10: 4

## 2020-01-22 NOTE — PROGRESS NOTES
hyponatremia     Compression fracture of T12 vertebra [facility]) 2013    kyphoplasty Dr Arnulfo Alberto    DJD (degenerative joint disease) of hip 3/20/2014    Dr Haven Mcrae DM (diabetes mellitus), type 2 with peripheral vascular complications (HCC)     diet controlled    Gallbladder sludge 2019    GERD (gastroesophageal reflux disease) 8/31/10    Glaucoma     bilateral    H/O vascular surgery 5/9/2019    Josie NAQVI 2006    Hearing loss     History of total hip replacement, right 05/09/2019    Dr Justin Adkins    Lower leg edema     chronic, R>L    Lumbar stenosis     Macular degeneration     left eye    PAF (paroxysmal atrial fibrillation) ([facility] 2019    Pain of left scapula 3/25/2019    PVD (peripheral vascular disease) ([facility] 10/28/2006    Dr Tracie Pearson, stents LE    Stress incontinence 10/28/2011     Past Surgical History:   Procedure Laterality Date    APPENDECTOMY      CYST REMOVAL  06/27/2016    DR SLOAN  RT THUMB MUCOUS CYST    HYSTERECTOMY      REFRACTIVE SURGERY  062001    left     TOTAL HIP ARTHROPLASTY Right 9/25/15    DR. NARANJO    UPPER GASTROINTESTINAL ENDOSCOPY  10/07/15    DR Pj Medley    VERTEBROPLASTY  2013    T12, Dr Arnulfo Alberto         Restrictions  Restrictions/Precautions: Fall Risk    SUBJECTIVE   General  Chart Reviewed: Yes  Family / Caregiver Present: No  Subjective  Subjective: \"I need to use the restroom. \"     Pre-Session Pain Report  Pre Treatment Pain Screening  Pain at present: 0  Scale Used: Numeric Score  Intervention List: Patient able to continue with treatment  Pain Screening  Patient Currently in Pain: No       Post-Session Pain Report  Pain Assessment  Pain Assessment: 0-10  Pain Level: 0         OBJECTIVE        Bed mobility  Comment: DNT pt in chair before and after tx. Transfers  Sit to Stand: Minimal Assistance  Stand to sit: Minimal Assistance  Comment: pt pulls from [employer] increased time to complete, pt steady. pt unwilling to attempt pushing from chair. Ambulation  Ambulation?: Yes  Ambulation 1  Surface: level tile  Device: Rolling Walker  Assistance: Contact guard assistance  Gait Deviations: Slow Noemi; Increased KOTA; Decreased step length;Decreased step height;Deviated path  Distance: 20' x2   Comments: VCs to decrease speed and stay within KOTA of ww, generalized decreased safety               Neuromuscular Education  Neuromuscular Comments: standing supported at [address] while pericare completed, pt maintains well. Activity Tolerance  Activity Tolerance: Patient Tolerated treatment well          ASSESSMENT   Assessment: pt with decreased safety awareness, pt able to increase ambulation distance and tolerate standing at [address] while pericare is completed. Discharge Recommendations:  Continue to assess pending progress, Patient would benefit from continued therapy after discharge    Goals  Short term goals  Short term goal 1: Bed mob with supervision   Short term goal 2: Transfers with SBA  Short term goal 3: Ambulate >/= 50 ft with ww with CGA   Long term goals  Long term goal 1: Improve strength and balance to safely achieve all goals   Patient Goals   Patient goals : return home     PLAN    Safety Devices  Type of devices: All fall risk precautions in place;Call light within reach; Chair alarm in place; Left in chair;Nurse notified     [facility] (6 CLICK) Sahil Lynne 28 Inpatient Mobility Raw Score : 17      Therapy Time   Individual   Time In 1130   Time Out 1148   Minutes 18      Gait: 12  NM: 4   trsf: 2     Leif Singletary PTA, 01/22/20 at 11:54 AM

## 2020-01-22 NOTE — DISCHARGE SUMMARY
Hospital Medicine Discharge Summary    Mykel Ramos  :  1918  MRN:  [id number]    Admit date:  2020  Discharge date:  2020    Admitting Physician:  Alvah Hammans, MD  Primary Care Physician:  Ayanna Lassiter MD      Discharge Diagnoses: Active Problems:    Spinal stenosis of lumbar region    PVD (peripheral vascular disease) (HCC)    DM (diabetes mellitus), type 2 with peripheral vascular complications (HCC)    Gastroesophageal reflux disease without esophagitis    Abnormality of gait and mobility due to severe spinal stenosis. Atrial fibrillation (HCC)    Generalized weakness  Resolved Problems:    * No resolved hospital problems. *    Chief Complaint   Patient presents with    Altered Mental Status     Pt's family wanted pt sent to ED d/t confusion - pt A/Ox4 on arrival to ED    Extremity Weakness     inability to stand up since yesterday     Hospital Course:   Mykel Ramos is a 80 y.o. female that was admitted and treated at [facility] for the following medical issues: Active Problems:    Spinal stenosis of lumbar region    PVD (peripheral vascular disease) (HCC)    DM (diabetes mellitus), type 2 with peripheral vascular complications (HCC)    Gastroesophageal reflux disease without esophagitis    Abnormality of gait and mobility due to severe spinal stenosis. Atrial fibrillation (HCC)    Generalized weakness  Resolved Problems:    * No resolved hospital problems. *    Patient presented with weakness and a cough from acute sinusitis with a PND. As it did not improve she was started on Abx. Prior to discharge she was found to have possible VRE; per ID its not true VRE and can likely go on PO Abx. Pt was discharge in a stable condition.      Exam on discharge:   BP (!) 147/52   Pulse 57   Temp 97.3 °F (36.3 °C) (Oral)   Resp 18   Ht 5' 1.5\" (1.562 m)   Wt 129 lb 11.2 oz (58.8 kg)   LMP  (LMP Unknown)   SpO2 98%   BMI 24.11 kg/m² hydrocephalus, recent ischemic infarct, or skull fracture identified. Moderate generalized atrophic changes are again noted. A small amount of fluid is noted within the right maxillary sinus suggestive of sinusitis. Mild mucosal thickening is noted within the ethmoid air cells and inferior right maxillary sinus. The mastoid air cells and other visualized paranasal sinuses are essentially clear. NO ACUTE INTRACRANIAL PROCESS IDENTIFIED. MILD PREDOMINANTLY RIGHT MAXILLARY SINUSITIS. Discharge Medications:       [facility] \"Marge\"   Home Medication Instructions FZD:[id number]    Printed on:01/22/20 115   Medication Information                      acetaminophen (TYLENOL) 325 MG tablet  Take 1 tablet by mouth 3 times daily as needed for Pain (DO NOT EXCEED 4GM IN 24 HOURS)             brimonidine (ALPHAGAN P) 0.15 % ophthalmic solution  USE 1 DROP IN BOTH EYES TWICE DAILY. Calcium Carbonate Antacid (TUMS CHEWY DELIGHTS PO)  Take by mouth             docusate sodium (COLACE) 100 MG capsule  Take 1 capsule by mouth 2 times daily as needed for Constipation             ELIQUIS 2.5 MG TABS tablet  TAKE 1 TABLET BY MOUTH TWICE A DAY             Hospital Bed MISC  by Does not apply route             latanoprost (XALATAN) 0.005 % ophthalmic solution  Place 1 drop into the left eye nightly             Misc. Devices (COMMODE BEDSIDE) MISC  As directed             Multiple Vitamin (MULTI VITAMIN DAILY) TABS  Take 1 tablet by mouth every morning             omeprazole (PRILOSEC) 20 MG delayed release capsule  TAKE ONE CAPSULE BY MOUTH EVERY DAY             timolol (TIMOPTIC-XE) 0.5 % ophthalmic gel-forming  USE 1 DROP IN BOTH EYES TWICE DAILY. vitamin D (CHOLECALCIFEROL) 1000 UNIT TABS tablet  Take 1,000 Units by mouth daily                 Disposition:   If discharged to Home, Any [place] [address] needs that were indicated and/or required as been addressed and set up by Social Work.      Condition

## 2020-01-22 NOTE — PROGRESS NOTES
01/22/2020    HGB 12.4 01/22/2020    HCT 36.2 (L) 01/22/2020    MCV 92.3 01/22/2020     01/22/2020     Lab Results   Component Value Date     01/22/2020    K 4.3 01/22/2020     01/22/2020    CO2 18 01/22/2020    BUN 17 01/22/2020    CREATININE 0.43 01/22/2020    GLUCOSE 100 01/22/2020    GLUCOSE 156 05/09/2012    CALCIUM 9.8 01/22/2020                ASSESSMENT:  Patient Active Problem List   Diagnosis    Stress incontinence    Hearing loss    Spinal stenosis of lumbar region    Impaired mobility and activities of daily living    Acute leg pain, left    Acute right hip pain    PVD (peripheral vascular disease) ([address]    Nuclear senile cataract    Nonexudative age-related macular degeneration    Primary open angle glaucoma    DM (diabetes mellitus), type 2 with peripheral vascular complications ([address]    Lumbar stenosis    Gastroesophageal reflux disease without esophagitis    Ambulatory dysfunction    Glaucoma    DJD (degenerative joint disease)    S/P kyphoplasty    Late onset Alzheimer's disease without behavioral disturbance (HCC)    Abnormality of gait and mobility due to severe spinal stenosis.     Leg edema, right    Age-related osteoporosis without current pathological fracture    Other constipation    Atrial fibrillation (HCC)    Age-related physical debility    Post-nasal drip    Generalized weakness         PLAN:    Urinary tract infection with Enterococcus faecalis    This is vancomycin resistant but it is penicillin sensitive so it is not a true VRE that is multidrug-resistant would be Enterococcus fecium     amoxicillin x7 days

## 2020-01-22 NOTE — PROGRESS NOTES
content. 6. Complex discharge planning: The patient is done well in acute rehab in the past progress and an acute level and I will now consider her appropriate for acute level rehabilitation. Complex Active General Medical Issues that complicate care Assess & Plan:     1. Active Problems:    Spinal stenosis of lumbar region    PVD (peripheral vascular disease) (HCC)    DM (diabetes mellitus), type 2 with peripheral vascular complications (HCC)    Gastroesophageal reflux disease without esophagitis    Abnormality of gait and mobility due to severe spinal stenosis. Atrial fibrillation ([facility])    Generalized weakness  Resolved Problems:    * No resolved hospital problems.  Britany Latham D.O., PM&R     Attending    [address]

## 2020-01-22 NOTE — CARE COORDINATION
Met with patient at the bedside as well as June, and by phone her niece April Niño.  Explained the discharge to skilled vs discharge to Rehab, the interqual process and the rationale. All in agreement with patient going to [facility]. Spoke with Dr. Yamilet Haile regarding outcome of interqual for Rehab (appropriate for skilled level of care). Dr. Yamilet Haile agrees with the outcome and stated he agrees to discharge to [facility] as per patients choice. HAIR Morton.   Electronically signed by Khanh Trevizo RN on 1/22/2020 at 11:55 AM

## 2020-01-23 ENCOUNTER — OFFICE VISIT (OUTPATIENT)
Dept: GERIATRIC MEDICINE | Age: 85
End: 2020-01-23
Payer: MEDICARE

## 2020-01-23 LAB
BASOPHILS ABSOLUTE: NORMAL
BASOPHILS RELATIVE PERCENT: NORMAL
BLOOD CULTURE, ROUTINE: NORMAL
BUN BLDV-MCNC: 25 MG/DL
BUN BLDV-MCNC: NORMAL MG/DL
CALCIUM SERPL-MCNC: 10.4 MG/DL
CALCIUM SERPL-MCNC: NORMAL MG/DL
CHLORIDE BLD-SCNC: 104 MMOL/L
CHLORIDE BLD-SCNC: NORMAL MMOL/L
CO2: 20 MMOL/L
CO2: NORMAL
CREAT SERPL-MCNC: 0.6 MG/DL
CREAT SERPL-MCNC: NORMAL MG/DL
CULTURE, BLOOD 2: NORMAL
EOSINOPHILS ABSOLUTE: NORMAL
EOSINOPHILS RELATIVE PERCENT: NORMAL
GFR CALCULATED: NORMAL
GFR CALCULATED: NORMAL
GLUCOSE BLD-MCNC: 97 MG/DL
GLUCOSE BLD-MCNC: NORMAL MG/DL
HCT VFR BLD CALC: 39.5 % (ref 36–46)
HEMOGLOBIN: 13.1 G/DL (ref 12–16)
LYMPHOCYTES ABSOLUTE: NORMAL
LYMPHOCYTES RELATIVE PERCENT: NORMAL
MCH RBC QN AUTO: 30.6 PG
MCHC RBC AUTO-ENTMCNC: 33.2 G/DL
MCV RBC AUTO: 92.2 FL
MONOCYTES ABSOLUTE: NORMAL
MONOCYTES RELATIVE PERCENT: NORMAL
NEUTROPHILS ABSOLUTE: NORMAL
NEUTROPHILS RELATIVE PERCENT: NORMAL
PLATELET # BLD: 218 K/ΜL
PMV BLD AUTO: 7.7 FL
POTASSIUM SERPL-SCNC: 4.6 MMOL/L
POTASSIUM SERPL-SCNC: NORMAL MMOL/L
RBC # BLD: 4.29 10^6/ΜL
SODIUM BLD-SCNC: 136 MMOL/L
SODIUM BLD-SCNC: NORMAL MMOL/L
WBC # BLD: 5.7 10^3/ML

## 2020-01-23 PROCEDURE — 1123F ACP DISCUSS/DSCN MKR DOCD: CPT | Performed by: INTERNAL MEDICINE

## 2020-01-23 PROCEDURE — 99305 1ST NF CARE MODERATE MDM 35: CPT | Performed by: INTERNAL MEDICINE

## 2020-01-23 PROCEDURE — G8482 FLU IMMUNIZE ORDER/ADMIN: HCPCS | Performed by: INTERNAL MEDICINE

## 2020-01-23 NOTE — PROGRESS NOTES
Physical Therapy  Facility/Department: [facility] MED SURG [id number]  Physical Therapy Discharge      NAME: Miles Fonseca    : 1918 (80 y.o.)  MRN: [id number]    Account: [de-identified]  Gender: female      Patient has been discharged from acute care hospital. DC patient from current PT program.      Electronically signed by Chey Lee PT on 20 at 3:50 PM

## 2020-01-27 ENCOUNTER — OFFICE VISIT (OUTPATIENT)
Dept: GERIATRIC MEDICINE | Age: 85
End: 2020-01-27

## 2020-01-28 RX ORDER — HYDROCODONE BITARTRATE AND ACETAMINOPHEN 5; 325 MG/1; MG/1
1 TABLET ORAL EVERY 6 HOURS PRN
Qty: 9 TABLET | Refills: 0 | Status: SHIPPED | OUTPATIENT
Start: 2020-01-28 | End: 2020-01-31

## 2020-01-30 LAB
BASOPHILS ABSOLUTE: ABNORMAL
BASOPHILS RELATIVE PERCENT: ABNORMAL
BUN BLDV-MCNC: 24 MG/DL
CALCIUM SERPL-MCNC: 9.6 MG/DL
CHLORIDE BLD-SCNC: 105 MMOL/L
CO2: 24 MMOL/L
CREAT SERPL-MCNC: 0.7 MG/DL
EOSINOPHILS ABSOLUTE: ABNORMAL
EOSINOPHILS RELATIVE PERCENT: ABNORMAL
GFR CALCULATED: 77
GLUCOSE BLD-MCNC: 106 MG/DL
HCT VFR BLD CALC: 32 % (ref 36–46)
HEMOGLOBIN: 10.8 G/DL (ref 12–16)
LYMPHOCYTES ABSOLUTE: ABNORMAL
LYMPHOCYTES RELATIVE PERCENT: ABNORMAL
MCH RBC QN AUTO: 31.7 PG
MCHC RBC AUTO-ENTMCNC: 33.8 G/DL
MCV RBC AUTO: 93.5 FL
MONOCYTES ABSOLUTE: ABNORMAL
MONOCYTES RELATIVE PERCENT: ABNORMAL
NEUTROPHILS ABSOLUTE: ABNORMAL
NEUTROPHILS RELATIVE PERCENT: ABNORMAL
PLATELET # BLD: 217 K/ΜL
PMV BLD AUTO: 8.2 FL
POTASSIUM SERPL-SCNC: 4.4 MMOL/L
RBC # BLD: 3.42 10^6/ΜL
SODIUM BLD-SCNC: 137 MMOL/L
WBC # BLD: 7.5 10^3/ML

## 2020-02-10 ENCOUNTER — OFFICE VISIT (OUTPATIENT)
Dept: GERIATRIC MEDICINE | Age: 85
End: 2020-02-10

## 2020-02-11 ENCOUNTER — HOSPITAL ENCOUNTER (INPATIENT)
Age: 85
LOS: 7 days | Discharge: HOME | DRG: 389 | End: 2020-02-18
Attending: INTERNAL MEDICINE | Admitting: INTERNAL MEDICINE
Payer: MEDICARE

## 2020-02-11 ENCOUNTER — APPOINTMENT (OUTPATIENT)
Dept: CT IMAGING | Age: 85
DRG: 389 | End: 2020-02-11
Payer: MEDICARE

## 2020-02-11 PROBLEM — K56.609 SBO (SMALL BOWEL OBSTRUCTION) (HCC): Status: ACTIVE | Noted: 2020-02-11

## 2020-02-11 LAB
ALBUMIN SERPL-MCNC: 3.8 G/DL (ref 3.5–4.6)
ALP BLD-CCNC: 161 U/L (ref 40–130)
ALT SERPL-CCNC: 17 U/L (ref 0–33)
ANION GAP SERPL CALCULATED.3IONS-SCNC: 15 MEQ/L (ref 9–15)
AST SERPL-CCNC: 19 U/L (ref 0–35)
BACTERIA: ABNORMAL /HPF
BASOPHILS ABSOLUTE: 0.1 K/UL (ref 0–0.2)
BASOPHILS RELATIVE PERCENT: 0.8 %
BILIRUB SERPL-MCNC: 0.3 MG/DL (ref 0.2–0.7)
BILIRUBIN URINE: NEGATIVE
BLOOD, URINE: ABNORMAL
BUN BLDV-MCNC: 12 MG/DL (ref 8–23)
CALCIUM SERPL-MCNC: 10.4 MG/DL (ref 8.5–9.9)
CHLORIDE BLD-SCNC: 100 MEQ/L (ref 95–107)
CLARITY: CLEAR
CO2: 22 MEQ/L (ref 20–31)
COLOR: YELLOW
CREAT SERPL-MCNC: 0.54 MG/DL (ref 0.5–0.9)
EOSINOPHILS ABSOLUTE: 0.3 K/UL (ref 0–0.7)
EOSINOPHILS RELATIVE PERCENT: 3 %
EPITHELIAL CELLS, UA: ABNORMAL /HPF (ref 0–5)
GFR AFRICAN AMERICAN: >60
GFR NON-AFRICAN AMERICAN: >60
GLOBULIN: 3 G/DL (ref 2.3–3.5)
GLUCOSE BLD-MCNC: 142 MG/DL (ref 70–99)
GLUCOSE URINE: NEGATIVE MG/DL
HCT VFR BLD CALC: 40.1 % (ref 37–47)
HEMOGLOBIN: 13.4 G/DL (ref 12–16)
HYALINE CASTS: ABNORMAL /HPF (ref 0–5)
KETONES, URINE: ABNORMAL MG/DL
LACTIC ACID: 1.1 MMOL/L (ref 0.5–2.2)
LEUKOCYTE ESTERASE, URINE: ABNORMAL
LIPASE: 20 U/L (ref 12–95)
LYMPHOCYTES ABSOLUTE: 1.3 K/UL (ref 1–4.8)
LYMPHOCYTES RELATIVE PERCENT: 14.1 %
MCH RBC QN AUTO: 31 PG (ref 27–31.3)
MCHC RBC AUTO-ENTMCNC: 33.4 % (ref 33–37)
MCV RBC AUTO: 92.9 FL (ref 82–100)
MONOCYTES ABSOLUTE: 0.6 K/UL (ref 0.2–0.8)
MONOCYTES RELATIVE PERCENT: 6.8 %
NEUTROPHILS ABSOLUTE: 6.8 K/UL (ref 1.4–6.5)
NEUTROPHILS RELATIVE PERCENT: 75.3 %
NITRITE, URINE: NEGATIVE
PDW BLD-RTO: 13.9 % (ref 11.5–14.5)
PH UA: 6.5 (ref 5–9)
PLATELET # BLD: 260 K/UL (ref 130–400)
POC CREATININE WHOLE BLOOD: 0.6
POTASSIUM SERPL-SCNC: 4.5 MEQ/L (ref 3.4–4.9)
PROTEIN UA: NEGATIVE MG/DL
RBC # BLD: 4.32 M/UL (ref 4.2–5.4)
RBC UA: ABNORMAL /HPF (ref 0–5)
SODIUM BLD-SCNC: 137 MEQ/L (ref 135–144)
SPECIFIC GRAVITY UA: 1.04 (ref 1–1.03)
TOTAL PROTEIN: 6.8 G/DL (ref 6.3–8)
URINE REFLEX TO CULTURE: YES
UROBILINOGEN, URINE: 0.2 E.U./DL
WBC # BLD: 9 K/UL (ref 4.8–10.8)
WBC UA: >100 /HPF (ref 0–5)

## 2020-02-11 PROCEDURE — 1210000000 HC MED SURG R&B

## 2020-02-11 PROCEDURE — 81001 URINALYSIS AUTO W/SCOPE: CPT

## 2020-02-11 PROCEDURE — 80053 COMPREHEN METABOLIC PANEL: CPT

## 2020-02-11 PROCEDURE — 2580000003 HC RX 258: Performed by: INTERNAL MEDICINE

## 2020-02-11 PROCEDURE — 87077 CULTURE AEROBIC IDENTIFY: CPT

## 2020-02-11 PROCEDURE — 2580000003 HC RX 258: Performed by: PHYSICIAN ASSISTANT

## 2020-02-11 PROCEDURE — 6360000002 HC RX W HCPCS: Performed by: INTERNAL MEDICINE

## 2020-02-11 PROCEDURE — 85025 COMPLETE CBC W/AUTO DIFF WBC: CPT

## 2020-02-11 PROCEDURE — 6360000004 HC RX CONTRAST MEDICATION: Performed by: PHYSICIAN ASSISTANT

## 2020-02-11 PROCEDURE — 87086 URINE CULTURE/COLONY COUNT: CPT

## 2020-02-11 PROCEDURE — 74177 CT ABD & PELVIS W/CONTRAST: CPT

## 2020-02-11 PROCEDURE — 6370000000 HC RX 637 (ALT 250 FOR IP): Performed by: INTERNAL MEDICINE

## 2020-02-11 PROCEDURE — 99285 EMERGENCY DEPT VISIT HI MDM: CPT

## 2020-02-11 PROCEDURE — 83605 ASSAY OF LACTIC ACID: CPT

## 2020-02-11 PROCEDURE — 6360000002 HC RX W HCPCS: Performed by: PHYSICIAN ASSISTANT

## 2020-02-11 PROCEDURE — 87186 SC STD MICRODIL/AGAR DIL: CPT

## 2020-02-11 PROCEDURE — 36415 COLL VENOUS BLD VENIPUNCTURE: CPT

## 2020-02-11 PROCEDURE — 83690 ASSAY OF LIPASE: CPT

## 2020-02-11 PROCEDURE — 96374 THER/PROPH/DIAG INJ IV PUSH: CPT

## 2020-02-11 PROCEDURE — 96375 TX/PRO/DX INJ NEW DRUG ADDON: CPT

## 2020-02-11 PROCEDURE — 96376 TX/PRO/DX INJ SAME DRUG ADON: CPT

## 2020-02-11 RX ORDER — MORPHINE SULFATE 2 MG/ML
1 INJECTION, SOLUTION INTRAMUSCULAR; INTRAVENOUS ONCE
Status: COMPLETED | OUTPATIENT
Start: 2020-02-11 | End: 2020-02-11

## 2020-02-11 RX ORDER — ONDANSETRON 2 MG/ML
4 INJECTION INTRAMUSCULAR; INTRAVENOUS ONCE
Status: COMPLETED | OUTPATIENT
Start: 2020-02-11 | End: 2020-02-11

## 2020-02-11 RX ORDER — SODIUM CHLORIDE 9 MG/ML
INJECTION, SOLUTION INTRAVENOUS CONTINUOUS
Status: DISCONTINUED | OUTPATIENT
Start: 2020-02-11 | End: 2020-02-17

## 2020-02-11 RX ORDER — MORPHINE SULFATE 2 MG/ML
2 INJECTION, SOLUTION INTRAMUSCULAR; INTRAVENOUS ONCE
Status: DISCONTINUED | OUTPATIENT
Start: 2020-02-11 | End: 2020-02-11

## 2020-02-11 RX ORDER — SODIUM CHLORIDE 9 MG/ML
INJECTION, SOLUTION INTRAVENOUS CONTINUOUS
Status: DISCONTINUED | OUTPATIENT
Start: 2020-02-11 | End: 2020-02-13 | Stop reason: SDUPTHER

## 2020-02-11 RX ORDER — BRIMONIDINE TARTRATE 2 MG/ML
1 SOLUTION/ DROPS OPHTHALMIC 2 TIMES DAILY
Status: DISCONTINUED | OUTPATIENT
Start: 2020-02-11 | End: 2020-02-18 | Stop reason: HOSPADM

## 2020-02-11 RX ORDER — BISACODYL 10 MG
10 SUPPOSITORY, RECTAL RECTAL DAILY PRN
Status: DISCONTINUED | OUTPATIENT
Start: 2020-02-11 | End: 2020-02-15

## 2020-02-11 RX ORDER — ONDANSETRON 2 MG/ML
4 INJECTION INTRAMUSCULAR; INTRAVENOUS EVERY 6 HOURS PRN
Status: DISCONTINUED | OUTPATIENT
Start: 2020-02-11 | End: 2020-02-18 | Stop reason: HOSPADM

## 2020-02-11 RX ORDER — SODIUM CHLORIDE 0.9 % (FLUSH) 0.9 %
10 SYRINGE (ML) INJECTION EVERY 12 HOURS SCHEDULED
Status: DISCONTINUED | OUTPATIENT
Start: 2020-02-11 | End: 2020-02-18 | Stop reason: HOSPADM

## 2020-02-11 RX ORDER — 0.9 % SODIUM CHLORIDE 0.9 %
500 INTRAVENOUS SOLUTION INTRAVENOUS ONCE
Status: COMPLETED | OUTPATIENT
Start: 2020-02-11 | End: 2020-02-11

## 2020-02-11 RX ORDER — SODIUM CHLORIDE 0.9 % (FLUSH) 0.9 %
10 SYRINGE (ML) INJECTION PRN
Status: DISCONTINUED | OUTPATIENT
Start: 2020-02-11 | End: 2020-02-18 | Stop reason: HOSPADM

## 2020-02-11 RX ADMIN — CEFTRIAXONE SODIUM 1 G: 1 INJECTION, POWDER, FOR SOLUTION INTRAMUSCULAR; INTRAVENOUS at 22:33

## 2020-02-11 RX ADMIN — ONDANSETRON 4 MG: 2 INJECTION INTRAMUSCULAR; INTRAVENOUS at 16:44

## 2020-02-11 RX ADMIN — SODIUM CHLORIDE: 9 INJECTION, SOLUTION INTRAVENOUS at 18:39

## 2020-02-11 RX ADMIN — SODIUM CHLORIDE 500 ML: 9 INJECTION, SOLUTION INTRAVENOUS at 16:44

## 2020-02-11 RX ADMIN — SODIUM CHLORIDE: 9 INJECTION, SOLUTION INTRAVENOUS at 22:32

## 2020-02-11 RX ADMIN — IOPAMIDOL 100 ML: 755 INJECTION, SOLUTION INTRAVENOUS at 17:15

## 2020-02-11 RX ADMIN — MORPHINE SULFATE 1 MG: 2 INJECTION, SOLUTION INTRAMUSCULAR; INTRAVENOUS at 16:51

## 2020-02-11 RX ADMIN — BRIMONIDINE TARTRATE 1 DROP: 2 SOLUTION OPHTHALMIC at 22:31

## 2020-02-11 RX ADMIN — MORPHINE SULFATE 1 MG: 2 INJECTION, SOLUTION INTRAMUSCULAR; INTRAVENOUS at 18:39

## 2020-02-11 ASSESSMENT — ENCOUNTER SYMPTOMS
EYE DISCHARGE: 0
COUGH: 0
BLOOD IN STOOL: 0
ABDOMINAL PAIN: 1
SHORTNESS OF BREATH: 0
ANAL BLEEDING: 0
NAUSEA: 1
VOMITING: 1
VOICE CHANGE: 0
PHOTOPHOBIA: 0
APNEA: 0
BACK PAIN: 0
ABDOMINAL DISTENTION: 0

## 2020-02-11 ASSESSMENT — PAIN DESCRIPTION - LOCATION
LOCATION: ABDOMEN

## 2020-02-11 ASSESSMENT — PAIN DESCRIPTION - ONSET
ONSET: ON-GOING

## 2020-02-11 ASSESSMENT — PAIN DESCRIPTION - FREQUENCY
FREQUENCY: CONTINUOUS

## 2020-02-11 ASSESSMENT — PAIN DESCRIPTION - PROGRESSION
CLINICAL_PROGRESSION: GRADUALLY WORSENING

## 2020-02-11 ASSESSMENT — PAIN DESCRIPTION - ORIENTATION
ORIENTATION: RIGHT;LEFT

## 2020-02-11 ASSESSMENT — PAIN SCALES - GENERAL
PAINLEVEL_OUTOF10: 7
PAINLEVEL_OUTOF10: 3
PAINLEVEL_OUTOF10: 3

## 2020-02-11 ASSESSMENT — PAIN DESCRIPTION - PAIN TYPE
TYPE: ACUTE PAIN

## 2020-02-11 ASSESSMENT — PAIN DESCRIPTION - DESCRIPTORS
DESCRIPTORS: PRESSURE
DESCRIPTORS: PRESSURE
DESCRIPTORS: ACHING

## 2020-02-11 ASSESSMENT — PAIN - FUNCTIONAL ASSESSMENT: PAIN_FUNCTIONAL_ASSESSMENT: 0-10

## 2020-02-11 NOTE — ED PROVIDER NOTES
service: More than 4 times per year     Active member of club or organization: Yes     Attends meetings of clubs or organizations: More than 4 times per year     Relationship status: Never     Intimate partner violence:     Fear of current or ex partner: No     Emotionally abused: No     Physically abused: No     Forced sexual activity: No   Other Topics Concern    None   Social History Narrative    Born in [place], [place] background (both parents born in the [facility])    Father was a [place] [employer] , teacher,  and  in [facility]     She never         Retired from [employer] alone in a house, no children    Former  at [address] for [address] [facility] for many years     Lives With: Alone(has 24 hour care)--June [employer] caregiver          Lives [id number] in [place] she states she would like [employer] if SNF appropriate    However has always done well at [address]    Type of Home: [address] [place] [address] Avenue: One level    Home Access: Level entry    ADL Assistance: Needs assistance    Homemaking Assistance: Needs assistance    Ambulation Assistance: Independent(normally independent with ww )    Transfer Assistance: Independent(normally independent with occ assist to stabilize ww )    Active : No    Additional Comments: Pt with increased assistance required and recent fall. SCREENINGS      @FLOW([phone])@      PHYSICAL EXAM    (up to 7 for level 4, 8 or more for level 5)     ED Triage Vitals [02/11/20 1622]   BP Temp Temp Source Pulse Resp SpO2 Height Weight   (!) 206/123 97.8 °F (36.6 °C) Oral 71 20 100 % 5' 1\" (1.549 m) 130 lb (59 kg)       Physical Exam  Vitals signs and nursing note reviewed. Constitutional:       General: She is not in acute distress. Appearance: She is well-developed. HENT:      Head: Normocephalic and atraumatic.       Ears: Comments: Hearing aids in place bilat. Nose: Nose normal.      Mouth/Throat:      Mouth: Mucous membranes are moist.      Pharynx: No posterior oropharyngeal erythema. Eyes:      General:         Right eye: No discharge. Left eye: No discharge. Extraocular Movements: Extraocular movements intact. Pupils: Pupils are equal, round, and reactive to light. Neck:      Musculoskeletal: Normal range of motion and neck supple. Cardiovascular:      Rate and Rhythm: Normal rate and regular rhythm. Heart sounds: Normal heart sounds. Pulmonary:      Effort: Pulmonary effort is normal. No respiratory distress. Breath sounds: Normal breath sounds. No stridor. Abdominal:      Palpations: Abdomen is soft. Tenderness: There is abdominal tenderness. Comments: Sl decrease bs ruq    bs present llq,luq,rlq   Musculoskeletal: Normal range of motion. Skin:     General: Skin is warm. Findings: No erythema. Neurological:      Mental Status: She is alert and oriented to person, place, and time. Psychiatric:         Mood and Affect: Mood normal.         DIAGNOSTIC RESULTS     EKG: All EKG's are interpreted by the Emergency Department Physician who either signs or Co-signsthis chart in the absence of a cardiologist.         RADIOLOGY:   Madlyn Hurst such as CT, Ultrasound and MRI are read by the radiologist. Plain radiographic images are visualized and preliminarily interpreted by the emergency physician with the below findings:    See  CAT scan report    Interpretation per the Radiologist below, if available at the time ofthis note:    CT ABDOMEN PELVIS W IV CONTRAST Additional Contrast? None   Final Result      Small bowel obstruction, mid to distal ileum. No mass identified. Findings similar to those on prior study. Hepatic steatosis. Minimal intrahepatic ductal dilatation, unchanged.       Severe degenerative change, lumbar spine, with remote L1

## 2020-02-11 NOTE — ED NOTES
Pt c/o \"terrible\" pain in her mid to lower back. States it feels like her back is going to break in half. Pt given a pillow and repositioned and still having pain. Miguel advised.       Louise Naranjo RN  02/11/20 [id number]

## 2020-02-11 NOTE — H&P
Hospital Medicine History & Physical      PCP: Leif Reese MD    Date of Admission: 2/11/2020    Date of Service: 2/11/20      Chief Complaint:  Abdominal pain      History Of Present Illness:  80 y.o. female who presented to [facility] ED for complaints of abdominal pain with associated nausea and vomiting for the past two days. The patient states that her stomach feels hard. Abdomin is firm round and tender. Denies cp sob ha rash anx n v d f. Past Medical History:          Diagnosis Date    Acute cystitis with hematuria 8/17/2019    Bowel obstruction ([address] 9/22/2019    Chronic hyponatremia     Compression fracture of T12 vertebra [facility]) 2013    kyphoplasty Dr Ratna Betancur    DJD (degenerative joint disease) of hip 3/20/2014    Dr Santosh Reese DM (diabetes mellitus), type 2 with peripheral vascular complications (HCC)     diet controlled    Gallbladder sludge 2019    GERD (gastroesophageal reflux disease) 8/31/10    Glaucoma     bilateral    H/O vascular surgery 5/9/2019    Josie NAQVI 2006    Hearing loss     History of total hip replacement, right 05/09/2019    Dr Gaurav Cordero    Lower leg edema     chronic, R>L    Lumbar stenosis     Macular degeneration     left eye    PAF (paroxysmal atrial fibrillation) ([address] 2019    Pain of left scapula 3/25/2019    PVD (peripheral vascular disease) ([address] 10/28/2006    Dr Suzanne Bowie, stents LE    Stress incontinence 10/28/2011       Past Surgical History:          Procedure Laterality Date    APPENDECTOMY      CYST REMOVAL  06/27/2016    DR SLOAN  RT THUMB MUCOUS CYST    HYSTERECTOMY      REFRACTIVE SURGERY  062001    left     TOTAL HIP ARTHROPLASTY Right 9/25/15    DR. NARANJO    UPPER GASTROINTESTINAL ENDOSCOPY  10/07/15    DR Kim Hernández    VERTEBROPLASTY  2013    T12, Dr Ratna Betancur       Medications Prior to Admission:      Prior to Admission medications    Medication Sig Start Date End Date Taking?  [address] by Does not apply route 11/22/19   Dhiraj Solares MD   [facility]. Devices (COMMODE BEDSIDE) MISC As directed 11/22/19   Dhiraj Solares MD   ELIQUIS 2.5 MG TABS tablet TAKE 1 TABLET BY MOUTH TWICE A DAY  Patient taking differently: 2.5 mg 2 times daily  11/19/19   Dhiraj Solares MD   Calcium Carbonate Antacid (TUMS CHEWY DELIGHTS PO) Take 1 tablet by mouth daily     Historical Provider, MD   vitamin D (CHOLECALCIFEROL) 1000 UNIT TABS tablet Take 1,000 Units by mouth daily    Historical Provider, MD   docusate sodium (COLACE) 100 MG capsule Take 1 capsule by mouth 2 times daily as needed for Constipation 10/3/19   Dhiraj Solares MD   omeprazole (PRILOSEC) 20 MG delayed release capsule TAKE ONE CAPSULE BY MOUTH EVERY DAY 8/28/19   Dhiraj Solares MD   brimonidine (ALPHAGAN) 0.2 % ophthalmic solution Place 1 drop into both eyes 2 times daily  8/7/18   Historical Provider, MD   timolol (TIMOPTIC-XE) 0.5 % ophthalmic gel-forming USE 1 DROP IN BOTH EYES TWICE DAILY. 5/1/18   Historical Provider, MD   acetaminophen (TYLENOL) 325 MG tablet Take 1 tablet by mouth 3 times daily as needed for Pain (DO NOT EXCEED 4GM IN 24 HOURS)  Patient taking differently: Take 650 mg by mouth 3 times daily as needed for Pain (DO NOT EXCEED 4GM IN 24 HOURS)  12/11/17   Tarsha Colon MD   latanoprost (XALATAN) 0.005 % ophthalmic solution Place 1 drop into the left eye nightly    Historical Provider, MD   Multiple Vitamin (MULTI VITAMIN DAILY) TABS Take 1 tablet by mouth every morning 10/17/15   Historical Provider, MD       Allergies:  Patient has no known allergies. Social History:      The patient currently lives home    TOBACCO:   reports that she quit smoking about 39 years ago. Her smoking use included cigarettes. She has never used smokeless tobacco.  ETOH:   reports no history of alcohol use.       Family History:       Positive as follows:        Problem Relation Age of Onset    01/18/2020    WBCUA >100 01/18/2020    BACTERIA RARE 01/18/2020    RBCUA 3-5 01/18/2020    BLOODU Negative 01/18/2020    SPECGRAV 1.014 01/18/2020    GLUCOSEU Negative 01/18/2020    GLUCOSEU NEG 05/09/2012       Radiology:     CXR: I have reviewed the CXR with the following interpretation: pending  EKG:  I have reviewed the EKG with the following interpretation: pending    CT ABDOMEN PELVIS W IV CONTRAST Additional Contrast? None   Final Result      Small bowel obstruction, mid to distal ileum. No mass identified. Findings similar to those on prior study. Hepatic steatosis. Minimal intrahepatic ductal dilatation, unchanged. Severe degenerative change, lumbar spine, with remote L1 kyphoplasty, and L4, and L5, grade 1 spondylolisthesis, unchanged. Remote right hip arthroplasty. Other findings discussed. All CT scans at this facility use dose modulation, iterative reconstruction, and/or weight based dosing when appropriate to reduce radiation dose to as low as reasonably achievable. ASSESSMENT:    Active Hospital Problems    Diagnosis Date Noted    [facility] obstruction) [facility] [D18.915] 02/11/2020       PLAN:        DVT Prophylaxis: eliquis  Diet: No diet orders on file  Code Status: Prior    PT/OT Eval Status: eval and tx    1. Small bowel obstruction-will admit and consult Dr. Kulwinder Heard. Will place NG to LIS  2. Abdominal pain       Ramon Helton    Thank you Leif Reese MD for the opportunity to be involved in this patient's care. If you have any questions or concerns please feel free to contact me.

## 2020-02-11 NOTE — ED NOTES
Bed: 21  Expected date: 2/11/20  Expected time: 4:07 PM  Means of arrival: [facility]  Comments:  80 F - lower abd pain. 196/87,74,20,97% RA. . Hx bowel obstruction. Bowel sounds absent.       Sudheer Left, RN  02/11/20 7549

## 2020-02-12 LAB
ANION GAP SERPL CALCULATED.3IONS-SCNC: 12 MEQ/L (ref 9–15)
BASOPHILS ABSOLUTE: 0 K/UL (ref 0–0.2)
BASOPHILS RELATIVE PERCENT: 0.5 %
BUN BLDV-MCNC: 13 MG/DL (ref 8–23)
CALCIUM SERPL-MCNC: 9.6 MG/DL (ref 8.5–9.9)
CHLORIDE BLD-SCNC: 105 MEQ/L (ref 95–107)
CO2: 20 MEQ/L (ref 20–31)
CREAT SERPL-MCNC: 0.59 MG/DL (ref 0.5–0.9)
EOSINOPHILS ABSOLUTE: 0.2 K/UL (ref 0–0.7)
EOSINOPHILS RELATIVE PERCENT: 2.5 %
GFR AFRICAN AMERICAN: >60
GFR NON-AFRICAN AMERICAN: >60
GLUCOSE BLD-MCNC: 141 MG/DL (ref 70–99)
HCT VFR BLD CALC: 36.7 % (ref 37–47)
HEMOGLOBIN: 12.1 G/DL (ref 12–16)
LACTIC ACID: 1.8 MMOL/L (ref 0.5–2.2)
LYMPHOCYTES ABSOLUTE: 1.4 K/UL (ref 1–4.8)
LYMPHOCYTES RELATIVE PERCENT: 15.1 %
MCH RBC QN AUTO: 31.4 PG (ref 27–31.3)
MCHC RBC AUTO-ENTMCNC: 33.1 % (ref 33–37)
MCV RBC AUTO: 95 FL (ref 82–100)
MONOCYTES ABSOLUTE: 0.8 K/UL (ref 0.2–0.8)
MONOCYTES RELATIVE PERCENT: 8.3 %
NEUTROPHILS ABSOLUTE: 6.8 K/UL (ref 1.4–6.5)
NEUTROPHILS RELATIVE PERCENT: 73.6 %
PDW BLD-RTO: 14.7 % (ref 11.5–14.5)
PLATELET # BLD: 234 K/UL (ref 130–400)
POTASSIUM REFLEX MAGNESIUM: 4 MEQ/L (ref 3.4–4.9)
RBC # BLD: 3.87 M/UL (ref 4.2–5.4)
SODIUM BLD-SCNC: 137 MEQ/L (ref 135–144)
WBC # BLD: 9.2 K/UL (ref 4.8–10.8)

## 2020-02-12 PROCEDURE — 2580000003 HC RX 258: Performed by: INTERNAL MEDICINE

## 2020-02-12 PROCEDURE — 85025 COMPLETE CBC W/AUTO DIFF WBC: CPT

## 2020-02-12 PROCEDURE — 6370000000 HC RX 637 (ALT 250 FOR IP): Performed by: INTERNAL MEDICINE

## 2020-02-12 PROCEDURE — 2500000003 HC RX 250 WO HCPCS: Performed by: PHYSICIAN ASSISTANT

## 2020-02-12 PROCEDURE — 6360000002 HC RX W HCPCS: Performed by: SURGERY

## 2020-02-12 PROCEDURE — 99222 1ST HOSP IP/OBS MODERATE 55: CPT | Performed by: SURGERY

## 2020-02-12 PROCEDURE — 80048 BASIC METABOLIC PNL TOTAL CA: CPT

## 2020-02-12 PROCEDURE — 2580000003 HC RX 258: Performed by: PHYSICIAN ASSISTANT

## 2020-02-12 PROCEDURE — 36415 COLL VENOUS BLD VENIPUNCTURE: CPT

## 2020-02-12 PROCEDURE — 1210000000 HC MED SURG R&B

## 2020-02-12 PROCEDURE — 6360000002 HC RX W HCPCS: Performed by: INTERNAL MEDICINE

## 2020-02-12 PROCEDURE — 97162 PT EVAL MOD COMPLEX 30 MIN: CPT

## 2020-02-12 PROCEDURE — 83605 ASSAY OF LACTIC ACID: CPT

## 2020-02-12 RX ORDER — METOCLOPRAMIDE HYDROCHLORIDE 5 MG/ML
5 INJECTION INTRAMUSCULAR; INTRAVENOUS EVERY 8 HOURS
Status: DISCONTINUED | OUTPATIENT
Start: 2020-02-12 | End: 2020-02-17

## 2020-02-12 RX ORDER — BISACODYL 10 MG
10 SUPPOSITORY, RECTAL RECTAL DAILY
Status: DISPENSED | OUTPATIENT
Start: 2020-02-12 | End: 2020-02-15

## 2020-02-12 RX ADMIN — FAMOTIDINE 20 MG: 10 INJECTION, SOLUTION INTRAVENOUS at 08:40

## 2020-02-12 RX ADMIN — BRIMONIDINE TARTRATE 1 DROP: 2 SOLUTION OPHTHALMIC at 08:41

## 2020-02-12 RX ADMIN — BRIMONIDINE TARTRATE 1 DROP: 2 SOLUTION OPHTHALMIC at 21:24

## 2020-02-12 RX ADMIN — CEFTRIAXONE SODIUM 1 G: 1 INJECTION, POWDER, FOR SOLUTION INTRAMUSCULAR; INTRAVENOUS at 21:22

## 2020-02-12 RX ADMIN — SODIUM CHLORIDE: 9 INJECTION, SOLUTION INTRAVENOUS at 13:49

## 2020-02-12 RX ADMIN — SODIUM CHLORIDE: 9 INJECTION, SOLUTION INTRAVENOUS at 21:23

## 2020-02-12 RX ADMIN — METOCLOPRAMIDE HYDROCHLORIDE 5 MG: 5 INJECTION INTRAMUSCULAR; INTRAVENOUS at 18:30

## 2020-02-12 RX ADMIN — Medication 10 ML: at 08:40

## 2020-02-12 ASSESSMENT — PAIN DESCRIPTION - LOCATION: LOCATION: BACK

## 2020-02-12 ASSESSMENT — PAIN DESCRIPTION - ORIENTATION: ORIENTATION: LOWER

## 2020-02-12 ASSESSMENT — PAIN SCALES - GENERAL
PAINLEVEL_OUTOF10: 3
PAINLEVEL_OUTOF10: 1

## 2020-02-12 ASSESSMENT — PAIN DESCRIPTION - DESCRIPTORS: DESCRIPTORS: ACHING

## 2020-02-12 ASSESSMENT — PAIN DESCRIPTION - PAIN TYPE: TYPE: CHRONIC PAIN

## 2020-02-12 NOTE — PROGRESS NOTES
0730:  Pt refused AM labs, asked lab to please come back later and try again. 0830:  Pt a/ox2 although very forgetful. Pleasant/ cooperative to care. Hearing aids now in, pt still very [place]. New #24 started to left hand as IV access was lost overnight. IVF restarted at 100ml /hr. Pt declines need for pain medication but does report back pain and sore right arm. Assisted x 1 up to [facility] and up to chair with chair alarm in place. Denies further needs at this time. Call light is within reach. 1400:  Pt and caregiver at bedside inquiring about diet order. Perfect serve message sent to Dr. Deana Salas who states pt needs to remain NPO.  1600:  Pt extremely confused/ agitated, unable to reorient. Pt believes she is at her own home. Continuing to climb out of bed/ chair without assistance. Alarms in place for safety.

## 2020-02-12 NOTE — ED NOTES
Tele placed on patient. Monitor room was nt and verified proper placement.       Gregor Tillman  02/11/20 1912

## 2020-02-12 NOTE — PROGRESS NOTES
Pt AxOX1. Confused . Resting quietly in bed. Fall precautions in place. meds given per orders. remains NPO per orders. abd soft no pain or nausea. Hypoactive BS. Fall precautions in place call light in reach. labor

## 2020-02-12 NOTE — PROGRESS NOTES
Physical Therapy Med Surg Initial Assessment  Facility/Department: [facility]  [facility]       NAME: Eros Parnell  : 1918 (80 y.o.)  MRN: [id number]  CODE STATUS: Limited    Date of Service: 2020    Patient Diagnosis(es): SBO (small bowel obstruction) [facility]) [N45.089]   Chief Complaint   Patient presents with    Abdominal Pain     x2 days with nausea and vomiting. Patient Active Problem List    Diagnosis Date Noted    SBO (small bowel obstruction) ([address] 2020    Generalized weakness 2020    Age-related physical debility 2019    Post-nasal drip 2019    Other constipation 10/03/2019    Atrial fibrillation ([address] 10/03/2019    Age-related osteoporosis without current pathological fracture 2019    Leg edema, right 2019    Abnormality of gait and mobility due to severe spinal stenosis.  2019    Late onset Alzheimer's disease without behavioral disturbance ([address] 2019    Ambulatory dysfunction 2019    Glaucoma 2019    DJD (degenerative joint disease) 2019    S/P kyphoplasty 2019    Gastroesophageal reflux disease without esophagitis 04/15/2019    Lumbar stenosis     DM (diabetes mellitus), type 2 with peripheral vascular complications ([address]     Nuclear senile cataract 2017    Nonexudative age-related macular degeneration 2017    Primary open angle glaucoma 2017    Impaired mobility and activities of daily living 2014    Acute leg pain, left 2014    Acute right hip pain 2014    Spinal stenosis of lumbar region 2012    Stress incontinence 10/28/2011    Hearing loss 10/28/2011    PVD (peripheral vascular disease) ([address] 10/28/2006        Past Medical History:   Diagnosis Date    Acute cystitis with hematuria 2019    Bowel obstruction (HCC) 2019    Chronic hyponatremia     Compression fracture of T12 vertebra ([address] 2013    kyphoplasty

## 2020-02-12 NOTE — PLAN OF CARE
Problem: Pain:  Description  Pain management should include both nonpharmacologic and pharmacologic interventions.   Goal: Pain level will decrease  Outcome: Ongoing  Goal: Control of acute pain  Outcome: Ongoing  Goal: Control of chronic pain  Outcome: Ongoing     Problem: Falls - Risk of:  Goal: Will remain free from falls  Outcome: Ongoing  Goal: Absence of physical injury  Outcome: Ongoing

## 2020-02-12 NOTE — DISCHARGE INSTR - COC
Continuity of Care Form    Patient Name: Samara Gonzalez   :  1918  MRN:  [id number]    Admit date:  2020  Discharge date:  20    Code Status Order: Limited   Advance Directives:   [facility] Documentation     Date/Time Healthcare Directive Type of Healthcare Directive Copy in [address] Agent's Name Healthcare Agent's Phone Number    20 [phone]  Yes, patient has an advance directive for healthcare treatment  Living will  Yes, copy in chart  Healthcare power of   Sofia Pipo  [phone]          Admitting Physician:  Vijay Baca MD  PCP: Ruby Thomson MD    Discharging Nurse: Melvern Oppenheim. [address] Unit/Room#: O571/K141-18  Discharging Unit Phone Number: [phone]    Emergency Contact:   Extended Emergency Contact Information  Primary Emergency Contact: Aleena [address] Phone: [phone]  Mobile Phone: [phone]  Relation: Niece/Nephew  Secondary Emergency Contact: Redd Mcneal [facility] Phone: [phone]  Relation: Niece/Nephew  Preferred language: English   needed? No    Past Surgical History:  Past Surgical History:   Procedure Laterality Date    APPENDECTOMY      CYST REMOVAL  2016    DR SLOAN  RT THUMB MUCOUS CYST    HYSTERECTOMY      REFRACTIVE SURGERY  [id number]    left     TOTAL HIP ARTHROPLASTY Right 9/25/15    DR. NARANJO    UPPER GASTROINTESTINAL ENDOSCOPY  10/07/15    DR Charlotte Fischer    VERTEBROPLASTY      T12, Dr Mookie Frey       Immunization History:   Immunization History   Administered Date(s) Administered    Influenza A (R7G0-30) Vaccine PF IM 2009    Influenza Vaccine, unspecified formulation 10/15/2016    Influenza Virus Vaccine 10/20/2014    Influenza Whole 10/20/2014, 10/05/2015    Influenza, High Dose (Fluzone 65 yrs and older) 10/15/2016    Influenza, Triv, inactivated, subunit, adjuvanted, IM (Fluad 65 yrs and older) 10/03/2019  Pneumococcal Conjugate 13-valent ([id number]) 05/16/2018    Pneumococcal Polysaccharide ([id number]) 01/31/2013       Active Problems:  Patient Active Problem List   Diagnosis Code    Stress incontinence N39.3    Hearing loss H91.90    Spinal stenosis of lumbar region M48.061    Impaired mobility and activities of daily living Z74.09    Acute leg pain, left M79.605    Acute right hip pain M25.551    PVD (peripheral vascular disease) ([facility] I73.9    Nuclear senile cataract H25.10    Nonexudative age-related macular degeneration H35.3190    Primary open angle glaucoma H40.1190    DM (diabetes mellitus), type 2 with peripheral vascular complications ([facility]) Z30.20    Lumbar stenosis M48.061    Gastroesophageal reflux disease without esophagitis K21.9    Ambulatory dysfunction R26.2    Glaucoma H40.9    DJD (degenerative joint disease) M19.90    S/P kyphoplasty Z98.890    Late onset Alzheimer's disease without behavioral disturbance ([facility]) G30.1, F02.80    Abnormality of gait and mobility due to severe spinal stenosis. 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 faecalis VRE urine on 01/18/2020. Electronically signed by Rai Day RN on 1/22/2020 at 7:56 AM     E. faecium VRE urine on 9/26/2019.  Electronically signed by Rai Day RN on 10/2/2019 at 7:41 AM           Nurse Assessment:  Last Vital Signs: BP (!) 132/57   Pulse 74   Temp 97.5 °F (36.4 °C) (Oral)   Resp 16 Ht 5' 1\" (1.549 m)   Wt 130 lb (59 kg)   LMP  (LMP Unknown)   SpO2 91%   BMI 24.56 kg/m²     Last documented pain score (0-10 scale): Pain Level: 3  Last Weight:   Wt Readings from Last 1 Encounters:   02/11/20 130 lb (59 kg)     Mental Status:  oriented and alert    IV Access:  - None    Nursing Mobility/ADLs:  Walking   Assisted  Transfer  Assisted  Bathing  Assisted  Dressing  Assisted  Toileting  Assisted  Feeding  Independent  Med Admin  Assisted  Med Delivery   whole    Wound Care Documentation and Therapy:  Wound 11/09/16 Abrasion(s) Elbow Outer abrasion from fall, no bleeding noted (Active)   Number of days: 1190        Elimination:  Continence:   · Bowel: Yes  · Bladder: No  Urinary Catheter: None   Colostomy/Ileostomy/Ileal Conduit: No       Date of Last BM: 2/17/20    Intake/Output Summary (Last 24 hours) at 2/12/2020 1516  Last data filed at 2/12/2020 0556  Gross per 24 hour   Intake 705 ml   Output --   Net 705 ml     I/O last 3 completed shifts: In: 705 [I.V.:705]  Out: -     Safety Concerns: At Risk for Falls    Impairments/Disabilities:      Hearing    Nutrition Therapy:  Current Nutrition Therapy:   - Oral Diet:  General    Routes of Feeding: Oral  Liquids: Thin Liquids  Daily Fluid Restriction: no  Last Modified Barium Swallow with Video (Video Swallowing Test): not done    Treatments at the Time of Hospital Discharge:   Respiratory Treatments: ***  Oxygen Therapy:  is not on home oxygen therapy.   Ventilator:    - No ventilator support    Rehab Therapies: {THERAPEUTIC INTERVENTION:[id number]}  Weight Bearing Status/Restrictions: No weight bearing restirctions  Other Medical Equipment (for information only, NOT a DME order):  walker  Other Treatments: PT/OT    Patient's personal belongings (please select all that are sent with patient):  Glasses, Hearing Aides {LEFT/RIGHT/BILATERAL:[id number]}    RN SIGNATURE:  Electronically signed by Sandi Arias RN on 2/18/20 at 11:57

## 2020-02-12 NOTE — CONSULTS
Pt Name: Viktoriya Puri  MRN: [id number]  YOB: 1918  Date of evaluation: 2/12/2020  Primary Care Physician: Stefano Esquivel MD  Patient evaluated at the request of  BALAJI Tinoco  Reason for evaluation: Small bowel obstruction    IMPRESSIONS:  1. Probable partial small bowel obstruction probably secondary to adhesions  2. No evidence of peritonitis    PLANS  1. I spoke with the patient and her niece who is her power of  and they are not interested in surgical intervention at this time. 2. N.p.o. and IV fluids  3. Dulcolax suppository daily and Reglan IV  4. Repeat x-rays tomorrow    SUBJECTIVE:  History of Chief Complaint:    Nevin Hernandez is a 80 y. o.female who presents to our emergency room with complaints of abdominal pain and nausea and vomiting for approximately 2 days. She feels like her stomach is become bloated and her pain is crampy. She had similar symptoms several months ago and had a small bowel series that diagnosed her with a possible partial small bowel obstruction versus ileus. She has done well since then until now. She currently states that her pain is about 2 out of 10 with some mild cramping. She has been having daily bowel movements. CAT scan done today was similar to that done in September. Her only previous abdominal surgery was an appendectomy and a hysterectomy. I spoke with the patient and her niece who is her power of  and they have expressed no desire in having any surgical procedures done at this time. Electrolytes are essentially normal except for a glucose of 141. White blood cell count 9200 hemoglobin 12.1 and platelet count 378,096.     Past Medical History   has a past medical history of Acute cystitis with hematuria, Bowel obstruction (HCC), Chronic hyponatremia, Compression fracture of T12 vertebra ([address] DJD (degenerative joint disease) of hip, DM (diabetes mellitus), type 2 with peripheral vascular complications ([address] Gallbladder sludge, GERD (gastroesophageal reflux disease), Glaucoma, H/O vascular surgery, Hearing loss, History of total hip replacement, right, Lower leg edema, Lumbar stenosis, Macular degeneration, PAF (paroxysmal atrial fibrillation) ([facility]), Pain of left scapula, PVD (peripheral vascular disease) ([address] and Stress incontinence. Past Surgical History   has a past surgical history that includes Refractive surgery (973888); Appendectomy; Hysterectomy; vertebroplasty (2013); Total hip arthroplasty (Right, 9/25/15); Upper gastrointestinal endoscopy (10/07/15); and cyst removal (06/27/2016). Medications  Prior to Admission medications    Medication Sig Start Date End Date Taking? Authorizing Provider   AUGUSTUS 2.5 MG TABS tablet TAKE 1 TABLET BY MOUTH TWICE A DAY  Patient taking differently: 2.5 mg 2 times daily  11/19/19  Yes Raul Ortega MD   Calcium Carbonate Antacid (TUMS CHEWY DELIGHTS PO) Take 1 tablet by mouth daily    Yes Historical Provider, MD   vitamin D (CHOLECALCIFEROL) 1000 UNIT TABS tablet Take 1,000 Units by mouth daily   Yes Historical Provider, MD   docusate sodium (COLACE) 100 MG capsule Take 1 capsule by mouth 2 times daily as needed for Constipation 10/3/19  Yes Raul Ortega MD   omeprazole (PRILOSEC) 20 MG delayed release capsule TAKE ONE CAPSULE BY MOUTH EVERY DAY 8/28/19  Yes Raul Ortega MD   brimonidine (ALPHAGAN) 0.2 % ophthalmic solution Place 1 drop into both eyes 2 times daily  8/7/18  Yes Historical Provider, MD   timolol (TIMOPTIC-XE) 0.5 % ophthalmic gel-forming USE 1 DROP IN BOTH EYES TWICE DAILY.  5/1/18  Yes Historical Provider, MD   acetaminophen (TYLENOL) 325 MG tablet Take 1 tablet by mouth 3 times daily as needed for Pain (DO NOT EXCEED 4GM IN 24 HOURS)  Patient taking differently: Take 650 mg by mouth 3 times daily as needed for Pain (DO NOT EXCEED 4GM IN 24 HOURS)  12/11/17  Yes Ranjana Arreola MD   latanoprost (XALATAN) 0.005 % ophthalmic solution Place 1 drop into the left eye nightly   Yes Historical Provider, MD   Multiple Vitamin (MULTI VITAMIN DAILY) TABS Take 1 tablet by mouth every morning 10/17/15  Yes Historical Provider, MD   [address] [place] [address] by Does not apply route 19   Torri Tsang MD   [facility] Devices (COMMODE BEDSIDE) MISC As directed 19   Torri Tsang MD    Scheduled Meds:   sodium chloride flush  10 mL Intravenous 2 times per day    famotidine (PEPCID) injection  20 mg Intravenous Daily    brimonidine  1 drop Both Eyes BID    cefTRIAXone (ROCEPHIN) IV  1 g Intravenous Q24H     Continuous Infusions:   sodium chloride 100 mL/hr at 20 0840    sodium chloride 75 mL/hr at 20 2232     PRN Meds:.sodium chloride flush, ondansetron, bisacodyl  Allergies  has No Known Allergies. Family History  family history includes Diabetes in her father; Heart Failure in her mother; Hypertension in her mother. Social History   reports that she quit smoking about 39 years ago. Her smoking use included cigarettes. She has never used smokeless tobacco. She reports that she does not drink alcohol or use drugs. Review of Systems:  14 systems were reviewed and negative other than Elim IRA    OBJECTIVE  CURRENT VITALS:  height is 5' 1\" (1.549 m) and weight is 130 lb (59 kg). Her oral temperature is 97.5 °F (36.4 °C). Her blood pressure is 132/57 (abnormal) and her pulse is 74. Her respiration is 16 and oxygen saturation is 91%.    Temperature Range (24h):Temp: 97.5 °F (36.4 °C) Temp  Av.7 °F (36.5 °C)  Min: 97.5 °F (36.4 °C)  Max: 98 °F (36.7 °C)  BP Range (99F): Systolic (83SLY), YJJ:285 , Min:132 , SZH:225     Diastolic (44WJT), HXT:95, Min:57, Max:123    Pulse Range (24h): Pulse  Av.9  Min: 62  Max: 86  Respiration Range (24h): Resp  Av.9  Min: 16  Max: 20  Current Pulse Ox (24h):  SpO2: 91 %  Pulse Ox Range (24h):  SpO2  Av.9 %  Min: 91 %  Max: 100 %  Oxygen Amount and Delivery:    CONSTITUTIONAL: Alert and oriented times 3, no acute distress and cooperative to examination with proper mood and affect. Pleasantly confused at times and hard of hearing. SKIN: Skin color, texture, turgor normal. No rashes or lesions. , no bruising  LYMPH: no cervical nodes, no inguinal nodes  HEENT: Head is normocephalic, atraumatic. PERRLA, Mucous membranes are moist.   NECK: Supple, symmetrical, trachea midline, no adenopathy, thyroid symmetric, not enlarged and no tenderness, skin normal.  CHEST/LUNGS: chest symmetric with normal A/P diameter, normal respiratory rate and rhythm, lungs clear to auscultation without wheezes, rales or rhonchi. No accessory muscle use. CARDIOVASCULAR: Heart sounds are normal.  Regular rate and rhythm without murmur, gallop or rub. ABDOMEN: Normal bowel sounds. No bruits. Soft, distended, no masses or organomegaly. no evidence of hernia. Tenderness: Mild diffuse tenderness with no rebound or guarding. RECTAL: negative without mass, lesions or tenderness  NEUROLOGIC: There are no focalizing motor or sensory deficits. CN II-XII are grossly intact. [place] EXTREMITIES: no cyanosis, no clubbing and no edema.   PYSCH:  Mood, affect and judgement are normal, alert and oriented x 3  LABS:  Recent Labs     02/11/20  1628 02/11/20  1630 02/11/20  1639 02/12/20  0941   WBC 9.0  --   --  9.2   HGB 13.4  --   --  12.1   HCT 40.1  --   --  36.7*     --   --  234     --   --  137   K 4.5  --   --  4.0     --   --  105   CO2 22  --   --  20   BUN 12  --   --  13   CREATININE 0.54  --   --  0.59   CALCIUM 10.4*  --   --  9.6   AST 19  --   --   --    ALT 17  --   --   --    BILITOT 0.3  --   --   --    LIPASE 20  --   --   --    LACTA  --   --  1.1 1.8   NITRU  --  Negative  --   --    COLORU  --  Yellow  --   --    BACTERIA  --  MANY*  --   --        RADIOLOGY:  I have personally reviewed the following films:  CT scan abd/pelvis: I reviewed the x-rays with radiologist and there appears to be fluid-filled dilated small bowel with no bowel wall thickening. There appears to be a transition point but in the mid to distal jejunum in the right upper quadrant. No mass identified. There is no free fluid or free air. Thank you for the interesting evaluation. Further recommendations to follow.     Electronically signed by Era Carlos MD on 2/12/20 at 1:39 PM

## 2020-02-12 NOTE — ED NOTES
Report called to [place] on [address] [facility], [address] [place] [address]  02/11/20 5874

## 2020-02-12 NOTE — CARE COORDINATION
[facility] [place] [facility] [place] Case Management Initial Discharge Assessment    Met with jacinto to discuss discharge plan. PCP: Renetta Castillo MD                                Date of Last Visit: January 2020    If no PCP, list provided? N/A    Discharge Planning    Living Arrangements: at home dependent on nursing care    Who do you live with? Alone with 24 hr care    Who helps you with your care:  private caregiver    If lives at home:     Do you have any barriers navigating in your home? no    Patient can perform ADL? No    Current Services (outpatient and in home) :  [facility])    Dialysis: No    Is transportation available to get to your appointments? Yes    DME Equipment:  yes - walker and transport chair    Respiratory equipment: None    Respiratory provider:  no     Pharmacy:  yes - [facility]    Consult with Medication Assistance Program?  No      Patient agreeable to [place] [id number]? Yes, [facility]    Patient agreeable to SNF/Rehab? No    Other discharge needs identified? Palliative Care-Potentially if family agrees    Freedom of choice list provided with basic dialogue that supports the patient's individualized plan of care/goals and shares the quality data associated with the providers. Yes    Does Patient Have a High-Risk for Readmission Diagnosis (CHF, PN, MI, COPD)? No    The plan for Transition of Care is related to the following treatment goals:Resolve and return to home    Initial Discharge Plan? (Note: please see concurrent daily documentation for any updates after initial note). Pt was just discharged from [facility] yesterday to return to home with 24 hr care. [facility] was ordered, but had not yet started services. Plan is to return to home. Will need [facility] order. Jacinto states the Dr. Stevie Milner mentioned possible hospice, but she states they are not ready for that. She may consider Palliative Care.     The Patient and/or patient representative: jacinto was provided with choice of any post-acute providers for care and equipment and agrees with discharge plan  Yes    Electronically signed by HAIR Bell on 2/12/2020 at 3:10 PM

## 2020-02-12 NOTE — PROGRESS NOTES
Hospitalist Progress Note      PCP: Vladimir Ron MD    Date of Admission: 2/11/2020    Subjective: :  Patient reports abdominal pain is improving. No emesis. Did not had a bowel movement in hospital    Medications:  Reviewed    Infusion Medications    sodium chloride 100 mL/hr at 02/12/20 1349    sodium chloride 75 mL/hr at 02/11/20 2232     Scheduled Medications    metoclopramide  5 mg Intravenous Q8H    bisacodyl  10 mg Rectal Daily    sodium chloride flush  10 mL Intravenous 2 times per day    famotidine (PEPCID) injection  20 mg Intravenous Daily    brimonidine  1 drop Both Eyes BID    cefTRIAXone (ROCEPHIN) IV  1 g Intravenous Q24H     PRN Meds: sodium chloride flush, ondansetron, bisacodyl      Intake/Output Summary (Last 24 hours) at 2/12/2020 1506  Last data filed at 2/12/2020 0556  Gross per 24 hour   Intake 705 ml   Output --   Net 705 ml       Exam:    BP (!) 132/57   Pulse 74   Temp 97.5 °F (36.4 °C) (Oral)   Resp 16   Ht 5' 1\" (1.549 m)   Wt 130 lb (59 kg)   LMP  (LMP Unknown)   SpO2 91%   BMI 24.56 kg/m²     General appearance: No apparent distress, sitting up in chair, cheerful  HEENT:  Conjunctivae/corneas clear. Neck: Supple, with full range of motion. Respiratory:  Normal respiratory effort. Clear to auscultation, bilaterally without Rales/Wheezes/Rhonchi. Cardiovascular: Regular rate and rhythm with normal S1/S2 without murmurs, rubs or gallops. Abdomen: Soft, mild tenderness upper abdomen  Musculoskeletal: No clubbing, cyanosis or edema bilaterally.    Neuro: Alert, moving all extremities      Labs:   Recent Labs     02/11/20  1628 02/12/20  0941   WBC 9.0 9.2   HGB 13.4 12.1   HCT 40.1 36.7*    234     Recent Labs     02/11/20  1628 02/12/20  0941    137   K 4.5 4.0    105   CO2 22 20   BUN 12 13   CREATININE 0.54 0.59   CALCIUM 10.4* 9.6     Recent Labs     02/11/20  1628   AST 19   ALT 17   BILITOT 0.3   ALKPHOS 161*     No results for input(s): INR in the last 72 hours. No results for input(s): Hakeem Tinajerosch in the last 72 hours. Urinalysis:      Lab Results   Component Value Date    NITRU Negative 02/11/2020    WBCUA >100 02/11/2020    BACTERIA MANY 02/11/2020    RBCUA 6-10 02/11/2020    BLOODU TRACE 02/11/2020    SPECGRAV 1.042 02/11/2020    GLUCOSEU Negative 02/11/2020    GLUCOSEU NEG 05/09/2012       Radiology:  CT ABDOMEN PELVIS W IV CONTRAST Additional Contrast? None   Final Result      Small bowel obstruction, mid to distal ileum. No mass identified. Findings similar to those on prior study. Hepatic steatosis. Minimal intrahepatic ductal dilatation, unchanged. Severe degenerative change, lumbar spine, with remote L1 kyphoplasty, and L4, and L5, grade 1 spondylolisthesis, unchanged. Remote right hip arthroplasty. Other findings discussed. All CT scans at this facility use dose modulation, iterative reconstruction, and/or weight based dosing when appropriate to reduce radiation dose to as low as reasonably achievable. Assessment/Plan:    Active Hospital Problems    Diagnosis Date Noted    SBO (small bowel obstruction) ([facility] [address] [J85.683] 02/11/2020     80-year-old female with history of DM, paroxysmal atrial fibrillation, PAD, macular degeneration, GERD  who presented with abdominal pain for 2 days and found to have partial small bowel obstruction    Partial small bowel obstruction  -N.p.o.  -IV fluids  -General surgery consulted    UTI  -Continue ceftriaxone   - fu urine cultures      Additional work up or/and treatment plan may be added today or then after based on clinical progression. I am managing a portion of pt care. Some medical issues are handled by other specialists. Additional work up and treatment should be done in out pt setting by pt PCP and other out pt providers.      In addition to examining and evaluating pt, I spent additional time explaining care, normal and abnormal findings, and treatment plan. All of pt questions were answered. Counseling, diet and education were  provided. Case will be discussed with nursing staff when appropriate. Family will be updated if and when appropriate.       Diet: Diet NPO Effective Now    Code Status: Limited      Electronically signed by Oksana Delgado MD on 2/12/2020 at 3:06 PM

## 2020-02-13 VITALS
TEMPERATURE: 98.6 F | DIASTOLIC BLOOD PRESSURE: 76 MMHG | RESPIRATION RATE: 17 BRPM | SYSTOLIC BLOOD PRESSURE: 128 MMHG | HEART RATE: 51 BPM | OXYGEN SATURATION: 96 %

## 2020-02-13 LAB
ANION GAP SERPL CALCULATED.3IONS-SCNC: 11 MEQ/L (ref 9–15)
BASOPHILS ABSOLUTE: 0 K/UL (ref 0–0.2)
BASOPHILS RELATIVE PERCENT: 0.9 %
BUN BLDV-MCNC: 10 MG/DL (ref 8–23)
CALCIUM SERPL-MCNC: 9 MG/DL (ref 8.5–9.9)
CHLORIDE BLD-SCNC: 108 MEQ/L (ref 95–107)
CO2: 21 MEQ/L (ref 20–31)
CREAT SERPL-MCNC: 0.54 MG/DL (ref 0.5–0.9)
EOSINOPHILS ABSOLUTE: 0.2 K/UL (ref 0–0.7)
EOSINOPHILS RELATIVE PERCENT: 4.4 %
GFR AFRICAN AMERICAN: >60
GFR NON-AFRICAN AMERICAN: >60
GLUCOSE BLD-MCNC: 98 MG/DL (ref 70–99)
HCT VFR BLD CALC: 33.8 % (ref 37–47)
HEMOGLOBIN: 11.3 G/DL (ref 12–16)
LACTIC ACID: 0.7 MMOL/L (ref 0.5–2.2)
LYMPHOCYTES ABSOLUTE: 1.3 K/UL (ref 1–4.8)
LYMPHOCYTES RELATIVE PERCENT: 26 %
MCH RBC QN AUTO: 31 PG (ref 27–31.3)
MCHC RBC AUTO-ENTMCNC: 33.5 % (ref 33–37)
MCV RBC AUTO: 92.8 FL (ref 82–100)
MONOCYTES ABSOLUTE: 0.5 K/UL (ref 0.2–0.8)
MONOCYTES RELATIVE PERCENT: 9.5 %
NEUTROPHILS ABSOLUTE: 2.9 K/UL (ref 1.4–6.5)
NEUTROPHILS RELATIVE PERCENT: 59.2 %
ORGANISM: ABNORMAL
PDW BLD-RTO: 14.3 % (ref 11.5–14.5)
PLATELET # BLD: 211 K/UL (ref 130–400)
POTASSIUM REFLEX MAGNESIUM: 3.9 MEQ/L (ref 3.4–4.9)
RBC # BLD: 3.64 M/UL (ref 4.2–5.4)
SODIUM BLD-SCNC: 140 MEQ/L (ref 135–144)
URINE CULTURE, ROUTINE: ABNORMAL
WBC # BLD: 4.9 K/UL (ref 4.8–10.8)

## 2020-02-13 PROCEDURE — 6360000002 HC RX W HCPCS: Performed by: SURGERY

## 2020-02-13 PROCEDURE — 2580000003 HC RX 258: Performed by: PHYSICIAN ASSISTANT

## 2020-02-13 PROCEDURE — 85025 COMPLETE CBC W/AUTO DIFF WBC: CPT

## 2020-02-13 PROCEDURE — 83605 ASSAY OF LACTIC ACID: CPT

## 2020-02-13 PROCEDURE — 1210000000 HC MED SURG R&B

## 2020-02-13 PROCEDURE — 2580000003 HC RX 258: Performed by: INTERNAL MEDICINE

## 2020-02-13 PROCEDURE — 2500000003 HC RX 250 WO HCPCS: Performed by: PHYSICIAN ASSISTANT

## 2020-02-13 PROCEDURE — 6370000000 HC RX 637 (ALT 250 FOR IP): Performed by: SURGERY

## 2020-02-13 PROCEDURE — 36415 COLL VENOUS BLD VENIPUNCTURE: CPT

## 2020-02-13 PROCEDURE — 6360000002 HC RX W HCPCS: Performed by: INTERNAL MEDICINE

## 2020-02-13 PROCEDURE — 99232 SBSQ HOSP IP/OBS MODERATE 35: CPT | Performed by: SURGERY

## 2020-02-13 PROCEDURE — 80048 BASIC METABOLIC PNL TOTAL CA: CPT

## 2020-02-13 PROCEDURE — 97166 OT EVAL MOD COMPLEX 45 MIN: CPT

## 2020-02-13 PROCEDURE — 97535 SELF CARE MNGMENT TRAINING: CPT

## 2020-02-13 RX ADMIN — METOCLOPRAMIDE HYDROCHLORIDE 5 MG: 5 INJECTION INTRAMUSCULAR; INTRAVENOUS at 09:57

## 2020-02-13 RX ADMIN — BRIMONIDINE TARTRATE 1 DROP: 2 SOLUTION OPHTHALMIC at 21:47

## 2020-02-13 RX ADMIN — BRIMONIDINE TARTRATE 1 DROP: 2 SOLUTION OPHTHALMIC at 09:57

## 2020-02-13 RX ADMIN — Medication 10 ML: at 21:44

## 2020-02-13 RX ADMIN — METOCLOPRAMIDE HYDROCHLORIDE 5 MG: 5 INJECTION INTRAMUSCULAR; INTRAVENOUS at 16:12

## 2020-02-13 RX ADMIN — SODIUM CHLORIDE: 9 INJECTION, SOLUTION INTRAVENOUS at 16:24

## 2020-02-13 RX ADMIN — FAMOTIDINE 20 MG: 10 INJECTION, SOLUTION INTRAVENOUS at 09:55

## 2020-02-13 RX ADMIN — Medication 10 ML: at 09:56

## 2020-02-13 RX ADMIN — BISACODYL 10 MG: 10 SUPPOSITORY RECTAL at 09:56

## 2020-02-13 RX ADMIN — CEFTRIAXONE SODIUM 1 G: 1 INJECTION, POWDER, FOR SOLUTION INTRAMUSCULAR; INTRAVENOUS at 21:44

## 2020-02-13 ASSESSMENT — PAIN SCALES - GENERAL: PAINLEVEL_OUTOF10: 0

## 2020-02-13 NOTE — PLAN OF CARE
See OT evaluation for all goals and OT POC.  Electronically signed by KIARA Zamarripa/L on 2/13/2020 at 10:04 AM

## 2020-02-13 NOTE — PROGRESS NOTES
in attenuation. Bile Ducts:  Minimal intrahepatic ductal dilatation, unchanged. Gallbladder:  No stones or wall thickening. Pancreas:  Hypoplastic, and fatty replaced, without masses, cysts, ductal dilatation or calcification. Spleen:  Normal in size without masses or calcifications. No splenules. Kidneys:  Normal in size and enhancement. No hydronephrosis, or stones. 2.5 cm left upper pole renal cysts, stable with smaller cystlike areas, bilateral kidneys, too small to characterize, and unchanged. Adrenals:  8mm noncalcified right adrenal nodule containing central fat, with mild left adrenal hyperplasia, unchanged. Small bowel:  Fluid-filled, dilated small bowel, with no wall thickening, having transition, mid to distal jejunum, right upper quadrant. No mass identified. Appendix:  Not visualized. No inflammatory change pericecal region. Colon:  Normal in caliber, or mildly decompressed. Diverticular change, sigmoid colon Peritoneum:  No ascites, free air, or fluid collections. Vessels: Aorta normal in course and caliber. Portal vein, splenic vein, superior mesenteric vein are patent. Lymph nodes:  Retroperitoneal:  No enlarged retroperitoneal lymph nodes. Mesenteric:  No enlarged mesenteric lymph nodes. Pelvic: No enlarged pelvic lymph nodes. Ureters: Normal in course and caliber. No calcifications. Bladder: Portion of urinary bladder, obscured by beam hardening artifact. Imaged bladder shows no wall thickening. Abdominal Wall:  No hernia identified. No diastasis of rectus musculature. No edema or masses. Bones:  No bone lesions. Diffuse disc space narrowing T10-11 through L5-S1. Remote kyphoplasty L1. 2 mm anterolisthesis L4 on L5, and 2 mm anterolisthesis L5 on S1, unchanged. Remote right bipolar noncemented hip replacement. Small bowel obstruction, mid to distal ileum. No mass identified. Findings similar to those on prior study. Hepatic steatosis. Minimal intrahepatic ductal dilatation, unchanged.

## 2020-02-13 NOTE — PROGRESS NOTES
Assumed care of pt this shift. Pt is a/ox1-2 at times. Pt has no complains of stomach pain or nausea. Pt was able to pass a small amount of stool this morning. Enema given as ordered. RLE has +2 pitting edema. Per caregiver this extremity can get swollen, but not normally \"this bad. \"     Pts skin is in tact, bowel soundds are hypoactive in right quadrants and active in left quadrants. Requesting to have clear liquids, educated pt that she is NPO and why, pt stated that she will ask her Dr.     No further needs at this time, caregiver at bedside. Pt had a large formed bowel movement.      Care handed off to [facility]

## 2020-02-13 NOTE — PROGRESS NOTES
Physical Therapy Missed Treatment   Facility/Department: [facility] MED SURG M089/V810-06    NAME: Micha Bolanos    : 1918 (80 y.o.)  MRN: [id number]    Account: [de-identified]  Gender: female    Chart reviewed, attempted PT at 14:38. Patient unavailable 2° to:    [] Hold per ns request    [x] Pt declined Pt lethargic in bed, unwilling to work with therapy at this time despite encouragement and education. [] Pt. . off floor for test/procedure. [] Pt. Unavailable       Will attempt PT treatment again at earliest convenience.       Electronically signed by Leif Singletary PTA on 20 at 2:37 PM

## 2020-02-13 NOTE — PROGRESS NOTES
Assumed care of patient at 1400. Pt resting in bed. Confused but easily reoriented. Following commands. Denies pain, N/V at this time. Safety maintained. Call light in reach. Bed alarm on and avasys in room for patient safety.  Electronically signed by Jama Abrams RN on 2/13/2020 at 4:33 PM

## 2020-02-13 NOTE — PROGRESS NOTES
Pt assessment and vitals complete and stable. Pt alert to self only, however pt will follow commands. Pt denies abdominal pain, passing gas or a bowel movement. Pt has had intermittent periods of aggression and agitation this shift but will let calm down almost immediately. Will continue to monitor.

## 2020-02-13 NOTE — PROGRESS NOTES
edema     chronic, R>L    Lumbar stenosis     Macular degeneration     left eye    PAF (paroxysmal atrial fibrillation) ([facility] [address] 2019    Pain of left scapula 3/25/2019    PVD (peripheral vascular disease) ([facility] 10/28/2006    Dr Luz Elena Matias, stents LE    Stress incontinence 10/28/2011         PHYSICAL EXAMINATION: 128/76, 98.6, 51, 17 respirations, 96% O2 saturation on room air. She is sitting up in the wheelchair. She is in no acute distress. She is awake, alert, pleasant, jovial. She is joking around. Mucosa is moist and intact. EOMs are intact. Heart is regular rate. Lungs are clear to auscultation, even, nonlabored. Weak voice, but there is no hoarseness. No oropharynx drainage. Bilateral nares are patent. She has no sinus pain or pressure over the sinus areas. No lumps, bumps noted in the face. Abdomen is nontender. Positive bowel sounds. Lower extremities have some trace edema bilaterally. ASSESSMENT AND PLAN:   1. Weakness and falls. 2.  UTI. 3.  Sinusitis. 4.  Atrial fibrillation. 5.  Chronic UTIs. PT, OT, rehabilitation. Labs. We will continue to follow.     FACE TO FACE 1110 - 1215pm  I spent greater than 35 minutes with the patient and greater than 50% of the time was spent coordinating care for multiple complex medical problem for diagnosis of UTIs, weakness, atrial fibrillation and f/u with therapy        Electronically Signed By: KRYSTAL Penny GNP-BC on 02/13/2020 09:11:03  ______________________________  KRYSTAL Penny GNP-BC  /[id number]  D: 02/10/2020 21:42:15  T: 02/11/2020 18:31:14    cc: - [facility]

## 2020-02-13 NOTE — PROGRESS NOTES
[facility] OCCUPATIONAL THERAPY EVALUATION - ACUTE     NAME: Li Quinn  : 1918 (80 y.o.)  MRN: [id number]  CODE STATUS: Limited  Room: D115/O011-68    Date of Service: 2020    Patient Diagnosis(es): SBO (small bowel obstruction) [facility]) [R09.290]   Chief Complaint   Patient presents with    Abdominal Pain     x2 days with nausea and vomiting. Patient Active Problem List    Diagnosis Date Noted    SBO (small bowel obstruction) ([facility] [address] 2020    Generalized weakness 2020    Age-related physical debility 2019    Post-nasal drip 2019    Other constipation 10/03/2019    Atrial fibrillation ([address] 10/03/2019    Age-related osteoporosis without current pathological fracture 2019    Leg edema, right 2019    Abnormality of gait and mobility due to severe spinal stenosis.  2019    Late onset Alzheimer's disease without behavioral disturbance ([address] 2019    Ambulatory dysfunction 2019    Glaucoma 2019    DJD (degenerative joint disease) 2019    S/P kyphoplasty 2019    Gastroesophageal reflux disease without esophagitis 04/15/2019    Lumbar stenosis     DM (diabetes mellitus), type 2 with peripheral vascular complications ([address]     Nuclear senile cataract 2017    Nonexudative age-related macular degeneration 2017    Primary open angle glaucoma 2017    Impaired mobility and activities of daily living 2014    Acute leg pain, left 2014    Acute right hip pain 2014    Spinal stenosis of lumbar region 2012    Stress incontinence 10/28/2011    Hearing loss 10/28/2011    PVD (peripheral vascular disease) ([address] 10/28/2006        Past Medical History:   Diagnosis Date    Acute cystitis with hematuria 2019    Bowel obstruction (HCC) 2019    Chronic hyponatremia     Compression fracture of T12 vertebra [facility]) 2013    kyphoplasty Dr Soraya Gordon    DJD (degenerative joint AROM (degrees)  LUE AROM : WFL  Left Hand AROM (degrees)  Left Hand AROM: WFL  RUE AROM (degrees)  RUE AROM : WFL  Right Hand AROM (degrees)  Right Hand AROM: WFL    Strength:  LUE Strength  Gross LUE Strength: Exceptions to [facility]  L Hand General: 3+/5  LUE Strength Comment: 3+/5 all planes  RUE Strength  Gross RUE Strength: Exceptions to [facility] [place]  R Hand General: 3+/5  RUE Strength Comment: 3+/5 all planes    Coordination, Tone, Quality of Movement: Tone RUE  RUE Tone: Normotonic  Tone LUE  LUE Tone: Normotonic  Coordination  Movements Are Fluid And Coordinated: No  Coordination and Movement description: Fine motor impairments, Decreased speed, Decreased accuracy, Right UE, Left UE    Hand Dominance:  Hand Dominance  Hand Dominance: Right    ADL Status:  ADL  Feeding: Setup(Currently NPO but +hand to mouth)  Grooming: Setup  UE Bathing: Moderate assistance  LE Bathing: Dependent/Total  UE Dressing: Moderate assistance  LE Dressing: Dependent/Total  Toileting: Dependent/Total  Additional Comments: Simulated ADLs as above; pt. did toilet and required assist for hygiene following earlier incontinence. Pt. is at her baseline level of assist for ADLs; requires multiple cues throughout ADL tasks to maximize safety. Toilet Transfers  Toilet - Technique: Stand pivot  Equipment Used: Standard bedside commode  Toilet Transfer: Minimal assistance  Toilet Transfers Comments: Min A onto commode, CGA off of commode with verbal cues.       Therapy key for assistance levels -   Independent = Pt. is able to perform task with no assistance but may require a device   Stand by assistance = Pt. does not perform task at an independent level but does not need physical assistance, requires verbal cues  Minimal, Moderate, Maximal Assistance = Pt. requires physical assistance (25%, 50%, 75% assist from helper) for task but is able to actively participate in task   Dependent = Pt. requires total assistance with task and is not able to actively

## 2020-02-13 NOTE — PROGRESS NOTES
Hospitalist Progress Note      PCP: Ema Oreilly MD    Date of Admission: 2/11/2020    Chief Complaint:      Subjective: :  States she has no abdominal pain today  No nausea or emesis  Had bowel movement today    Medications:  Reviewed    Infusion Medications    sodium chloride 75 mL/hr at 02/13/20 0829     Scheduled Medications    metoclopramide  5 mg Intravenous Q8H    bisacodyl  10 mg Rectal Daily    sodium chloride flush  10 mL Intravenous 2 times per day    famotidine (PEPCID) injection  20 mg Intravenous Daily    brimonidine  1 drop Both Eyes BID    cefTRIAXone (ROCEPHIN) IV  1 g Intravenous Q24H     PRN Meds: sodium chloride flush, ondansetron, bisacodyl      Intake/Output Summary (Last 24 hours) at 2/13/2020 1343  Last data filed at 2/13/2020 0829  Gross per 24 hour   Intake 857 ml   Output 385 ml   Net 472 ml       Exam:    BP (!) 147/61   Pulse 69   Temp 97.5 °F (36.4 °C)   Resp 16   Ht 5' 1\" (1.549 m)   Wt 130 lb (59 kg)   LMP  (LMP Unknown)   SpO2 95%   BMI 24.56 kg/m²     General appearance: No apparent distress  HEENT: Conjunctivae/corneas clear. Neck: Supple, with full range of motion. Respiratory:  Normal respiratory effort. Clear to auscultation, bilaterally without Rales/Wheezes/Rhonchi. Cardiovascular: Regular rate and rhythm with normal S1/S2 without murmurs, rubs or gallops. Abdomen: Soft, mild tenderness RUQ, normal bowel sounds.    Musculoskeletal: 1+ bilateral lower extremity edema  Neuro: Alert, moving all extremities      Labs:   Recent Labs     02/11/20  1628 02/12/20  0941 02/13/20  0852   WBC 9.0 9.2 4.9   HGB 13.4 12.1 11.3*   HCT 40.1 36.7* 33.8*    234 211     Recent Labs     02/11/20  1628 02/12/20  0941 02/13/20  0852    137 140   K 4.5 4.0 3.9    105 108*   CO2 22 20 21   BUN 12 13 10   CREATININE 0.54 0.59 0.54   CALCIUM 10.4* 9.6 9.0     Recent Labs     02/11/20  1628   AST 19   ALT 17   BILITOT 0.3   ALKPHOS 161*     No

## 2020-02-14 ENCOUNTER — APPOINTMENT (OUTPATIENT)
Dept: GENERAL RADIOLOGY | Age: 85
DRG: 389 | End: 2020-02-14
Payer: MEDICARE

## 2020-02-14 LAB
ANION GAP SERPL CALCULATED.3IONS-SCNC: 15 MEQ/L (ref 9–15)
BASOPHILS ABSOLUTE: 0 K/UL (ref 0–0.2)
BASOPHILS RELATIVE PERCENT: 0.8 %
BUN BLDV-MCNC: 9 MG/DL (ref 8–23)
CALCIUM SERPL-MCNC: 9.3 MG/DL (ref 8.5–9.9)
CHLORIDE BLD-SCNC: 105 MEQ/L (ref 95–107)
CO2: 17 MEQ/L (ref 20–31)
CREAT SERPL-MCNC: 0.45 MG/DL (ref 0.5–0.9)
EOSINOPHILS ABSOLUTE: 0.2 K/UL (ref 0–0.7)
EOSINOPHILS RELATIVE PERCENT: 2.8 %
GFR AFRICAN AMERICAN: >60
GFR NON-AFRICAN AMERICAN: >60
GLUCOSE BLD-MCNC: 180 MG/DL (ref 70–99)
HCT VFR BLD CALC: 35.1 % (ref 37–47)
HEMOGLOBIN: 11.7 G/DL (ref 12–16)
LYMPHOCYTES ABSOLUTE: 1.1 K/UL (ref 1–4.8)
LYMPHOCYTES RELATIVE PERCENT: 19.5 %
MCH RBC QN AUTO: 30.9 PG (ref 27–31.3)
MCHC RBC AUTO-ENTMCNC: 33.2 % (ref 33–37)
MCV RBC AUTO: 93.1 FL (ref 82–100)
MONOCYTES ABSOLUTE: 0.5 K/UL (ref 0.2–0.8)
MONOCYTES RELATIVE PERCENT: 9.1 %
NEUTROPHILS ABSOLUTE: 3.9 K/UL (ref 1.4–6.5)
NEUTROPHILS RELATIVE PERCENT: 67.8 %
PDW BLD-RTO: 13.8 % (ref 11.5–14.5)
PLATELET # BLD: 218 K/UL (ref 130–400)
POTASSIUM REFLEX MAGNESIUM: 3.7 MEQ/L (ref 3.4–4.9)
RBC # BLD: 3.77 M/UL (ref 4.2–5.4)
SODIUM BLD-SCNC: 137 MEQ/L (ref 135–144)
WBC # BLD: 5.8 K/UL (ref 4.8–10.8)

## 2020-02-14 PROCEDURE — 97535 SELF CARE MNGMENT TRAINING: CPT

## 2020-02-14 PROCEDURE — 85025 COMPLETE CBC W/AUTO DIFF WBC: CPT

## 2020-02-14 PROCEDURE — 6360000002 HC RX W HCPCS: Performed by: SURGERY

## 2020-02-14 PROCEDURE — 2500000003 HC RX 250 WO HCPCS: Performed by: PHYSICIAN ASSISTANT

## 2020-02-14 PROCEDURE — 36415 COLL VENOUS BLD VENIPUNCTURE: CPT

## 2020-02-14 PROCEDURE — 74019 RADEX ABDOMEN 2 VIEWS: CPT

## 2020-02-14 PROCEDURE — 1210000000 HC MED SURG R&B

## 2020-02-14 PROCEDURE — 6370000000 HC RX 637 (ALT 250 FOR IP): Performed by: SURGERY

## 2020-02-14 PROCEDURE — 99232 SBSQ HOSP IP/OBS MODERATE 35: CPT | Performed by: SURGERY

## 2020-02-14 PROCEDURE — 80048 BASIC METABOLIC PNL TOTAL CA: CPT

## 2020-02-14 PROCEDURE — 2580000003 HC RX 258: Performed by: PHYSICIAN ASSISTANT

## 2020-02-14 RX ORDER — ACETAMINOPHEN 325 MG/1
650 TABLET ORAL EVERY 4 HOURS PRN
Status: DISCONTINUED | OUTPATIENT
Start: 2020-02-14 | End: 2020-02-18 | Stop reason: HOSPADM

## 2020-02-14 RX ADMIN — METOCLOPRAMIDE HYDROCHLORIDE 5 MG: 5 INJECTION INTRAMUSCULAR; INTRAVENOUS at 01:28

## 2020-02-14 RX ADMIN — BRIMONIDINE TARTRATE 1 DROP: 2 SOLUTION OPHTHALMIC at 21:59

## 2020-02-14 RX ADMIN — BRIMONIDINE TARTRATE 1 DROP: 2 SOLUTION OPHTHALMIC at 10:01

## 2020-02-14 RX ADMIN — METOCLOPRAMIDE HYDROCHLORIDE 5 MG: 5 INJECTION INTRAMUSCULAR; INTRAVENOUS at 09:59

## 2020-02-14 RX ADMIN — Medication 10 ML: at 21:59

## 2020-02-14 RX ADMIN — BISACODYL 10 MG: 10 SUPPOSITORY RECTAL at 10:00

## 2020-02-14 RX ADMIN — FAMOTIDINE 20 MG: 10 INJECTION, SOLUTION INTRAVENOUS at 09:59

## 2020-02-14 RX ADMIN — METOCLOPRAMIDE HYDROCHLORIDE 5 MG: 5 INJECTION INTRAMUSCULAR; INTRAVENOUS at 18:11

## 2020-02-14 RX ADMIN — SODIUM CHLORIDE: 9 INJECTION, SOLUTION INTRAVENOUS at 13:30

## 2020-02-14 ASSESSMENT — PAIN SCALES - GENERAL
PAINLEVEL_OUTOF10: 0
PAINLEVEL_OUTOF10: 0

## 2020-02-14 NOTE — PROGRESS NOTES
°F (36.5 °C)  BP Range (55E): Systolic (39PAX), VCM:458 , Min:154 , MW     Diastolic (57WKR), RAMONA:20, Min:86, Max:86    Pulse Range (24h): Pulse  Av  Min: 92  Max: 92  Respiration Range (24h): Resp  Av  Min: 16  Max: 16    GENERAL: alert, no distress  LUNGS: clear to ausculation, without wheezes, rales or rhonci  HEART: normal rate and regular rhythm  ABDOMEN: soft, non-tender, non-distended, bowel sounds present in all 4 quadrants and no guarding or peritoneal signs  EXTERMITY: no cyanosis, clubbing or edema  No intake/output data recorded. LABS  Recent Labs     20  0941 20  0852   WBC 9.0 9.2 4.9   HGB 13.4 12.1 11.3*   HCT 40.1 36.7* 33.8*    234 211    137 140   K 4.5 4.0 3.9    105 108*   CO2 22 20 21   BUN 12 13 10   CREATININE 0.54 0.59 0.54   CALCIUM 10.4* 9.6 9.0      No results for input(s): PTT, INR in the last 72 hours. Invalid input(s): PT  Recent Labs     20  16320  0941 20  0852   AST 19  --   --   --    ALT 17  --   --   --    BILITOT 0.3  --   --   --    LIPASE 20  --   --   --    LACTA  --  1.1 1.8 0.7       RADIOLOGY  Xr Chest Standard (2 Vw)    Result Date: 2020  XR CHEST (2 VW) : 2020 12:00 PM CLINICAL HISTORY:  cough . COMPARISON: Portable chest 2019 and chest CT with contrast 2019. TECHNIQUE: Upright AP and lateral radiographs of the chest were obtained. FINDINGS: A shallow inspiration is present without significant infiltrate identified. There is no cardiomegaly, significant pleural effusion, vascular congestion, pneumothorax, or acute fractures identified. A mild compression fracture of L1 with previous kyphoplasty is again noted. NO EVIDENCE OF ACTIVE CARDIOPULMONARY DISEASE. Ct Head Wo Contrast    Result Date: 2020  CT HEAD WO CONTRAST : 2020 CLINICAL HISTORY:  confusion . COMPARISON: 2019.  TECHNIQUE: Spiral unenhanced images were obtained of as low as reasonably achievable.      Electronically signed by Araceli Wong MD on 2/14/2020 at 10:14 AM

## 2020-02-14 NOTE — PROGRESS NOTES
Physical Therapy Med Surg Daily Treatment Note  Facility/Department: [facility] MED SURG UNIT  [facility]       NAME: Olga Schaumann  : 1918 (80 y.o.)  MRN: [id number]  CODE STATUS: Limited    Date of Service: 2020    Patient Diagnosis(es): SBO (small bowel obstruction) [facility]) [S66.899]   Chief Complaint   Patient presents with    Abdominal Pain     x2 days with nausea and vomiting. Patient Active Problem List    Diagnosis Date Noted    SBO (small bowel obstruction) ([address] 2020    Generalized weakness 2020    Age-related physical debility 2019    Post-nasal drip 2019    Other constipation 10/03/2019    Atrial fibrillation ([address] 10/03/2019    Age-related osteoporosis without current pathological fracture 2019    Leg edema, right 2019    Abnormality of gait and mobility due to severe spinal stenosis.  2019    Late onset Alzheimer's disease without behavioral disturbance ([address] 2019    Ambulatory dysfunction 2019    Glaucoma 2019    DJD (degenerative joint disease) 2019    S/P kyphoplasty 2019    Gastroesophageal reflux disease without esophagitis 04/15/2019    Lumbar stenosis     DM (diabetes mellitus), type 2 with peripheral vascular complications ([address]     Nuclear senile cataract 2017    Nonexudative age-related macular degeneration 2017    Primary open angle glaucoma 2017    Impaired mobility and activities of daily living 2014    Acute leg pain, left 2014    Acute right hip pain 2014    Spinal stenosis of lumbar region 2012    Stress incontinence 10/28/2011    Hearing loss 10/28/2011    PVD (peripheral vascular disease) ([address] 10/28/2006        Past Medical History:   Diagnosis Date    Acute cystitis with hematuria 2019    Bowel obstruction (HCC) 2019    Chronic hyponatremia     Compression fracture of T12 vertebra ([address] 2013    kyphoplasty Dr Hallie Leventhal    DJD (degenerative joint disease) of hip 3/20/2014    Dr Myrna Ferraro DM (diabetes mellitus), type 2 with peripheral vascular complications (HCC)     diet controlled    Gallbladder sludge 2019    GERD (gastroesophageal reflux disease) 8/31/10    Glaucoma     bilateral    H/O vascular surgery 5/9/2019    Stens DONYA 2006    Hearing loss     History of total hip replacement, right 05/09/2019    Dr Ronal Lincoln    Lower leg edema     chronic, R>L    Lumbar stenosis     Macular degeneration     left eye    PAF ([facility]oxysmal atrial fibrillation) [facility] 2019    Pain of left scapula 3/25/2019    PVD (peripheral vascular disease) ([facility] 10/28/2006    Dr Nadia Gonzalez, stents LE    Stress incontinence 10/28/2011     Past Surgical History:   Procedure Laterality Date    APPENDECTOMY      CYST REMOVAL  06/27/2016    DR SLOAN  RT THUMB MUCOUS CYST    HYSTERECTOMY      REFRACTIVE SURGERY  062001    left     TOTAL HIP ARTHROPLASTY Right 9/25/15    DR. NARANJO    UPPER GASTROINTESTINAL ENDOSCOPY  10/07/15    DR Makayla Prince    VERTEBROPLASTY  2013    T12, Dr Hallie Leventhal         Restrictions  Restrictions/Precautions: Contact Precautions; Fall Risk(VRE)    SUBJECTIVE   General  Chart Reviewed: Yes  Response To Previous Treatment: Patient with no complaints from previous session. Family / Caregiver Present: No  Subjective  Subjective: \"I need to put my makeup on\" Pt feeling well this morning. Agreeable to get up to chair.  declined ambulation until she is ready for the day  General Comment  Comments: resting in bed- agreeable to PT treatment    Pre-Session Pain Report     Pain Screening  Patient Currently in Pain: No  Pre Treatment Pain Screening  Scale Used: Numeric Score  Intervention List: Patient able to continue with treatment    Post-Session Pain Report  Pain Assessment  Pain Assessment: 0-10  Pain Level: 0     OBJECTIVE      Bed mobility  Rolling to Right: Stand by assistance(VC for log roll)  Supine to Sit: Contact guard

## 2020-02-14 NOTE — PROGRESS NOTES
Hospitalist Progress Note      PCP: Stevenson Dolan MD    Date of Admission: 2/11/2020    Subjective: :  Patient states she feels well. She denies any abdominal pain  No nausea or emesis. She has been started on clear liquids today    Medications:  Reviewed    Infusion Medications    sodium chloride 75 mL/hr at 02/13/20 1624     Scheduled Medications    metoclopramide  5 mg Intravenous Q8H    bisacodyl  10 mg Rectal Daily    sodium chloride flush  10 mL Intravenous 2 times per day    famotidine (PEPCID) injection  20 mg Intravenous Daily    brimonidine  1 drop Both Eyes BID     PRN Meds: sodium chloride flush, ondansetron, bisacodyl    No intake or output data in the 24 hours ending 02/14/20 1325    Exam:    BP (!) 154/86   Pulse 92   Temp 97.7 °F (36.5 °C) (Oral)   Resp 16   Ht 5' 1\" (1.549 m)   Wt 130 lb (59 kg)   LMP  (LMP Unknown)   SpO2 99%   BMI 24.56 kg/m²     General appearance: No apparent distress  HEENT: Conjunctivae/corneas clear. Neck: Supple, with full range of motion. Respiratory:  Normal respiratory effort. Clear to auscultation, bilaterally without Rales/Wheezes/Rhonchi. Cardiovascular: Regular rate and rhythm with normal S1/S2 without murmurs, rubs or gallops. Abdomen: Soft, mild tenderness RUQ, normal bowel sounds. Musculoskeletal: 1+ bilateral lower extremity edema  Neuro: Alert, moving all extremities      Labs:   Recent Labs     02/11/20  1628 02/12/20  0941 02/13/20  0852   WBC 9.0 9.2 4.9   HGB 13.4 12.1 11.3*   HCT 40.1 36.7* 33.8*    234 211     Recent Labs     02/11/20  1628 02/12/20  0941 02/13/20  0852    137 140   K 4.5 4.0 3.9    105 108*   CO2 22 20 21   BUN 12 13 10   CREATININE 0.54 0.59 0.54   CALCIUM 10.4* 9.6 9.0     Recent Labs     02/11/20  1628   AST 19   ALT 17   BILITOT 0.3   ALKPHOS 161*     No results for input(s): INR in the last 72 hours.   No results for input(s): Scott Ramming in the last 72 examining and evaluating pt, I spent additional time explaining care, normal and abnormal findings, and treatment plan. All of pt questions were answered. Counseling, diet and education were  provided. Case will be discussed with nursing staff when appropriate. Family will be updated if and when appropriate.       Diet: DIET CLEAR LIQUID;    Code Status: Limited      Electronically signed by Elder Arroyo MD on 2/14/2020 at 1:25 PM

## 2020-02-14 NOTE — PROGRESS NOTES
IV attempt x1. Right hand without success. Pt. C/O of some discomfort during attempt. No bleeding or bruising noted. Pt. Stated no pain after IV attempt. Antonia Crum RN notified.

## 2020-02-15 LAB
ANION GAP SERPL CALCULATED.3IONS-SCNC: 12 MEQ/L (ref 9–15)
BASOPHILS ABSOLUTE: 0.1 K/UL (ref 0–0.2)
BASOPHILS RELATIVE PERCENT: 1.2 %
BUN BLDV-MCNC: 5 MG/DL (ref 8–23)
CALCIUM SERPL-MCNC: 9.4 MG/DL (ref 8.5–9.9)
CHLORIDE BLD-SCNC: 109 MEQ/L (ref 95–107)
CO2: 20 MEQ/L (ref 20–31)
CREAT SERPL-MCNC: 0.47 MG/DL (ref 0.5–0.9)
EOSINOPHILS ABSOLUTE: 0.2 K/UL (ref 0–0.7)
EOSINOPHILS RELATIVE PERCENT: 5 %
GFR AFRICAN AMERICAN: >60
GFR NON-AFRICAN AMERICAN: >60
GLUCOSE BLD-MCNC: 103 MG/DL (ref 70–99)
HCT VFR BLD CALC: 35.8 % (ref 37–47)
HEMOGLOBIN: 12.2 G/DL (ref 12–16)
LYMPHOCYTES ABSOLUTE: 1.5 K/UL (ref 1–4.8)
LYMPHOCYTES RELATIVE PERCENT: 32 %
MCH RBC QN AUTO: 31.8 PG (ref 27–31.3)
MCHC RBC AUTO-ENTMCNC: 34.1 % (ref 33–37)
MCV RBC AUTO: 93.1 FL (ref 82–100)
MONOCYTES ABSOLUTE: 0.6 K/UL (ref 0.2–0.8)
MONOCYTES RELATIVE PERCENT: 12.7 %
NEUTROPHILS ABSOLUTE: 2.3 K/UL (ref 1.4–6.5)
NEUTROPHILS RELATIVE PERCENT: 49.1 %
PDW BLD-RTO: 14.1 % (ref 11.5–14.5)
PLATELET # BLD: 207 K/UL (ref 130–400)
POTASSIUM REFLEX MAGNESIUM: 3.9 MEQ/L (ref 3.4–4.9)
RBC # BLD: 3.85 M/UL (ref 4.2–5.4)
SODIUM BLD-SCNC: 141 MEQ/L (ref 135–144)
WBC # BLD: 4.6 K/UL (ref 4.8–10.8)

## 2020-02-15 PROCEDURE — 6370000000 HC RX 637 (ALT 250 FOR IP): Performed by: SURGERY

## 2020-02-15 PROCEDURE — 2500000003 HC RX 250 WO HCPCS: Performed by: PHYSICIAN ASSISTANT

## 2020-02-15 PROCEDURE — 6360000002 HC RX W HCPCS: Performed by: SURGERY

## 2020-02-15 PROCEDURE — 36415 COLL VENOUS BLD VENIPUNCTURE: CPT

## 2020-02-15 PROCEDURE — 80048 BASIC METABOLIC PNL TOTAL CA: CPT

## 2020-02-15 PROCEDURE — 2580000003 HC RX 258: Performed by: PHYSICIAN ASSISTANT

## 2020-02-15 PROCEDURE — 1210000000 HC MED SURG R&B

## 2020-02-15 PROCEDURE — 99232 SBSQ HOSP IP/OBS MODERATE 35: CPT | Performed by: SURGERY

## 2020-02-15 PROCEDURE — 85025 COMPLETE CBC W/AUTO DIFF WBC: CPT

## 2020-02-15 PROCEDURE — 6370000000 HC RX 637 (ALT 250 FOR IP): Performed by: INTERNAL MEDICINE

## 2020-02-15 RX ORDER — BISACODYL 10 MG
10 SUPPOSITORY, RECTAL RECTAL DAILY
Status: COMPLETED | OUTPATIENT
Start: 2020-02-15 | End: 2020-02-17

## 2020-02-15 RX ADMIN — SODIUM CHLORIDE: 9 INJECTION, SOLUTION INTRAVENOUS at 02:17

## 2020-02-15 RX ADMIN — SODIUM CHLORIDE: 9 INJECTION, SOLUTION INTRAVENOUS at 15:27

## 2020-02-15 RX ADMIN — ACETAMINOPHEN 650 MG: 325 TABLET ORAL at 20:33

## 2020-02-15 RX ADMIN — BISACODYL 10 MG: 10 SUPPOSITORY RECTAL at 15:27

## 2020-02-15 RX ADMIN — BRIMONIDINE TARTRATE 1 DROP: 2 SOLUTION OPHTHALMIC at 11:08

## 2020-02-15 RX ADMIN — Medication 10 ML: at 09:17

## 2020-02-15 RX ADMIN — BRIMONIDINE TARTRATE 1 DROP: 2 SOLUTION OPHTHALMIC at 20:33

## 2020-02-15 RX ADMIN — FAMOTIDINE 20 MG: 10 INJECTION, SOLUTION INTRAVENOUS at 09:17

## 2020-02-15 RX ADMIN — METOCLOPRAMIDE HYDROCHLORIDE 5 MG: 5 INJECTION INTRAMUSCULAR; INTRAVENOUS at 09:16

## 2020-02-15 RX ADMIN — METOCLOPRAMIDE HYDROCHLORIDE 5 MG: 5 INJECTION INTRAMUSCULAR; INTRAVENOUS at 02:15

## 2020-02-15 RX ADMIN — METOCLOPRAMIDE HYDROCHLORIDE 5 MG: 5 INJECTION INTRAMUSCULAR; INTRAVENOUS at 15:27

## 2020-02-15 ASSESSMENT — PAIN DESCRIPTION - LOCATION: LOCATION: BACK

## 2020-02-15 ASSESSMENT — PAIN SCALES - GENERAL
PAINLEVEL_OUTOF10: 4
PAINLEVEL_OUTOF10: 0
PAINLEVEL_OUTOF10: 4

## 2020-02-15 NOTE — PROGRESS NOTES
Pt Name: Li Quinn  Medical Record Number: [id number]  Date of Birth 1918   Admit date 2020  4:13 PM  Today's Date: 2/15/2020     ASSESSMENT  1. Hospital day # 4  2. Partial small bowel obstruction, improving    PLAN  1. Dulcolax suppositories and Reglan IV  2. Continue to observe    SUBJECTIVE  Chief complaint: None  Afebrile, vital signs are stable. She denies any nausea or vomiting, has passed flatus andr had a bowel movement. She is tolerating a DIET CLEAR LIQUID;. Her pain is well controlled on current medications. She has been ambulating in the halls. has a past medical history of Acute cystitis with hematuria, Bowel obstruction (HCC), Chronic hyponatremia, Compression fracture of T12 vertebra ([address] DJD (degenerative joint disease) of hip, DM (diabetes mellitus), type 2 with peripheral vascular complications ([address] Gallbladder sludge, GERD (gastroesophageal reflux disease), Glaucoma, H/O vascular surgery, Hearing loss, History of total hip replacement, right, Lower leg edema, Lumbar stenosis, Macular degeneration, PAF (paroxysmal atrial fibrillation) ([address] Pain of left scapula, PVD (peripheral vascular disease) ([address] and Stress incontinence. CURRENT MEDS  Scheduled Meds:   bisacodyl  10 mg Rectal Daily    metoclopramide  5 mg Intravenous Q8H    sodium chloride flush  10 mL Intravenous 2 times per day    famotidine (PEPCID) injection  20 mg Intravenous Daily    brimonidine  1 drop Both Eyes BID     Continuous Infusions:   sodium chloride 75 mL/hr at 02/15/20 0217     PRN Meds:.acetaminophen, sodium chloride flush, ondansetron    OBJECTIVE  CURRENT VITALS:  height is 5' 1\" (1.549 m) and weight is 130 lb (59 kg). Her oral temperature is 97.2 °F (36.2 °C). Her blood pressure is 157/54 (abnormal) and her pulse is 68. Her respiration is 16 and oxygen saturation is 100%.    Temperature Range (24h):Temp: 97.2 °F (36.2 °C) Temp  Av °F (36.1 °C)  Min: 96.6 °F (35.9 °C)  Max: 97.2 °F (36.2 °C)  BP Range (38F): Systolic (82BGA), ACC:036 , Min:139 , EJF:782     Diastolic (87TPP), BKF:02, Min:52, Max:120    Pulse Range (24h): Pulse  Av.3  Min: 59  Max: 89  Respiration Range (24h): No data recorded    GENERAL: alert, no distress  LUNGS: clear to ausculation, without wheezes, rales or rhonci  HEART: normal rate and regular rhythm  ABDOMEN: soft, non-tender, non-distended, bowel sounds present in all 4 quadrants and no guarding or peritoneal signs  EXTERMITY: no cyanosis, clubbing or edema    In: 2505 [P. O.:900; I.V.:1605]  Out: 750 [Urine:750]  Date 02/15/20 0000 - 02/15/20 2359   Shift 9408-3931 8611-3928 8145-6079 24 Hour Total   INTAKE   P.O.(mL/kg/hr)  450  450   I. V.(mL/kg) [id number])   467(49.3)   Shift Total(mL/kg) 450(78.2) 450(7.6)  1230(20.9)   OUTPUT   Urine(mL/kg/hr) 0(0) 200  200   Emesis/NG output(mL/kg) 0(0)   0(0)   Other(mL/kg) 0(0)   0(0)   Stool(mL/kg) 0(0)   0(0)   Blood(mL/kg) 0(0)   0(0)   Shift Total(mL/kg) 0(0) 200(3.4)  200(3.4)   Weight (kg) 59 59 59 59       LABS  Recent Labs     20  0852 20  1414 02/15/20  0621 02/15/20  0622   WBC 4.9 5.8 4.6*  --    HGB 11.3* 11.7* 12.2  --    HCT 33.8* 35.1* 35.8*  --     218 207  --     137  --  141   K 3.9 3.7  --  3.9   * 105  --  109*   CO2 21 17*  --  20   BUN 10 9  --  5*   CREATININE 0.54 0.45*  --  0.47*   CALCIUM 9.0 9.3  --  9.4      No results for input(s): PTT, INR in the last 72 hours. Invalid input(s): PT  Recent Labs     20  0852   LACTA 0.7       RADIOLOGY  Xr Chest Standard (2 Vw)    Result Date: 2020  XR CHEST (2 VW) : 2020 12:00 PM CLINICAL HISTORY:  cough . COMPARISON: Portable chest 2019 and chest CT with contrast 2019. TECHNIQUE: Upright AP and lateral radiographs of the chest were obtained. FINDINGS: A shallow inspiration is present without significant infiltrate identified.  There is no cardiomegaly, significant pleural effusion, vascular congestion, pneumothorax, or acute fractures identified. A mild compression fracture of L1 with previous kyphoplasty is again noted. NO EVIDENCE OF ACTIVE CARDIOPULMONARY DISEASE. Ct Head Wo Contrast    Result Date: 1/18/2020  CT HEAD WO CONTRAST : 1/18/2020 CLINICAL HISTORY:  confusion . COMPARISON: 8/17/2019. TECHNIQUE: Spiral unenhanced images were obtained of the head, with routine multiplanar reconstructions performed. All CT scans at this facility use dose modulation, iterative reconstruction, and/or weight based dosing when appropriate to reduce radiation dose to as low as reasonably achievable. FINDINGS: There is no intracranial hemorrhage, mass effect, midline shift, extra-axial collection, evidence of hydrocephalus, recent ischemic infarct, or skull fracture identified. Moderate generalized atrophic changes are again noted. A small amount of fluid is noted within the right maxillary sinus suggestive of sinusitis. Mild mucosal thickening is noted within the ethmoid air cells and inferior right maxillary sinus. The mastoid air cells and other visualized paranasal sinuses are essentially clear. NO ACUTE INTRACRANIAL PROCESS IDENTIFIED. MILD PREDOMINANTLY RIGHT MAXILLARY SINUSITIS. Ct Abdomen Pelvis W Iv Contrast Additional Contrast? None    Result Date: 2/11/2020  CT of the Abdomen and Pelvis with intravenous contrast medium History:  Nausea, vomiting, abdominal pain Technical Factors: CT imaging of the abdomen and pelvis were obtained and formatted as 5 mm contiguous axial images from the domes of the diaphragm to the symphysis pubis. Sagittal and coronal reconstructions were also obtained. Oral contrast medium: None Intravenous contrast medium: Isovue-370, 100 mL Comparison:  CT abdomen pelvis, September 26, 2019, September 22, 2019. Findings: Study limited secondary to beam hardening artifact from right hip replacement. Lungs:  Minimal bibasilar subsegmental atelectatic change.  Liver: Normal in size, shape, and decreased in attenuation. Bile Ducts:  Minimal intrahepatic ductal dilatation, unchanged. Gallbladder:  No stones or wall thickening. Pancreas:  Hypoplastic, and fatty replaced, without masses, cysts, ductal dilatation or calcification. Spleen:  Normal in size without masses or calcifications. No splenules. Kidneys:  Normal in size and enhancement. No hydronephrosis, or stones. 2.5 cm left upper pole renal cysts, stable with smaller cystlike areas, bilateral kidneys, too small to characterize, and unchanged. Adrenals:  8mm noncalcified right adrenal nodule containing central fat, with mild left adrenal hyperplasia, unchanged. Small bowel:  Fluid-filled, dilated small bowel, with no wall thickening, having transition, mid to distal jejunum, right upper quadrant. No mass identified. Appendix:  Not visualized. No inflammatory change pericecal region. Colon:  Normal in caliber, or mildly decompressed. Diverticular change, sigmoid colon Peritoneum:  No ascites, free air, or fluid collections. Vessels: Aorta normal in course and caliber. Portal vein, splenic vein, superior mesenteric vein are patent. Lymph nodes:  Retroperitoneal:  No enlarged retroperitoneal lymph nodes. Mesenteric:  No enlarged mesenteric lymph nodes. Pelvic: No enlarged pelvic lymph nodes. Ureters: Normal in course and caliber. No calcifications. Bladder: Portion of urinary bladder, obscured by beam hardening artifact. Imaged bladder shows no wall thickening. Abdominal Wall:  No hernia identified. No diastasis of rectus musculature. No edema or masses. Bones:  No bone lesions. Diffuse disc space narrowing T10-11 through L5-S1. Remote kyphoplasty L1. 2 mm anterolisthesis L4 on L5, and 2 mm anterolisthesis L5 on S1, unchanged. Remote right bipolar noncemented hip replacement. Small bowel obstruction, mid to distal ileum. No mass identified. Findings similar to those on prior study. Hepatic steatosis.  Minimal

## 2020-02-15 NOTE — PROGRESS NOTES
Attempted to get patient up to the chair and moving this am. Patient said she is really tired and to give her 5 more minutes.

## 2020-02-15 NOTE — PROGRESS NOTES
Ambulated patient. She tolerated well with one person assistance and a walker. She was given a rectal suppository. Awaiting results.

## 2020-02-15 NOTE — PROGRESS NOTES
Hospitalist Progress Note      PCP: Elsa Florence MD    Date of Admission: 2/11/2020    Subjective: :  Denies any complaints  Denies abdominal pain  No emesis  No bowel movement yesterday    Medications:  Reviewed    Infusion Medications    sodium chloride 75 mL/hr at 02/15/20 0217     Scheduled Medications    metoclopramide  5 mg Intravenous Q8H    sodium chloride flush  10 mL Intravenous 2 times per day    famotidine (PEPCID) injection  20 mg Intravenous Daily    brimonidine  1 drop Both Eyes BID     PRN Meds: acetaminophen, sodium chloride flush, ondansetron, bisacodyl      Intake/Output Summary (Last 24 hours) at 2/15/2020 1025  Last data filed at 2/15/2020 0516  Gross per 24 hour   Intake 2055 ml   Output 550 ml   Net 1505 ml       Exam:    BP (!) 157/54   Pulse 68   Temp 97.2 °F (36.2 °C) (Oral)   Resp 16   Ht 5' 1\" (1.549 m)   Wt 130 lb (59 kg)   LMP  (LMP Unknown)   SpO2 100%   BMI 24.56 kg/m²     General appearance: No apparent distress  HEENT: Conjunctivae/corneas clear. Neck: Supple, with full range of motion. Respiratory:  Normal respiratory effort. Clear to auscultation, bilaterally without Rales/Wheezes/Rhonchi. Cardiovascular: Regular rate and rhythm with normal S1/S2 without murmurs, rubs or gallops. Abdomen: Soft, nontender, normal bowel sounds.   Musculoskeletal: 1+ bilateral lower extremity edema  Neuro: Alert, moving all extremities       Labs:   Recent Labs     02/13/20  0852 02/14/20  1414 02/15/20  0621   WBC 4.9 5.8 4.6*   HGB 11.3* 11.7* 12.2   HCT 33.8* 35.1* 35.8*    218 207     Recent Labs     02/13/20  0852 02/14/20  1414 02/15/20  0622    137 141   K 3.9 3.7 3.9   * 105 109*   CO2 21 17* 20   BUN 10 9 5*   CREATININE 0.54 0.45* 0.47*   CALCIUM 9.0 9.3 9.4     No results for input(s): AST, ALT, BILIDIR, BILITOT, ALKPHOS in the last 72 hours. No results for input(s): INR in the last 72 hours.   No results for input(s): CKTOTAL, TROPONINI in the last 72 hours. Urinalysis:      Lab Results   Component Value Date    NITRU Negative 02/11/2020    WBCUA >100 02/11/2020    BACTERIA MANY 02/11/2020    RBCUA 6-10 02/11/2020    BLOODU TRACE 02/11/2020    SPECGRAV 1.042 02/11/2020    GLUCOSEU Negative 02/11/2020    GLUCOSEU NEG 05/09/2012       Radiology:  XR ABDOMEN (2 VIEWS)   Final Result   NONSPECIFIC ABDOMEN. CT ABDOMEN PELVIS W IV CONTRAST Additional Contrast? None   Final Result      Small bowel obstruction, mid to distal ileum. No mass identified. Findings similar to those on prior study. Hepatic steatosis. Minimal intrahepatic ductal dilatation, unchanged. Severe degenerative change, lumbar spine, with remote L1 kyphoplasty, and L4, and L5, grade 1 spondylolisthesis, unchanged. Remote right hip arthroplasty. Other findings discussed. All CT scans at this facility use dose modulation, iterative reconstruction, and/or weight based dosing when appropriate to reduce radiation dose to as low as reasonably achievable. Assessment/Plan:    Active Hospital Problems    Diagnosis Date Noted    SBO (small bowel obstruction) ([facility] [Y40.041] 02/11/2020      8-year-old female with history of DM, paroxysmal atrial fibrillation, PAD, macular degeneration, GERD  who presented with abdominal pain for 2 days and found to have partial small bowel obstruction     Partial small bowel obstruction  - improving  - tolerating clear liquids  -IV fluids   -Management per general surgery     Klebsiella UTI  -Completed ceftriaxone for 3 days. Additional work up or/and treatment plan may be added today or then after based on clinical progression. I am managing a portion of pt care. Some medical issues are handled by other specialists. Additional work up and treatment should be done in out pt setting by pt PCP and other out pt providers.      In addition to examining and evaluating pt, I spent additional

## 2020-02-16 LAB
ANION GAP SERPL CALCULATED.3IONS-SCNC: 11 MEQ/L (ref 9–15)
BASOPHILS ABSOLUTE: 0 K/UL (ref 0–0.2)
BASOPHILS RELATIVE PERCENT: 0.9 %
BUN BLDV-MCNC: 3 MG/DL (ref 8–23)
CALCIUM SERPL-MCNC: 9.4 MG/DL (ref 8.5–9.9)
CHLORIDE BLD-SCNC: 111 MEQ/L (ref 95–107)
CO2: 23 MEQ/L (ref 20–31)
CREAT SERPL-MCNC: 0.41 MG/DL (ref 0.5–0.9)
EOSINOPHILS ABSOLUTE: 0.3 K/UL (ref 0–0.7)
EOSINOPHILS RELATIVE PERCENT: 6.2 %
GFR AFRICAN AMERICAN: >60
GFR NON-AFRICAN AMERICAN: >60
GLUCOSE BLD-MCNC: 106 MG/DL (ref 70–99)
HCT VFR BLD CALC: 34.6 % (ref 37–47)
HEMOGLOBIN: 11.6 G/DL (ref 12–16)
LYMPHOCYTES ABSOLUTE: 1.4 K/UL (ref 1–4.8)
LYMPHOCYTES RELATIVE PERCENT: 31.7 %
MAGNESIUM: 1.6 MG/DL (ref 1.7–2.4)
MCH RBC QN AUTO: 31.1 PG (ref 27–31.3)
MCHC RBC AUTO-ENTMCNC: 33.7 % (ref 33–37)
MCV RBC AUTO: 92.3 FL (ref 82–100)
MONOCYTES ABSOLUTE: 0.6 K/UL (ref 0.2–0.8)
MONOCYTES RELATIVE PERCENT: 12.4 %
NEUTROPHILS ABSOLUTE: 2.2 K/UL (ref 1.4–6.5)
NEUTROPHILS RELATIVE PERCENT: 48.8 %
PDW BLD-RTO: 14.2 % (ref 11.5–14.5)
PLATELET # BLD: 226 K/UL (ref 130–400)
POTASSIUM REFLEX MAGNESIUM: 3.5 MEQ/L (ref 3.4–4.9)
RBC # BLD: 3.75 M/UL (ref 4.2–5.4)
SODIUM BLD-SCNC: 145 MEQ/L (ref 135–144)
WBC # BLD: 4.5 K/UL (ref 4.8–10.8)

## 2020-02-16 PROCEDURE — 2580000003 HC RX 258: Performed by: PHYSICIAN ASSISTANT

## 2020-02-16 PROCEDURE — 6370000000 HC RX 637 (ALT 250 FOR IP): Performed by: SURGERY

## 2020-02-16 PROCEDURE — 6360000002 HC RX W HCPCS: Performed by: SURGERY

## 2020-02-16 PROCEDURE — 80048 BASIC METABOLIC PNL TOTAL CA: CPT

## 2020-02-16 PROCEDURE — 99232 SBSQ HOSP IP/OBS MODERATE 35: CPT | Performed by: SURGERY

## 2020-02-16 PROCEDURE — 83735 ASSAY OF MAGNESIUM: CPT

## 2020-02-16 PROCEDURE — 2500000003 HC RX 250 WO HCPCS: Performed by: PHYSICIAN ASSISTANT

## 2020-02-16 PROCEDURE — 36415 COLL VENOUS BLD VENIPUNCTURE: CPT

## 2020-02-16 PROCEDURE — 85025 COMPLETE CBC W/AUTO DIFF WBC: CPT

## 2020-02-16 PROCEDURE — 1210000000 HC MED SURG R&B

## 2020-02-16 RX ORDER — TIMOLOL MALEATE 5 MG/ML
1 SOLUTION/ DROPS OPHTHALMIC 2 TIMES DAILY
Status: DISCONTINUED | OUTPATIENT
Start: 2020-02-16 | End: 2020-02-18 | Stop reason: HOSPADM

## 2020-02-16 RX ORDER — LATANOPROST 50 UG/ML
1 SOLUTION/ DROPS OPHTHALMIC NIGHTLY
Status: DISCONTINUED | OUTPATIENT
Start: 2020-02-16 | End: 2020-02-18 | Stop reason: HOSPADM

## 2020-02-16 RX ADMIN — BRIMONIDINE TARTRATE 1 DROP: 2 SOLUTION OPHTHALMIC at 09:26

## 2020-02-16 RX ADMIN — METOCLOPRAMIDE HYDROCHLORIDE 5 MG: 5 INJECTION INTRAMUSCULAR; INTRAVENOUS at 01:07

## 2020-02-16 RX ADMIN — SODIUM CHLORIDE: 9 INJECTION, SOLUTION INTRAVENOUS at 05:32

## 2020-02-16 RX ADMIN — BISACODYL 10 MG: 10 SUPPOSITORY RECTAL at 09:26

## 2020-02-16 RX ADMIN — METOCLOPRAMIDE HYDROCHLORIDE 5 MG: 5 INJECTION INTRAMUSCULAR; INTRAVENOUS at 09:26

## 2020-02-16 RX ADMIN — SODIUM CHLORIDE: 9 INJECTION, SOLUTION INTRAVENOUS at 17:43

## 2020-02-16 RX ADMIN — BRIMONIDINE TARTRATE 1 DROP: 2 SOLUTION OPHTHALMIC at 21:00

## 2020-02-16 RX ADMIN — FAMOTIDINE 20 MG: 10 INJECTION, SOLUTION INTRAVENOUS at 09:27

## 2020-02-16 RX ADMIN — METOCLOPRAMIDE HYDROCHLORIDE 5 MG: 5 INJECTION INTRAMUSCULAR; INTRAVENOUS at 16:07

## 2020-02-16 ASSESSMENT — PAIN SCALES - GENERAL
PAINLEVEL_OUTOF10: 0

## 2020-02-16 NOTE — PROGRESS NOTES
(36.7 °C)  Min: 97.7 °F (36.5 °C)  Max: 98.2 °F (36.8 °C)  BP Range (44M): Systolic (92RQB), ALL:484 , Min:173 , RRD:741     Diastolic (24FWF), DEP:46, Min:71, Max:75    Pulse Range (24h): Pulse  Av.5  Min: 72  Max: 91  Respiration Range (24h): Resp  Av  Min: 18  Max: 18    GENERAL: alert, no distress, pleasantly confused  LUNGS: clear to ausculation, without wheezes, rales or rhonci  HEART: normal rate and regular rhythm  ABDOMEN: soft, non-tender, non-distended, bowel sounds present in all 4 quadrants and no guarding or peritoneal signs  EXTERMITY: no cyanosis, clubbing or edema    In:  [P.O.:460; I.V.:1643]  Out: 200 [Urine:200]  Date 20 - 20 235   Shift 7158-2294 9733-6212 8648-5553 24 Hour Total   INTAKE   P.O.(mL/kg/hr)  340  340   Shift Total(mL/kg)  340(5.8)  340(5.8)   OUTPUT   Shift Total(mL/kg)       Weight (kg) 59 59 59 59       LABS  Recent Labs     20  1414 02/15/20  0621 02/15/20  0622 20  0645   WBC 5.8 4.6*  --  4.5*   HGB 11.7* 12.2  --  11.6*   HCT 35.1* 35.8*  --  34.6*    207  --  226     --  141 145*   K 3.7  --  3.9 3.5     --  109* 111*   CO2 17*  --  20 23   BUN 9  --  5* 3*   CREATININE 0.45*  --  0.47* 0.41*   MG  --   --   --  1.6*   CALCIUM 9.3  --  9.4 9.4      No results for input(s): PTT, INR in the last 72 hours. Invalid input(s): PT  No results for input(s): AST, ALT, BILITOT, BILIDIR, AMYLASE, LIPASE, LDH, LACTA in the last 72 hours. RADIOLOGY  Xr Chest Standard (2 Vw)    Result Date: 2020  XR CHEST (2 VW) : 2020 12:00 PM CLINICAL HISTORY:  cough . COMPARISON: Portable chest 2019 and chest CT with contrast 2019. TECHNIQUE: Upright AP and lateral radiographs of the chest were obtained. FINDINGS: A shallow inspiration is present without significant infiltrate identified. There is no cardiomegaly, significant pleural effusion, vascular congestion, pneumothorax, or acute fractures identified.  A mild compression fracture of L1 with previous kyphoplasty is again noted. NO EVIDENCE OF ACTIVE CARDIOPULMONARY DISEASE. Ct Head Wo Contrast    Result Date: 1/18/2020  CT HEAD WO CONTRAST : 1/18/2020 CLINICAL HISTORY:  confusion . COMPARISON: 8/17/2019. TECHNIQUE: Spiral unenhanced images were obtained of the head, with routine multiplanar reconstructions performed. All CT scans at this facility use dose modulation, iterative reconstruction, and/or weight based dosing when appropriate to reduce radiation dose to as low as reasonably achievable. FINDINGS: There is no intracranial hemorrhage, mass effect, midline shift, extra-axial collection, evidence of hydrocephalus, recent ischemic infarct, or skull fracture identified. Moderate generalized atrophic changes are again noted. A small amount of fluid is noted within the right maxillary sinus suggestive of sinusitis. Mild mucosal thickening is noted within the ethmoid air cells and inferior right maxillary sinus. The mastoid air cells and other visualized paranasal sinuses are essentially clear. NO ACUTE INTRACRANIAL PROCESS IDENTIFIED. MILD PREDOMINANTLY RIGHT MAXILLARY SINUSITIS. Ct Abdomen Pelvis W Iv Contrast Additional Contrast? None    Result Date: 2/11/2020  CT of the Abdomen and Pelvis with intravenous contrast medium History:  Nausea, vomiting, abdominal pain Technical Factors: CT imaging of the abdomen and pelvis were obtained and formatted as 5 mm contiguous axial images from the domes of the diaphragm to the symphysis pubis. Sagittal and coronal reconstructions were also obtained. Oral contrast medium: None Intravenous contrast medium: Isovue-370, 100 mL Comparison:  CT abdomen pelvis, September 26, 2019, September 22, 2019. Findings: Study limited secondary to beam hardening artifact from right hip replacement. Lungs:  Minimal bibasilar subsegmental atelectatic change. Liver:  Normal in size, shape, and decreased in attenuation.  Bile Ducts: Minimal intrahepatic ductal dilatation, unchanged. Gallbladder:  No stones or wall thickening. Pancreas:  Hypoplastic, and fatty replaced, without masses, cysts, ductal dilatation or calcification. Spleen:  Normal in size without masses or calcifications. No splenules. Kidneys:  Normal in size and enhancement. No hydronephrosis, or stones. 2.5 cm left upper pole renal cysts, stable with smaller cystlike areas, bilateral kidneys, too small to characterize, and unchanged. Adrenals:  8mm noncalcified right adrenal nodule containing central fat, with mild left adrenal hyperplasia, unchanged. Small bowel:  Fluid-filled, dilated small bowel, with no wall thickening, having transition, mid to distal jejunum, right upper quadrant. No mass identified. Appendix:  Not visualized. No inflammatory change pericecal region. Colon:  Normal in caliber, or mildly decompressed. Diverticular change, sigmoid colon Peritoneum:  No ascites, free air, or fluid collections. Vessels: Aorta normal in course and caliber. Portal vein, splenic vein, superior mesenteric vein are patent. Lymph nodes:  Retroperitoneal:  No enlarged retroperitoneal lymph nodes. Mesenteric:  No enlarged mesenteric lymph nodes. Pelvic: No enlarged pelvic lymph nodes. Ureters: Normal in course and caliber. No calcifications. Bladder: Portion of urinary bladder, obscured by beam hardening artifact. Imaged bladder shows no wall thickening. Abdominal Wall:  No hernia identified. No diastasis of rectus musculature. No edema or masses. Bones:  No bone lesions. Diffuse disc space narrowing T10-11 through L5-S1. Remote kyphoplasty L1. 2 mm anterolisthesis L4 on L5, and 2 mm anterolisthesis L5 on S1, unchanged. Remote right bipolar noncemented hip replacement. Small bowel obstruction, mid to distal ileum. No mass identified. Findings similar to those on prior study. Hepatic steatosis. Minimal intrahepatic ductal dilatation, unchanged.  Severe degenerative

## 2020-02-16 NOTE — PROGRESS NOTES
Hospitalist Progress Note      PCP: Kurt Akbar MD    Date of Admission: 2/11/2020    Subjective: :  Patient denies abdominal pain  Tolerating clear liquids  Passing flatus. No bowel movement    Medications:  Reviewed    Infusion Medications    sodium chloride 75 mL/hr at 02/16/20 0532     Scheduled Medications    bisacodyl  10 mg Rectal Daily    metoclopramide  5 mg Intravenous Q8H    sodium chloride flush  10 mL Intravenous 2 times per day    famotidine (PEPCID) injection  20 mg Intravenous Daily    brimonidine  1 drop Both Eyes BID     PRN Meds: acetaminophen, sodium chloride flush, ondansetron      Intake/Output Summary (Last 24 hours) at 2/16/2020 1143  Last data filed at 2/16/2020 0805  Gross per 24 hour   Intake 2568 ml   Output 400 ml   Net 2168 ml       Exam:    BP (!) 173/71   Pulse 72   Temp 98.2 °F (36.8 °C) (Oral)   Resp 18   Ht 5' 1\" (1.549 m)   Wt 130 lb (59 kg)   LMP  (LMP Unknown)   SpO2 99%   BMI 24.56 kg/m²     General appearance: No apparent distress, sitting up in chair  HEENT:Conjunctivae/corneas clear. Neck: Supple, with full range of motion. Respiratory:  Normal respiratory effort. Clear to auscultation, bilaterally without Rales/Wheezes/Rhonchi. Cardiovascular: Regular rate and rhythm with normal S1/S2 without murmurs, rubs or gallops. Abdomen: Soft, non-tender, non-distended with normal bowel sounds. Musculoskeletal: No clubbing, cyanosis or edema bilaterally. Neuro: Alert, moving all extremities. Labs:   Recent Labs     02/14/20  1414 02/15/20  0621 02/16/20  0645   WBC 5.8 4.6* 4.5*   HGB 11.7* 12.2 11.6*   HCT 35.1* 35.8* 34.6*    207 226     Recent Labs     02/14/20  1414 02/15/20  0622 02/16/20  0645    141 145*   K 3.7 3.9 3.5    109* 111*   CO2 17* 20 23   BUN 9 5* 3*   CREATININE 0.45* 0.47* 0.41*   CALCIUM 9.3 9.4 9.4     No results for input(s): AST, ALT, BILIDIR, BILITOT, ALKPHOS in the last 72 hours.   No results for input(s): INR in the last 72 hours. No results for input(s): Jeovany Prayer in the last 72 hours. Urinalysis:      Lab Results   Component Value Date    NITRU Negative 02/11/2020    WBCUA >100 02/11/2020    BACTERIA MANY 02/11/2020    RBCUA 6-10 02/11/2020    BLOODU TRACE 02/11/2020    SPECGRAV 1.042 02/11/2020    GLUCOSEU Negative 02/11/2020    GLUCOSEU NEG 05/09/2012       Radiology:  XR ABDOMEN (2 VIEWS)   Final Result   NONSPECIFIC ABDOMEN. CT ABDOMEN PELVIS W IV CONTRAST Additional Contrast? None   Final Result      Small bowel obstruction, mid to distal ileum. No mass identified. Findings similar to those on prior study. Hepatic steatosis. Minimal intrahepatic ductal dilatation, unchanged. Severe degenerative change, lumbar spine, with remote L1 kyphoplasty, and L4, and L5, grade 1 spondylolisthesis, unchanged. Remote right hip arthroplasty. Other findings discussed. All CT scans at this facility use dose modulation, iterative reconstruction, and/or weight based dosing when appropriate to reduce radiation dose to as low as reasonably achievable. Assessment/Plan:    Active Hospital Problems    Diagnosis Date Noted    SBO (small bowel obstruction) ([facility] [address] [P09.630] 02/11/2020        8-year-old female with history of DM, paroxysmal atrial fibrillation, PAD, macular degeneration, GERD  who presented with abdominal pain for 2 days and found to have partial small bowel obstruction     Partial small bowel obstruction  - improving  - tolerating clear liquids  -IV fluids   -Management per general surgery     Paroxysmal atrial fibrillation -holding Eliquis due to SBO    Additional work up or/and treatment plan may be added today or then after based on clinical progression. I am managing a portion of pt care. Some medical issues are handled by other specialists.  Additional work up and treatment should be done in out pt setting by pt PCP and other out pt

## 2020-02-16 NOTE — PLAN OF CARE
Problem: Pain:  Goal: Pain level will decrease  Description  Pain level will decrease  Outcome: Ongoing  Goal: Control of acute pain  Description  Control of acute pain  Outcome: Ongoing  Goal: Control of chronic pain  Description  Control of chronic pain  Outcome: Ongoing     Problem: Falls - Risk of:  Goal: Will remain free from falls  Description  Will remain free from falls  Outcome: Ongoing  Goal: Absence of physical injury  Description  Absence of physical injury  Outcome: Ongoing     Problem: IP BALANCE  Goal: LTG - Patient will maintain balance to allow for safe/functional mobility  Outcome: Ongoing     Problem: Risk for Impaired Skin Integrity  Goal: Tissue integrity - skin and mucous membranes  Description  Structural intactness and normal physiological function of skin and  mucous membranes.   Outcome: Ongoing     Problem: IP BALANCE  Goal: LTG - patient will maintain standing balance to allow for completion of daily activities  Outcome: Ongoing

## 2020-02-17 LAB
ANION GAP SERPL CALCULATED.3IONS-SCNC: 12 MEQ/L (ref 9–15)
BASOPHILS ABSOLUTE: 0.1 K/UL (ref 0–0.2)
BASOPHILS RELATIVE PERCENT: 0.9 %
BUN BLDV-MCNC: 4 MG/DL (ref 8–23)
CALCIUM SERPL-MCNC: 9.1 MG/DL (ref 8.5–9.9)
CHLORIDE BLD-SCNC: 109 MEQ/L (ref 95–107)
CO2: 21 MEQ/L (ref 20–31)
CREAT SERPL-MCNC: 0.41 MG/DL (ref 0.5–0.9)
EOSINOPHILS ABSOLUTE: 0.4 K/UL (ref 0–0.7)
EOSINOPHILS RELATIVE PERCENT: 6.4 %
GFR AFRICAN AMERICAN: >60
GFR NON-AFRICAN AMERICAN: >60
GLUCOSE BLD-MCNC: 106 MG/DL (ref 70–99)
HCT VFR BLD CALC: 35.7 % (ref 37–47)
HEMOGLOBIN: 11.8 G/DL (ref 12–16)
LYMPHOCYTES ABSOLUTE: 1.6 K/UL (ref 1–4.8)
LYMPHOCYTES RELATIVE PERCENT: 23.6 %
MCH RBC QN AUTO: 30.9 PG (ref 27–31.3)
MCHC RBC AUTO-ENTMCNC: 33 % (ref 33–37)
MCV RBC AUTO: 93.6 FL (ref 82–100)
MONOCYTES ABSOLUTE: 0.8 K/UL (ref 0.2–0.8)
MONOCYTES RELATIVE PERCENT: 11.7 %
NEUTROPHILS ABSOLUTE: 3.9 K/UL (ref 1.4–6.5)
NEUTROPHILS RELATIVE PERCENT: 57.4 %
PDW BLD-RTO: 14.5 % (ref 11.5–14.5)
PLATELET # BLD: 236 K/UL (ref 130–400)
POTASSIUM REFLEX MAGNESIUM: 3.8 MEQ/L (ref 3.4–4.9)
RBC # BLD: 3.81 M/UL (ref 4.2–5.4)
REASON FOR REJECTION: NORMAL
REJECTED TEST: NORMAL
SODIUM BLD-SCNC: 142 MEQ/L (ref 135–144)
WBC # BLD: 6.8 K/UL (ref 4.8–10.8)

## 2020-02-17 PROCEDURE — 99232 SBSQ HOSP IP/OBS MODERATE 35: CPT | Performed by: SURGERY

## 2020-02-17 PROCEDURE — 6360000002 HC RX W HCPCS: Performed by: SURGERY

## 2020-02-17 PROCEDURE — 85025 COMPLETE CBC W/AUTO DIFF WBC: CPT

## 2020-02-17 PROCEDURE — 6370000000 HC RX 637 (ALT 250 FOR IP): Performed by: INTERNAL MEDICINE

## 2020-02-17 PROCEDURE — 36415 COLL VENOUS BLD VENIPUNCTURE: CPT

## 2020-02-17 PROCEDURE — 2580000003 HC RX 258: Performed by: PHYSICIAN ASSISTANT

## 2020-02-17 PROCEDURE — 80048 BASIC METABOLIC PNL TOTAL CA: CPT

## 2020-02-17 PROCEDURE — 1210000000 HC MED SURG R&B

## 2020-02-17 PROCEDURE — 97110 THERAPEUTIC EXERCISES: CPT

## 2020-02-17 PROCEDURE — 6370000000 HC RX 637 (ALT 250 FOR IP): Performed by: SURGERY

## 2020-02-17 PROCEDURE — 2500000003 HC RX 250 WO HCPCS: Performed by: PHYSICIAN ASSISTANT

## 2020-02-17 RX ORDER — DOCUSATE SODIUM 100 MG/1
100 CAPSULE, LIQUID FILLED ORAL 2 TIMES DAILY
Status: DISCONTINUED | OUTPATIENT
Start: 2020-02-17 | End: 2020-02-18 | Stop reason: HOSPADM

## 2020-02-17 RX ORDER — VITAMIN B COMPLEX
1000 TABLET ORAL DAILY
Status: DISCONTINUED | OUTPATIENT
Start: 2020-02-17 | End: 2020-02-18 | Stop reason: HOSPADM

## 2020-02-17 RX ORDER — SENNA PLUS 8.6 MG/1
2 TABLET ORAL NIGHTLY
Status: DISCONTINUED | OUTPATIENT
Start: 2020-02-17 | End: 2020-02-18 | Stop reason: HOSPADM

## 2020-02-17 RX ADMIN — APIXABAN 2.5 MG: 2.5 TABLET, FILM COATED ORAL at 20:16

## 2020-02-17 RX ADMIN — VITAMIN D, TAB 1000IU (100/BT) 1000 UNITS: 25 TAB at 13:41

## 2020-02-17 RX ADMIN — TIMOLOL MALEATE 1 DROP: 5 SOLUTION OPHTHALMIC at 09:34

## 2020-02-17 RX ADMIN — BISACODYL 10 MG: 10 SUPPOSITORY RECTAL at 09:37

## 2020-02-17 RX ADMIN — APIXABAN 2.5 MG: 2.5 TABLET, FILM COATED ORAL at 13:41

## 2020-02-17 RX ADMIN — LATANOPROST 1 DROP: 50 SOLUTION OPHTHALMIC at 20:16

## 2020-02-17 RX ADMIN — BRIMONIDINE TARTRATE 1 DROP: 2 SOLUTION OPHTHALMIC at 09:34

## 2020-02-17 RX ADMIN — TIMOLOL MALEATE 1 DROP: 5 SOLUTION OPHTHALMIC at 20:16

## 2020-02-17 RX ADMIN — SENNOSIDES 17.2 MG: 8.6 TABLET, FILM COATED ORAL at 20:16

## 2020-02-17 RX ADMIN — Medication 10 ML: at 20:16

## 2020-02-17 RX ADMIN — DOCUSATE SODIUM 100 MG: 100 CAPSULE, LIQUID FILLED ORAL at 20:16

## 2020-02-17 RX ADMIN — FAMOTIDINE 20 MG: 10 INJECTION, SOLUTION INTRAVENOUS at 11:35

## 2020-02-17 RX ADMIN — METOCLOPRAMIDE HYDROCHLORIDE 5 MG: 5 INJECTION INTRAMUSCULAR; INTRAVENOUS at 08:49

## 2020-02-17 RX ADMIN — BRIMONIDINE TARTRATE 1 DROP: 2 SOLUTION OPHTHALMIC at 20:16

## 2020-02-17 RX ADMIN — DOCUSATE SODIUM 100 MG: 100 CAPSULE, LIQUID FILLED ORAL at 13:41

## 2020-02-17 ASSESSMENT — PAIN SCALES - GENERAL
PAINLEVEL_OUTOF10: 0

## 2020-02-17 NOTE — CARE COORDINATION
Phone call to jacinto Pink who confirmed pt will return to home with 24 hours care. She would like [address] at [place] for nursing, PT/OT. Reviewed IMM.

## 2020-02-17 NOTE — PROGRESS NOTES
Hospitalist Progress Note      PCP: Vladimir Ron MD    Date of Admission: 2/11/2020    Chief Complaint:    Chief Complaint   Patient presents with    Abdominal Pain     x2 days with nausea and vomiting. Subjective:  Patient no longer has nausea, vomiting; tolerated a diet. 12 point ROS negative other than mentioned above     Medications:  Reviewed    Infusion Medications    sodium chloride 75 mL/hr at 02/16/20 1743     Scheduled Medications    latanoprost  1 drop Left Eye Nightly    timolol  1 drop Both Eyes BID    metoclopramide  5 mg Intravenous Q8H    sodium chloride flush  10 mL Intravenous 2 times per day    famotidine (PEPCID) injection  20 mg Intravenous Daily    brimonidine  1 drop Both Eyes BID     PRN Meds: acetaminophen, sodium chloride flush, ondansetron      Intake/Output Summary (Last 24 hours) at 2/17/2020 1227  Last data filed at 2/17/2020 0919  Gross per 24 hour   Intake 2445 ml   Output 0 ml   Net 2445 ml     Exam:    BP (!) 172/69   Pulse 92   Temp 97.5 °F (36.4 °C) (Oral)   Resp 17   Ht 5' 1\" (1.549 m)   Wt 137 lb 12.6 oz (62.5 kg)   LMP  (LMP Unknown)   SpO2 95%   BMI 26.03 kg/m²     General appearance: No apparent distress, appears stated age and cooperative. HEENT:  Conjunctivae/corneas clear. Neck:  Trachea midline. Respiratory:  Normal respiratory effort. Clear to auscultation  Cardiovascular: Regular rate and rhythm   Abdomen: Soft, non-tender, non-distended with normal bowel sounds.   Musculoskeletal: No clubbing, cyanosis or edema bilaterally  Neuro: Non Focal.   Capillary Refill: Brisk,< 3 seconds   Peripheral Pulses: +2 palpable, equal bilaterally     Labs:   Recent Labs     02/14/20  1414 02/15/20  0621 02/16/20  0645   WBC 5.8 4.6* 4.5*   HGB 11.7* 12.2 11.6*   HCT 35.1* 35.8* 34.6*    207 226     Recent Labs     02/15/20  0622 02/16/20  0645 02/17/20  0708    145* 142   K 3.9 3.5 3.8   * 111* 109*   CO2 20 23 21   BUN 5* 3* 4* CREATININE 0.47* 0.41* 0.41*   CALCIUM 9.4 9.4 9.1     No results for input(s): AST, ALT, BILIDIR, BILITOT, ALKPHOS in the last 72 hours. No results for input(s): INR in the last 72 hours. No results for input(s): Monda Saupe in the last 72 hours. Urinalysis:      Lab Results   Component Value Date    NITRU Negative 02/11/2020    WBCUA >100 02/11/2020    BACTERIA MANY 02/11/2020    RBCUA 6-10 02/11/2020    BLOODU TRACE 02/11/2020    SPECGRAV 1.042 02/11/2020    GLUCOSEU Negative 02/11/2020    GLUCOSEU NEG 05/09/2012     Radiology:  XR ABDOMEN (2 VIEWS)   Final Result   NONSPECIFIC ABDOMEN. CT ABDOMEN PELVIS W IV CONTRAST Additional Contrast? None   Final Result      Small bowel obstruction, mid to distal ileum. No mass identified. Findings similar to those on prior study. Hepatic steatosis. Minimal intrahepatic ductal dilatation, unchanged. Severe degenerative change, lumbar spine, with remote L1 kyphoplasty, and L4, and L5, grade 1 spondylolisthesis, unchanged. Remote right hip arthroplasty. Other findings discussed. All CT scans at this facility use dose modulation, iterative reconstruction, and/or weight based dosing when appropriate to reduce radiation dose to as low as reasonably achievable. Assessment/Plan:        8-year-old female with history of DM, paroxysmal atrial fibrillation, PAD, macular degeneration, GERD  who presented with abdominal pain for 2 days and found to have partial small bowel obstruction.   Now tolerating a general diet and having BM.     Partial small bowel obstruction  - Appears resolved; hopeful for d/c in the AM  - Stopping reglan and fluids; starting a bowel regimen     Paroxysmal atrial fibrillation -restarted [facility]     Active Hospital Problems    Diagnosis Date Noted    SBO (small bowel obstruction) ([facility] [address] [K56.609] 02/11/2020     Additional work up or/and treatment plan may be added today or then after based on

## 2020-02-17 NOTE — PROGRESS NOTES
Dr Sienna Ahuja    DJD (degenerative joint disease) of hip 3/20/2014    Dr Camilo Dorantes DM (diabetes mellitus), type 2 with peripheral vascular complications (HCC)     diet controlled    Gallbladder sludge 2019    GERD (gastroesophageal reflux disease) 8/31/10    Glaucoma     bilateral    H/O vascular surgery 5/9/2019    Josie NAQVI 2006    Hearing loss     History of total hip replacement, right 05/09/2019    Dr Radha Artis    Lower leg edema     chronic, R>L    Lumbar stenosis     Macular degeneration     left eye    PAF (paroxysmal atrial fibrillation) ([facility] [address] 2019    Pain of left scapula 3/25/2019    PVD (peripheral vascular disease) ([facility] [address] 10/28/2006    Dr Gabino Irvin, stents LE    Stress incontinence 10/28/2011     Past Surgical History:   Procedure Laterality Date    APPENDECTOMY      CYST REMOVAL  06/27/2016    DR SLOAN  RT THUMB MUCOUS CYST    HYSTERECTOMY      REFRACTIVE SURGERY  062001    left     TOTAL HIP ARTHROPLASTY Right 9/25/15    DR. NARANJO    UPPER GASTROINTESTINAL ENDOSCOPY  10/07/15    DR Ramona Sheth    VERTEBROPLASTY  2013    T12, Dr Sienna Ahuja         Restrictions  Restrictions/Precautions: Contact Precautions; Fall Risk    SUBJECTIVE   General  Chart Reviewed: Yes  Response To Previous Treatment: Patient with no complaints from previous session. Family / Caregiver Present: Yes(Home caregiver present)  Subjective  Subjective: Pt stated she is doing well and has no pain at this time. Pre-Session Pain Report     Pain Screening  Patient Currently in Pain: Denies       Post-Session Pain Report            OBJECTIVE             Transfers  Sit to Stand: Contact guard assistance  Stand to sit: Contact guard assistance  Stand Pivot Transfers: Contact guard assistance  Comment: VCs for proper hand placement for safety with transfers. Sit-stand performed x3     Ambulation  Ambulation?: Yes  Ambulation 1  Device: Rolling Walker  Distance: 3 steps to bed side toilet.            Exercises  Hip Flexion: x10  Hip

## 2020-02-17 NOTE — PROGRESS NOTES
Pt Name: Miles Fonseca  Medical Record Number: [id number]  Date of Birth 1918   Admit date 2020  4:13 PM  Today's Date: 2020     ASSESSMENT  1. Hospital day # 6  2. Small bowel obstruction appears to be improving    PLAN  1. Continue same, disposition as per hospitalist    SUBJECTIVE  Chief complaint: None  Afebrile, vital signs are stable. She denies any nausea or vomiting, has passed flatus and had a bowel movement. She is tolerating a DIET GENERAL;. Her pain is well controlled on current medications. She has been ambulating in the halls. has a past medical history of Acute cystitis with hematuria, Bowel obstruction (HCC), Chronic hyponatremia, Compression fracture of T12 vertebra ([address] DJD (degenerative joint disease) of hip, DM (diabetes mellitus), type 2 with peripheral vascular complications ([address] Gallbladder sludge, GERD (gastroesophageal reflux disease), Glaucoma, H/O vascular surgery, Hearing loss, History of total hip replacement, right, Lower leg edema, Lumbar stenosis, Macular degeneration, PAF (paroxysmal atrial fibrillation) ([address] Pain of left scapula, PVD (peripheral vascular disease) ([address] and Stress incontinence. CURRENT MEDS  Scheduled Meds:   latanoprost  1 drop Left Eye Nightly    timolol  1 drop Both Eyes BID    metoclopramide  5 mg Intravenous Q8H    sodium chloride flush  10 mL Intravenous 2 times per day    famotidine (PEPCID) injection  20 mg Intravenous Daily    brimonidine  1 drop Both Eyes BID     Continuous Infusions:   sodium chloride 75 mL/hr at 20 1743     PRN Meds:.acetaminophen, sodium chloride flush, ondansetron    OBJECTIVE  CURRENT VITALS:  height is 5' 1\" (1.549 m) and weight is 137 lb 12.6 oz (62.5 kg). Her oral temperature is 97.5 °F (36.4 °C). Her blood pressure is 172/69 (abnormal) and her pulse is 92. Her respiration is 17 and oxygen saturation is 95%.    Temperature Range (24h):Temp: 97.5 °F (36.4 °C) Temp  Av.4 °F (36.3 °C) Upright AP and lateral radiographs of the chest were obtained. FINDINGS: A shallow inspiration is present without significant infiltrate identified. There is no cardiomegaly, significant pleural effusion, vascular congestion, pneumothorax, or acute fractures identified. A mild compression fracture of L1 with previous kyphoplasty is again noted. NO EVIDENCE OF ACTIVE CARDIOPULMONARY DISEASE. Ct Head Wo Contrast    Result Date: 1/18/2020  CT HEAD WO CONTRAST : 1/18/2020 CLINICAL HISTORY:  confusion . COMPARISON: 8/17/2019. TECHNIQUE: Spiral unenhanced images were obtained of the head, with routine multiplanar reconstructions performed. All CT scans at this facility use dose modulation, iterative reconstruction, and/or weight based dosing when appropriate to reduce radiation dose to as low as reasonably achievable. FINDINGS: There is no intracranial hemorrhage, mass effect, midline shift, extra-axial collection, evidence of hydrocephalus, recent ischemic infarct, or skull fracture identified. Moderate generalized atrophic changes are again noted. A small amount of fluid is noted within the right maxillary sinus suggestive of sinusitis. Mild mucosal thickening is noted within the ethmoid air cells and inferior right maxillary sinus. The mastoid air cells and other visualized paranasal sinuses are essentially clear. NO ACUTE INTRACRANIAL PROCESS IDENTIFIED. MILD PREDOMINANTLY RIGHT MAXILLARY SINUSITIS. Ct Abdomen Pelvis W Iv Contrast Additional Contrast? None    Result Date: 2/11/2020  CT of the Abdomen and Pelvis with intravenous contrast medium History:  Nausea, vomiting, abdominal pain Technical Factors: CT imaging of the abdomen and pelvis were obtained and formatted as 5 mm contiguous axial images from the domes of the diaphragm to the symphysis pubis. Sagittal and coronal reconstructions were also obtained.  Oral contrast medium: None Intravenous contrast medium: Isovue-370, 100 mL Comparison:  CT S1, unchanged. Remote right bipolar noncemented hip replacement. Small bowel obstruction, mid to distal ileum. No mass identified. Findings similar to those on prior study. Hepatic steatosis. Minimal intrahepatic ductal dilatation, unchanged. Severe degenerative change, lumbar spine, with remote L1 kyphoplasty, and L4, and L5, grade 1 spondylolisthesis, unchanged. Remote right hip arthroplasty. Other findings discussed. All CT scans at this facility use dose modulation, iterative reconstruction, and/or weight based dosing when appropriate to reduce radiation dose to as low as reasonably achievable. Xr Abdomen (2 Views)    Result Date: 2/14/2020  EXAMINATION: XR ABDOMEN (3 VIEWS) CLINICAL HISTORY: SMALL BOWEL OBSTRUCTION COMPARISONS: PLAIN FILM IMAGING ABDOMEN SEPTEMBER 28, 2019 FINDINGS: Remote L2 kyphosis, and right total hip replacement. Gas and stool in colon. Gas in small bowel. No focal or diffuse small bowel dilatation. No mass effect. No abnormal calcifications. NONSPECIFIC ABDOMEN.     Electronically signed by Araceli Wong MD on 2/17/2020 at 11:23 AM

## 2020-02-18 ENCOUNTER — TELEPHONE (OUTPATIENT)
Dept: FAMILY MEDICINE CLINIC | Age: 85
End: 2020-02-18

## 2020-02-18 VITALS
OXYGEN SATURATION: 100 % | WEIGHT: 137.79 LBS | RESPIRATION RATE: 16 BRPM | HEIGHT: 61 IN | SYSTOLIC BLOOD PRESSURE: 169 MMHG | HEART RATE: 72 BPM | DIASTOLIC BLOOD PRESSURE: 58 MMHG | TEMPERATURE: 98.1 F | BODY MASS INDEX: 26.01 KG/M2

## 2020-02-18 LAB
ANION GAP SERPL CALCULATED.3IONS-SCNC: 14 MEQ/L (ref 9–15)
BASOPHILS ABSOLUTE: 0.1 K/UL (ref 0–0.2)
BASOPHILS RELATIVE PERCENT: 0.8 %
BUN BLDV-MCNC: 6 MG/DL (ref 8–23)
CALCIUM SERPL-MCNC: 10 MG/DL (ref 8.5–9.9)
CHLORIDE BLD-SCNC: 105 MEQ/L (ref 95–107)
CO2: 23 MEQ/L (ref 20–31)
CREAT SERPL-MCNC: 0.47 MG/DL (ref 0.5–0.9)
EOSINOPHILS ABSOLUTE: 0.3 K/UL (ref 0–0.7)
EOSINOPHILS RELATIVE PERCENT: 4.7 %
GFR AFRICAN AMERICAN: >60
GFR NON-AFRICAN AMERICAN: >60
GLUCOSE BLD-MCNC: 120 MG/DL (ref 70–99)
HCT VFR BLD CALC: 39.1 % (ref 37–47)
HEMOGLOBIN: 13 G/DL (ref 12–16)
LYMPHOCYTES ABSOLUTE: 1.5 K/UL (ref 1–4.8)
LYMPHOCYTES RELATIVE PERCENT: 19.8 %
MAGNESIUM: 1.7 MG/DL (ref 1.7–2.4)
MCH RBC QN AUTO: 31.1 PG (ref 27–31.3)
MCHC RBC AUTO-ENTMCNC: 33.2 % (ref 33–37)
MCV RBC AUTO: 93.5 FL (ref 82–100)
MONOCYTES ABSOLUTE: 0.8 K/UL (ref 0.2–0.8)
MONOCYTES RELATIVE PERCENT: 11.4 %
NEUTROPHILS ABSOLUTE: 4.7 K/UL (ref 1.4–6.5)
NEUTROPHILS RELATIVE PERCENT: 63.3 %
PDW BLD-RTO: 14.2 % (ref 11.5–14.5)
PLATELET # BLD: 237 K/UL (ref 130–400)
POTASSIUM REFLEX MAGNESIUM: 3.5 MEQ/L (ref 3.4–4.9)
RBC # BLD: 4.18 M/UL (ref 4.2–5.4)
SODIUM BLD-SCNC: 142 MEQ/L (ref 135–144)
WBC # BLD: 7.4 K/UL (ref 4.8–10.8)

## 2020-02-18 PROCEDURE — 83735 ASSAY OF MAGNESIUM: CPT

## 2020-02-18 PROCEDURE — 2580000003 HC RX 258: Performed by: PHYSICIAN ASSISTANT

## 2020-02-18 PROCEDURE — 36415 COLL VENOUS BLD VENIPUNCTURE: CPT

## 2020-02-18 PROCEDURE — 85025 COMPLETE CBC W/AUTO DIFF WBC: CPT

## 2020-02-18 PROCEDURE — 80048 BASIC METABOLIC PNL TOTAL CA: CPT

## 2020-02-18 PROCEDURE — 6370000000 HC RX 637 (ALT 250 FOR IP): Performed by: INTERNAL MEDICINE

## 2020-02-18 PROCEDURE — 2500000003 HC RX 250 WO HCPCS: Performed by: PHYSICIAN ASSISTANT

## 2020-02-18 PROCEDURE — 99232 SBSQ HOSP IP/OBS MODERATE 35: CPT | Performed by: SURGERY

## 2020-02-18 RX ORDER — DOCUSATE SODIUM 100 MG/1
100 CAPSULE, LIQUID FILLED ORAL 2 TIMES DAILY
Qty: 60 CAPSULE | Refills: 2 | Status: SHIPPED | OUTPATIENT
Start: 2020-02-18

## 2020-02-18 RX ORDER — POLYETHYLENE GLYCOL 3350 17 G/17G
17 POWDER, FOR SOLUTION ORAL DAILY PRN
Qty: 1530 G | Refills: 1 | Status: ON HOLD | OUTPATIENT
Start: 2020-02-18 | End: 2020-02-23

## 2020-02-18 RX ORDER — SENNA PLUS 8.6 MG/1
2 TABLET ORAL NIGHTLY
Qty: 60 TABLET | Refills: 0 | Status: ON HOLD | OUTPATIENT
Start: 2020-02-18 | End: 2020-02-23

## 2020-02-18 RX ADMIN — ACETAMINOPHEN 650 MG: 325 TABLET ORAL at 08:33

## 2020-02-18 RX ADMIN — DOCUSATE SODIUM 100 MG: 100 CAPSULE, LIQUID FILLED ORAL at 08:33

## 2020-02-18 RX ADMIN — FAMOTIDINE 20 MG: 10 INJECTION, SOLUTION INTRAVENOUS at 08:33

## 2020-02-18 RX ADMIN — VITAMIN D, TAB 1000IU (100/BT) 1000 UNITS: 25 TAB at 08:33

## 2020-02-18 RX ADMIN — BRIMONIDINE TARTRATE 1 DROP: 2 SOLUTION OPHTHALMIC at 12:47

## 2020-02-18 RX ADMIN — APIXABAN 2.5 MG: 2.5 TABLET, FILM COATED ORAL at 08:33

## 2020-02-18 RX ADMIN — TIMOLOL MALEATE 1 DROP: 5 SOLUTION OPHTHALMIC at 12:46

## 2020-02-18 RX ADMIN — Medication 10 ML: at 08:45

## 2020-02-18 NOTE — DISCHARGE INSTR - DIET

## 2020-02-18 NOTE — DISCHARGE SUMMARY
Hospital Medicine Discharge Summary    Jaden Lea  :  1918  MRN:  [id number]    Admit date:  2020  Discharge date:  2020    Admitting Physician:  Oksana Delgado MD  Primary Care Physician:  Leif Reese MD      Discharge Diagnoses:    SBO    Chief Complaint   Patient presents with    Abdominal Pain     x2 days with nausea and vomiting. Hospital Course:     Patient presented with a SBO which resolved with conservative management. Being advised a more aggressive bowel regimen as an outpatient and to follow up closely with PCP. Exam on discharge:   BP (!) 169/58   Pulse 72   Temp 98.1 °F (36.7 °C) (Oral)   Resp 16   Ht 5' 1\" (1.549 m)   Wt 137 lb 12.6 oz (62.5 kg)   LMP  (LMP Unknown)   SpO2 100%   BMI 26.03 kg/m²   General appearance: No apparent distress, appears stated age and cooperative. HEENT:  Conjunctivae/corneas clear. Neck:  Trachea midline. Respiratory:  Normal respiratory effort. Clear to auscultation  Cardiovascular: Regular rate and rhythm   Abdomen: Soft, non-tender, non-distended with normal bowel sounds. Musculoskeletal: No clubbing, cyanosis or edema bilaterally  Neuro: Non Focal.   Capillary Refill: Brisk,< 3 seconds   Peripheral Pulses: +2 palpable, equal bilaterally      Patient was seen by the following consultants while admitted to [facility]:   Consults:  [facility] CONSULT TO HOME CARE NEEDS    Significant Diagnostic Studies:    Refer to chart     Please refer to chart if no studies are shown here    Ct Abdomen Pelvis W Iv Contrast Additional Contrast? None    Result Date: 2020  CT of the Abdomen and Pelvis with intravenous contrast medium History:  Nausea, vomiting, abdominal pain Technical Factors: CT imaging of the abdomen and pelvis were obtained and formatted as 5 mm contiguous axial images from the domes of the diaphragm to the symphysis pubis.   Sagittal and coronal reconstructions were also obtained. Oral contrast medium: None Intravenous contrast medium: Isovue-370, 100 mL Comparison:  CT abdomen pelvis, September 26, 2019, September 22, 2019. Findings: Study limited secondary to beam hardening artifact from right hip replacement. Lungs:  Minimal bibasilar subsegmental atelectatic change. Liver:  Normal in size, shape, and decreased in attenuation. Bile Ducts:  Minimal intrahepatic ductal dilatation, unchanged. Gallbladder:  No stones or wall thickening. Pancreas:  Hypoplastic, and fatty replaced, without masses, cysts, ductal dilatation or calcification. Spleen:  Normal in size without masses or calcifications. No splenules. Kidneys:  Normal in size and enhancement. No hydronephrosis, or stones. 2.5 cm left upper pole renal cysts, stable with smaller cystlike areas, bilateral kidneys, too small to characterize, and unchanged. Adrenals:  8mm noncalcified right adrenal nodule containing central fat, with mild left adrenal hyperplasia, unchanged. Small bowel:  Fluid-filled, dilated small bowel, with no wall thickening, having transition, mid to distal jejunum, right upper quadrant. No mass identified. Appendix:  Not visualized. No inflammatory change pericecal region. Colon:  Normal in caliber, or mildly decompressed. Diverticular change, sigmoid colon Peritoneum:  No ascites, free air, or fluid collections. Vessels: Aorta normal in course and caliber. Portal vein, splenic vein, superior mesenteric vein are patent. Lymph nodes:  Retroperitoneal:  No enlarged retroperitoneal lymph nodes. Mesenteric:  No enlarged mesenteric lymph nodes. Pelvic: No enlarged pelvic lymph nodes. Ureters: Normal in course and caliber. No calcifications. Bladder: Portion of urinary bladder, obscured by beam hardening artifact. Imaged bladder shows no wall thickening. Abdominal Wall:  No hernia identified. No diastasis of rectus musculature. No edema or masses. Bones:  No bone lesions.  Diffuse disc space

## 2020-02-18 NOTE — CARE COORDINATION
Call placed and referral made to [facility]. Patient discharged home. Orders on chart. Freedom of choice was provided.

## 2020-02-18 NOTE — TELEPHONE ENCOUNTER
Per Da Williamson, Patient discharged from [facility]. Dr. Poonam Nieto gave orders for home care for skilled nursing, physical therapy, and occupational therapy. Da Williamson asking if you will follow patient. Please advise.

## 2020-02-18 NOTE — FLOWSHEET NOTE
Patient awke and sitting up in the chair for breakfast with friend at bedside. Washed up with hair and makeup done per friend. Assisted up to the bathroom with the walker and tolerated it well. Incontinent of urine, pericare given with clean depend applied. Continue to have bilateral lower leg 2+ edema. Lungs clear and diminished. Tele. NSR-72. Possible discharge home today. Remains on Contact Isolation.

## 2020-02-18 NOTE — PROGRESS NOTES
Assessment completed. Patient alert and oriented X1. Denies any SOB or pain at this time. Resting in bed with call light within reach.   Electronically signed by Alexis Barros RN on 2/18/2020 at 4:44 AM

## 2020-02-18 NOTE — PROGRESS NOTES
(36.7 °C) Temp  Av.2 °F (36.8 °C)  Min: 98.1 °F (36.7 °C)  Max: 98.2 °F (36.8 °C)  BP Range (22J): Systolic (09XWR), WAU:292 , Min:163 , XKY:383     Diastolic (16JGE), GAVIN:22, Min:51, Max:58    Pulse Range (24h): Pulse  Av.7  Min: 68  Max: 81  Respiration Range (24h): Resp  Av  Min: 16  Max: 16    GENERAL: alert, no distress  LUNGS: clear to ausculation, without wheezes, rales or rhonci  HEART: normal rate and regular rhythm  ABDOMEN: soft, non-tender, non-distended, bowel sounds present in all 4 quadrants and no guarding or peritoneal signs  EXTERMITY: no cyanosis, 2+ leg edema  In: 1639 [P.O.:540; I.V.:1099]  Out: -   Date 20 - 20 2359   Shift 9090-6001 0239-6881 5490-6642 24 Hour Total   INTAKE   P.O.(mL/kg/hr) 60(0.1) 240  300   I. V.(mL/kg) 20(0.3)   20(0.3)   Shift Total(mL/kg) 80(1.3) 240(3.8)  320(5.1)   OUTPUT   Shift Total(mL/kg)       Weight (kg) 62.5 62.5 62.5 62.5       LABS  Recent Labs     20  0645 20  0708 20  1705 20  0904   WBC 4.5*  --  6.8 7.4   HGB 11.6*  --  11.8* 13.0   HCT 34.6*  --  35.7* 39.1     --  236 237   * 142  --  142   K 3.5 3.8  --  3.5   * 109*  --  105   CO2 23 21  --  23   BUN 3* 4*  --  6*   CREATININE 0.41* 0.41*  --  0.47*   MG 1.6*  --   --  1.7   CALCIUM 9.4 9.1  --  10.0*      No results for input(s): PTT, INR in the last 72 hours. Invalid input(s): PT  No results for input(s): AST, ALT, BILITOT, BILIDIR, AMYLASE, LIPASE, LDH, LACTA in the last 72 hours. RADIOLOGY  Ct Abdomen Pelvis W Iv Contrast Additional Contrast? None    Result Date: 2020  CT of the Abdomen and Pelvis with intravenous contrast medium History:  Nausea, vomiting, abdominal pain Technical Factors: CT imaging of the abdomen and pelvis were obtained and formatted as 5 mm contiguous axial images from the domes of the diaphragm to the symphysis pubis. Sagittal and coronal reconstructions were also obtained.  Oral contrast medium: None Intravenous contrast medium: Isovue-370, 100 mL Comparison:  CT abdomen pelvis, September 26, 2019, September 22, 2019. Findings: Study limited secondary to beam hardening artifact from right hip replacement. Lungs:  Minimal bibasilar subsegmental atelectatic change. Liver:  Normal in size, shape, and decreased in attenuation. Bile Ducts:  Minimal intrahepatic ductal dilatation, unchanged. Gallbladder:  No stones or wall thickening. Pancreas:  Hypoplastic, and fatty replaced, without masses, cysts, ductal dilatation or calcification. Spleen:  Normal in size without masses or calcifications. No splenules. Kidneys:  Normal in size and enhancement. No hydronephrosis, or stones. 2.5 cm left upper pole renal cysts, stable with smaller cystlike areas, bilateral kidneys, too small to characterize, and unchanged. Adrenals:  8mm noncalcified right adrenal nodule containing central fat, with mild left adrenal hyperplasia, unchanged. Small bowel:  Fluid-filled, dilated small bowel, with no wall thickening, having transition, mid to distal jejunum, right upper quadrant. No mass identified. Appendix:  Not visualized. No inflammatory change pericecal region. Colon:  Normal in caliber, or mildly decompressed. Diverticular change, sigmoid colon Peritoneum:  No ascites, free air, or fluid collections. Vessels: Aorta normal in course and caliber. Portal vein, splenic vein, superior mesenteric vein are patent. Lymph nodes:  Retroperitoneal:  No enlarged retroperitoneal lymph nodes. Mesenteric:  No enlarged mesenteric lymph nodes. Pelvic: No enlarged pelvic lymph nodes. Ureters: Normal in course and caliber. No calcifications. Bladder: Portion of urinary bladder, obscured by beam hardening artifact. Imaged bladder shows no wall thickening. Abdominal Wall:  No hernia identified. No diastasis of rectus musculature. No edema or masses. Bones:  No bone lesions. Diffuse disc space narrowing T10-11 through L5-S1.  Remote kyphoplasty L1. 2 mm anterolisthesis L4 on L5, and 2 mm anterolisthesis L5 on S1, unchanged. Remote right bipolar noncemented hip replacement. Small bowel obstruction, mid to distal ileum. No mass identified. Findings similar to those on prior study. Hepatic steatosis. Minimal intrahepatic ductal dilatation, unchanged. Severe degenerative change, lumbar spine, with remote L1 kyphoplasty, and L4, and L5, grade 1 spondylolisthesis, unchanged. Remote right hip arthroplasty. Other findings discussed. All CT scans at this facility use dose modulation, iterative reconstruction, and/or weight based dosing when appropriate to reduce radiation dose to as low as reasonably achievable. Xr Abdomen (2 Views)    Result Date: 2/14/2020  EXAMINATION: XR ABDOMEN (3 VIEWS) CLINICAL HISTORY: SMALL BOWEL OBSTRUCTION COMPARISONS: PLAIN FILM IMAGING ABDOMEN SEPTEMBER 28, 2019 FINDINGS: Remote L2 kyphosis, and right total hip replacement. Gas and stool in colon. Gas in small bowel. No focal or diffuse small bowel dilatation. No mass effect. No abnormal calcifications. NONSPECIFIC ABDOMEN.     Electronically signed by Ludwin Weiss MD on 2/18/2020 at 11:46 AM

## 2020-02-18 NOTE — PROGRESS NOTES
Physical Therapy  Facility/Department: [facility] MED SURG M220/L863-64  Physical Therapy Discharge      NAME: Salima Prior    : 1918 (80 y.o.)  MRN: [id number]    Account: [de-identified]  Gender: female      Patient has been discharged from acute care hospital. DC patient from current PT program.      Electronically signed by Alejandra Law PT on 20 at 2:31 PM

## 2020-02-18 NOTE — PROGRESS NOTES
Patient ready felder discharge today; instructions given to patient and friend-June with understanding. Assisted up to bathroom. SL removed fro left arm wrist area. Telemetry removed and sent to 1 WT. Transporter will be called.

## 2020-02-19 ENCOUNTER — CARE COORDINATION (OUTPATIENT)
Dept: CASE MANAGEMENT | Age: 85
End: 2020-02-19

## 2020-02-19 NOTE — CARE COORDINATION
Maurizio 45 Transitions Initial Follow Up Call    Call within 2 business days of discharge: Yes    Patient: Susan Cornelisu Patient : 1918   MRN: [id number]  Reason for Admission: -2020 ED [facility] IP SBP resolved w/ conservative tx. Discharge Date: 20 RARS: Readmission Risk Score: 22  CM: 6  [facility]  PCP: Elvira Loredo MD  Readmission    Last Discharge [address] [address]       Complaint Diagnosis Description Type Department Provider    20 Abdominal Pain SBO (small bowel obstruction) ([facility] [address] ED to Hosp-Admission (Discharged) (ADMITTED) M[facility] MED JOHNIE Milana Mccray MD; Anna Reed,... Spoke with: Pt Private Pay Caregiver. Reports pt is doing well. Tolerates diet and caregiver encouraging fluids. No BM since return home but passing flatus. Reports abd soft and non-tender. Denies acute abd pain, nausea, vomiting, or bloat. Denies any home needs. Facility: [facility] advised of DC and need for sos date. TCM 2020 1:00, Bao Barrios 3/2/2020 1:15. Advised. Med rec/1111F order completed. START taking:  polyethylene glycol (GLYCOLAX)  Psyllium  senna (SENOKOT)  CHANGE how you take:  docusate sodium (COLACE)    Non-face-to-face services provided:  Obtained and reviewed discharge summary and/or continuity of care documents  Education of patient/family/caregiver/guardian to support self-management-Reviewed s/s developing bowel obstruction.     Care Transitions 24 Hour Call    Do you have any ongoing symptoms?:  No  Do you have a copy of your discharge instructions?:  Yes  Do you have all of your prescriptions and are they filled?:  Yes (Comment: Kash manages pill box.)  Have you been contacted by a [address]?:  No  Have you scheduled your follow up appointment?:  Yes  How are you going to get to your appointment?:  Car - family or friend to transport  Were you discharged with any Home Care or Post Acute Services:  Yes  Post Acute Services:  [address] [place] [address] Ousmane Faulkner (Comment: [facility])  Patient DME:  Kaylen Arana, Wheelchair, [address] [facility] chair, Other  Other Patient DME:  [id number]  Do you have support at home?:  Alone, Other Caregiver  Do you feel like you have everything you need to keep you well at home?:  Yes  Are you an active caregiver in your home?:  No  Care Transitions Interventions                                 Follow Up  Future Appointments   Date Time Provider Raghu Tania   2/24/2020  1:00 PM Angella Anthony MD [facility]   3/2/2020  1:15 PM Manohar Loco MD [address] [place] [address]   3/9/2020 11:45 AM Angella Anthony MD [address]

## 2020-02-21 ENCOUNTER — TELEPHONE (OUTPATIENT)
Dept: FAMILY MEDICINE CLINIC | Age: 85
End: 2020-02-21

## 2020-02-21 VITALS — SYSTOLIC BLOOD PRESSURE: 110 MMHG | DIASTOLIC BLOOD PRESSURE: 62 MMHG

## 2020-02-21 NOTE — TELEPHONE ENCOUNTER
Home Health called asking for a verbal order for urinalysis. They said the caregiver reported Sally Mccauley was feeling weak and confused. After speaking with Dr. Lefty Paulson a verbal order for UA and urine culture were given along with a request for vitals to be reported during the home visit. The nurse was also advised to send Mary back to the ER if she showed a increase in symptoms.

## 2020-02-22 ENCOUNTER — APPOINTMENT (OUTPATIENT)
Dept: GENERAL RADIOLOGY | Age: 85
End: 2020-02-22
Payer: MEDICARE

## 2020-02-22 ENCOUNTER — APPOINTMENT (OUTPATIENT)
Dept: CT IMAGING | Age: 85
End: 2020-02-22
Payer: MEDICARE

## 2020-02-22 ENCOUNTER — TELEPHONE (OUTPATIENT)
Dept: FAMILY MEDICINE CLINIC | Age: 85
End: 2020-02-22

## 2020-02-22 ENCOUNTER — HOSPITAL ENCOUNTER (OUTPATIENT)
Age: 85
Setting detail: OBSERVATION
Discharge: NURSING, REHAB OR LONG-TERM CARE | End: 2020-02-23
Attending: NEUROMUSCULOSKELETAL MEDICINE, SPORTS MEDICINE | Admitting: INTERNAL MEDICINE
Payer: MEDICARE
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
## 2020-02-22 LAB
ALBUMIN SERPL-MCNC: 3.5 G/DL (ref 3.5–4.6)
ALP BLD-CCNC: 115 U/L (ref 40–130)
ALT SERPL-CCNC: 14 U/L (ref 0–33)
ANION GAP SERPL CALCULATED.3IONS-SCNC: 12 MEQ/L (ref 9–15)
AST SERPL-CCNC: 13 U/L (ref 0–35)
BASOPHILS ABSOLUTE: 0.1 K/UL (ref 0–0.2)
BASOPHILS RELATIVE PERCENT: 1.2 %
BILIRUB SERPL-MCNC: <0.2 MG/DL (ref 0.2–0.7)
BILIRUBIN URINE: NEGATIVE
BLOOD, URINE: NEGATIVE
BUN BLDV-MCNC: 13 MG/DL (ref 8–23)
CALCIUM SERPL-MCNC: 9.6 MG/DL (ref 8.5–9.9)
CHLORIDE BLD-SCNC: 99 MEQ/L (ref 95–107)
CLARITY: CLEAR
CO2: 21 MEQ/L (ref 20–31)
COLOR: YELLOW
CREAT SERPL-MCNC: 0.68 MG/DL (ref 0.5–0.9)
CREATININE URINE: 21.7 MG/DL
EKG ATRIAL RATE: 70 BPM
EKG P AXIS: -29 DEGREES
EKG P-R INTERVAL: 136 MS
EKG Q-T INTERVAL: 418 MS
EKG QRS DURATION: 84 MS
EKG QTC CALCULATION (BAZETT): 451 MS
EKG R AXIS: 2 DEGREES
EKG T AXIS: 62 DEGREES
EKG VENTRICULAR RATE: 70 BPM
EOSINOPHILS ABSOLUTE: 0.4 K/UL (ref 0–0.7)
EOSINOPHILS RELATIVE PERCENT: 6.9 %
GFR AFRICAN AMERICAN: >60
GFR NON-AFRICAN AMERICAN: >60
GLOBULIN: 2.3 G/DL (ref 2.3–3.5)
GLUCOSE BLD-MCNC: 161 MG/DL (ref 70–99)
GLUCOSE URINE: NEGATIVE MG/DL
HCT VFR BLD CALC: 35.4 % (ref 37–47)
HEMOGLOBIN: 11.7 G/DL (ref 12–16)
KETONES, URINE: NEGATIVE MG/DL
LEUKOCYTE ESTERASE, URINE: NEGATIVE
LYMPHOCYTES ABSOLUTE: 1.4 K/UL (ref 1–4.8)
LYMPHOCYTES RELATIVE PERCENT: 26.1 %
MCH RBC QN AUTO: 30.8 PG (ref 27–31.3)
MCHC RBC AUTO-ENTMCNC: 33 % (ref 33–37)
MCV RBC AUTO: 93.4 FL (ref 82–100)
MONOCYTES ABSOLUTE: 0.9 K/UL (ref 0.2–0.8)
MONOCYTES RELATIVE PERCENT: 16.5 %
NEUTROPHILS ABSOLUTE: 2.6 K/UL (ref 1.4–6.5)
NEUTROPHILS RELATIVE PERCENT: 49.3 %
NITRITE, URINE: NEGATIVE
PDW BLD-RTO: 14.4 % (ref 11.5–14.5)
PH UA: 8 (ref 5–9)
PLATELET # BLD: 186 K/UL (ref 130–400)
POTASSIUM SERPL-SCNC: 4.8 MEQ/L (ref 3.4–4.9)
PROTEIN UA: NEGATIVE MG/DL
RBC # BLD: 3.79 M/UL (ref 4.2–5.4)
SODIUM BLD-SCNC: 132 MEQ/L (ref 135–144)
SODIUM URINE: 176 MEQ/L
SPECIFIC GRAVITY UA: 1.01 (ref 1–1.03)
TOTAL PROTEIN: 5.8 G/DL (ref 6.3–8)
TROPONIN: <0.01 NG/ML (ref 0–0.01)
UROBILINOGEN, URINE: 1 E.U./DL
WBC # BLD: 5.3 K/UL (ref 4.8–10.8)

## 2020-02-22 PROCEDURE — G0378 HOSPITAL OBSERVATION PER HR: HCPCS

## 2020-02-22 PROCEDURE — 80053 COMPREHEN METABOLIC PANEL: CPT

## 2020-02-22 PROCEDURE — 82570 ASSAY OF URINE CREATININE: CPT

## 2020-02-22 PROCEDURE — 93005 ELECTROCARDIOGRAM TRACING: CPT | Performed by: NEUROMUSCULOSKELETAL MEDICINE, SPORTS MEDICINE

## 2020-02-22 PROCEDURE — 81003 URINALYSIS AUTO W/O SCOPE: CPT

## 2020-02-22 PROCEDURE — 36415 COLL VENOUS BLD VENIPUNCTURE: CPT

## 2020-02-22 PROCEDURE — P9612 CATHETERIZE FOR URINE SPEC: HCPCS

## 2020-02-22 PROCEDURE — 99285 EMERGENCY DEPT VISIT HI MDM: CPT

## 2020-02-22 PROCEDURE — 71045 X-RAY EXAM CHEST 1 VIEW: CPT

## 2020-02-22 PROCEDURE — 73502 X-RAY EXAM HIP UNI 2-3 VIEWS: CPT

## 2020-02-22 PROCEDURE — 73110 X-RAY EXAM OF WRIST: CPT

## 2020-02-22 PROCEDURE — 85025 COMPLETE CBC W/AUTO DIFF WBC: CPT

## 2020-02-22 PROCEDURE — 6370000000 HC RX 637 (ALT 250 FOR IP): Performed by: NEUROMUSCULOSKELETAL MEDICINE, SPORTS MEDICINE

## 2020-02-22 PROCEDURE — 84484 ASSAY OF TROPONIN QUANT: CPT

## 2020-02-22 PROCEDURE — 70450 CT HEAD/BRAIN W/O DYE: CPT

## 2020-02-22 PROCEDURE — 6830039000 HC L3 TRAUMA ALERT

## 2020-02-22 PROCEDURE — 84300 ASSAY OF URINE SODIUM: CPT

## 2020-02-22 PROCEDURE — 74018 RADEX ABDOMEN 1 VIEW: CPT

## 2020-02-22 PROCEDURE — 74176 CT ABD & PELVIS W/O CONTRAST: CPT

## 2020-02-22 RX ORDER — POLYETHYLENE GLYCOL 3350 17 G/17G
17 POWDER, FOR SOLUTION ORAL DAILY PRN
Status: DISCONTINUED | OUTPATIENT
Start: 2020-02-22 | End: 2020-02-23 | Stop reason: HOSPADM

## 2020-02-22 RX ORDER — TRAMADOL HYDROCHLORIDE 50 MG/1
100 TABLET ORAL ONCE
Status: COMPLETED | OUTPATIENT
Start: 2020-02-22 | End: 2020-02-22

## 2020-02-22 RX ORDER — SODIUM CHLORIDE 0.9 % (FLUSH) 0.9 %
10 SYRINGE (ML) INJECTION PRN
Status: DISCONTINUED | OUTPATIENT
Start: 2020-02-22 | End: 2020-02-23 | Stop reason: HOSPADM

## 2020-02-22 RX ORDER — ACETAMINOPHEN 325 MG/1
650 TABLET ORAL EVERY 6 HOURS PRN
Status: DISCONTINUED | OUTPATIENT
Start: 2020-02-22 | End: 2020-02-23 | Stop reason: HOSPADM

## 2020-02-22 RX ORDER — ONDANSETRON 2 MG/ML
4 INJECTION INTRAMUSCULAR; INTRAVENOUS EVERY 6 HOURS PRN
Status: DISCONTINUED | OUTPATIENT
Start: 2020-02-22 | End: 2020-02-23 | Stop reason: HOSPADM

## 2020-02-22 RX ORDER — PROMETHAZINE HYDROCHLORIDE 12.5 MG/1
12.5 TABLET ORAL EVERY 6 HOURS PRN
Status: DISCONTINUED | OUTPATIENT
Start: 2020-02-22 | End: 2020-02-23 | Stop reason: HOSPADM

## 2020-02-22 RX ORDER — SENNA AND DOCUSATE SODIUM 50; 8.6 MG/1; MG/1
1 TABLET, FILM COATED ORAL DAILY
Qty: 20 TABLET | Refills: 0 | Status: SHIPPED | OUTPATIENT
Start: 2020-02-22

## 2020-02-22 RX ORDER — ACETAMINOPHEN 650 MG/1
650 SUPPOSITORY RECTAL EVERY 6 HOURS PRN
Status: DISCONTINUED | OUTPATIENT
Start: 2020-02-22 | End: 2020-02-23 | Stop reason: HOSPADM

## 2020-02-22 RX ORDER — SODIUM CHLORIDE 0.9 % (FLUSH) 0.9 %
10 SYRINGE (ML) INJECTION EVERY 12 HOURS SCHEDULED
Status: DISCONTINUED | OUTPATIENT
Start: 2020-02-22 | End: 2020-02-23 | Stop reason: HOSPADM

## 2020-02-22 RX ADMIN — TRAMADOL HYDROCHLORIDE 100 MG: 50 TABLET, FILM COATED ORAL at 21:05

## 2020-02-22 ASSESSMENT — ENCOUNTER SYMPTOMS
SHORTNESS OF BREATH: 0
EYE REDNESS: 0
CONSTIPATION: 1
EYE DISCHARGE: 0
DIARRHEA: 0
NAUSEA: 1
BACK PAIN: 1
VOMITING: 0
SORE THROAT: 0
COUGH: 0
EYE PAIN: 0
ABDOMINAL PAIN: 1
SINUS PAIN: 0
WHEEZING: 0

## 2020-02-22 ASSESSMENT — PAIN SCALES - GENERAL
PAINLEVEL_OUTOF10: 5
PAINLEVEL_OUTOF10: 2

## 2020-02-22 ASSESSMENT — PAIN DESCRIPTION - PAIN TYPE: TYPE: ACUTE PAIN

## 2020-02-22 ASSESSMENT — PAIN DESCRIPTION - LOCATION: LOCATION: ARM

## 2020-02-22 NOTE — TELEPHONE ENCOUNTER
Family member called on behalf of patient. Complained that patient was feeling more confused and light headed. Also complained that patient was having weakness in the lower extremities. Symptoms are similar to symptoms had when she had a UTI earlier this month (grew klebsiella). A nurse came to the house and judy a Urine culture. Family member wanted to know the results. Results are still preliminary (no growth to date). Suggested patient go to the hospital to get evaluated. Due to patient's condition, family member declined. Will give amoxicillin to cover for any possible UTI based on last urine culture results (this was also the abx that was given to her by infectious disease). Family member advised to bring patient to the hospital if condition worsens. Family member was also advised that the urine culture will come back in 2 business days. Family to notify PCP.      Will route message to PCP

## 2020-02-23 VITALS
HEIGHT: 61 IN | SYSTOLIC BLOOD PRESSURE: 144 MMHG | BODY MASS INDEX: 26.91 KG/M2 | HEART RATE: 75 BPM | DIASTOLIC BLOOD PRESSURE: 64 MMHG | RESPIRATION RATE: 16 BRPM | OXYGEN SATURATION: 95 % | WEIGHT: 142.5 LBS | TEMPERATURE: 97.9 F

## 2020-02-23 VITALS — SYSTOLIC BLOOD PRESSURE: 134 MMHG | DIASTOLIC BLOOD PRESSURE: 60 MMHG | TEMPERATURE: 97.7 F | HEART RATE: 82 BPM

## 2020-02-23 LAB
ANION GAP SERPL CALCULATED.3IONS-SCNC: 11 MEQ/L (ref 9–15)
BASOPHILS ABSOLUTE: 0.1 K/UL (ref 0–0.2)
BASOPHILS RELATIVE PERCENT: 1.2 %
BUN BLDV-MCNC: 10 MG/DL (ref 8–23)
CALCIUM SERPL-MCNC: 9.5 MG/DL (ref 8.5–9.9)
CHLORIDE BLD-SCNC: 102 MEQ/L (ref 95–107)
CO2: 22 MEQ/L (ref 20–31)
CREAT SERPL-MCNC: 0.62 MG/DL (ref 0.5–0.9)
EOSINOPHILS ABSOLUTE: 0.4 K/UL (ref 0–0.7)
EOSINOPHILS RELATIVE PERCENT: 6.9 %
GFR AFRICAN AMERICAN: >60
GFR NON-AFRICAN AMERICAN: >60
GLUCOSE BLD-MCNC: 100 MG/DL (ref 70–99)
HBA1C MFR BLD: 5.7 % (ref 4.8–5.9)
HCT VFR BLD CALC: 36.1 % (ref 37–47)
HEMOGLOBIN: 11.8 G/DL (ref 12–16)
LYMPHOCYTES ABSOLUTE: 1.6 K/UL (ref 1–4.8)
LYMPHOCYTES RELATIVE PERCENT: 30.5 %
MCH RBC QN AUTO: 31.2 PG (ref 27–31.3)
MCHC RBC AUTO-ENTMCNC: 32.6 % (ref 33–37)
MCV RBC AUTO: 95.6 FL (ref 82–100)
MONOCYTES ABSOLUTE: 0.8 K/UL (ref 0.2–0.8)
MONOCYTES RELATIVE PERCENT: 15.6 %
NEUTROPHILS ABSOLUTE: 2.3 K/UL (ref 1.4–6.5)
NEUTROPHILS RELATIVE PERCENT: 45.8 %
PDW BLD-RTO: 14.9 % (ref 11.5–14.5)
PLATELET # BLD: 169 K/UL (ref 130–400)
POTASSIUM REFLEX MAGNESIUM: 4.4 MEQ/L (ref 3.4–4.9)
RBC # BLD: 3.78 M/UL (ref 4.2–5.4)
SODIUM BLD-SCNC: 135 MEQ/L (ref 135–144)
WBC # BLD: 5.1 K/UL (ref 4.8–10.8)

## 2020-02-23 PROCEDURE — 80048 BASIC METABOLIC PNL TOTAL CA: CPT

## 2020-02-23 PROCEDURE — G0378 HOSPITAL OBSERVATION PER HR: HCPCS

## 2020-02-23 PROCEDURE — 36415 COLL VENOUS BLD VENIPUNCTURE: CPT

## 2020-02-23 PROCEDURE — 83036 HEMOGLOBIN GLYCOSYLATED A1C: CPT

## 2020-02-23 PROCEDURE — 97166 OT EVAL MOD COMPLEX 45 MIN: CPT

## 2020-02-23 PROCEDURE — 85025 COMPLETE CBC W/AUTO DIFF WBC: CPT

## 2020-02-23 PROCEDURE — 97162 PT EVAL MOD COMPLEX 30 MIN: CPT

## 2020-02-23 ASSESSMENT — PAIN SCALES - GENERAL: PAINLEVEL_OUTOF10: 2

## 2020-02-23 NOTE — PROGRESS NOTES
Assessment complete. See flow sheet. Patient alert to self and follows commands with moderate strength. Patient very [address] with hearing aids in ears. Bilateral lung sounds clear and diminished. Bowel sounds times 4 quad and patient denies brittany upon light abdominal palpation. Abdomen soft. +3 pitting edema to RLE and +2 pitting edema to LLE. Small scab to right great toe. Peripheral pulses present times 4 extremities. Nargis-area and coccyx reddened. Call light in reach. Attempted to complete admission with patient. Patient is very hard-of-hearing with hearing aids being placed back into patient's ears. Multiple attempts made to complete admission with patient.   Will follow up with patient's niece in the am to complete admission and home medication list.

## 2020-02-23 NOTE — PROGRESS NOTES
[facility] OCCUPATIONAL THERAPY EVALUATION - ACUTE     NAME: Rasheed Velasquez  : 1918 (80 y.o.)  MRN: [id number]  CODE STATUS: Full Code  Room: [place] [id number]    Date of Service: 2020    Patient Diagnosis(es): Fall at home, initial encounter [W19. Cheryl Mittal, M87.512]   Chief Complaint   Patient presents with    Abdominal Pain     Patient Active Problem List    Diagnosis Date Noted    Fall at home, initial encounter 2020    SBO (small bowel obstruction) ([address] 2020    Generalized weakness 2020    Age-related physical debility 2019    Post-nasal drip 2019    Other constipation 10/03/2019    Atrial fibrillation ([address] 10/03/2019    Age-related osteoporosis without current pathological fracture 2019    Leg edema, right 2019    Abnormality of gait and mobility due to severe spinal stenosis.  2019    Late onset Alzheimer's disease without behavioral disturbance ([address] 2019    Ambulatory dysfunction 2019    Glaucoma 2019    DJD (degenerative joint disease) 2019    S/P kyphoplasty 2019    Gastroesophageal reflux disease without esophagitis 04/15/2019    Lumbar stenosis     DM (diabetes mellitus), type 2 with peripheral vascular complications ([address]     Nuclear senile cataract 2017    Nonexudative age-related macular degeneration 2017    Primary open angle glaucoma 2017    Impaired mobility and activities of daily living 2014    Acute leg pain, left 2014    Acute right hip pain 2014    Spinal stenosis of lumbar region 2012    Stress incontinence 10/28/2011    Hearing loss 10/28/2011    PVD (peripheral vascular disease) ([address] 10/28/2006        Past Medical History:   Diagnosis Date    Acute cystitis with hematuria 2019    Bowel obstruction (HCC) 2019    Chronic hyponatremia     Compression fracture of T12 vertebra [facility]) 2013    kyphoplasty Dr Saddie Dandy DJD (degenerative joint disease) of hip 3/20/2014    Dr Jerrell Mehta DM (diabetes mellitus), type 2 with peripheral vascular complications (HCC)     diet controlled    Gallbladder sludge 2019    GERD (gastroesophageal reflux disease) 8/31/10    Glaucoma     bilateral    H/O vascular surgery 5/9/2019    Josie NAQVI 2006    Hearing loss     History of total hip replacement, right 05/09/2019    Dr Lauri Calero    Lower leg edema     chronic, R>L    Lumbar stenosis     Macular degeneration     left eye    PAF (paroxysmal atrial fibrillation) [facility] 2019    Pain of left scapula 3/25/2019    PVD (peripheral vascular disease) ([facility] 10/28/2006    Dr Meredith Caal, stents LE    Stress incontinence 10/28/2011     Past Surgical History:   Procedure Laterality Date    APPENDECTOMY      CYST REMOVAL  06/27/2016    DR SLOAN  RT THUMB MUCOUS CYST    HYSTERECTOMY      REFRACTIVE SURGERY  062001    left     TOTAL HIP ARTHROPLASTY Right 9/25/15    DR. NARANJO    UPPER GASTROINTESTINAL ENDOSCOPY  10/07/15    DR Digna Gerber    VERTEBROPLASTY  2013    T12, Dr Tan Reese        Restrictions  Restrictions/Precautions: Fall Risk, Contact Precautions     Safety Devices: Safety Devices  Safety Devices in place: Yes  Type of devices:  All fall risk precautions in place    Subjective  Pre Treatment Pain Screening  Pain at present: 0  Scale Used: Numeric Score  Intervention List: Patient able to continue with treatment    Pain Reassessment:   Pain Assessment  Patient Currently in Pain: Denies       Prior Level of Function:  Social/Functional History  Lives With: Alone  Type of Home: Apartment  Home Layout: One level  Home Access: Level entry  Bathroom Shower/Tub: Tub/Shower unit  Bathroom Equipment: Grab bars in shower, Shower chair  Home Equipment: 4 wheeled walker  Receives Help From: (Caregiver daily)  ADL Assistance: Needs assistance(Patient requires assistance prn for dressing and bathing)  Homemaking Assistance: Needs assistance(Caregiver performs Assessment: 3/5    Coordination, Tone, Quality of Movement:    Tone RUE  RUE Tone: Normotonic  Tone LUE  LUE Tone: Normotonic  Coordination  Movements Are Fluid And Coordinated: No  Coordination and Movement description: Fine motor impairments, Gross motor impairments, Right UE, Left UE    Hand Dominance:  Hand Dominance  Hand Dominance: Right    ADL Status:  ADL  Feeding: Setup  Grooming: Setup  UE Bathing: Setup  LE Bathing: Minimal assistance  UE Dressing: Setup  LE Dressing: Minimal assistance  Toileting: Minimal assistance  Additional Comments: ADLs simulated as above  Toilet Transfers  Toilet Transfers Comments: Not formally assessed, anticpiate Min A based on functional observation  Tub Transfers  Tub Transfers: Not tested    Therapy key for assistance levels -   Independent = Pt. is able to perform task with no assistance but may require a device   Stand by assistance = Pt. does not perform task at an independent level but does not need physical assistance, requires verbal cues  Minimal, Moderate, Maximal Assistance = Pt. requires physical assistance (25%, 50%, 75% assist from helper) for task but is able to actively participate in task   Dependent = Pt. requires total assistance with task and is not able to actively participate with task completion     Functional Mobility:  Functional Mobility  Functional - Mobility Device: Rolling Walker  Activity: Other  Assist Level: Minimal assistance  Functional Mobility Comments: Patient transfers from EOB to bedside chair  Transfers  Sit to stand: Minimal assistance  Stand to sit: Minimal assistance    Bed Mobility  Bed mobility  Supine to Sit: Maximum assistance  Sit to Supine: Unable to assess(Patient sitting up in chair upon completion of evaluation)    Seated and Standing Balance:  Balance  Sitting Balance: Contact guard assistance  Standing Balance: Minimal assistance    Functional Endurance:  Activity Tolerance  Activity Tolerance: Patient Tolerated treatment well    D/C Recommendations:  OT D/C RECOMMENDATIONS  REQUIRES OT FOLLOW UP: Yes    Equipment Recommendations:  OT Equipment Recommendations  Other: Continue to assess    OT Education:   OT Education  OT Education: OT Role, Plan of Care    OT Follow Up:  OT D/C RECOMMENDATIONS  REQUIRES OT FOLLOW UP: Yes       Assessment/Discharge Disposition:  Assessment: Patient is a 8 year old female who presents from home alone s/p recent fall with the above deficits. She would benefit from continued occupational therapy to increase safety and independence with self-care skills.    Performance deficits / Impairments: Decreased functional mobility , Decreased strength, Decreased endurance, Decreased coordination, Decreased ADL status, Decreased safe awareness, Decreased high-level IADLs, Decreased cognition, Decreased balance  Prognosis: Good  Discharge Recommendations: Continue to assess pending progress  Decision Making: Medium Complexity  History: Multiple comorbidities  Exam: 9 performance deficits  Assistance / Modification: Min A    Six Click Score   How much help for putting on and taking off regular lower body clothing?: A Little  How much help for Bathing?: A Little  How much help for Toileting?: A Little  How much help for putting on and taking off regular upper body clothing?: A Little  How much help for taking care of personal grooming?: A Little  How much help for eating meals?: A Little  AM-Located within [facility] Inpatient Daily Activity Raw Score: 18  AM-PAC Inpatient ADL T-Scale Score : 38.66  ADL Inpatient CMS 0-100% Score: 46.65    Plan:  Plan  Times per week: 1-3x/week  Plan weeks: Length of acute care stay  Current Treatment Recommendations: Strengthening, Functional Mobility Training, Patient/Caregiver Education & Training, Endurance Training, Equipment Evaluation, Education, & procurement, Cognitive/Perceptual Training, Balance Training, Neuromuscular Re-education, Safety Education & Training, Self-Care / ADL    Goals: Patient will:    - Improve functional endurance to tolerate/complete 30 mins of ADL's  - Be Modified Independent in UB ADLs   - Be Supervision in LB ADLs  - Be Supervision in ADL transfers without LOB  - Be Modified Independent in toileting tasks  - Improve bilateral hand fine motor coordination to [facility] in order to manage clothing fasteners/self-care containers in a timely manner  - Improve bilateral UE strength and endurance to 3+/5 in order to participate in self-care activities as projected. - Access appropriate D/C site with as few architectural barriers as possible. - Sequence self-care tasks with minimal cues for safety awareness  - Other :  Improve sitting/standing balance to complete ADLs as projected. Patient Goal: Patient goals :  To return to apartment     Discussed and agreed upon: Yes Comments:     Therapy Time:   OT Individual Minutes  Time In: 1113  Time Out: 1134  Minutes: 21    Eval: 21 minutes     Electronically signed by:    KIM Justin  2/23/2020, 11:52 AM

## 2020-02-23 NOTE — H&P
Hospital Medicine History & Physical      PCP: Ema Oreilly MD    Date of Admission: 2/22/2020    Date of Service: Pt seen/examined on 2/22/2020 Admitted to Inpatient with expected LOS greater than two midnights due to medical therapy. Chief Complaint:  Weakness, fall       History Of Present Illness:     80 y.o. female with pmhx of paroxysmal atrial fibrillation , GERD, DM II - diet controlled, who presented to [address] [facility] ED with above chief complaint. Per jacinto at bedside, when she went to see patient today patient was in a recliner and she was very weak to get up was complaining of abdominal pain and noted patient's abdomen was distended. She however added that patient did have a large BM prior to arrival of EMS. Niece also added that patient's HHA  reported patient was lowered to the floor yesterday when she was about to have a fall however denies hitting head. she also states pt seem to more confused lately. Niece would like patient placed in a nursing facility due to difficulty with taking care of patient. pt is very Teller,lively, however disoriented to time and place but responds to questions appropriately.     Past Medical History:          Diagnosis Date    Acute cystitis with hematuria 8/17/2019    Bowel obstruction ([address] 9/22/2019    Chronic hyponatremia     Compression fracture of T12 vertebra [facility]) 2013    kyphoplasty Dr Jeremy Robertson    DJD (degenerative joint disease) of hip 3/20/2014    Dr Abigail Prince DM (diabetes mellitus), type 2 with peripheral vascular complications (HCC)     diet controlled    Gallbladder sludge 2019    GERD (gastroesophageal reflux disease) 8/31/10    Glaucoma     bilateral    H/O vascular surgery 5/9/2019    Josie NAQVI 2006    Hearing loss     History of total hip replacement, right 05/09/2019    Dr Tremaine Olivares    Lower leg edema     chronic, R>L    Lumbar stenosis     Macular degeneration     left eye    PAF (paroxysmal atrial fibrillation) ([address] 2019  Pain of left scapula 3/25/2019    PVD (peripheral vascular disease) ([address] 10/28/2006    Dr Savage Diaz, stents LE    Stress incontinence 10/28/2011       Past Surgical History:          Procedure Laterality Date    APPENDECTOMY      CYST REMOVAL  06/27/2016    DR SLOAN  RT THUMB MUCOUS CYST    HYSTERECTOMY      REFRACTIVE SURGERY  [id number]    left     TOTAL HIP ARTHROPLASTY Right 9/25/15    DR. NARANJO    UPPER GASTROINTESTINAL ENDOSCOPY  10/07/15    DR Oliva Morning    VERTEBROPLASTY  2013    T12, Dr Arslan Canseco       Medications Prior to Admission:      Prior to Admission medications    Medication Sig Start Date End Date Taking? Authorizing Provider   sennosides-docusate sodium (SENOKOT-S) 8.6-50 MG tablet Take 1 tablet by mouth daily 2/22/20  Yes Elias Pearson MD   docusate sodium (COLACE) 100 MG capsule Take 1 capsule by mouth 2 times daily 2/18/20   Steph Ortiz MD   senna (SENOKOT) 8.6 MG tablet Take 2 tablets by mouth nightly 2/18/20 3/19/20  Steph Ortiz MD   Psyllium 63 % POWD Take as directed;  Over the counter  Patient not taking: Reported on 2/19/2020 2/18/20   Steph Ortiz MD   polyethylene glycol (GLYCOLAX) powder Take 17 g by mouth daily as needed (Constipation; ensure at least 1 BM every other day) 2/18/20 3/19/20  Steph Ortiz MD   ELIQUIS 2.5 MG TABS tablet TAKE 1 TABLET BY MOUTH TWICE A DAY  Patient taking differently: 2.5 mg 2 times daily  11/19/19   Angella Anthony MD   Calcium Carbonate Antacid (TUMS CHEWY DELIGHTS PO) Take 1 tablet by mouth daily     Historical Provider, MD   vitamin D (CHOLECALCIFEROL) 1000 UNIT TABS tablet Take 1,000 Units by mouth daily    Historical Provider, MD   omeprazole (PRILOSEC) 20 MG delayed release capsule TAKE ONE CAPSULE BY MOUTH EVERY DAY 8/28/19   Angella Anthony MD   brimonidine (ALPHAGAN) 0.2 % ophthalmic solution Place 1 drop into both eyes 2 times daily  8/7/18   Historical Provider, MD   timolol (TIMOPTIC-XE) 0.5 % ophthalmic gel-forming USE 1 DROP IN BOTH EYES TWICE DAILY. 5/1/18   Historical Provider, MD   acetaminophen (TYLENOL) 325 MG tablet Take 1 tablet by mouth 3 times daily as needed for Pain (DO NOT EXCEED 4GM IN 24 HOURS)  Patient not taking: Reported on 2/19/2020 12/11/17   Mario Sesay MD   latanoprost (XALATAN) 0.005 % ophthalmic solution Place 1 drop into the left eye nightly    Historical Provider, MD   Multiple Vitamin (MULTI VITAMIN DAILY) TABS Take 1 tablet by mouth every morning 10/17/15   Historical Provider, MD       Allergies:  Patient has no known allergies. Social History:      The patient currently lives @ home     TOBACCO:   reports that she quit smoking about 39 years ago. Her smoking use included cigarettes. She has never used smokeless tobacco.  ETOH:   reports no history of alcohol use. Family History:       Reviewed in detail and negative for DM, CAD, Cancer, CVA. Positive as follows:        Problem Relation Age of Onset    Heart Failure Mother         dec age 77    Hypertension Mother     Diabetes Father         dec age 67       REVIEW OF SYSTEMS:   Pertinent positives as noted in the HPI. All other systems reviewed and negative. PHYSICAL EXAM:    BP (!) 159/60   Pulse 66   Temp 98.1 °F (36.7 °C) (Oral)   Resp 16   Ht 5' 1\" (1.549 m)   Wt 140 lb (63.5 kg)   LMP  (LMP Unknown)   SpO2 96%   BMI 26.45 kg/m²     General appearance:  No apparent distress, appears stated age and cooperative. HEENT:  Normal cephalic, atraumatic without obvious deformity. Pupils equal, round, and reactive to light. Extra ocular muscles intact. Conjunctivae/corneas clear. Neck: Supple, with full range of motion. No jugular venous distention. Trachea midline. Respiratory:  Normal respiratory effort. Clear to auscultation,diminished  in the bases  Cardiovascular:  Regular rate and rhythm with normal S1/S2 without murmurs, rubs or gallops.   Abdomen: Soft, non-tender, non-distended with normal bowel sounds. Musculoskeletal:  No clubbing, cyanosis or edema bilaterally. Skin: Skin color, texture, turgor normal.  No rashes or lesions. Neurologic: Disoriented to time and place, hard of hearing,able  to follow simple commands  Capillary Refill: Brisk,< 3 seconds   Peripheral Pulses: +2 palpable, equal bilaterally       Labs:     Recent Labs     02/22/20 1846   WBC 5.3   HGB 11.7*   HCT 35.4*        Recent Labs     02/22/20 1846   *   K 4.8   CL 99   CO2 21   BUN 13   CREATININE 0.68   CALCIUM 9.6     Recent Labs     02/22/20 1846   AST 13   ALT 14   BILITOT <0.2   ALKPHOS 115     No results for input(s): INR in the last 72 hours. Recent Labs     02/22/20 1846   TROPONINI <0.010       Urinalysis:      Lab Results   Component Value Date    NITRU Negative 02/22/2020    WBCUA >100 02/11/2020    BACTERIA MANY 02/11/2020    RBCUA 6-10 02/11/2020    BLOODU Negative 02/22/2020    SPECGRAV 1.011 02/22/2020    GLUCOSEU Negative 02/22/2020    GLUCOSEU NEG 05/09/2012       Radiology:     CXR: I have reviewed the CXR with the following interpretation:   EKG:  I have reviewed the EKG with the following interpretation:     XR ABDOMEN (KUB) (SINGLE AP VIEW)    (Results Pending)   XR CHEST PORTABLE    (Results Pending)   CT ABDOMEN PELVIS WO CONTRAST Additional Contrast? None    (Results Pending)   XR WRIST LEFT (MIN 3 VIEWS)    (Results Pending)   XR HIP 2-3 VW W PELVIS LEFT    (Results Pending)   CT HEAD WO CONTRAST    (Results Pending)       ASSESSMENT/plan :    Acute confusion:partly related to aging, answers questions appropriately with no neuro deficits noted, prelim. CT of the head unremarkable, neurochecks to monitor for improvement,     Fall/impaired mobility: Consult PT/ OT for evaluation and management, fall precautions, x-ray of the wrist and hip pending to rule out any fractures    Abdominal pain: prelim.  CT of the abdomen unremarkable, likely due to constipation, will start on daily stool softeners/laxative    Hyperglycemia: hx of DM II- diet controlled not on any antidiabetic, check HGA1c    Elevated blood pressure: History of hypertension, likely due to pain, will monitor blood pressure closely and start on low-dose amlodipine if for elevation    Discharge Planning/ NH placement: Consult case management    Paroxysmal atrial fibrillation: HR controlled, continue eliquis and monitor HR closely    DVT Prophylaxis: Patient on Eliquis  Diet:regular   Code Status: full, niece states she doesn't know pts code status and  will try and get hold of pt's daughter to discuss with her      Dispo -inpatient        Jose Bunn, JADE - CNP    Thank you Feroz Land MD for the opportunity to be involved in this patient's care. If you have any questions or concerns please feel free to contact me.

## 2020-02-23 NOTE — ED NOTES
I spoke with pts niece whom takes care of the pt. Per niece pt fell yesterday from standing to the ground, niece states that the pts PCA lowered her to the ground. Per niece pt has been having new onset confusion and weakness since the incident. Dr. Vandana Mccauley was advised at this time, trauma alert was called. Pt has no obvious injuries at this time, per niece pt has had increased pain all over.       Piotr Gracia RN  02/22/20 [id number]

## 2020-02-23 NOTE — DISCHARGE INSTR - COC
Continuity of Care Form    Patient Name: Kimberli Romo   :  1918  MRN:  [id number]    Admit date:  2020  Discharge date:  2020    Code Status Order: Full Code   Advance Directives:     Admitting Physician:  Anant Allen MD  PCP: Orestes Ng MD    Discharging Nurse: pd  [address] Unit/Room#: Z936/M178-63  Discharging Unit Phone Number: [phone]    Emergency Contact:   Extended Emergency Contact Information  Primary Emergency Contact: [place] [address] Phone: [phone]  Mobile Phone: [phone]  Relation: Niece/Nephew  Secondary Emergency Contact: [address] Phone: [phone]  Relation: Niece/Nephew  Preferred language: English   needed? No    Past Surgical History:  Past Surgical History:   Procedure Laterality Date    APPENDECTOMY      CYST REMOVAL  2016    DR SLOAN  RT THUMB MUCOUS CYST    HYSTERECTOMY      REFRACTIVE SURGERY  [id number]    left     TOTAL HIP ARTHROPLASTY Right 9/25/15    DR. NARANJO    UPPER GASTROINTESTINAL ENDOSCOPY  10/07/15    DR Ankita Chou    VERTEBROPLASTY  2013    T12, Dr Lesa Montero       Immunization History:   Immunization History   Administered Date(s) Administered    Influenza A ([id number]) Vaccine PF IM 2009    Influenza Vaccine, unspecified formulation 10/15/2016    Influenza Virus Vaccine 10/20/2014    Influenza Whole 10/20/2014, 10/05/2015    Influenza, High Dose (Fluzone 65 yrs and older) 10/15/2016    Influenza, Triv, inactivated, subunit, adjuvanted, IM (Fluad 65 yrs and older) 10/03/2019    Pneumococcal Conjugate 13-valent ([id number]) 2018    Pneumococcal Polysaccharide ([id number]) 2013       Active Problems:  Patient Active Problem List   Diagnosis Code    Stress incontinence N39.3    Hearing loss H91.90    Spinal stenosis of lumbar region M48.061    Impaired mobility and activities of daily living Z74.09    Acute leg pain, left M79.605    Worker signature: {Esignature:[id number]}    PHYSICIAN SECTION    Prognosis: Good    Condition at Discharge: Stable    Rehab Potential (if transferring to Rehab):     Recommended Labs or Other Treatments After Discharge: CBC and BMP in 3 days; folow up with PCP in 3 days    Physician Certification: I certify the above information and transfer of Miles Fonseca  is necessary for the continuing treatment of the diagnosis listed and that she requires [place] for less 30 days.      Update Admission H&P: No change in H&P    PHYSICIAN SIGNATURE:  Dr. Sophia López

## 2020-02-23 NOTE — ED NOTES
pts jacinto Charles was called and advised of pt admission and given update on pt condition, tx, and plan of care.       Maggie Vega RN  02/22/20 7247

## 2020-02-23 NOTE — ED NOTES
Pt report was called to RN tele pack was placed on pt, pt was taken to room via transport on arleen Clement RN  02/22/20 4165

## 2020-02-23 NOTE — PROGRESS NOTES
kyphoplasty Dr Reina Lacey DJD (degenerative joint disease) of hip 3/20/2014    Dr Armando Little DM (diabetes mellitus), type 2 with peripheral vascular complications (HCC)     diet controlled    Gallbladder sludge 2019    GERD (gastroesophageal reflux disease) 8/31/10    Glaucoma     bilateral    H/O vascular surgery 5/9/2019    Josie NAQVI 2006    Hearing loss     History of total hip replacement, right 05/09/2019    Dr Paulo Singh    Lower leg edema     chronic, R>L    Lumbar stenosis     Macular degeneration     left eye    PAF (paroxysmal atrial fibrillation) ([facility] 2019    Pain of left scapula 3/25/2019    PVD (peripheral vascular disease) ([facility] [address] [facility] 10/28/2006    Dr Rosanna Simmons, stents LE    Stress incontinence 10/28/2011     Past Surgical History:   Procedure Laterality Date    APPENDECTOMY      CYST REMOVAL  06/27/2016    DR SLOAN  RT THUMB MUCOUS CYST    HYSTERECTOMY      REFRACTIVE SURGERY  062001    left     TOTAL HIP ARTHROPLASTY Right 9/25/15    DR. NARANJO    UPPER GASTROINTESTINAL ENDOSCOPY  10/07/15    DR Barbara Matos    VERTEBROPLASTY  2013    T12, Dr Zora Zhao     Chart Reviewed: Yes  Patient assessed for rehabilitation services?: Yes  Family / Caregiver Present: No    Restrictions:  Restrictions/Precautions: Fall Risk, Contact Precautions(VRE)     SUBJECTIVE: Subjective: Pt states \"I am going to be 89\"    Pain  Pre Treatment Pain Screening  Pain at present: 0  Scale Used: Numeric Score    Post Treatment Pain Screening:   Pain Screening  Patient Currently in Pain: No  Pain Assessment  Pain Assessment: 0-10  Pain Level: 2    Prior Level of Function:  Social/Functional History  Lives With: Alone  Type of Home: Apartment  Home Layout: One level  Home Access: Level entry  Bathroom Shower/Tub: Tub/Shower unit  [employer]: Grab bars in shower, Shower chair  Home Equipment: (rollator)  Receives Help From: (caregiver daily)  ADL Assistance: Needs assistance(assistance PRN)  Homemaking Assistance: Needs assistance  Homemaking Responsibilities: Yes  Ambulation Assistance: Independent(rollator)  Transfer Assistance: Independent  Active : No    OBJECTIVE:   Vision: Impaired  Vision Exceptions: Wears glasses for reading;Wears glasses at all times  Hearing: Exceptions to [facility]  Hearing Exceptions: Hard of hearing/hearing concerns;Bilateral hearing aid    Cognition:  Overall Orientation Status: Impaired  Orientation Level: Oriented to person, Oriented to situation, Oriented to place(knows month and that bday is coming up)  Follows Commands: Within Functional Limits    Observation/Palpation  Posture: Fair(kyphotic)  Observation: very [place], no acute distress, resting in bed upon arrival    ROM:  RLE AROM: WFL  LLE AROM : WFL    Strength:  Strength RLE  Comment: functionally >3+/5  Strength LLE  Comment: functionally >3+/5    Neuro:  Balance  Sitting - Static: Fair;+  Sitting - Dynamic: Fair  Standing - Static: Fair  Standing - Dynamic: Fair;-     Motor Control  Gross Motor?: WFL     Bed mobility  Supine to Sit: Maximum assistance  Comment: poor ability to sequence. Pt sleeps in chair/recliner at home    Transfers  Sit to Stand: Minimal Assistance  Stand to sit: Minimal Assistance  Bed to Chair: Minimal assistance  Stand Pivot Transfers: Minimal Assistance  Comment: with 2ww    Ambulation  Ambulation?: Yes  Ambulation 1  Surface: level tile  Device: Rolling Walker  Assistance: Minimal assistance  Distance: 3ft  Comments: requires assistance for balance and walker management, poor safety awareness    Activity Tolerance  Activity Tolerance: Patient Tolerated treatment well        PT Education  PT Education: Goals;PT Role;Plan of Care    ASSESSMENT:   Body structures, Functions, Activity limitations: Decreased functional mobility ; Decreased strength;Decreased posture;Decreased balance;Decreased safe awareness  Decision Making: Medium Complexity  History: high  Exam: high  Clinical Presentation: med    Prognosis:

## 2020-02-23 NOTE — CARE COORDINATION
Isamar notified of discharge to [facility] and transport time. Received voice mail from [place], patient is good to go. Court Alvarez will arrange transport. Armida Boyd updated. Spoke with Diaz Farah @ [facility], she will do a PASSAR, H&P faxed to [phone]. Anticipate discharge to [facility] when completed and transport is arranged. RN updated. Griselda from [facility] stated they have a bed available however cannot accept without the 71933. This has not been an issue in the past, suggested that it be done tomorrow. Griselda will speak with Charity Guerra and call back ,  Call place to Gabby ARNDT to discuss options. She assured me the 26086 would be completed 1st thing in the morning, she stated she has worked with [facility] in the past.     Referral to [facility]. [facility] does not have skilled bed available    Spoke with MISSY/Isamar via telephone to discuss discharge plan. She is in process/on waiting list for long term placement at [facility]. She would like SNF placement at [facility]. Info faxed to both [facility] and [facility]. Spoke with Lynnea Babinski, charge RN at [facility], she will have admissions contact Isamar in regards to long term placement. Mariann Conde with [facility] notified of need for admission to [facility] if bed is available. Family is aware  patient is observations. List also provided for freedom of choice and reference for future placement if [facility] is not able to accommodate.   Electronically signed by Steven Estevez, LAN on 2/23/2020 at 8:35 AM

## 2020-02-23 NOTE — DISCHARGE SUMMARY
Hospital Medicine Discharge Summary    Henrry Ruiz  :  1918  MRN:  [id number]    Admit date:  2020  Discharge date:  2020    Admitting Physician:  Nikos Munguia MD  Primary Care Physician:  Nannette Villalobos MD      Discharge Diagnoses:    Debility due to age    Hospital Course:   80 y.o. female with pmhx of paroxysmal atrial fibrillation , GERD, DM II - diet controlled, who presented to [facility] ED with above chief complaint. Per jacinto at bedside, when she went to see patient today patient was in a recliner and she was very weak to get up was complaining of abdominal pain and noted patient's abdomen was distended. She however added that patient did have a large BM prior to arrival of EMS. Niece also added that patient's HHA  reported patient was lowered to the floor yesterday when she was about to have a fall however denies hitting head. she also states pt seem to more confused lately. [place] would like patient placed in a nursing facility due to difficulty with taking care of patient. pt is very [place], however disoriented to time and place but responds to questions appropriately. Pt has been stable with WNL VS and labs, seen by PT/OT, appropriate and medically cleared for discharge to long term care ([facility])        Patient was seen by the following consultants while admitted to [facility]:   Consults:  None    Physical exam:  General appearance: No apparent distress, appears stated age and cooperative. HEENT: Pupils equal, round, and reactive to light. Conjunctivae/corneas clear. Neck: Supple, with full range of motion. No jugular venous distention. Trachea midline. Respiratory:  Normal respiratory effort. Clear to auscultation, bilaterally without Rales/Wheezes/Rhonchi. Cardiovascular: Regular rate and rhythm with normal S1/S2 without murmurs, rubs or gallops. Abdomen: Soft, non-tender, non-distended with normal bowel sounds.   Musculoskeletal: No clubbing, cyanosis or edema bilaterally. Full range of motion without deformity. Skin: Skin color, texture, turgor normal.  No rashes or lesions. Neuro: Non Focal. Symetrical motor and tone. Nl Comprehension, Alert,awake and oriented. NL CN. Symetrical tone and reflexes. Psychiatric: Alert and oriented, thought content appropriate, normal insight  Capillary Refill: Brisk,< 3 seconds   Peripheral Pulses: +2 palpable, equal bilaterally     Discharge Medications:       Shelton Dane \"Marge\"   Home Medication Instructions ZBL:[id number]    Printed on:02/23/20 5160   Medication Information                      acetaminophen (TYLENOL) 325 MG tablet  Take 1 tablet by mouth 3 times daily as needed for Pain (DO NOT EXCEED 4GM IN 24 HOURS)             brimonidine (ALPHAGAN) 0.2 % ophthalmic solution  Place 1 drop into both eyes 2 times daily              Calcium Carbonate Antacid (TUMS CHEWY DELIGHTS PO)  Take 1 tablet by mouth daily              docusate sodium (COLACE) 100 MG capsule  Take 1 capsule by mouth 2 times daily             ELIQUIS 2.5 MG TABS tablet  TAKE 1 TABLET BY MOUTH TWICE A DAY             Multiple Vitamin (MULTI VITAMIN DAILY) TABS  Take 1 tablet by mouth every morning             omeprazole (PRILOSEC) 20 MG delayed release capsule  TAKE ONE CAPSULE BY MOUTH EVERY DAY             sennosides-docusate sodium (SENOKOT-S) 8.6-50 MG tablet  Take 1 tablet by mouth daily             timolol (TIMOPTIC-XE) 0.5 % ophthalmic gel-forming  USE 1 DROP IN BOTH EYES TWICE DAILY. vitamin D (CHOLECALCIFEROL) 1000 UNIT TABS tablet  Take 1,000 Units by mouth daily                 Disposition:   Discharged to long term care. Any [facility] needs that were indicated and/or required as been addressed and set up by Social Work. Condition at discharge: Pt was medically stable at the time of discharge.  Significant improvement in clinical condition compared to initial condition at presentation to

## 2020-02-23 NOTE — ED PROVIDER NOTES
IN 24 HOURS)    BRIMONIDINE (ALPHAGAN) 0.2 % OPHTHALMIC SOLUTION    Place 1 drop into both eyes 2 times daily     CALCIUM CARBONATE ANTACID (TUMS CHEWY DELIGHTS PO)    Take 1 tablet by mouth daily     DOCUSATE SODIUM (COLACE) 100 MG CAPSULE    Take 1 capsule by mouth 2 times daily    ELIQUIS 2.5 MG TABS TABLET    TAKE 1 TABLET BY MOUTH TWICE A DAY    LATANOPROST (XALATAN) 0.005 % OPHTHALMIC SOLUTION    Place 1 drop into the left eye nightly    MULTIPLE VITAMIN (MULTI VITAMIN DAILY) TABS    Take 1 tablet by mouth every morning    OMEPRAZOLE (PRILOSEC) 20 MG DELAYED RELEASE CAPSULE    TAKE ONE CAPSULE BY MOUTH EVERY DAY    POLYETHYLENE GLYCOL (GLYCOLAX) POWDER    Take 17 g by mouth daily as needed (Constipation; ensure at least 1 BM every other day)    PSYLLIUM 63 % POWD    Take as directed; Over the counter    SENNA (SENOKOT) 8.6 MG TABLET    Take 2 tablets by mouth nightly    TIMOLOL (TIMOPTIC-XE) 0.5 % OPHTHALMIC GEL-FORMING    USE 1 DROP IN BOTH EYES TWICE DAILY. VITAMIN D (CHOLECALCIFEROL) 1000 UNIT TABS TABLET    Take 1,000 Units by mouth daily       ALLERGIES     Patient has no known allergies.     FAMILY HISTORY       Family History   Problem Relation Age of Onset    Heart Failure Mother         dec age 77    Hypertension Mother     Diabetes Father         dec age 67          SOCIAL HISTORY       Social History     Socioeconomic History    Marital status: Single     Spouse name: None    Number of children: 0    Years of education: None    Highest education level: None   Occupational History    Occupation: retired [employer]   Social Needs    Financial resource strain: Not hard at all   uberall insecurity:     Worry: Never true     Inability: Never true   Procam TV needs:     Medical: Yes     Non-medical: Yes   Tobacco Use    Smoking status: Former Smoker     Types: Cigarettes     Last attempt to quit: 1980     Years since quittin.7    Smokeless tobacco: Never Used   Substance and Sexual Activity    Alcohol use: No     Alcohol/week: 0.0 standard drinks    Drug use: No    Sexual activity: Not Currently     Partners: Male   Lifestyle    Physical activity:     Days per week: 0 days     Minutes per session: 0 min    Stress: Only a little   Relationships    Social connections:     Talks on phone: More than three times a week     Gets together: More than three times a week     Attends [employer] service: More than 4 times per year     Active member of club or organization: Yes     Attends meetings of clubs or organizations: More than 4 times per year     Relationship status: Never     Intimate partner violence:     Fear of current or ex partner: No     Emotionally abused: No     Physically abused: No     Forced sexual activity: No   Other Topics Concern    None   Social History Narrative    Born in [place] background (both parents born in the [address])    Father was a [employer] , teacher,  and  in [facility]     She never         Retired from [employer] alone in a house, no children    Former  at [employer] and [address] [place] [address] for [employer] for many years     Lives With: Alone(has 24 hour care)--[employer] caregiver          Lives [id number] [place] [address] in [facility] she states she would like [facility] if SNF appropriate    However has always done well at [address]    Type of Home: [address] [place] [address] Avenue: One level    Home Access: Level entry    ADL Assistance: Needs assistance    Homemaking Assistance: Needs assistance    Ambulation Assistance: Independent(normally independent with ww )    Transfer Assistance: Independent(normally independent with occ assist to stabilize ww )    Active : No    Additional Comments: Pt with increased assistance required and recent fall.               PHYSICAL EXAM       ED Triage Vitals [02/22/20 1825]   BP Temp Temp Range: 8.5 - 9.9 mg/dL 9.6   Total Protein Latest Ref Range: 6.3 - 8.0 g/dL 5.8 (L)   Albumin Latest Ref Range: 3.5 - 4.6 g/dL 3.5   Globulin Latest Ref Range: 2.3 - 3.5 g/dL 2.3   Alk Phos Latest Ref Range: 40 - 130 U/L 115   ALT Latest Ref Range: 0 - 33 U/L 14   AST Latest Ref Range: 0 - 35 U/L 13   Bilirubin Latest Ref Range: 0.2 - 0.7 mg/dL <0.2   WBC Latest Ref Range: 4.8 - 10.8 K/uL 5.3   RBC Latest Ref Range: 4.20 - 5.40 M/uL 3.79 (L)   Hemoglobin Quant Latest Ref Range: 12.0 - 16.0 g/dL 11.7 (L)   Hematocrit Latest Ref Range: 37.0 - 47.0 % 35.4 (L)   MCV Latest Ref Range: 82.0 - 100.0 fL 93.4   MCH Latest Ref Range: 27.0 - 31.3 pg 30.8   MCHC Latest Ref Range: 33.0 - 37.0 % 33.0   RDW Latest Ref Range: 11.5 - 14.5 % 14.4   Platelet Count Latest Ref Range: 130 - 400 K/uL 186   Neutrophils % Latest Units: % 49.3   Lymphocyte % Latest Units: % 26.1   Monocytes % Latest Units: % 16.5   Eosinophils % Latest Units: % 6.9   Basophils % Latest Units: % 1.2   Neutrophils Absolute Latest Ref Range: 1.4 - 6.5 K/uL 2.6   Lymphocytes Absolute Latest Ref Range: 1.0 - 4.8 K/uL 1.4   Monocytes Absolute Latest Ref Range: 0.2 - 0.8 K/uL 0.9 (H)   Eosinophils Absolute Latest Ref Range: 0.0 - 0.7 K/uL 0.4   Basophils Absolute Latest Ref Range: 0.0 - 0.2 K/uL 0.1     CT of the abdomen and pelvis showed some calcifications of the thoracic aorta and coronary arteries with some evidence for bibasilar atelectasis and/or scarring. Benign cyst left kidney right adrenal gland nodule measuring 17 mm absent appendix. Fat-containing umbilical hernia. Kyphoplasty noted at L1 total right hip arthroplasty  CT scan of the head showed chronic small vessel ischemic disease with out evidence for acute infarct hemorrhage mass or edema. There is a partial opacification of the mastoid air cells. EKG shows NSR with HR 70, normal axis, normal intervals, no acute STT changes. FINAL IMPRESSION      1.  Constipation, unspecified constipation type 2. Contusion of hip, unspecified laterality, initial encounter    3. Contusion of left wrist, initial encounter    4. Hyponatremia    5.  Confusion and disorientation        DISPOSITION/PLAN   DISPOSITION Decision To Admit 02/22/2020 07:46:09 PM        DISCHARGE MEDICATIONS:  Pascual Bishpo MD(electronically signed)  Attending Emergency Physician            Clovis Strauss MD  02/22/20 8860

## 2020-02-24 ENCOUNTER — CARE COORDINATION (OUTPATIENT)
Dept: CASE MANAGEMENT | Age: 85
End: 2020-02-24

## 2020-02-24 PROCEDURE — 93010 ELECTROCARDIOGRAM REPORT: CPT | Performed by: INTERNAL MEDICINE

## 2020-02-24 NOTE — PROGRESS NOTES
PATIENTDenowen Armando : 1918 DOS: 2020     [facility]    Admission history and physical.    CHIEF COMPLAINT:  Shortness of breath, weakness, hyponatremia. HISTORY OF PRESENT ILLNESS:  This is a 8-year-old woman who has been a long term care resident. The patient was sent out with weakness beyond her baseline confusion. The patient had recently completed course of antibiotic therapy for urinary tract infection. The patient had suffered a fall recently with bruising in her neck. The patient did undergo a course of physical therapy and occupational therapy. The patient did have CT of the chest performed. The patient did make gains. The patient was stabilized, was found to have evidence of hyponatremia. The patient was given IV fluid rehydration, stabilized. The patient's cognition was declining. She was stabilized in terms of her AFib and diabetes, was felt to be below her baseline in terms of her functional status, subsequently returned to this facility for course of rehabilitation. PAST MEDICAL HISTORY:  Included hyponatremia, dehydration, unsteadiness with falls, dementia with agitation, hypertension, diabetes, coronary artery disease, atrial fibrillation. FAMILY HISTORY:  Included coronary artery disease, diabetes. SOCIAL HISTORY:  The patient has been a community dwelling individual, had been independent of ADL/IADLs. MEDICATIONS:  Per the nursing home record. REVIEW OF SYSTEMS:  The patient is somewhat confused, cannot provide coherent narration in terms of her 14-point review of systems. PHYSICAL EXAMINATION:  Her vital signs were stable. She is afebrile 97.7, pulse 82, blood pressure 134/60. She is frail appearing. Pupils are small, but they are reactive. Oral mucosa is moist.  Chest showed diminished breath sounds. No crackles, no wheezing. Cardiovascular exam showed a regular rate. Abdomen is soft, nontender.

## 2020-02-25 ENCOUNTER — TELEPHONE (OUTPATIENT)
Dept: FAMILY MEDICINE CLINIC | Age: 85
End: 2020-02-25

## 2020-02-26 VITALS
TEMPERATURE: 97.3 F | DIASTOLIC BLOOD PRESSURE: 68 MMHG | SYSTOLIC BLOOD PRESSURE: 116 MMHG | BODY MASS INDEX: 24.87 KG/M2 | OXYGEN SATURATION: 92 % | WEIGHT: 133.8 LBS | RESPIRATION RATE: 18 BRPM | HEART RATE: 63 BPM

## 2020-02-26 NOTE — TELEPHONE ENCOUNTER
Please let Taylor know that I am so sorry about Denise decline. Her change in behavior cannot be explained by the urine. It was normal on February 22. Would you like to come in and talk with me about possibly enrolling Mary in hospice?

## 2020-02-26 NOTE — PROGRESS NOTES
her H and H remained stable. She does have macular degeneration and past cataract. The patient states she has to be going home. She is up with a walker and she does need supervision with and without a gait belt depending on whether she is having a good day or bad day. Her weight was relatively stable. Her last GFR was 68, which is very good for her age of 80 and the rest of her labs were stable at that time. She had a BMP. Calcium was 9.6, which is good for her bone health. ALLERGIES: She has no known allergies. MEDICATIONS: Include cholecalciferol 1000 units daily, Eliquis 2.5 b.i.d. for her atrial fibrillation, brimonidine tartrate 0.15% solution one drop both eyes two times a day, amoxicillin 500 t.i.d. which finished on 01/29, Tylenol is p.r.n., Colace is as needed for constipation, she has Tums p.r.n. for her GERD, Timoptic-XE Gel solution 0.5 one drop both eyes for her dry eyes, multivitamin daily, latanoprost solution 0.005% one drop left eye at bedtime for her glaucoma, omeprazole 20 mg daily for her GERD. She was ambulatory with a walker with assess, fairly safe. She still has her ataxic gait, but she was eating and drinking fairly good before discharge. PHYSICAL EXAMINATION: Vital Signs: 116/68, 97.3, 63, 18, 133.8 pounds, 92% O2 saturation on room air. She was sitting up in chair in her room. She was in no acute distress. She was smiling, cooperative, forgetful, but she was conversational. She was answering most questions appropriately. She could follow simple commands. EOMs were intact. Buccal mucosa moist and pink without thrush. Heart: Regular rate, slightly irregular rhythm. Lungs are clear to auscultation. Even, nonlabored respirations. No cyanosis. Abdomen: Nontender. No suprapubic tenderness. No CVA tenderness. No rigidity or guarding. Lower extremities had no significant edema, trace in the pedal area, none on the ankle or shin.  She had normal thought content, was lucid, jovial.

## 2020-02-27 NOTE — TELEPHONE ENCOUNTER
Patient's POA Isamar called she states that Venus is back to normal was just one episode and she think is because of the change of rehab facility, She sates that she appreciate your respond.

## 2020-02-28 ENCOUNTER — OFFICE VISIT (OUTPATIENT)
Dept: GERIATRIC MEDICINE | Age: 85
End: 2020-02-28
Payer: MEDICARE

## 2020-02-28 VITALS
DIASTOLIC BLOOD PRESSURE: 62 MMHG | HEART RATE: 76 BPM | TEMPERATURE: 97.8 F | OXYGEN SATURATION: 95 % | RESPIRATION RATE: 17 BRPM | SYSTOLIC BLOOD PRESSURE: 128 MMHG

## 2020-02-28 PROBLEM — R09.89 ABNORMAL LUNG SOUNDS: Status: ACTIVE | Noted: 2020-02-28

## 2020-02-28 PROBLEM — I48.0 PAF (PAROXYSMAL ATRIAL FIBRILLATION) (HCC): Chronic | Status: ACTIVE | Noted: 2019-10-03

## 2020-02-28 PROCEDURE — G8482 FLU IMMUNIZE ORDER/ADMIN: HCPCS | Performed by: INTERNAL MEDICINE

## 2020-02-28 PROCEDURE — G8482 FLU IMMUNIZE ORDER/ADMIN: HCPCS | Performed by: NURSE PRACTITIONER

## 2020-02-28 PROCEDURE — 99316 NF DSCHRG MGMT 30 MIN+: CPT | Performed by: NURSE PRACTITIONER

## 2020-02-28 PROCEDURE — 99304 1ST NF CARE SF/LOW MDM 25: CPT | Performed by: INTERNAL MEDICINE

## 2020-02-28 PROCEDURE — 1123F ACP DISCUSS/DSCN MKR DOCD: CPT | Performed by: INTERNAL MEDICINE

## 2020-02-28 NOTE — PROGRESS NOTES
(36.6 °C)   Resp 17   LMP  (LMP Unknown)   SpO2 95%     Physical Exam Constitutional: Lying in bed , well-developed, in no acute distess  Pulmonary/Chest:  breathing unlabored, lung sounds diminished in bilateral lower lobes, crackles bilateral lower lobes, no shortness of breath, no dyspnea  Cardiovascular:  S1-S2 regular, 2+ DP x 2, no edema, no clubbing, no cyanosis  Abd/GI:  abdomen soft, round, non-distended, non-tender, active bowel sounds x 4 quadrants  MS/Extremities:  KELLOGG, ROM limited, abnormal gait, power grade 4 out of 5 upper and lower extremities. Psych:  A/O x 2, cooperative with care, no anxiety, appropriate mood and affect  Skin:  warm and dry, color normal, no lesions, no rashes     Diagnosis Orders   1. SBO (small bowel obstruction) ([facility] [address]     2. DM (diabetes mellitus), type 2 with peripheral vascular complications (HCC)     3. Spinal stenosis of lumbar region, unspecified whether neurogenic claudication present     4. Late onset Alzheimer's disease without behavioral disturbance ([facility] [address]     5. PAF (paroxysmal atrial fibrillation) ([facility] [address]     6. Abnormal lung sounds     7. Generalized weakness         Assessment and Plan:      1. SBO (small bowel obstruction) (HCC)  Denies complaints of abdominal pain, nausea, vomiting. Does report bowel movement. Continue taking medications as ordered. 2. DM (diabetes mellitus), type 2 with peripheral vascular complications (HCC)  Diabetes is diet controlled, blood sugars are within acceptable range. Hemoglobin A1c is 5.3. Continue Accu-Cheks weekly as ordered. 3. Paroxysmal atrial fibrillation  Heart rate is regular this morning. Resident denies chest pain or palpitations. Continue medications as ordered. 4. Spinal stenosis of lumbar region, unspecified whether neurogenic claudication present  Denies any complaints of pain this morning. Is asking that Lidoderm patch be removed from thoracic spine.   May use Lidoderm patch 4% to thoracic spine daily as needed. 5. Late onset Alzheimer's disease without behavioral disturbance ([facility] [address]  Does have a history of late Alzheimer's disease with behavioral disturbance at times. Behaviors controlled today. Resident is pleasant and oriented x2. Continue medications as ordered. 6.  Abnormal lung sounds/cough: Start Robitussin 10 ml p.o. every 6 hours as needed. New onset expiratory wheeze per staff, resident denies wheeze shortness of breath but admits to dry cough. Start aerosols with albuterol 0.083 unit dose every 6 hours as needed for shortness of breath/wheeze. X-ray done in hospital recently is negative for pneumonia or pleural effusion. We will continue to monitor. 7.  May d/c to [address] [place] [address] today. PT OT is ordered. Reviewed labs:  Yes    Discharge greater than 31 minutes    I have reviewed the patient's medical history in detail and updated the computerized patient record.     Bhavesh Burrell, APRN-CNP

## 2020-03-02 LAB
BASOPHILS ABSOLUTE: ABNORMAL
BASOPHILS RELATIVE PERCENT: ABNORMAL
BUN BLDV-MCNC: 16 MG/DL
CALCIUM SERPL-MCNC: 9.6 MG/DL
CHLORIDE BLD-SCNC: 104 MMOL/L
CHOLESTEROL, TOTAL: 172 MG/DL
CHOLESTEROL/HDL RATIO: 2
CO2: 23 MMOL/L
CREAT SERPL-MCNC: 0.5 MG/DL
EOSINOPHILS ABSOLUTE: ABNORMAL
EOSINOPHILS RELATIVE PERCENT: ABNORMAL
GFR CALCULATED: 113
GLUCOSE BLD-MCNC: 95 MG/DL
HCT VFR BLD CALC: 34.1 % (ref 36–46)
HDLC SERPL-MCNC: 49 MG/DL (ref 35–70)
HEMOGLOBIN: 11.2 G/DL (ref 12–16)
LDL CHOLESTEROL CALCULATED: 100 MG/DL (ref 0–160)
LYMPHOCYTES ABSOLUTE: ABNORMAL
LYMPHOCYTES RELATIVE PERCENT: ABNORMAL
MCH RBC QN AUTO: 31.2 PG
MCHC RBC AUTO-ENTMCNC: 32.9 G/DL
MCV RBC AUTO: 94.8 FL
MONOCYTES ABSOLUTE: ABNORMAL
MONOCYTES RELATIVE PERCENT: ABNORMAL
NEUTROPHILS ABSOLUTE: ABNORMAL
NEUTROPHILS RELATIVE PERCENT: ABNORMAL
PLATELET # BLD: 229 K/ΜL
PMV BLD AUTO: 8 FL
POTASSIUM SERPL-SCNC: NORMAL MMOL/L
RBC # BLD: 3.6 10^6/ΜL
SODIUM BLD-SCNC: 137 MMOL/L
TRIGL SERPL-MCNC: 114 MG/DL
VLDLC SERPL CALC-MCNC: 23 MG/DL
WBC # BLD: 8.4 10^3/ML

## 2020-03-04 VITALS — SYSTOLIC BLOOD PRESSURE: 112 MMHG | TEMPERATURE: 97.2 F | DIASTOLIC BLOOD PRESSURE: 70 MMHG | HEART RATE: 86 BPM

## 2020-03-04 NOTE — PROGRESS NOTES
crackles. Cardiovascular exam showed a regular rate. Abdomen was soft, nontender. Extremities showed trace dorsal pedal pulse. ASSESSMENT AND PLAN:  1. Degenerative joint disease. No new pain crisis. 2.   Hypertension, blood pressure is stable. 3.   Diabetes. We will check A1c and lipid panel. 4.   Weakness. The patient has muscular weakness. We are following up with physical therapy, occupational therapy with a goal of maximization of her functional status. Please note, available hospital records, imaging reports, consultant notes, laboratory results reviewed.         Electronically Signed By: Marcio Menon M.D. on 03/02/2020 01:13:35  ______________________________  Marcio Menon M.D.  [id number]  D: 02/28/2020 15:26:45  T: 02/28/2020 46:27:78    cc: Reyes Castillo

## 2020-03-08 ENCOUNTER — OFFICE VISIT (OUTPATIENT)
Dept: GERIATRIC MEDICINE | Age: 85
End: 2020-03-08
Payer: MEDICARE

## 2020-03-08 PROCEDURE — G8482 FLU IMMUNIZE ORDER/ADMIN: HCPCS | Performed by: INTERNAL MEDICINE

## 2020-03-08 PROCEDURE — 1123F ACP DISCUSS/DSCN MKR DOCD: CPT | Performed by: INTERNAL MEDICINE

## 2020-03-08 PROCEDURE — 99308 SBSQ NF CARE LOW MDM 20: CPT | Performed by: INTERNAL MEDICINE

## 2020-03-26 RX ORDER — OMEPRAZOLE 20 MG/1
CAPSULE, DELAYED RELEASE ORAL
Qty: 90 CAPSULE | Refills: 1 | Status: SHIPPED | OUTPATIENT
Start: 2020-03-26

## 2020-03-26 NOTE — TELEPHONE ENCOUNTER
Pharmacy requesting medication refill. Please approve or deny this request.    Rx requested:  Requested Prescriptions     Pending Prescriptions Disp Refills    omeprazole (PRILOSEC) 20 MG delayed release capsule [Pharmacy Med Name: OMEPRAZOLE DR 20 MG CAPSULE] 90 capsule 1     Sig: TAKE 1 CAPSULE BY MOUTH EVERY DAY         Last Office Visit:   1/6/2020      Next Visit Date:  No future appointments.

## 2020-03-30 ENCOUNTER — VIRTUAL VISIT (OUTPATIENT)
Dept: GERIATRIC MEDICINE | Age: 85
End: 2020-03-30
Payer: MEDICARE

## 2020-03-30 PROCEDURE — 99308 SBSQ NF CARE LOW MDM 20: CPT | Performed by: NURSE PRACTITIONER

## 2020-03-30 PROCEDURE — 1123F ACP DISCUSS/DSCN MKR DOCD: CPT | Performed by: NURSE PRACTITIONER

## 2020-04-07 NOTE — PROGRESS NOTES
[facility]    Follow up visit for recent admission for her dementia, confusion, diabetes, unsteadiness with falls. SUBJECTIVE: This 8-year-old woman was evaluated in her room. The patient has been well received in facility, making gains. She is globally frail. The patient has not had any new emesis, fevers or chills. OBJECTIVE: She appeared chronically ill. Pupils are small, minimally reactive. Oral mucosa is dry. Chest showed no crackles, no wheezing. Cardiovascular exam showed a regular rate. Abdomen was soft, nontender. Extremities showed trace dorsal pedal pulse. ASSESSMENT AND PLAN:  1. Degenerative joint disease: No new pain crisis. 2. Dementia: No evidence of acute psychosis. Continue supportive care. 3. Unsteadiness with falls: Continue with bowel straining as able.  We will monitor closely

## 2020-04-08 ENCOUNTER — VIRTUAL VISIT (OUTPATIENT)
Dept: GERIATRIC MEDICINE | Age: 85
End: 2020-04-08
Payer: MEDICARE

## 2020-04-08 PROCEDURE — 1123F ACP DISCUSS/DSCN MKR DOCD: CPT | Performed by: NURSE PRACTITIONER

## 2020-04-08 PROCEDURE — 99308 SBSQ NF CARE LOW MDM 20: CPT | Performed by: NURSE PRACTITIONER

## 2020-04-15 NOTE — PROGRESS NOTES
3/30/2020    TELEHEALTH EVALUATION -- Audio/Visual (During Y- public health emergency)    HPI:    Asuncion Garcia (:  1918) has requested an audio/video evaluation for the following concern(s):    Chief Complaint   Patient presents with    Follow-Up from Hospital         Review of Systems   Review of Systems - History obtained from nurse, chart review and the patient  Psychological ROS: negative for - concentration difficulties or depression  Ophthalmic ROS: positive for - uses glasses  negative for - blurry vision  ENT ROS: positive for - [place], negative for-sore throat  Respiratory ROS: no cough, shortness of breath, or wheezing  Cardiovascular ROS: no chest pain or dyspnea on exertion  Gastrointestinal ROS: no abdominal pain, change in bowel habits  Genito-Urinary ROS: no dysuria, trouble voiding, or hematuria  Musculoskeletal ROS: negative for - joint pain positive for-gait disturbance, wheelchair dependent  Dermatological ROS: negative for - rash  Denies pain      Prior to Visit Medications    Medication Sig Taking? Authorizing Provider   omeprazole (PRILOSEC) 20 MG delayed release capsule TAKE 1 CAPSULE BY MOUTH EVERY DAY  Víctor Kirkland MD   Misc. Devices (COMMODE BEDSIDE) MISC Misc. Devices (COMMODE BEDSIDE) MISC Misc.  Devices (COMMODE BEDSIDE) MISC Indications: Weakness , Spinal stenosis of lumbar region, unspecified whether neurogenic claudication present , Impaired mobility and activities of daily living , Abnormality of gait and mobility , Late onset Alzheimer's disease without behavioral disturbance (HCC) , Age-related physical debility , Primary osteoarthritis involving multiple joints As directed 1 each 0 2019 Active 2019  [facility]) ([address])  Historical Provider, [address] [place] [address] by NOT APPLICABLE route  Historical Provider, MD   sennosides-docusate sodium (SENOKOT-S) 8.6-50 MG tablet Take 1 tablet by mouth daily  Jacob Montes MD   docusate sodium substitute for in-person clinic visit. Patient and provider were located at their individual homes. --Beverly Rogers, JADE - CNP on 4/15/2020 at 7:06 PM    An electronic signature was used to authenticate this note. Cc.  [id number] [facility]

## 2020-04-17 ENCOUNTER — VIRTUAL VISIT (OUTPATIENT)
Dept: GERIATRIC MEDICINE | Age: 85
End: 2020-04-17
Payer: MEDICARE

## 2020-04-17 PROCEDURE — 99308 SBSQ NF CARE LOW MDM 20: CPT | Performed by: NURSE PRACTITIONER

## 2020-04-17 PROCEDURE — 1123F ACP DISCUSS/DSCN MKR DOCD: CPT | Performed by: NURSE PRACTITIONER

## 2020-04-24 ENCOUNTER — VIRTUAL VISIT (OUTPATIENT)
Dept: GERIATRIC MEDICINE | Age: 85
End: 2020-04-24
Payer: MEDICARE

## 2020-04-24 PROCEDURE — 1123F ACP DISCUSS/DSCN MKR DOCD: CPT | Performed by: NURSE PRACTITIONER

## 2020-04-24 PROCEDURE — 99308 SBSQ NF CARE LOW MDM 20: CPT | Performed by: NURSE PRACTITIONER

## 2020-04-28 VITALS
RESPIRATION RATE: 16 BRPM | DIASTOLIC BLOOD PRESSURE: 60 MMHG | TEMPERATURE: 97.6 F | SYSTOLIC BLOOD PRESSURE: 108 MMHG | HEART RATE: 68 BPM | OXYGEN SATURATION: 95 %

## 2020-04-28 NOTE — PROGRESS NOTES
PATIENTHerbie [facility] : 1918 DOS: 2020     [facility]  Chief Complaint   Patient presents with    Atrial Fibrillation    Gastroesophageal Reflux    Abnormal Lab       REASON FOR VISIT: The patient is being seen today for paroxysmal AFib, GERD, chronic hyponatremia. SUBJECTIVE: Resident states she is doing well. She offers no concerns. Nursing staff report resident remains at her baseline. FAMILY AND SOCIAL HISTORY: Refer to H&P. Past Medical History:   Diagnosis Date    Acute cystitis with hematuria 2019    Bowel obstruction ([address] 2019    Chronic hyponatremia     Compression fracture of T12 vertebra [facility])     kyphoplasty Dr Lincoln Reich    DJD (degenerative joint disease) of hip 3/20/2014    Dr Lisa Jeong DM (diabetes mellitus), type 2 with peripheral vascular complications (HCC)     diet controlled    Gallbladder sludge     GERD (gastroesophageal reflux disease) 8/31/10    Glaucoma     bilateral    H/O vascular surgery 2019    Josie NAQVI     Hearing loss     History of total hip replacement, right 2019    Dr Tony Marti    Lower leg edema     chronic, R>L    Lumbar stenosis     Macular degeneration     left eye    PAF (paroxysmal atrial fibrillation) ([address] 2019    Pain of left scapula 3/25/2019    PVD (peripheral vascular disease) ([address] 10/28/2006    Dr Chris White, stents LE    Stress incontinence 10/28/2011       MEDICATIONS AND ALLERGIES: Reviewed on chart in Epic. REVIEW OF SYSTEMS: Resident denies any headache, dizziness, blurred vision, chest pain, shortness of breath, abdominal pain, nausea, vomiting or changes in bowel or bladder habits. She reports she is eating well and sleeping well for a 8-year-old, otherwise no concerns. PHYSICAL EXAMINATION: Most recent vital signs: /60, heart rate 68, respirations 16, temp 97.6, pulse oxing 95% on room air.  CONSTITUTIONAL: Resident is alert and oriented x3 elderly female, forgetful. Pleasant and cooperative with exam. INTEGUMENT: Pink dry. HEENT: Normocephalic, atraumatic in appearance. Hard of hearing. Conjunctivae pink. Sclerae nonicteric. Mucous membranes pink, moist. NECK: No visible masses. RESPIRATORY: Respirations even and nonlabored. No distress noted. PV: Edema not noted. ASSESSMENT AND PLAN:   1. Paroxysmal AFib. Resident has not had any current anticoagulation. She denies any chest pain, chest discomfort or palpitations. 2.  GERD. Resident is tolerating her diet as ordered. We will continue her omeprazole as ordered. 3.  Chronic hyponatremia. Resident's most recent sodium 137. I have reviewed this resident's medication and treatment plan as well as most recent lab work. No further changes will be made at this time. Return in about 1 month (around 5/8/2020) for chronic conditions. Pursuant to the emergency declaration under the [facility], [facility] waiver authority and the [employer] and [employer] Act, this Virtual Visit was conducted, with patient's consent, to reduce the patient's risk of exposure to COVID-19 and provide continuity of care for an established patient. Services were provided through a video synchronous discussion virtually to substitute for in-person clinic visit they will be billed for this visit and they agree to continue with assistance of  staff and via VIRTUAL platform     Patient identification was verified at the start of the visit : YES    Total time spent on this encounter: Not billed by time. Electronically Signed By: Rosaline Lang. Jinx Gowers, CNP on 04/26/2020 20:57:20  ______________________________  Rosaline Lang.  Jinx Gowers, CNP [id number]  D: 04/23/2020 23:06:31  T: 04/23/2020 23:38:34    cc: Reyes Camarena

## 2020-05-01 ENCOUNTER — VIRTUAL VISIT (OUTPATIENT)
Dept: GERIATRIC MEDICINE | Age: 85
End: 2020-05-01
Payer: MEDICARE

## 2020-05-01 PROCEDURE — 1123F ACP DISCUSS/DSCN MKR DOCD: CPT | Performed by: NURSE PRACTITIONER

## 2020-05-07 VITALS
SYSTOLIC BLOOD PRESSURE: 157 MMHG | HEART RATE: 76 BPM | OXYGEN SATURATION: 99 % | RESPIRATION RATE: 17 BRPM | DIASTOLIC BLOOD PRESSURE: 73 MMHG | TEMPERATURE: 97.1 F

## 2020-05-07 NOTE — PROGRESS NOTES
[address]  Chief Complaint   Patient presents with    Hypertension    Diabetes       REASON FOR VISIT: The patient being seen today for acute visit for hypertension and diabetes mellitus. SUBJECTIVE: Resident offers no concerns, states she is doing well. Nursing staff reports resident remains at her baseline. FAMILY AND SOCIAL HISTORY: Refer to H and P. Past Medical History:   Diagnosis Date    Acute cystitis with hematuria 8/17/2019    Bowel obstruction ([address] 9/22/2019    Chronic hyponatremia     Compression fracture of T12 vertebra [facility]) 2013    kyphoplasty Dr Angelica Campbell    DJD (degenerative joint disease) of hip 3/20/2014    Dr Lynette Chiang DM (diabetes mellitus), type 2 with peripheral vascular complications (HCC)     diet controlled    Gallbladder sludge 2019    GERD (gastroesophageal reflux disease) 8/31/10    Glaucoma     bilateral    H/O vascular surgery 5/9/2019    Josie NAQVI 2006    Hearing loss     History of total hip replacement, right 05/09/2019    Dr Patsy Cardoso    Lower leg edema     chronic, R>L    Lumbar stenosis     Macular degeneration     left eye    PAF (paroxysmal atrial fibrillation) [facility] 2019    Pain of left scapula 3/25/2019    PVD (peripheral vascular disease) ([facility] 10/28/2006    Dr Leoma Romberg, stents LE    Stress incontinence 10/28/2011       MEDICATIONS AND ALLERGIES: Reviewed on chart in Epic. REVIEW OF SYSTEMS: Resident denies any headache, dizziness, blurred vision, chest pain, shortness of breath, abdominal pain, nausea, vomiting, or changes in her bowel or bladder habits. She reports she is eating well, sleeping well, and has no pain. PHYSICAL EXAMINATION: Most recent VITAL SIGNS: /73, heart rate 76, respirations 17, temperature 97.1, pulse oxing 99% on room air. CONSTITUTIONAL: Resident is alert and oriented x3, forgetful elderly female, frail in appearance, in no apparent distress. INTEGUMENT: Pink, warm, dry.  HEENT: Normocephalic,
Discharged

## 2020-05-08 ENCOUNTER — VIRTUAL VISIT (OUTPATIENT)
Dept: GERIATRIC MEDICINE | Age: 85
End: 2020-05-08
Payer: MEDICARE

## 2020-05-08 PROCEDURE — 99308 SBSQ NF CARE LOW MDM 20: CPT | Performed by: NURSE PRACTITIONER

## 2020-05-08 PROCEDURE — 1123F ACP DISCUSS/DSCN MKR DOCD: CPT | Performed by: NURSE PRACTITIONER

## 2020-05-11 VITALS
SYSTOLIC BLOOD PRESSURE: 90 MMHG | OXYGEN SATURATION: 93 % | RESPIRATION RATE: 18 BRPM | HEART RATE: 82 BPM | TEMPERATURE: 98 F | DIASTOLIC BLOOD PRESSURE: 62 MMHG

## 2020-05-11 NOTE — PROGRESS NOTES
Pink, dry. HEENT: Normocephalic, atraumatic in appearance. She is hard of hearing. Conjunctivae pink. Sclerae nonicteric. Mucous membranes pink, moist. NECK: No visible masses. RESPIRATORY: Respirations even, nonlabored. ABDOMEN: Flat. PV: No edema noted. She is wearing GERSON hose. ASSESSMENT AND PLAN:   1. Lumbar stenosis. Resident currently not have any complaints of pain. She reports her acetaminophen is effective in meeting her pain needs. I have reviewed this resident's medication and treatment plan, as well as, most recent lab work. No further changes will be made at this time. Return in about 1 week (around 4/24/2020), or if symptoms worsen or fail to improve. Pursuant to the emergency declaration under the [address] [place] [address], [facility], [facility] waiver authority and the [employer] and [employer] Act, this Virtual Visit was conducted, with patient's consent, to reduce the patient's risk of exposure to COVID-19 and provide continuity of care for an established patient. Services were provided through a video synchronous discussion virtually to substitute for in-person clinic visit they will be billed for this visit and they agree to continue with assistance of NF staff and via VIRTUAL platform     Patient identification was verified at the start of the visit : YES    Total time spent on this encounter: Not billed by time. Electronically Signed By: Angeli Malone CNP on 05/10/2020 03:05:30  ______________________________  Angeli Malone CNP  [id number]  D: 05/09/2020 18:16:51  T: 05/09/2020 19:26:02    cc: [address]

## 2020-05-15 ENCOUNTER — OFFICE VISIT (OUTPATIENT)
Dept: GERIATRIC MEDICINE | Age: 85
End: 2020-05-15
Payer: MEDICARE

## 2020-05-15 PROCEDURE — 99309 SBSQ NF CARE MODERATE MDM 30: CPT | Performed by: INTERNAL MEDICINE

## 2020-05-15 PROCEDURE — 1123F ACP DISCUSS/DSCN MKR DOCD: CPT | Performed by: INTERNAL MEDICINE

## 2020-05-19 ENCOUNTER — VIRTUAL VISIT (OUTPATIENT)
Dept: GERIATRIC MEDICINE | Age: 85
End: 2020-05-19
Payer: MEDICARE

## 2020-05-19 PROCEDURE — 1123F ACP DISCUSS/DSCN MKR DOCD: CPT | Performed by: NURSE PRACTITIONER

## 2020-05-19 PROCEDURE — 99308 SBSQ NF CARE LOW MDM 20: CPT | Performed by: NURSE PRACTITIONER

## 2020-05-26 ENCOUNTER — VIRTUAL VISIT (OUTPATIENT)
Dept: GERIATRIC MEDICINE | Age: 85
End: 2020-05-26
Payer: MEDICARE

## 2020-05-26 VITALS
RESPIRATION RATE: 16 BRPM | SYSTOLIC BLOOD PRESSURE: 138 MMHG | TEMPERATURE: 97.8 F | HEART RATE: 71 BPM | OXYGEN SATURATION: 97 % | DIASTOLIC BLOOD PRESSURE: 62 MMHG

## 2020-05-26 PROCEDURE — 1123F ACP DISCUSS/DSCN MKR DOCD: CPT | Performed by: NURSE PRACTITIONER

## 2020-05-26 PROCEDURE — 99308 SBSQ NF CARE LOW MDM 20: CPT | Performed by: NURSE PRACTITIONER

## 2020-05-26 NOTE — PROGRESS NOTES
cooperative with exam. INTEGUMENT: Pink, dry. HEENT: Normocephalic, atraumatic in appearance. She is hard of hearing. Conjunctivae pink. Sclerae nonicteric. Mucous membranes pink, moist. NECK: With no visible masses. RESPIRATORY: Respirations even, nonlabored. ABDOMEN: Flat. PV: No edema. She is wearing GERSON hose. ASSESSMENT AND PLAN:   1. Generalized weakness. Resident has not had any recent falls. She is doing well. We will continue to monitor closely. I have reviewed this resident's medication and treatment plan, as well as, most recent lab work. No further changes will be made at this time. Return in about 1 week (around 5/8/2020). Pursuant to the emergency declaration under the [address] [place] [address], [facility] waiver authority and the [employer] and [employer] Act, this Virtual Visit was conducted, with patient's consent, to reduce the patient's risk of exposure to COVID-19 and provide continuity of care for an established patient. Services were provided through a video synchronous discussion virtually to substitute for in-person clinic visit they will be billed for this visit and they agree to continue with assistance of  staff and via VIRTUAL platform     Patient identification was verified at the start of the visit : YES    Total time spent on this encounter: Not billed by time. Electronically Signed By: Wendie Hawkins CNP on 05/24/2020 11:21:03  ______________________________  Wendie Hawkins CNP  /[id number]  D: 05/21/2020 01:15:59  T: 05/21/2020 01:42:12    cc: [address]

## 2020-06-02 VITALS
SYSTOLIC BLOOD PRESSURE: 119 MMHG | RESPIRATION RATE: 18 BRPM | TEMPERATURE: 97.9 F | HEART RATE: 71 BPM | DIASTOLIC BLOOD PRESSURE: 69 MMHG | OXYGEN SATURATION: 95 %

## 2020-06-02 NOTE — PROGRESS NOTES
Gladys Calderon : 1918 DOS: 2020     [facility]  Chief Complaint   Patient presents with    Dementia       The patient is being seen today for acute visit for late onset dementia. SUBJECTIVE: Resident offers no concerns, other than she has chronic back pain. She states that this is related to a fracture she has had in the past. Tylenol is effective in meeting her pain needs. Otherwise, she offers no concerns. FAMILY AND SOCIAL HISTORY: Refer to H&P. Past Medical History:   Diagnosis Date    Acute cystitis with hematuria 2019    Bowel obstruction ([address] 2019    Chronic hyponatremia     Compression fracture of T12 vertebra [facility]) 2013    kyphoplasty Dr Celia Perla    DJD (degenerative joint disease) of hip 3/20/2014    Dr Bar Wells DM (diabetes mellitus), type 2 with peripheral vascular complications (HCC)     diet controlled    Gallbladder sludge     GERD (gastroesophageal reflux disease) 8/31/10    Glaucoma     bilateral    H/O vascular surgery 2019    Josie NAQVI     Hearing loss     History of total hip replacement, right 2019    Dr Alba Fore    Lower leg edema     chronic, R>L    Lumbar stenosis     Macular degeneration     left eye    PAF (paroxysmal atrial fibrillation) ([address] 2019    Pain of left scapula 3/25/2019    PVD (peripheral vascular disease) ([address] 10/28/2006    Dr Renetta Jimenez, stents LE    Stress incontinence 10/28/2011       MEDICATIONS AND ALLERGIES: Reviewed on chart in Epic. REVIEW OF SYSTEMS: Resident denies any headache, dizziness, blurred vision, chest pain, shortness of breath, abdominal pain, nausea, vomiting, or changes in her bowel or bladder habits. She reports she is eating okay, sleeping okay. She does have pain in her back that she relates to a fracture she had in the past a long time ago. She does take Tylenol for her back pain and it is effective. Otherwise, no concerns.     PHYSICAL EXAMINATION:

## 2020-06-04 ENCOUNTER — VIRTUAL VISIT (OUTPATIENT)
Dept: GERIATRIC MEDICINE | Age: 85
End: 2020-06-04
Payer: MEDICARE

## 2020-06-04 PROCEDURE — 1123F ACP DISCUSS/DSCN MKR DOCD: CPT | Performed by: NURSE PRACTITIONER

## 2020-06-04 PROCEDURE — 99308 SBSQ NF CARE LOW MDM 20: CPT | Performed by: NURSE PRACTITIONER

## 2020-06-11 ENCOUNTER — VIRTUAL VISIT (OUTPATIENT)
Dept: GERIATRIC MEDICINE | Age: 85
End: 2020-06-11
Payer: MEDICARE

## 2020-06-11 PROCEDURE — 1123F ACP DISCUSS/DSCN MKR DOCD: CPT | Performed by: NURSE PRACTITIONER

## 2020-06-11 PROCEDURE — 99308 SBSQ NF CARE LOW MDM 20: CPT | Performed by: NURSE PRACTITIONER

## 2020-06-14 ASSESSMENT — ENCOUNTER SYMPTOMS
BACK PAIN: 0
SHORTNESS OF BREATH: 0
DIARRHEA: 0

## 2020-06-15 NOTE — PROGRESS NOTES
0.5 % ophthalmic gel-forming USE 1 DROP IN BOTH EYES TWICE DAILY. 11    acetaminophen (TYLENOL) 325 MG tablet Take 1 tablet by mouth 3 times daily as needed for Pain (DO NOT EXCEED 4GM IN 24 HOURS) 90 tablet 0    Multiple Vitamin (MULTI VITAMIN DAILY) TABS Take 1 tablet by mouth every morning       No current facility-administered medications on file prior to visit. No Known Allergies      Family History   Problem Relation Age of Onset    Heart Failure Mother         dec age 77    Hypertension Mother     Diabetes Father         dec age 67         Physical Exam:      Physical Exam   Constitutional: She appears well-developed and well-nourished. HENT:   Head: Atraumatic. Eyes: EOM are normal.   Neck: Neck supple. No JVD present. No thyromegaly present. Cardiovascular: Normal rate and regular rhythm. Pulmonary/Chest: Effort normal. She has no wheezes. She exhibits no tenderness. Abdominal: Bowel sounds are normal. There is no abdominal tenderness. There is no rebound. Musculoskeletal:         General: No deformity or edema. Neurological: She is alert. Skin: Skin is dry. No rash noted.        .   Lab Results   Component Value Date    WBC 8.4 03/02/2020    HGB 11.2 (A) 03/02/2020    HCT 34.1 (A) 03/02/2020    MCV 94.8 03/02/2020     03/02/2020     Lab Results   Component Value Date     03/02/2020    K 4.4 02/23/2020     03/02/2020    CO2 23 03/02/2020    BUN 16 03/02/2020    CREATININE 0.5 03/02/2020    GLUCOSE 95 03/02/2020    GLUCOSE 156 05/09/2012    CALCIUM 9.6 03/02/2020                ASSESSMENT:  Patient Active Problem List   Diagnosis    Essential hypertension    Stress incontinence    Hearing loss    Spinal stenosis of lumbar region    Impaired mobility and activities of daily living    Acute leg pain, left    Acute right hip pain    PVD (peripheral vascular disease) ([address]    Nuclear senile cataract    Nonexudative age-related macular degeneration    Primary open angle glaucoma    DM (diabetes mellitus), type 2 with peripheral vascular complications (HCC)    Lumbar stenosis    Gastroesophageal reflux disease without esophagitis    Hyponatremia    Ambulatory dysfunction    Glaucoma    DJD (degenerative joint disease)    S/P kyphoplasty    Late onset Alzheimer's disease without behavioral disturbance ([facility])    Abnormality of gait and mobility due to severe spinal stenosis.  Leg edema, right    Age-related osteoporosis without current pathological fracture    Other constipation    PAF (paroxysmal atrial fibrillation) ([facility])    Age-related physical debility    Post-nasal drip    Generalized weakness    SBO (small bowel obstruction) ([address]    Fall at home, initial encounter    Abnormal lung sounds         PLAN:   Diagnosis Orders   1. Dementia without behavioral disturbance, unspecified dementia type ([address]     2. Essential hypertension     3. Diabetes mellitus without complication ([address]       Continuous supportive care monitoring blood sugars and blood pressure as needed repeat A1c next 3 months as able continue with nutritional support monitoring for falls and skin breakdown.

## 2020-06-16 NOTE — PROGRESS NOTES
PATIENTLadarrell Edwards : 1918 DOS: 2020     [address]  Chief Complaint   Patient presents with    Other     osteoarthritis       REASON FOR VISIT: The patient is being seen today for acute visit for osteoarthritis. SUBJECTIVE: Resident offers no concerns other than she is having some pain in the area of her brief. Otherwise, she states she is doing very well for somebody who is 89. Nursing staff offer no new concerns. FAMILY AND SOCIAL HISTORY: Refer to H&P. Past Medical History:   Diagnosis Date    Acute cystitis with hematuria 2019    Bowel obstruction ([address] 2019    Chronic hyponatremia     Compression fracture of T12 vertebra [facility]) 2013    kyphoplasty Dr Tana MACHUCAD (degenerative joint disease) of hip 3/20/2014    Dr Giuseppe Lundberg DM (diabetes mellitus), type 2 with peripheral vascular complications (HCC)     diet controlled    Gallbladder sludge     GERD (gastroesophageal reflux disease) 8/31/10    Glaucoma     bilateral    H/O vascular surgery 2019    Josie PEDERSON    Hearing loss     History of total hip replacement, right 2019    Dr Nico Hurd    Lower leg edema     chronic, R>L    Lumbar stenosis     Macular degeneration     left eye    PAF (paroxysmal atrial fibrillation) ([address] 2019    Pain of left scapula 3/25/2019    PVD (peripheral vascular disease) ([address] 10/28/2006    Dr Kimber Granados, stents LE    Stress incontinence 10/28/2011       MEDICATIONS AND ALLERGIES: Reviewed on chart in Epic. REVIEW OF SYSTEMS: Resident denies any headache, dizziness, blurred vision, chest pain, shortness of breath, abdominal pain, nausea, vomiting, or changes in her bowel or bladder habits. She reports she is eating well, sleeping well. She does have some discomfort in the area of her brief. Otherwise, no concerns.     PHYSICAL EXAMINATION: Most recent vital signs: /69, heart rate 70, respirations 18, temp 98.4, pulse oxing 95% on room

## 2020-06-28 NOTE — PROGRESS NOTES
air. Weight 134. Constitutional: Resident is alert and oriented x2 to 3, forgetful female in no apparent distress, pleasant and cooperative with exam. Integument: Pink, dry. HEENT: Normocephalic, atraumatic in appearance. She is hard of hearing. Conjunctivae pink. Sclerae nonicteric. Mucous membranes pink, moist. Neck has no visible masses. Cardiovascular: Heart rate regular per nursing staff. Respiratory: Lung sounds clear through all fields per the nursing staff. Respirations even, nonlabored. No distress noted. Abdomen: Flat. PV: No edema noted. ASSESSMENT AND PLAN:   1.  UTI: We will start the resident on Rocephin 1 g x3 days. We will also monitor for any increase in symptoms. 2.  Hypertension: Resident's blood pressure is stable. We will continue to monitor. I have reviewed this resident's medication and treatment plan as well as most recent lab work. No further changes will be made at this time. Return in about 1 week (around 6/11/2020), or if symptoms worsen or fail to improve, for chronic conditions. Pursuant to the emergency declaration under the [facility], [facility] waiver authority and the [employer] and [employer] Act, this Virtual Visit was conducted, with patient's consent, to reduce the patient's risk of exposure to COVID-19 and provide continuity of care for an established patient. Services were provided through a video synchronous discussion virtually to substitute for in-person clinic visit they will be billed for this visit and they agree to continue with assistance of NF staff and via VIRTUAL platform     Patient identification was verified at the start of the visit : YES    Total time spent on this encounter: Not billed by time. Electronically Signed By: Mireille Solares CNP on 06/28/2020 11:53:18  ______________________________  DANN Puente/[id number]  D: 06/27/2020

## 2020-07-08 ENCOUNTER — VIRTUAL VISIT (OUTPATIENT)
Dept: GERIATRIC MEDICINE | Age: 85
End: 2020-07-08
Payer: MEDICARE

## 2020-07-08 PROCEDURE — 1123F ACP DISCUSS/DSCN MKR DOCD: CPT | Performed by: NURSE PRACTITIONER

## 2020-07-08 PROCEDURE — 99308 SBSQ NF CARE LOW MDM 20: CPT | Performed by: NURSE PRACTITIONER

## 2020-07-16 ENCOUNTER — VIRTUAL VISIT (OUTPATIENT)
Dept: GERIATRIC MEDICINE | Age: 85
End: 2020-07-16
Payer: MEDICARE

## 2020-07-16 PROCEDURE — 1123F ACP DISCUSS/DSCN MKR DOCD: CPT | Performed by: NURSE PRACTITIONER

## 2020-07-16 PROCEDURE — 99308 SBSQ NF CARE LOW MDM 20: CPT | Performed by: NURSE PRACTITIONER

## 2020-07-27 ENCOUNTER — OFFICE VISIT (OUTPATIENT)
Dept: GERIATRIC MEDICINE | Age: 85
End: 2020-07-27
Payer: MEDICARE

## 2020-07-27 PROCEDURE — 1123F ACP DISCUSS/DSCN MKR DOCD: CPT | Performed by: INTERNAL MEDICINE

## 2020-07-27 PROCEDURE — 99309 SBSQ NF CARE MODERATE MDM 30: CPT | Performed by: INTERNAL MEDICINE

## 2020-07-30 NOTE — PROGRESS NOTES
PATIENTTerribreanna Johnson : 1918 DOS: 2020     SASHA ALVAREZ     Seen for a routine monthly visit for her degenerative joint disease, peripheral vascular disease, and hypertension. MEDICATIONS:  Reviewed. SUBJECTIVE:  This patient was evaluated in her room, at her baseline. The patient is poorly responsive to me. She is a diabetic without symptomatic new hypoglycemia episodes. The patient has not had any new evidence of RVR from her known Afib. The patient has not had any new change in her bowel or bladder habits. She has had urine infections in the last few months, but clinically stable at this time. Past Medical History:   Diagnosis Date    Acute cystitis with hematuria 2019    Bowel obstruction ([address] 2019    Chronic hyponatremia     Compression fracture of T12 vertebra [facility]) 2013    kyphoplasty Dr Venancio Rodriguez    DJD (degenerative joint disease) of hip 3/20/2014    Dr Carla Herbert DM (diabetes mellitus), type 2 with peripheral vascular complications (HCC)     diet controlled    Gallbladder sludge     GERD (gastroesophageal reflux disease) 8/31/10    Glaucoma     bilateral    H/O vascular surgery 2019    Stens LE 2006    Hearing loss     History of total hip replacement, right 2019    Dr Gypsy Mcguire    Lower leg edema     chronic, R>L    Lumbar stenosis     Macular degeneration     left eye    PAF (paroxysmal atrial fibrillation) ([address] 2019    Pain of left scapula 3/25/2019    PVD (peripheral vascular disease) ([address] 10/28/2006    Dr Domingo Land, stents LE    Stress incontinence 10/28/2011     Past Surgical History:   Procedure Laterality Date    APPENDECTOMY      CYST REMOVAL  2016    DR SLOAN  RT THUMB MUCOUS CYST    HYSTERECTOMY      REFRACTIVE SURGERY      left     TOTAL HIP ARTHROPLASTY Right 9/25/15    DR. NARANJO    UPPER GASTROINTESTINAL ENDOSCOPY  10/07/15    DR Alissa Lackey    VERTEBROPLASTY      T12, Dr Zena Melendez

## 2020-07-30 NOTE — PROGRESS NOTES
PATIENTCespennie Reis : 1918 DOS: 2020     [facility]  Chief Complaint   Patient presents with    Diabetes       The patient is being seen today for acute visit for diabetes mellitus. SUBJECTIVE:  Resident offers no concerns. She is forgetful at her baseline. Nursing staff report she remains stable. FAMILY AND SOCIAL HISTORY:  Refer to H&P. Past Medical History:   Diagnosis Date    Acute cystitis with hematuria 2019    Bowel obstruction ([address] 2019    Chronic hyponatremia     Compression fracture of T12 vertebra [facility]) 2013    kyphoplasty Dr Bill Mejia    DJD (degenerative joint disease) of hip 3/20/2014    Dr hCen Kay DM (diabetes mellitus), type 2 with peripheral vascular complications (HCC)     diet controlled    Gallbladder sludge     GERD (gastroesophageal reflux disease) 8/31/10    Glaucoma     bilateral    H/O vascular surgery 2019    Josie NAQVI     Hearing loss     History of total hip replacement, right 2019    Dr Payton Luque    Lower leg edema     chronic, R>L    Lumbar stenosis     Macular degeneration     left eye    PAF (paroxysmal atrial fibrillation) ([address]     Pain of left scapula 3/25/2019    PVD (peripheral vascular disease) ([address] 10/28/2006    Dr Mac Ware, stents LE    Stress incontinence 10/28/2011       MEDICATIONS AND ALLERGIES:  Reviewed on chart in Epic. REVIEW OF SYSTEMS:  Resident denies any headache, dizziness, blurred vision, chest pain, shortness of breath, abdominal pain, nausea, vomiting, or changes in her bowel or bladder habits. She reports she is eating well, sleeping well, and has no pain. PHYSICAL EXAMINATION:  Most recent vital signs, blood pressure 118/70, heart rate 70, respirations 18, temp 97.7, pulse oxing 96% on room air and weight 139.   CONSTITUTIONAL:  Resident is hard of hearing, elderly female, in no apparent distress, pleasant and cooperative with exam.  INTEGUMENT:  Pink,

## 2020-08-10 ENCOUNTER — OFFICE VISIT (OUTPATIENT)
Dept: GERIATRIC MEDICINE | Age: 85
End: 2020-08-10
Payer: MEDICARE

## 2020-08-10 PROCEDURE — 1123F ACP DISCUSS/DSCN MKR DOCD: CPT | Performed by: NURSE PRACTITIONER

## 2020-08-10 PROCEDURE — 99308 SBSQ NF CARE LOW MDM 20: CPT | Performed by: NURSE PRACTITIONER

## 2020-08-11 NOTE — PROGRESS NOTES
[facility]  Chief Complaint   Patient presents with    Gastroesophageal Reflux       SUBJECTIVE:  The patient is being seen today for acute visit for GERD. FAMILY AND SOCIAL HISTORY:  Refer to H&P. Past Medical History:   Diagnosis Date    Acute cystitis with hematuria 8/17/2019    Bowel obstruction ([address] 9/22/2019    Chronic hyponatremia     Compression fracture of T12 vertebra [facility]) 2013    kyphoplasty Dr Keanu An    DJD (degenerative joint disease) of hip 3/20/2014    Dr Kaykay Smiley DM (diabetes mellitus), type 2 with peripheral vascular complications (HCC)     diet controlled    Gallbladder sludge 2019    GERD (gastroesophageal reflux disease) 8/31/10    Glaucoma     bilateral    H/O vascular surgery 5/9/2019    Stens DONYA 2006    Hearing loss     History of total hip replacement, right 05/09/2019    Dr Ace Gomez    Lower leg edema     chronic, R>L    Lumbar stenosis     Macular degeneration     left eye    PA[facility] ([facility]ox[facility]mal [facility]) [facility] 2019    Pain of left scapula 3/25/2019    PVD (peripheral vascular disease) ([facility] 10/28/2006    Dr Pereira Flatness, stents LE    Stress incontinence 10/28/2011       MEDICATIONS AND ALLERGIES:  Reviewed on chart in Epic. REVIEW OF SYSTEMS:  Resident denies any headache, dizziness, blurred vision, chest pain, shortness of breath, abdominal pain, nausea, vomiting or changes in her bowel or bladder habits. She does report some acid reflux. She states her medication is effective with that. Otherwise no concerns. PHYSICAL EXAMINATION:  Most recent vital signs:  /70, heart rate 72, respirations 18, pulse oxing 96% on room air. Weight 139. CONSTITUTIONAL:  Resident is alert, elderly, frail female, forgetful at her baseline. No apparent distress. INTETUMENT:  Pink, dry. HEENT:  Normocephalic, atraumatic. Hard of hearing. Conjunctivae pink. Sclerae nonicteric. Mucous membranes pink, moist.  NECK:  With no masses noted. CARDIOVASCULAR:  Heart rate irregular per nursing staff. RESPIRATORY:  Lungs are clear throughout all fields per nursing staff. Respirations even and nonlabored. No distress noted. ABDOMEN:  Flat. PV:  She does have trace of nonpitting edema to her bilateral lower extremities. She is wearing GERSON hose. ASSESSMENT AND PLAN:  GERD. Resident is tolerating her diet as ordered. We will continue her omeprazole as ordered. She reports it is effective in meeting her needs. No further changes will be made at this time. Return in about 1 week (around 7/23/2020), or if symptoms worsen or fail to improve. Pursuant to the emergency declaration under the [facility], [facility] waiver authority and the [employer] Act, this Virtual Visit was conducted, with patient's consent, to reduce the patient's risk of exposure to COVID-19 and provide continuity of care for an established patient. Services were provided through a video synchronous discussion virtually to substitute for in-person clinic visit they will be billed for this visit and they agree to continue with assistance of NF staff and via VIRTUAL platform     Patient identification was verified at the start of the visit : YES    Total time spent on this encounter: Not billed by time. Electronically Signed By: Pat Verduzco CNP on 08/11/2020 10:10:55  ______________________________  Pat Verduzco CNP  ED/[id number]  D: 08/04/2020 18:38:17  T: 08/04/2020 20:58:18    cc: [address]

## 2020-09-04 PROBLEM — K21.9 GASTROESOPHAGEAL REFLUX DISEASE: Status: ACTIVE | Noted: 2019-04-15

## 2020-09-04 NOTE — PROGRESS NOTES
[address]  Chief Complaint   Patient presents with    Gastroesophageal Reflux    Other     PVD    Dementia       SUBJECTIVE:  The patient is being seen today for monthly visit for GERD, PVD, and dementia. Subjectively, the resident offers no concerns, states she is doing well. The nursing staff reports the resident remains at her baseline. FAMILY AND SOCIAL HISTORY:  Refer to H and P. Past Medical History:   Diagnosis Date    Acute cystitis with hematuria 8/17/2019    Bowel obstruction ([address] 9/22/2019    Chronic hyponatremia     Compression fracture of T12 vertebra [facility]) 2013    kyphoplasty Dr Anjali Diaz    DJD (degenerative joint disease) of hip 3/20/2014    Dr Sarah Watt DM (diabetes mellitus), type 2 with peripheral vascular complications (HCC)     diet controlled    Gallbladder sludge 2019    GERD (gastroesophageal reflux disease) 8/31/10    Glaucoma     bilateral    H/O vascular surgery 5/9/2019    Stenjune NAQVI 2006    Hearing loss     History of total hip replacement, right 05/09/2019    Dr Fely Hunter    Lower leg edema     chronic, R>L    Lumbar stenosis     Macular degeneration     left eye    PAF (paroxysmal atrial fibrillation) ([address] 2019    Pain of left scapula 3/25/2019    PVD (peripheral vascular disease) ([address] 10/28/2006    Dr Jose Alfredo Aldridge, stents LE    Stress incontinence 10/28/2011       MEDICATIONS AND ALLERGIES:  Reviewed on chart at nursing facility. REVIEW OF SYSTEMS:  The resident denies any headache, dizziness, blurred vision, chest pain, shortness of breath, abdominal pain, nausea, vomiting, nor changes in her bowel and bladder habits. She reports she is sleeping well, eating well, and has no pain. PHYSICAL EXAMINATION:  Most recent vital signs, /78, heart rate 68, respirations 18, pulse oxing 98% on room air, weight 138.   Constitutional:  The resident is an alert, elderly female, in no apparent distress; pleasant and cooperative with exam. HEENT:  Normocephalic, atraumatic. Hard of hearing. Conjunctivae pink, sclerae nonicteric. Mucous membranes pink, moist.  No oropharyngeal exudate. Neck:  Supple. No cervical or clavicular lymphadenopathy. Cardiovascular:  Regular rate and rhythm. No gallops, gallops, or rubs. Respiratory:  Lung sounds clear through all fields. Respirations even, nonlabored. Abdomen:  Soft, nontender, distended; normoactive bowel sounds. PV:  She does have a trace of nonpitting edema to her right lower extremity. ASSESSMENT AND PLAN:  1. GERD. She has been tolerating the prescribed diet as ordered. We will continue her omeprazole. 2.   PVD. The resident has no complaints of pain. She does have acetaminophen p.r.n. should she require. 3.   Dementia. The resident's mood is stable. She has not had any decrease in her cognition or increase in behaviors. I have reviewed this resident's medication and treatment plan, as well as most recent lab work. No further changes will be made at this time. Return in about 1 month (around 9/10/2020), or if symptoms worsen or fail to improve, for chronic conditions. ______________________________  Tomasa Dunn, [address] [facility]/[id number]  D: 08/31/2020 09:14:48  T: 08/31/2020 17:51:28    cc: [facility]        Electronically Signed By: Laney Vides. Jodie Dunn CNP on 09/04/2020 16:01:06  ______________________________  Laney Vides.  Jodie Dunn CNP  /[id number]  D: 08/31/2020 09:14:48  T: 08/31/2020 17:51:28    cc: - [address] [place] [address]

## 2020-09-13 ENCOUNTER — OFFICE VISIT (OUTPATIENT)
Dept: GERIATRIC MEDICINE | Age: 85
End: 2020-09-13
Payer: MEDICARE

## 2020-09-13 PROCEDURE — 99308 SBSQ NF CARE LOW MDM 20: CPT | Performed by: INTERNAL MEDICINE

## 2020-09-13 PROCEDURE — 1123F ACP DISCUSS/DSCN MKR DOCD: CPT | Performed by: INTERNAL MEDICINE

## 2020-09-30 NOTE — PROGRESS NOTES
Patient Name: Cortes Cavazos  YOB: 1918  Medical Record Number: [id number]        History of Present Illness:  Patient seen today for her routine monthly visit for her degenerative disease weakness hypertension A. fib patient is his caretaker is a dementia she is 8 years old and is at the 88th extremes of human aging. Patient is smiling today pleasant globally weak. Patient has not had new emesis fevers or chills remains on COVID-19 precautions.         Review of Systems   Unable to perform ROS: Dementia       Review of Systems: All 14 review of systems negative other than as stated above    Social History     Tobacco Use    Smoking status: Former Smoker     Types: Cigarettes     Last attempt to quit: 1980     Years since quittin.3    Smokeless tobacco: Never Used   Substance Use Topics    Alcohol use: No     Alcohol/week: 0.0 standard drinks    Drug use: No         Past Medical History:   Diagnosis Date    Acute cystitis with hematuria 2019    Bowel obstruction (HCC) 2019    Chronic hyponatremia     Compression fracture [facility] T12 vertebra ([facility])     kyphoplasty Dr Sigifredo Alvarez DJD (degenerative joint disease) of hip 3/20/2014    Dr Ana Siegel DM (diabetes mellitus), type 2 with peripheral vascular complications ([facility])     diet controlled    Gallbladder sludge     GERD (gastroesophageal reflux disease) 8/31/10    Glaucoma     bilateral    H/O vascular surgery 2019    Josie NAQVI     Hearing loss     History of total hip replacement, right 2019    Dr Joon Selby    Lower leg edema     chronic, R>L    Lumbar stenosis     Macular degeneration     left eye    PAF (paroxysmal atrial fibrillation) ([facility] 2019    Pain of left scapula 3/25/2019    PVD (peripheral vascular disease) ([address] 10/28/2006    Dr Kate Vasquez, stents LE    Stress incontinence 10/28/2011           Past Surgical History:   Procedure Laterality Date    APPENDECTOMY      CYST REMOVAL 06/27/2016    DR SLOAN  RT THUMB MUCOUS CYST    HYSTERECTOMY      REFRACTIVE SURGERY  062001    left     TOTAL HIP ARTHROPLASTY Right 9/25/15    DR. NARANJO    UPPER GASTROINTESTINAL ENDOSCOPY  10/07/15    DR Manohar Garcia    VERTEBROPLASTY  2013    T12, Dr Frieda Nicole         Current Outpatient Medications on File Prior to Visit   Medication Sig Dispense Refill    omeprazole (PRILOSEC) 20 MG delayed release capsule TAKE 1 CAPSULE BY MOUTH EVERY DAY 90 capsule 1    Misc. Devices (COMMODE BEDSIDE) MISC Misc. Devices (COMMODE BEDSIDE) MISC Misc. Devices (COMMODE BEDSIDE) MISC Indications: Weakness , Spinal stenosis of lumbar region, unspecified whether neurogenic claudication present , Impaired mobility and activities of daily living , Abnormality of gait and mobility , Late onset Alzheimer's disease without behavioral disturbance (HCC) , Age-related physical debility , Primary osteoarthritis involving multiple joints As directed 1 each 0 11/22/2019 Active 11-  [facility] ([address] [place] [address])     [address] [place] [address] Blvd by NOT APPLICABLE route      sennosides-docusate sodium (SENOKOT-S) 8.6-50 MG tablet Take 1 tablet by mouth daily 20 tablet 0    docusate sodium (COLACE) 100 MG capsule Take 1 capsule by mouth 2 times daily 60 capsule 2    Calcium Carbonate Antacid (TUMS CHEWY DELIGHTS PO) Take 1 tablet by mouth daily       vitamin D (CHOLECALCIFEROL) 1000 UNIT TABS tablet Take 1,000 Units by mouth daily      brimonidine (ALPHAGAN) 0.2 % ophthalmic solution Place 1 drop into both eyes 2 times daily   11    timolol (TIMOPTIC-XE) 0.5 % ophthalmic gel-forming USE 1 DROP IN BOTH EYES TWICE DAILY. 11    acetaminophen (TYLENOL) 325 MG tablet Take 1 tablet by mouth 3 times daily as needed for Pain (DO NOT EXCEED 4GM IN 24 HOURS) 90 tablet 0    Multiple Vitamin (MULTI VITAMIN DAILY) TABS Take 1 tablet by mouth every morning       No current facility-administered medications on file prior to visit.         No Known Allergies      Family History   Problem Relation Age of Onset    Heart Failure Mother         dec age 77    Hypertension Mother     Diabetes Father         dec age 67         Physical Exam: Pulse was 64 respirations are 14 temperature is 98.1     Physical Exam   Constitutional: She appears well-developed and well-nourished. No distress. HENT:   Head: Normocephalic and atraumatic. Eyes: EOM are normal. No scleral icterus. Neck: Neck supple. No thyromegaly present. Cardiovascular: Normal rate and regular rhythm. Pulmonary/Chest: Effort normal and breath sounds normal. She has no wheezes. Abdominal: Soft. Bowel sounds are normal. She exhibits no distension. Musculoskeletal:         General: No edema. Neurological: She is alert. Skin: Skin is warm and dry. Psychiatric: She has a normal mood and affect. Nursing note and vitals reviewed. not currently breastfeeding.       .   Lab Results   Component Value Date    WBC 8.4 03/02/2020    HGB 11.2 (A) 03/02/2020    HCT 34.1 (A) 03/02/2020    MCV 94.8 03/02/2020     03/02/2020     Lab Results   Component Value Date     03/02/2020    K 4.4 02/23/2020     03/02/2020    CO2 23 03/02/2020    BUN 16 03/02/2020    CREATININE 0.5 03/02/2020    GLUCOSE 95 03/02/2020    GLUCOSE 156 05/09/2012    CALCIUM 9.6 03/02/2020                ASSESSMENT:  Patient Active Problem List   Diagnosis    Essential hypertension    Stress incontinence    Hearing loss    Spinal stenosis of lumbar region    Impaired mobility and activities of daily living    Acute leg pain, left    Acute right hip pain    PVD (peripheral vascular disease) ([address]    Nuclear senile cataract    Nonexudative age-related macular degeneration    Primary open angle glaucoma    DM (diabetes mellitus), type 2 with peripheral vascular complications ([address]    Lumbar stenosis    Gastroesophageal reflux disease    Hyponatremia    Ambulatory dysfunction    Glaucoma    Osteoarthritis    S/P kyphoplasty    Late onset Alzheimer's disease without behavioral disturbance (HCC)    Abnormality of gait and mobility due to severe spinal stenosis.  Leg edema, right    Age-related osteoporosis without current pathological fracture    Other constipation    PAF (paroxysmal atrial fibrillation) (HCC)    Age-related physical debility    Post-nasal drip    Generalized weakness    SBO (small bowel obstruction) ([address]    Fall at home, initial encounter    Abnormal lung sounds         PLAN:   Diagnosis Orders   1. Late onset Alzheimer's disease without behavioral disturbance ([address]     2. PAF (paroxysmal atrial fibrillation) ([address]     3. Essential hypertension       Continue supportive care continue with monitoring. Patient has been clinically stable. evidence of acute psychosis. Patient is likely appropriate for conservative management given the extremes of age.

## 2020-10-27 ENCOUNTER — VIRTUAL VISIT (OUTPATIENT)
Dept: GERIATRIC MEDICINE | Age: 85
End: 2020-10-27
Payer: MEDICARE

## 2020-10-27 PROCEDURE — 1123F ACP DISCUSS/DSCN MKR DOCD: CPT | Performed by: NURSE PRACTITIONER

## 2020-11-20 NOTE — PROGRESS NOTES
[address]  Chief Complaint   Patient presents with    1 Month Follow-Up       The patient is being seen today for monthly visit for lumbar stenosis, GERD, and osteoporosis. SUBJECTIVE:  Resident offers no concerns. States she is doing well. Nursing staff report resident remains at her baseline. FAMILY AND SOCIAL HISTORY:  Refer to H&P. Past Medical History:   Diagnosis Date    Acute cystitis with hematuria 8/17/2019    Bowel obstruction ([address] 9/22/2019    Chronic hyponatremia     Compression fracture of T12 vertebra [facility]) 2013    kyphoplasty Dr Geovanny Stoddard    DJD (degenerative joint disease) of hip 3/20/2014    Dr Timothy Barros DM (diabetes mellitus), type 2 with peripheral vascular complications (HCC)     diet controlled    Gallbladder sludge 2019    GERD (gastroesophageal reflux disease) 8/31/10    Glaucoma     bilateral    H/O vascular surgery 5/9/2019    Josie NAQVI 2006    Hearing loss     History of total hip replacement, right 05/09/2019    Dr Rachele Brewer    Lower leg edema     chronic, R>L    Lumbar stenosis     Macular degeneration     left eye    PA[facility] ([facility]oxysmal [facility]) ([facility] 2019    Pain of left scapula 3/25/2019    PVD (peripheral vascular disease) ([facility] 10/28/2006    Dr Darien Bourne, stents LE    Stress incontinence 10/28/2011       MEDICATIONS AND ALLERGIES:  Reviewed on chart in Epic. REVIEW OF SYSTEMS:  Resident denies any headache, dizziness, blurred vision, chest pain, shortness of breath, abdominal pain, nausea, vomiting, or changes in her bowel or bladder habits. She reports she is eating well, sleeping well. PHYSICAL EXAMINATION:  Most recent vital signs:  /74, heart rate 88, temp 98.7, respirations 18, pulse oxing 93% room air. Weight 136. Constitutional:  Resident is alert, elderly, frail female, in no apparent distress, pleasant and cooperative with exam.  Integument:  Pink, warm, dry. HEENT:  Normocephalic, atraumatic.   Very hard of hearing. Conjunctivae pink. Sclerae nonicteric. Mucous membranes pink, moist.  No oropharyngeal exudate. Neck:  No masses noted. Cardiovascular:  Regular rate per nursing staff. Respiratory:  Lung sounds clear through all fields per nursing staff. Respirations even, nonlabored. No distress noted. Abdomen:  Flat. PV: She does have nonpitting edema to her right lower extremity that is chronic. She is wearing GERSON hose. ASSESSMENT AND PLAN:  1. Lumbar stenosis:  Resident is currently not having any back pain. She does have acetaminophen p.r.n. should she require it. 2.   GERD:  Resident is tolerating her diet as prescribed. She is taking omeprazole, which is effective for her. 3.   Osteoporosis:  Resident is not having any joint discomfort or pain. She is on vitamin D and Tums. I have reviewed this resident's medication and treatment plan as well as most recent lab work. No further changes will be made at this time. Return in about 1 month (around 11/27/2020) for chronic conditions. Electronically Signed By: Monroe Olmedo CNP on 11/20/2020 10:56:26  ______________________________  Monroe Olmedo CNP  [id number]  D: 11/19/2020 13:48:01  T: 11/19/2020 15:20:16    cc: [address] [place]

## 2020-11-24 PROBLEM — M81.0 OSTEOPOROSIS WITHOUT CURRENT PATHOLOGICAL FRACTURE: Status: ACTIVE | Noted: 2020-11-24

## 2020-12-22 ENCOUNTER — VIRTUAL VISIT (OUTPATIENT)
Dept: GERIATRIC MEDICINE | Age: 85
End: 2020-12-22
Payer: MEDICARE

## 2020-12-22 DIAGNOSIS — I73.9 PVD (PERIPHERAL VASCULAR DISEASE) (HCC): ICD-10-CM

## 2020-12-22 DIAGNOSIS — G30.1 LATE ONSET ALZHEIMER'S DISEASE WITHOUT BEHAVIORAL DISTURBANCE (HCC): Primary | Chronic | ICD-10-CM

## 2020-12-22 DIAGNOSIS — I48.0 PAF (PAROXYSMAL ATRIAL FIBRILLATION) (HCC): Chronic | ICD-10-CM

## 2020-12-22 DIAGNOSIS — F02.80 LATE ONSET ALZHEIMER'S DISEASE WITHOUT BEHAVIORAL DISTURBANCE (HCC): Primary | Chronic | ICD-10-CM

## 2020-12-22 PROCEDURE — 1123F ACP DISCUSS/DSCN MKR DOCD: CPT | Performed by: NURSE PRACTITIONER

## 2020-12-22 PROCEDURE — 99308 SBSQ NF CARE LOW MDM 20: CPT | Performed by: NURSE PRACTITIONER

## 2021-01-04 ENCOUNTER — OFFICE VISIT (OUTPATIENT)
Dept: GERIATRIC MEDICINE | Age: 86
End: 2021-01-04
Payer: MEDICARE

## 2021-01-04 DIAGNOSIS — E11.51 DM (DIABETES MELLITUS), TYPE 2 WITH PERIPHERAL VASCULAR COMPLICATIONS (HCC): Primary | ICD-10-CM

## 2021-01-04 DIAGNOSIS — F02.80 LATE ONSET ALZHEIMER'S DISEASE WITHOUT BEHAVIORAL DISTURBANCE (HCC): Chronic | ICD-10-CM

## 2021-01-04 DIAGNOSIS — G30.1 LATE ONSET ALZHEIMER'S DISEASE WITHOUT BEHAVIORAL DISTURBANCE (HCC): Chronic | ICD-10-CM

## 2021-01-04 DIAGNOSIS — I73.9 PVD (PERIPHERAL VASCULAR DISEASE) (HCC): ICD-10-CM

## 2021-01-04 PROCEDURE — 1123F ACP DISCUSS/DSCN MKR DOCD: CPT | Performed by: INTERNAL MEDICINE

## 2021-01-04 PROCEDURE — 99309 SBSQ NF CARE MODERATE MDM 30: CPT | Performed by: INTERNAL MEDICINE

## 2021-01-04 PROCEDURE — G8484 FLU IMMUNIZE NO ADMIN: HCPCS | Performed by: INTERNAL MEDICINE

## 2021-01-22 NOTE — PROGRESS NOTES
Juani 36  Chief Complaint   Patient presents with    1 Month Follow-Up       The patient is being seen today for monthly visit for Alzheimer dementia without behaviors, PAF, and PVD. SUBJECTIVE:  Resident reports she is feeling great. She reports she is eating good, offers no concerns. Nursing staff report resident remains at her baseline. FAMILY AND SOCIAL HISTORY:  Refer to H&P. Past Medical History:   Diagnosis Date    Acute cystitis with hematuria 8/17/2019    Bowel obstruction ([address] 9/22/2019    Chronic hyponatremia     Compression fracture of T12 vertebra [facility]) 2013    kyphoplasty Dr Wilfredo Burrows    DJD (degenerative joint disease) of hip 3/20/2014    Dr Hadley Stovall DM (diabetes mellitus), type 2 with peripheral vascular complications (HCC)     diet controlled    Gallbladder sludge 2019    GERD (gastroesophageal reflux disease) 8/31/10    Glaucoma     bilateral    H/O vascular surgery 5/9/2019    Josie NAQVI 2006    Hearing loss     History of total hip replacement, right 05/09/2019    Dr Luisana Dior    Lower leg edema     chronic, R>L    Lumbar stenosis     Macular degeneration     left eye    PAF (paroxysmal atrial fibrillation) ([address] 2019    Pain of left scapula 3/25/2019    PVD (peripheral vascular disease) ([address] 10/28/2006    Dr Todd Porras, stents LE    Stress incontinence 10/28/2011       MEDICATIONS AND ALLERGIES:  Reviewed on chart in Epic. REVIEW OF SYSTEMS:  Resident denies any headache, dizziness, blurred vision, chest pain, shortness of breath, abdominal pain, nausea, vomiting, or changes in her bowel or bladder habits. She reports she is eating good and feels great. PHYSICAL EXAMINATION:  Most recent vital signs:  /88, heart rate 70, respirations 16, pulse oxing 98% on room air. Weight 135. Constitutional:  Resident is alert, elderly female, frail in appearance. No apparent distress. Integument:  Pink, dry. HEENT:  Normocephalic, atraumatic. Hard of hearing. Conjunctivae pink. Sclerae nonicteric. Mucous membranes pink, moist.  Neck with no visible masses. Cardiovascular:  Heart rate is regular per nursing staff. Respiratory:  Lung sounds clear through all fields per nursing staff. Respirations even, nonlabored. She is pulse oxing 98% on room air. No use of accessory muscles with her breathing. Abdomen:  Flat. PV:  1+ pitting edema to her bilateral lower extremities that is chronic. ASSESSMENT AND PLAN:  1. Alzheimer dementia without behaviors:  Resident's mood has been stable. She has not had any further decrease in her cognition or increase in her behaviors. 2.   Paroxysmal AFib:  Resident is currently in regular rhythm. She has not had any chest pain or palpitations. 3.   PVD:  Resident is not currently complaining of any leg pain. I have reviewed this resident's medication and treatment plan as well as most recent lab work. No further changes will be made at this time. Return in about 1 month (around 1/22/2021) for chronic conditions. Pursuant to the emergency declaration under the [facility], [facility] waiver authority and the [employer] Act, this Virtual Visit was conducted, with patient's consent, to reduce the patient's risk of exposure to COVID-19 and provide continuity of care for an established patient.      Services were provided through a video synchronous discussion virtually to substitute for in-person clinic visit they will be billed for this visit and they agree to continue with assistance of  staff and via VIRTUAL platform Patient identification was verified at the start of the visit : YES    Total time spent on this encounter: Not billed by time. ________________________    Radha Brooke Banner, [address] [place] [address]  Office [phone]  Fax [phone]  Cc.  [facility]

## 2021-02-03 ASSESSMENT — ENCOUNTER SYMPTOMS
CONSTIPATION: 1
SHORTNESS OF BREATH: 0
ABDOMINAL PAIN: 0
BACK PAIN: 1

## 2021-02-04 NOTE — PROGRESS NOTES
Patient Name: Sharene Meckel  YOB: 1918  Medical Record Number: [id number]      History of Present Illness:  Patient seated up in her room today pleasant interactive. She is not had new evidence of acute shortness of breath. No recent chest palpitation. Patient is globally weak has been acutely dependent nursing staff for care. No recent falls weight reviewed with nursing staff patient has not had new emesis fevers or chills no bleeding diathesis. Review of Systems   Constitutional: Negative for fatigue and fever. HENT: Negative for congestion. Respiratory: Negative for shortness of breath. Cardiovascular: Negative for chest pain. Gastrointestinal: Positive for constipation. Negative for abdominal pain. Genitourinary: Negative for dysuria. Musculoskeletal: Positive for back pain. Skin: Negative for rash. Neurological: Negative for weakness. Psychiatric/Behavioral: Negative for behavioral problems. All other systems reviewed and are negative.       Review of Systems: All 14 review of systems negative other than as stated above    Social History     Tobacco Use    Smoking status: Former Smoker     Types: Cigarettes     Quit date: 1980     Years since quittin.7    Smokeless tobacco: Never Used   Substance Use Topics    Alcohol use: No     Alcohol/week: 0.0 standard drinks    Drug use: No         Past Medical History:   Diagnosis Date    Acute cystitis with hematuria 2019    Bowel obstruction (HCC) 2019    Chronic hyponatremia     Compression fracture of T12 vertebra [facility]) 2013    kyphoplasty Dr Kishore MACHUCAD (degenerative joint disease) of hip 3/20/2014    Dr Felipe Dowell DM (diabetes mellitus), type 2 with peripheral vascular complications ([facility]     diet controlled    Gallbladder sludge     GERD (gastroesophageal reflux disease) 8/31/10    Glaucoma     bilateral    H/O vascular surgery 2019    Josie NAVQI 2006    Hearing loss  History of total hip replacement, right 05/09/2019    Dr Brigitte Head    Lower leg edema     chronic, R>L    Lumbar stenosis     Macular degeneration     left eye    PAF (paroxysmal atrial fibrillation) ([facility] 2019    Pain of left scapula 3/25/2019    PVD (peripheral vascular disease) ([facility] 10/28/2006    Dr Nenita Krishna, stents LE    Stress incontinence 10/28/2011           Past Surgical History:   Procedure Laterality Date    APPENDECTOMY      CYST REMOVAL  06/27/2016    DR SLOAN  RT THUMB MUCOUS CYST    HYSTERECTOMY      REFRACTIVE SURGERY  [id number]    left     TOTAL HIP ARTHROPLASTY Right 9/25/15    DR. NARANJO    UPPER GASTROINTESTINAL ENDOSCOPY  10/07/15    DR Pierre Spears    VERTEBROPLASTY  2013    T12, Dr Sharifa Hunt         Current Outpatient Medications on File Prior to Visit   Medication Sig Dispense Refill    omeprazole (PRILOSEC) 20 MG delayed release capsule TAKE 1 CAPSULE BY MOUTH EVERY DAY 90 capsule 1    Misc. Devices (COMMODE BEDSIDE) MISC Misc. Devices (COMMODE BEDSIDE) MISC Misc.  Devices (COMMODE BEDSIDE) MISC Indications: Weakness , Spinal stenosis of lumbar region, unspecified whether neurogenic claudication present , Impaired mobility and activities of daily living , Abnormality of gait and mobility , Late onset Alzheimer's disease without behavioral disturbance (HCC) , Age-related physical debility , Primary osteoarthritis involving multiple joints As directed 1 each 0 11/22/2019 Active 11-  [facility]) ([address] [place] [address])     [address] [place] [address] Blvd by NOT APPLICABLE route      sennosides-docusate sodium (SENOKOT-S) 8.6-50 MG tablet Take 1 tablet by mouth daily 20 tablet 0    docusate sodium (COLACE) 100 MG capsule Take 1 capsule by mouth 2 times daily 60 capsule 2    Calcium Carbonate Antacid (TUMS CHEWY DELIGHTS PO) Take 1 tablet by mouth daily       vitamin D (CHOLECALCIFEROL) 1000 UNIT TABS tablet Take 1,000 Units by mouth daily  Nonexudative age-related macular degeneration    Primary open angle glaucoma    DM (diabetes mellitus), type 2 with peripheral vascular complications (HCC)    Lumbar stenosis    Gastroesophageal reflux disease    Hyponatremia    Ambulatory dysfunction    Glaucoma    Osteoarthritis    S/P kyphoplasty    Late onset Alzheimer's disease without behavioral disturbance ([facility])    Abnormality of gait and mobility due to severe spinal stenosis.  Leg edema, right    Age-related osteoporosis without current pathological fracture    Other constipation    PAF (paroxysmal atrial fibrillation) ([facility])    Age-related physical debility    Post-nasal drip    Generalized weakness    SBO (small bowel obstruction) ([address]    Fall at home, initial encounter    Abnormal lung sounds    Osteoporosis without current pathological fracture         PLAN:   Diagnosis Orders   1. DM (diabetes mellitus), type 2 with peripheral vascular complications ([address]     2. PVD (peripheral vascular disease) ([address]     3. Late onset Alzheimer's disease without behavioral disturbance ([address]       We will continue to monitor blood sugars repeat A1c in the next 6 months. Have reviewed diet have reviewed nutritional intake. No evidence of new endorgan disease. Monitoring for new evidence of skin breakdown. No evidence of acute psychosis. Patient is cognitively at her baseline without evidence of acute decline. We will continue to follow closely.

## 2021-02-21 ENCOUNTER — OFFICE VISIT (OUTPATIENT)
Dept: GERIATRIC MEDICINE | Age: 86
End: 2021-02-21
Payer: MEDICARE

## 2021-02-21 DIAGNOSIS — M54.6 ACUTE THORACIC BACK PAIN, UNSPECIFIED BACK PAIN LATERALITY: ICD-10-CM

## 2021-02-21 DIAGNOSIS — I48.0 PAF (PAROXYSMAL ATRIAL FIBRILLATION) (HCC): Primary | Chronic | ICD-10-CM

## 2021-02-21 DIAGNOSIS — K21.9 GASTROESOPHAGEAL REFLUX DISEASE, UNSPECIFIED WHETHER ESOPHAGITIS PRESENT: ICD-10-CM

## 2021-02-21 PROCEDURE — 1123F ACP DISCUSS/DSCN MKR DOCD: CPT | Performed by: NURSE PRACTITIONER

## 2021-02-21 PROCEDURE — 99309 SBSQ NF CARE MODERATE MDM 30: CPT | Performed by: NURSE PRACTITIONER

## 2021-02-21 PROCEDURE — G8484 FLU IMMUNIZE NO ADMIN: HCPCS | Performed by: NURSE PRACTITIONER

## 2021-02-28 NOTE — PROGRESS NOTES
PHYSICAL EXAMINATION:  Most recent vital signs:  /62, temp 97.0, heart rate 58, respirations 18, pulse oxing 94% on room air, weight 138. CONSTITUTIONAL:  Resident is alert, elderly female, in no apparent distress, pleasant and cooperative with exam.  INTEGUMENT:  Pink, warm, dry. HEENT:  Normocephalic, atraumatic. She is very hard of hearing. Conjunctivae pink. Sclerae nonicteric. Mucous membranes pink, moist. No oropharyngeal exudates. NECK:  Supple. No cervical or clavicular lymphadenopathy. CARDIOVASCULAR:  Regular rate and rhythm. No murmurs, gallops, or rubs noted. RESPIRATORY:  Lung sounds are clear throughout all fields. Respirations even, nonlabored. ABDOMEN:  Soft, nontender, nondistended. Normoactive bowel sounds. PV:  Peripheral pulses present. She does have nonpitting edema to her bilateral lower extremities. She is wearing GERSON hose. ASSESSMENT AND PLAN:  1. Paroxysmal AFib. Resident is currently in regular rhythm. She is not on any anticoagulant therapy. We will continue to monitor her closely. 2.   Back pain. I will add Lidoderm patch to her midthoracic spine on for 12 hours, off for 12 hours daily. 3.   GERD. Resident is tolerating her diet. We will continue her omeprazole as ordered. I have reviewed this resident's medication and treatment plan, as well as, most recent lab work. No further changes will be made at this time. Return in about 1 month (around 3/21/2021), or if symptoms worsen or fail to improve, for chronic conditions. Electronically Signed By: David Huitron. Macarena Saleem CNP on 02/28/2021 09:17:29  ______________________________  David Saleem CNP  [id number]  D: 02/27/2021 23:50:48  T: 02/28/2021 00:18:34    cc: [address]

## 2021-03-19 ENCOUNTER — OFFICE VISIT (OUTPATIENT)
Dept: GERIATRIC MEDICINE | Age: 86
End: 2021-03-19
Payer: MEDICARE

## 2021-03-19 DIAGNOSIS — I10 ESSENTIAL HYPERTENSION: ICD-10-CM

## 2021-03-19 DIAGNOSIS — I73.9 PVD (PERIPHERAL VASCULAR DISEASE) (HCC): Primary | ICD-10-CM

## 2021-03-19 DIAGNOSIS — E11.51 DM (DIABETES MELLITUS), TYPE 2 WITH PERIPHERAL VASCULAR COMPLICATIONS (HCC): ICD-10-CM

## 2021-03-19 PROCEDURE — G8484 FLU IMMUNIZE NO ADMIN: HCPCS | Performed by: INTERNAL MEDICINE

## 2021-03-19 PROCEDURE — 99309 SBSQ NF CARE MODERATE MDM 30: CPT | Performed by: INTERNAL MEDICINE

## 2021-03-19 PROCEDURE — 1123F ACP DISCUSS/DSCN MKR DOCD: CPT | Performed by: INTERNAL MEDICINE

## 2021-03-31 NOTE — PROGRESS NOTES
Patient Name: Shaheen Singletoner  Date: 3/31/2021  YOB: 1918  Medical Record Number: [id number]    History of Present Illness: This 8-year-old woman was seen in her room today. Patient has been doing well frail at her baseline is at the extremes of age. Patient has not had new emesis fevers or chills. No recent pain crisis. Patient remains increasingly had by nursing staff for care tries benefit from close supervision and redirection. Attempted nonpharmacological interventions as able. No evidence of new upper respiratory infections.     Review of Systems   Unable to perform ROS: Dementia       Review of Systems: All 14 review of systems negative other than as stated above    Social History     Tobacco Use    Smoking status: Former Smoker     Types: Cigarettes     Quit date: 1980     Years since quittin.8    Smokeless tobacco: Never Used   Substance Use Topics    Alcohol use: No     Alcohol/week: 0.0 standard drinks    Drug use: No         Past Medical History:   Diagnosis Date    Acute cystitis with hematuria 2019    Bowel obstruction (HCC) 2019    Chronic hyponatremia     Compression fracture of T12 vertebra (HCC) 2013    kyphoplasty Dr Diony Jimenez DJD (degenerative joint disease) of hip 3/20/2014    Dr Clover Epstein DM (diabetes mellitus), type 2 with peripheral vascular complications ([facility])     diet controlled    Gallbladder sludge     GERD (gastroesophageal reflux disease) 8/31/10    Glaucoma     bilateral    H/O vascular surgery 2019    Josie NAQVI 2006    Hearing loss     History of total hip replacement, right 2019    Dr Rodrigo Pandya    Lower leg edema     chronic, R>L    Lumbar stenosis     Macular degeneration     left eye    PAF (paroxysmal atrial fibrillation) ([facility] 2019    Pain of left scapula 3/25/2019    PVD (peripheral vascular disease) ([facility] 10/28/2006    Dr Nam Hernandez, stents LE    Stress incontinence 10/28/2011           Past Surgical History:   Procedure Laterality Date    APPENDECTOMY      CYST REMOVAL  06/27/2016    DR SLOAN  RT THUMB MUCOUS CYST    HYSTERECTOMY      REFRACTIVE SURGERY  [id number]    left     TOTAL HIP ARTHROPLASTY Right 9/25/15    DR. NARANJO    UPPER GASTROINTESTINAL ENDOSCOPY  10/07/15    DR Madelaine Edmondson    VERTEBROPLASTY  2013    T12, Dr Walter Hester         Current Outpatient Medications on File Prior to Visit   Medication Sig Dispense Refill    omeprazole (PRILOSEC) 20 MG delayed release capsule TAKE 1 CAPSULE BY MOUTH EVERY DAY 90 capsule 1    Misc. Devices (COMMODE BEDSIDE) MISC Misc. Devices (COMMODE BEDSIDE) MISC Misc. Devices (COMMODE BEDSIDE) MISC Indications: Weakness , Spinal stenosis of lumbar region, unspecified whether neurogenic claudication present , Impaired mobility and activities of daily living , Abnormality of gait and mobility , Late onset Alzheimer's disease without behavioral disturbance (HCC) , Age-related physical debility , Primary osteoarthritis involving multiple joints As directed 1 each 0 11/22/2019 Active 11-  [facility]) ([address] [place] [address])     [address] [place] [address] by NOT APPLICABLE route      sennosides-docusate sodium (SENOKOT-S) 8.6-50 MG tablet Take 1 tablet by mouth daily 20 tablet 0    docusate sodium (COLACE) 100 MG capsule Take 1 capsule by mouth 2 times daily 60 capsule 2    Calcium Carbonate Antacid (TUMS CHEWY DELIGHTS PO) Take 1 tablet by mouth daily       vitamin D (CHOLECALCIFEROL) 1000 UNIT TABS tablet Take 1,000 Units by mouth daily      brimonidine (ALPHAGAN) 0.2 % ophthalmic solution Place 1 drop into both eyes 2 times daily   11    timolol (TIMOPTIC-XE) 0.5 % ophthalmic gel-forming USE 1 DROP IN BOTH EYES TWICE DAILY.   11    acetaminophen (TYLENOL) 325 MG tablet Take 1 tablet by mouth 3 times daily as needed for Pain (DO NOT EXCEED 4GM IN 24 HOURS) 90 tablet 0    Multiple Vitamin (MULTI VITAMIN DAILY) TABS Take 1 tablet by mouth every morning       No current facility-administered medications on file prior to visit. No Known Allergies      Family History   Problem Relation Age of Onset    Heart Failure Mother         dec age 77    Hypertension Mother     Diabetes Father         dec age 67         Physical Exam:      Physical Exam  Vitals and nursing note reviewed. Constitutional:       Appearance: Normal appearance. HENT:      Head: Normocephalic and atraumatic. Nose: Nose normal. No rhinorrhea. Mouth/Throat:      Mouth: Mucous membranes are dry. Cardiovascular:      Rate and Rhythm: Normal rate and regular rhythm. Pulses: Normal pulses. Heart sounds: Normal heart sounds. No friction rub. Pulmonary:      Breath sounds: Normal breath sounds. No rhonchi. Abdominal:      General: Bowel sounds are normal.      Palpations: Abdomen is soft. There is no mass. Musculoskeletal:         General: No swelling. Normal range of motion. Cervical back: Normal range of motion. No tenderness. Skin:     General: Skin is warm and dry. Findings: No erythema. Neurological:      General: No focal deficit present. Mental Status: She is alert. She is disoriented. Psychiatric:         Mood and Affect: Mood normal.         not currently breastfeeding.       .   Lab Results   Component Value Date    WBC 8.4 03/02/2020    HGB 11.2 (A) 03/02/2020    HCT 34.1 (A) 03/02/2020    MCV 94.8 03/02/2020     03/02/2020     Lab Results   Component Value Date     03/02/2020    K 4.4 02/23/2020     03/02/2020    CO2 23 03/02/2020    BUN 16 03/02/2020    CREATININE 0.5 03/02/2020    GLUCOSE 95 03/02/2020    GLUCOSE 156 05/09/2012    CALCIUM 9.6 03/02/2020                ASSESSMENT:  Patient Active Problem List   Diagnosis    Essential hypertension    Stress incontinence    Hearing loss    Spinal stenosis of lumbar region    Acute thoracic back pain    Impaired mobility and activities of daily living    Acute leg pain, left    Acute right hip pain    PVD (peripheral vascular disease) ([facility]    Nuclear senile cataract    Nonexudative age-related macular degeneration    Primary open angle glaucoma    DM (diabetes mellitus), type 2 with peripheral vascular complications (HCC)    Lumbar stenosis    Gastroesophageal reflux disease    Hyponatremia    Ambulatory dysfunction    Glaucoma    Osteoarthritis    S/P kyphoplasty    Late onset Alzheimer's disease without behavioral disturbance ([facility])    Abnormality of gait and mobility due to severe spinal stenosis.  Leg edema, right    Age-related osteoporosis without current pathological fracture    Other constipation    PAF (paroxysmal atrial fibrillation) ([facility])    Age-related physical debility    Post-nasal drip    Generalized weakness    SBO (small bowel obstruction) ([address]    Fall at home, initial encounter    Abnormal lung sounds    Osteoporosis without current pathological fracture         PLAN:   Diagnosis Orders   1. PVD (peripheral vascular disease) ([address]     2. Essential hypertension     3. DM (diabetes mellitus), type 2 with peripheral vascular complications (HCC)       No evidence of new endorgan disease continue supportive care continue redirection. Monitoring blood sugars closely. No evidence of new acute psychosis will follow as needed.

## 2021-04-25 LAB
BILIRUBIN, URINE: NEGATIVE
BLOOD, URINE: NEGATIVE
CLARITY: ABNORMAL
COLOR: ABNORMAL
GLUCOSE URINE: ABNORMAL
KETONES, URINE: NEGATIVE
LEUKOCYTE ESTERASE, URINE: ABNORMAL
NITRITE, URINE: POSITIVE
PH UA: 5 (ref 4.5–8)
PROTEIN UA: NEGATIVE
SPECIFIC GRAVITY, URINE: 1.03
UROBILINOGEN, URINE: NORMAL

## 2021-04-27 ENCOUNTER — VIRTUAL VISIT (OUTPATIENT)
Dept: GERIATRIC MEDICINE | Age: 86
End: 2021-04-27
Payer: MEDICARE

## 2021-04-27 DIAGNOSIS — I48.0 PAF (PAROXYSMAL ATRIAL FIBRILLATION) (HCC): Primary | Chronic | ICD-10-CM

## 2021-04-27 DIAGNOSIS — G30.1 LATE ONSET ALZHEIMER'S DISEASE WITHOUT BEHAVIORAL DISTURBANCE (HCC): Chronic | ICD-10-CM

## 2021-04-27 DIAGNOSIS — F02.80 LATE ONSET ALZHEIMER'S DISEASE WITHOUT BEHAVIORAL DISTURBANCE (HCC): Chronic | ICD-10-CM

## 2021-04-27 DIAGNOSIS — N39.0 URINARY TRACT INFECTION WITHOUT HEMATURIA, SITE UNSPECIFIED: ICD-10-CM

## 2021-04-27 PROCEDURE — 99308 SBSQ NF CARE LOW MDM 20: CPT | Performed by: NURSE PRACTITIONER

## 2021-04-27 PROCEDURE — 1123F ACP DISCUSS/DSCN MKR DOCD: CPT | Performed by: NURSE PRACTITIONER

## 2021-05-22 NOTE — PROGRESS NOTES
[address] [place] [address] NURSING  Chief Complaint   Patient presents with    1 Month Follow-Up       Patient is being seen today for monthly visit for UTI, Alzheimer's dementia, paroxysmal AFib. Nursing staff reports resident is doing better. She became combative with care yesterday and did hurt her finger. Otherwise, no concerns. FAMILY/SOCIAL HISTORY:  Refer to H and P. Past Medical History:   Diagnosis Date    Acute cystitis with hematuria 8/17/2019    Bowel obstruction ([address] 9/22/2019    Chronic hyponatremia     Compression fracture of T12 vertebra [facility]) 2013    kyphoplasty Dr Kathye Epley    DJD (degenerative joint disease) of hip 3/20/2014    Dr Catherine Sorensen DM (diabetes mellitus), type 2 with peripheral vascular complications (HCC)     diet controlled    Gallbladder sludge 2019    GERD (gastroesophageal reflux disease) 8/31/10    Glaucoma     bilateral    H/O vascular surgery 5/9/2019    Stenjune NAQVI 2006    Hearing loss     History of total hip replacement, right 05/09/2019    Dr Fung Found    Lower leg edema     chronic, R>L    Lumbar stenosis     Macular degeneration     left eye    PAF (paroxysmal atrial fibrillation) ([address] 2019    Pain of left scapula 3/25/2019    PVD (peripheral vascular disease) ([address] 10/28/2006    Dr Ariane Garsia, stents LE    Stress incontinence 10/28/2011       MEDICATIONS AND ALLERGIES:  Reviewed on chart in Epic. REVIEW OF SYSTEMS:  Resident denies any headache, dizziness, blurred vision, chest pain, shortness of breath, abdominal pain, nausea, vomiting, or changes in her bowel or bladder habits. She reports she is eating well, sleeping well, and has no pain. PE:  Most recent vital signs:  /68, heart rate 70, respirations 16, temp 97.4, pulse oxing 98% on room air. Weight 136. CONSTITUTIONAL:  Resident is alert and oriented x2, elderly female, in no apparent distress, pleasant and cooperative with exam.  INTEG:  Pink and dry.   She does have a bruise to her right index finger. HEENT:  Normocephalic, atraumatic. She is very hard of hearing. Conjunctivae pink. Sclerae nonicteric. Mucous membranes pink, moist.  NECK:  With no visible masses. CARDIOVASCULAR:  Heart rate is regular per nursing staff. RESPIRATORY:  Respirations even, nonlabored. ABDOMEN:  Flat. PV:  She does have 1+ pitting edema of her bilateral lower extremities. She is wearing GERSON hose. A AND P:  1.   UTI. Preliminary urinalysis shows nitrite positive. We will await the sensitivity. Resident is no longer combative. 2.   Alzheimer's dementia. Resident's mood is stable. She has not had any further decrease in her cognition or increase in her behaviors. She was behavioral the other day, but found she has the UTI and she has been stable since. 3.   Paroxysmal AFib. Resident is currently in a regular rhythm. She has not had any chest pain or chest discomfort. I have reviewed this resident's medication and treatment plan, as well as most recent lab work. No further changes will be made at this time. We will continue to monitor for overall comfort, function, and safety and await her sensitivity before proceeding further. Pursuant to the emergency declaration under the [facility] waiver authority and the [employer] Act, this Virtual Visit was conducted, with patient's consent, to reduce the patient's risk of exposure to COVID-19 and provide continuity of care for an established patient. Services were provided through a video synchronous discussion virtually to substitute for in-person clinic visit they will be billed for this visit and they agree to continue with assistance of  staff and via VIRTUAL platform     Patient identification was verified at the start of the visit : YES    Total time spent on this encounter: Not billed by time.      Return in about 1 month (around 5/27/2021) for chronic conditions. Electronically Signed By: Trung Vital. Willy Marie CNP on 05/22/2021 09:01:13  ______________________________  Trung Vital.  Willy Marie CNP  [id number]  D: 05/21/2021 17:39:31  T: 05/21/2021 18:54:33    cc: - [address]

## 2021-05-26 PROBLEM — N39.0 URINARY TRACT INFECTION WITHOUT HEMATURIA: Status: ACTIVE | Noted: 2021-05-26

## 2021-07-16 ENCOUNTER — OFFICE VISIT (OUTPATIENT)
Dept: GERIATRIC MEDICINE | Age: 86
End: 2021-07-16
Payer: MEDICARE

## 2021-07-16 DIAGNOSIS — E11.9 DIABETES MELLITUS WITHOUT COMPLICATION (HCC): ICD-10-CM

## 2021-07-16 DIAGNOSIS — M15.9 OSTEOARTHRITIS OF MULTIPLE JOINTS, UNSPECIFIED OSTEOARTHRITIS TYPE: ICD-10-CM

## 2021-07-16 DIAGNOSIS — G31.84 MCI (MILD COGNITIVE IMPAIRMENT): Primary | ICD-10-CM

## 2021-07-16 PROCEDURE — 99309 SBSQ NF CARE MODERATE MDM 30: CPT | Performed by: INTERNAL MEDICINE

## 2021-07-16 PROCEDURE — 1123F ACP DISCUSS/DSCN MKR DOCD: CPT | Performed by: INTERNAL MEDICINE

## 2021-07-28 NOTE — DISCHARGE INSTR - COC
Continuity of Care Form    Patient Name: Micha Bolanos   :  1918  MRN:  [id number]    Admit date:  2020  Discharge date:  20    Code Status Order: Full Code   Advance Directives:   Advance Care Flowsheet Documentation     Date/Time Healthcare Directive Type of Healthcare Directive Copy in [address] Agent's Name Healthcare Agent's Phone Number    20 1608  No, patient does not have an advance directive for healthcare treatment -- -- -- -- --          Admitting Physician:  Rober Dimas MD  PCP: Monico Keys MD    Discharging Nurse: [id number] Unit/Room#: X592/B304-82  Discharging Unit Phone Number: [phone]  Emergency Contact:   Extended Emergency Contact Information  Primary Emergency Contact: [facility] [address] Phone: [phone]  Relation: Niece/Nephew  Secondary Emergency Contact: Redd Du [facility] Phone: [phone]  Relation: Niece/Nephew  Preferred language: English   needed? No    Past Surgical History:  Past Surgical History:   Procedure Laterality Date    APPENDECTOMY      CYST REMOVAL  2016    DR SLOAN  RT THUMB MUCOUS CYST    HYSTERECTOMY      REFRACTIVE SURGERY  321112    left     TOTAL HIP ARTHROPLASTY Right 9/25/15    DR. NARANJO    UPPER GASTROINTESTINAL ENDOSCOPY  10/07/15    DR Stockton Last    VERTEBROPLASTY      T12, Dr Jolynn Langford       Immunization History:   Immunization History   Administered Date(s) Administered    Influenza A ([id number]) Vaccine PF IM 2009    Influenza Vaccine, unspecified formulation 10/15/2016    Influenza Virus Vaccine 10/20/2014    Influenza Whole 10/20/2014, 10/05/2015    Influenza, High Dose (Fluzone 65 yrs and older) 10/15/2016    Influenza, Triv, inactivated, subunit, adjuvanted, IM (Fluad 65 yrs and older) 10/03/2019    Pneumococcal Conjugate 13-valent ([id number]) 2018    Pneumococcal Polysaccharide alert, coherent, logical, thought processes intact and able to concentrate and follow conversation    IV Access:  - None    Nursing Mobility/ADLs:  Walking   Assisted  Transfer  Assisted  Bathing  Assisted  Dressing  Assisted  Toileting  Assisted  Feeding  Independent after set up  Med Admin  Assisted  Med Delivery   whole    Wound Care Documentation and Therapy:  Wound 11/09/16 Abrasion(s) Elbow Outer abrasion from fall, no bleeding noted (Active)   Number of days: 1165        Elimination:  Continence:   · Bowel: YES   · Bladder: NO  Urinary Catheter: None   Colostomy/Ileostomy/Ileal Conduit: NO       Date of Last BM:1/22/20  No intake or output data in the 24 hours ending 01/19/20 0818  No intake/output data recorded. Safety Concerns:     History of Falls (last 30 days)    Impairments/Disabilities:      Vision and Hearing    Nutrition Therapy:  Current Nutrition Therapy:   - Oral Diet:  General    Routes of Feeding: Oral  Liquids:thin  Daily Fluid Restriction: no  Last Modified Barium Swallow with Video (Video Swallowing Test):Not Done  Treatments at the Time of Hospital Discharge:   Respiratory Treatments: none  Oxygen Therapy:  is not on home oxygen therapy. Ventilator:    - No ventilator support    Rehab Therapies: PT and OT  Weight Bearing Status/Restrictions: No weight bearing restirctions  Other Medical Equipment (for information only, NOT a DME order):   Walker  Other Treatments:     Patient's personal belongings (please select all that are sent with patient):  Glasses, Hearing Aides bilateral, Dentures upper, Juanaelry    RN SIGNATURE:  Yennifer Stover 1/22/2020 at [id number] PM    CASE MANAGEMENT/SOCIAL WORK SECTION    Inpatient Status Date: 1-    Readmission Risk Assessment Score:  Readmission Risk              Risk of Unplanned Readmission:        22           Discharging to Facility/ Agency   · Name: [facility]  · Address:  · Phone:[phone]  · Fax:    Dialysis Facility (if applicable) Ambulatory

## 2021-08-10 ENCOUNTER — OFFICE VISIT (OUTPATIENT)
Dept: GERIATRIC MEDICINE | Age: 86
End: 2021-08-10
Payer: MEDICARE

## 2021-08-10 DIAGNOSIS — I48.0 PAF (PAROXYSMAL ATRIAL FIBRILLATION) (HCC): Primary | Chronic | ICD-10-CM

## 2021-08-10 DIAGNOSIS — Z91.81 HX OF FALLING: ICD-10-CM

## 2021-08-10 DIAGNOSIS — I73.9 PVD (PERIPHERAL VASCULAR DISEASE) (HCC): ICD-10-CM

## 2021-08-10 PROCEDURE — 1123F ACP DISCUSS/DSCN MKR DOCD: CPT | Performed by: PHYSICIAN ASSISTANT

## 2021-08-10 PROCEDURE — 99308 SBSQ NF CARE LOW MDM 20: CPT | Performed by: PHYSICIAN ASSISTANT

## 2021-08-19 NOTE — PROGRESS NOTES
significant changes. No new orders. 3.   History of falling-continue assisting with all ADLs as needed and no new additional orders today.         Electronically Signed By: Jannie Mckeon PA-C on 08/12/2021 12:30:02  ______________________________  Jannie Mckeon PA-C  [id number]  D: 08/11/2021 17:39:12  T: 08/12/2021 09:31:36    cc:  [facility]

## 2021-09-14 ENCOUNTER — OFFICE VISIT (OUTPATIENT)
Dept: GERIATRIC MEDICINE | Age: 86
End: 2021-09-14
Payer: COMMERCIAL

## 2021-09-14 DIAGNOSIS — U07.1 SARS-COV-2 POSITIVE: Primary | ICD-10-CM

## 2021-09-14 PROCEDURE — 99308 SBSQ NF CARE LOW MDM 20: CPT | Performed by: PHYSICIAN ASSISTANT

## 2021-09-14 PROCEDURE — 1123F ACP DISCUSS/DSCN MKR DOCD: CPT | Performed by: PHYSICIAN ASSISTANT

## 2021-09-29 ENCOUNTER — OFFICE VISIT (OUTPATIENT)
Dept: GERIATRIC MEDICINE | Age: 86
End: 2021-09-29
Payer: COMMERCIAL

## 2021-09-29 ENCOUNTER — VIRTUAL VISIT (OUTPATIENT)
Dept: GERIATRIC MEDICINE | Age: 86
End: 2021-09-29

## 2021-09-29 DIAGNOSIS — R46.89 AGGRESSIVE BEHAVIOR: Primary | ICD-10-CM

## 2021-09-29 DIAGNOSIS — Z87.440 HISTORY OF UTI: ICD-10-CM

## 2021-09-29 DIAGNOSIS — F02.80 LATE ONSET ALZHEIMER'S DISEASE WITHOUT BEHAVIORAL DISTURBANCE (HCC): Chronic | ICD-10-CM

## 2021-09-29 DIAGNOSIS — N39.0 RECURRENT UTI: Primary | ICD-10-CM

## 2021-09-29 DIAGNOSIS — G30.1 LATE ONSET ALZHEIMER'S DISEASE WITHOUT BEHAVIORAL DISTURBANCE (HCC): Chronic | ICD-10-CM

## 2021-09-29 PROCEDURE — 99442 PR PHYS/QHP TELEPHONE EVALUATION 11-20 MIN: CPT | Performed by: PHYSICIAN ASSISTANT

## 2021-09-30 LAB
BILIRUBIN, URINE: ABNORMAL
BLOOD, URINE: POSITIVE
CLARITY: ABNORMAL
COLOR: YELLOW
GLUCOSE URINE: ABNORMAL
KETONES, URINE: POSITIVE
LEUKOCYTE ESTERASE, URINE: ABNORMAL
NITRITE, URINE: NEGATIVE
PH UA: 6 (ref 4.5–8)
PROTEIN UA: ABNORMAL
SPECIFIC GRAVITY, URINE: 1.03
UROBILINOGEN, URINE: NORMAL

## 2021-10-05 NOTE — PROGRESS NOTES
Briana Rosenberg is a 80 y.o. female evaluated via telephone on 2021. Consent:  She and/or health care decision maker is aware that that she may receive a bill for this telephone service, depending on her insurance coverage, and has provided verbal consent to proceed: Yes      Documentation:  I communicated with the patient and/or health care decision maker about     PATIENT:  Amarilis Freedman    :  1918    DOS:  2021    [address]    I spoke with nurse over the phone today via televisit. The patient is combative and acting out. She is grabbing at and punching at nursing staff and CNAs. She does have a history of recurrent UTIs. Recently came off the COVID unit without sequelae noted at this time. No new respiratory or cardiopulmonary complaints at the moment. We will have a UA C&S drawn along with a CMP and ammonia level for tomorrow morning. We will follow up with patient in-person if possible. Otherwise, we will perform televisit to address treatment plan tomorrow as indicated per laboratory results. Did give orders, as well to straight catheterize as needed for UA, C&S due to patient's combativeness and poor compliance with care at the moment due to AMS. Electronically Signed By: Sol Hernandez PA-C on 10/04/2021 10:30:04  ______________________________  Sol Hernandez PA-C  [id number]  D: 2021 12:44:53  T: 2021 05:21:52    cc: - Demetria Mcdonald Details of this discussion including any medical advice provided: see above      I affirm this is a Patient Initiated Episode with a Patient who has not had a related appointment within my department in the past 7 days or scheduled within the next 24 hours.     Patient identification was verified at the start of the visit: Yes    Total Time: minutes: 11-20 minutes    The visit was conducted pursuant to the emergency declaration under the [address] [place], [facility] waiver authority and the [employer] General Act. Patient identification was verified, and a caregiver was present when appropriate. The patient was located in a state where the provider was credentialed to provide care.     Note: not billable if this call serves to triage the patient into an appointment for the relevant concern      Ramon Cross

## 2021-10-12 ENCOUNTER — OFFICE VISIT (OUTPATIENT)
Dept: GERIATRIC MEDICINE | Age: 86
End: 2021-10-12
Payer: COMMERCIAL

## 2021-10-12 DIAGNOSIS — Z86.16 HISTORY OF COVID-19: ICD-10-CM

## 2021-10-12 DIAGNOSIS — I48.0 PAF (PAROXYSMAL ATRIAL FIBRILLATION) (HCC): Primary | Chronic | ICD-10-CM

## 2021-10-12 DIAGNOSIS — I73.9 PVD (PERIPHERAL VASCULAR DISEASE) (HCC): ICD-10-CM

## 2021-10-12 PROCEDURE — 99308 SBSQ NF CARE LOW MDM 20: CPT | Performed by: PHYSICIAN ASSISTANT

## 2021-10-12 PROCEDURE — G8484 FLU IMMUNIZE NO ADMIN: HCPCS | Performed by: PHYSICIAN ASSISTANT

## 2021-10-12 PROCEDURE — 1123F ACP DISCUSS/DSCN MKR DOCD: CPT | Performed by: PHYSICIAN ASSISTANT

## 2021-10-15 NOTE — PROGRESS NOTES
PATIENT:  Hamida Lindsay    :  [id number]    DOS:  2021    [facility] NURSING    Vital signs reviewed, afebrile, within normal limits. HPI:  The patient is on the COVID unit right now with positive COVID infection. She has been vaccinated with dual Pfizer doses. Most recent BNP within normal limits. CRP 8.9 and a CBC within normal limits. No leukocytosis noted. The patient has no evidence of wheezing, respiratory distress. She does have significant dementia. Difficult ROS. Lungs are overall clear. A minor dry cough, but nonproductive. The patient continues on vitamin C, zinc, vitamin D. Currently no new heparin. The patient's D-dimer was within normal limits and was not at increased risk of DVT or PE. The patient also underwent monoclonal antibody treatment on September 10, 2021, successfully. Has had fairly uneventful progression over the course of her COVID infection. No new orders to be given today. We will continue monitoring and continue current care plan. The patient will soon be on removed from the COVID-19 quarantine area and placed back in general population of SNF. No new additional concerns. We will follow up with patient p.r.n. moving forward. We will reassess patient at later date while back in her original room. Await pending, D-dimer and utilize p.r.n.s as needed. MEDICATIONS:  Reviewed. ALLERGIES:  Reviewed. REVIEW OF SYSTEMS:  Unable to establish due to patient dementia. PHYSICAL EXAMINATION:  Alert and oriented times three, no acute distress. Pleasant affect, pleasantly confused. HEENT:  PERRLA. MMM, no erythema or exudate on oropharyngeal examination. Cardiopulmonary:  RRR, mild 0 to /+1 pitting lower leg edema bilaterally. Pedal pulses are intact bilaterally trace. Lung sounds clear to auscultation bilaterally. Abdominal:  Normoactive bowel sounds.   Skin:  No obvious new deformities of wounds or bruising on gross skin examination of periphery. Mental Status: The patient is awake, alert, oriented 2/3. ASSESSMENT AND PLAN:  Positive COVID infection-vital signs are stable and patient is asymptomatic at this time. Continue current care parameters. We will move patient back to her room within the next week.         Electronically Signed By: Alicia Heredia PA-C on 10/12/2021 09:30:04  ______________________________  WYATT [id number]  D: 10/08/2021 14:01:01  T: 10/08/2021 21:29:47    cc: Reyes Gilliam

## 2021-10-22 ENCOUNTER — OFFICE VISIT (OUTPATIENT)
Dept: GERIATRIC MEDICINE | Age: 86
End: 2021-10-22
Payer: COMMERCIAL

## 2021-10-22 DIAGNOSIS — G30.1 LATE ONSET ALZHEIMER'S DISEASE WITHOUT BEHAVIORAL DISTURBANCE (HCC): Chronic | ICD-10-CM

## 2021-10-22 DIAGNOSIS — F23 ACUTE PSYCHOSIS (HCC): Primary | ICD-10-CM

## 2021-10-22 DIAGNOSIS — F02.80 LATE ONSET ALZHEIMER'S DISEASE WITHOUT BEHAVIORAL DISTURBANCE (HCC): Chronic | ICD-10-CM

## 2021-10-22 PROCEDURE — G8484 FLU IMMUNIZE NO ADMIN: HCPCS | Performed by: PHYSICIAN ASSISTANT

## 2021-10-22 PROCEDURE — 99308 SBSQ NF CARE LOW MDM 20: CPT | Performed by: PHYSICIAN ASSISTANT

## 2021-10-22 PROCEDURE — 1123F ACP DISCUSS/DSCN MKR DOCD: CPT | Performed by: PHYSICIAN ASSISTANT

## 2021-10-29 NOTE — PROGRESS NOTES
Agatha Edwards is a 80 y.o. female evaluated via telephone on 2021. Consent:  She and/or health care decision maker is aware that that she may receive a bill for this telephone service, depending on her insurance coverage, and has provided verbal consent to proceed: Yes      Documentation:  I communicated with the patient and/or health care decision maker about     PATIENT:  Shantell Harris    :  1918    DOS:  2021    [address] 36    I spoke with nurse over the phone today via televisit. The patient is combative and acting out. She is grabbing at and punching at nursing staff and CNAs. She does have a history of recurrent UTIs. Recently came off the COVID unit without sequelae noted at this time. No new respiratory or cardiopulmonary complaints at the moment. We will have a UA C&S drawn along with a CMP and ammonia level for tomorrow morning. We will follow up with patient in-person if possible. Otherwise, we will perform televisit to address treatment plan tomorrow as indicated per laboratory results. Did give orders, as well to straight catheterize as needed for UA, C&S due to patient's combativeness and poor compliance with care at the moment due to AMS. Electronically Signed By: Manohar Bello PA-C on 10/04/2021 10:30:04  ______________________________  Manohar Bello PA-C  [id number]  D: 2021 12:44:53  T: 2021 05:21:52    cc: - Demetria Mcdonald Details of this discussion including any medical advice provided: see above      I affirm this is a Patient Initiated Episode with a Patient who has not had a related appointment within my department in the past 7 days or scheduled within the next 24 hours.     Patient identification was verified at the start of the visit: Yes    Total Time: minutes: 11-20 minutes    The visit was conducted pursuant to the emergency declaration under the [facility], [facility] waiver authority and the [employer] General Act. Patient identification was verified, and a caregiver was present when appropriate. The patient was located in a state where the provider was credentialed to provide care.     Note: not billable if this call serves to triage the patient into an appointment for the relevant concern      Ramon Munoz

## 2021-11-18 NOTE — PROGRESS NOTES
PATIENT:  Iman Edward    :  1918    DOS:  10/12/2021    VITAL SIGNS:  Reviewed. HPI:  Seen today for monthly followup visit for chronic medical conditions including PAF, history of COVID-1 as well as PVD. Currently, patient is having regular rate and rhythm. No arrhythmia noted on auscultation today. She is unable to establish any ROS today due to dementia and advanced age. Patient had uneventful course overall through her COVID-19 infection. Nursing staff states that she is mildly cognitively deficient since coming back from the [facility] unit; however, no expressed changes to cardiovascular health in comparison to baseline. Currently, no aspirin or anticoagulant at this time due to high risk of fall and subsequent internal bleeding. She is having some new behaviors per nurse that she is yelling out since COVID infection, some very little increase in physical behaviors. She just finished antibiotics for UTI on 10/11/21. Will add quetiapine 12.5 mg p.o. daily with monitoring. We will follow up in 1 week. MEDICATIONS:  Reviewed. ALLERGIES:  Reviewed. REVIEW OF SYSTEMS:  Unable to establish due to the patient's dementia and advanced age. PHYSICAL EXAM:    ASSESSMENT AND PLAN:  1. PAF Patient is asymptomatic at this time. Continue monitoring. 2.   History of COVID-19 infection Patient is currently asymptomatic at this time. Continue monitoring. No new changes other than some minor increase in behaviors. 3.   PVD No new changes. Monitor for circulatory issues.         Electronically Signed By: Cinthia Reeder PA-C on 2021 18:16:48  ______________________________  WYATT Dhillon/[id number]  D: 11/15/2021 18:36:15  T: 2021 14:41:48    cc: Reyes Gilliam

## 2022-01-13 LAB
BILIRUBIN, URINE: NEGATIVE
BLOOD, URINE: POSITIVE
BUN BLDV-MCNC: 17 MG/DL
CALCIUM SERPL-MCNC: 9.6 MG/DL
CHLORIDE BLD-SCNC: 112 MMOL/L
CLARITY: ABNORMAL
CO2: 24 MMOL/L
COLOR: YELLOW
CREAT SERPL-MCNC: 0.6 MG/DL
GFR CALCULATED: NORMAL
GLUCOSE BLD-MCNC: 87 MG/DL
GLUCOSE URINE: ABNORMAL
KETONES, URINE: NEGATIVE
LEUKOCYTE ESTERASE, URINE: ABNORMAL
NITRITE, URINE: NEGATIVE
PH UA: 5 (ref 4.5–8)
POTASSIUM SERPL-SCNC: 5 MMOL/L
PROTEIN UA: NEGATIVE
SODIUM BLD-SCNC: 144 MMOL/L
SPECIFIC GRAVITY, URINE: 1.02
UROBILINOGEN, URINE: NORMAL

## 2022-01-18 ENCOUNTER — OFFICE VISIT (OUTPATIENT)
Dept: GERIATRIC MEDICINE | Age: 87
End: 2022-01-18
Payer: COMMERCIAL

## 2022-01-18 DIAGNOSIS — N39.0 RECURRENT UTI: Primary | ICD-10-CM

## 2022-01-18 DIAGNOSIS — F02.80 LATE ONSET ALZHEIMER'S DISEASE WITHOUT BEHAVIORAL DISTURBANCE (HCC): Chronic | ICD-10-CM

## 2022-01-18 DIAGNOSIS — G30.1 LATE ONSET ALZHEIMER'S DISEASE WITHOUT BEHAVIORAL DISTURBANCE (HCC): Chronic | ICD-10-CM

## 2022-01-18 PROCEDURE — 1123F ACP DISCUSS/DSCN MKR DOCD: CPT | Performed by: PHYSICIAN ASSISTANT

## 2022-01-18 PROCEDURE — 99308 SBSQ NF CARE LOW MDM 20: CPT | Performed by: PHYSICIAN ASSISTANT

## 2022-01-18 PROCEDURE — G8484 FLU IMMUNIZE NO ADMIN: HCPCS | Performed by: PHYSICIAN ASSISTANT

## 2022-01-19 ENCOUNTER — OFFICE VISIT (OUTPATIENT)
Dept: GERIATRIC MEDICINE | Age: 87
End: 2022-01-19
Payer: COMMERCIAL

## 2022-01-19 DIAGNOSIS — M48.061 SPINAL STENOSIS OF LUMBAR REGION, UNSPECIFIED WHETHER NEUROGENIC CLAUDICATION PRESENT: ICD-10-CM

## 2022-01-19 DIAGNOSIS — F02.80 LATE ONSET ALZHEIMER'S DISEASE WITHOUT BEHAVIORAL DISTURBANCE (HCC): Chronic | ICD-10-CM

## 2022-01-19 DIAGNOSIS — I48.0 PAF (PAROXYSMAL ATRIAL FIBRILLATION) (HCC): Primary | Chronic | ICD-10-CM

## 2022-01-19 DIAGNOSIS — G30.1 LATE ONSET ALZHEIMER'S DISEASE WITHOUT BEHAVIORAL DISTURBANCE (HCC): Chronic | ICD-10-CM

## 2022-01-19 PROCEDURE — 99309 SBSQ NF CARE MODERATE MDM 30: CPT | Performed by: INTERNAL MEDICINE

## 2022-01-19 PROCEDURE — 1123F ACP DISCUSS/DSCN MKR DOCD: CPT | Performed by: INTERNAL MEDICINE

## 2022-01-19 PROCEDURE — G8484 FLU IMMUNIZE NO ADMIN: HCPCS | Performed by: INTERNAL MEDICINE

## 2022-02-19 NOTE — PROGRESS NOTES
SUBJECTIVE:  This 8-year-old woman is seen for follow-up visit for her dementia afibrillation motion and weakness. Patient is at her baseline has undergone eval for COVID-19 has been universal cautions. Patient has been stabilized from respiratory standpoint patient is pain-free at this time. Patient is pleasant but confused      ROS: Bicarbonate  The rest of the 14 point ROS negative    PHYSICAL EXAM: VSS per facility record  Normocephalic pupils are small per visual acuity oral mucosa moist chest showed faint extra wheezing cardiovascular showed a an irregular rate abdomen soft nontender extremity trace radial pulse no calf tenderness. Skin shows rash. ASSESSMENT & PLAN:   Diagnosis Orders   1. PAF (paroxysmal atrial fibrillation) ([address]     2. Late onset Alzheimer's disease without behavioral disturbance ([address]     3. Spinal stenosis of lumbar region, unspecified whether neurogenic claudication present       Continue with reorientation as able conservative management. Continue course of care.             Past Medical History:   Diagnosis Date    Acute cystitis with hematuria 8/17/2019    Bowel obstruction ([address] 9/22/2019    Chronic hyponatremia     Compression fracture of T12 vertebra [facility]) 2013    kyphoplasty Dr Iliana Vega    DJD (degenerative joint disease) of hip 3/20/2014    Dr Yamila Colón DM (diabetes mellitus), type 2 with peripheral vascular complications (HCC)     diet controlled    Gallbladder sludge 2019    GERD (gastroesophageal reflux disease) 8/31/10    Glaucoma     bilateral    H/O vascular surgery 5/9/2019    Josie NAQVI 2006    Hearing loss     History of total hip replacement, right 05/09/2019    Dr Alayna Quiroga    Lower leg edema     chronic, R>L    Lumbar stenosis     Macular degeneration     left eye    PAF (paroxysmal atrial fibrillation) ([address] 2019    Pain of left scapula 3/25/2019    PVD (peripheral vascular disease) ([address] 10/28/2006    Dr Fransisca Morgan, stents LE    Stress incontinence 10/28/2011         Past Surgical History:   Procedure Laterality Date    APPENDECTOMY      CYST REMOVAL  06/27/2016    DR SLOAN  RT THUMB MUCOUS CYST    HYSTERECTOMY      REFRACTIVE SURGERY  [id number]    left     TOTAL HIP ARTHROPLASTY Right 9/25/15    DR. NARANJO    UPPER GASTROINTESTINAL ENDOSCOPY  10/07/15    DR Jaylon Gaston    VERTEBROPLASTY  2013    T12, Dr Henny Guerrero         Current Outpatient Medications on File Prior to Visit   Medication Sig Dispense Refill    omeprazole (PRILOSEC) 20 MG delayed release capsule TAKE 1 CAPSULE BY MOUTH EVERY DAY 90 capsule 1    Misc. Devices (COMMODE BEDSIDE) MISC Misc. Devices (COMMODE BEDSIDE) MISC Misc. Devices (COMMODE BEDSIDE) MISC Indications: Weakness , Spinal stenosis of lumbar region, unspecified whether neurogenic claudication present , Impaired mobility and activities of daily living , Abnormality of gait and mobility , Late onset Alzheimer's disease without behavioral disturbance (HCC) , Age-related physical debility , Primary osteoarthritis involving multiple joints As directed 1 each 0 11/22/2019 Active 11-  [facility]) ([address] [place] [address])     [address] [place] [address] Blvd by NOT APPLICABLE route      sennosides-docusate sodium (SENOKOT-S) 8.6-50 MG tablet Take 1 tablet by mouth daily 20 tablet 0    docusate sodium (COLACE) 100 MG capsule Take 1 capsule by mouth 2 times daily 60 capsule 2    Calcium Carbonate Antacid (TUMS CHEWY DELIGHTS PO) Take 1 tablet by mouth daily       vitamin D (CHOLECALCIFEROL) 1000 UNIT TABS tablet Take 1,000 Units by mouth daily      brimonidine (ALPHAGAN) 0.2 % ophthalmic solution Place 1 drop into both eyes 2 times daily   11    timolol (TIMOPTIC-XE) 0.5 % ophthalmic gel-forming USE 1 DROP IN BOTH EYES TWICE DAILY.   11    acetaminophen (TYLENOL) 325 MG tablet Take 1 tablet by mouth 3 times daily as needed for Pain (DO NOT EXCEED 4GM IN 24 HOURS) 90 tablet 0    Multiple Vitamin (MULTI VITAMIN DAILY) TABS Take 1 tablet by mouth every morning       No current facility-administered medications on file prior to visit.          Family History   Problem Relation Age of Onset    Heart Failure Mother         dec age 77    Hypertension Mother     Diabetes Father         dec age 67       Social History     Socioeconomic History    Marital status: Single     Spouse name: Not on file    Number of children: 0    Years of education: Not on file    Highest education level: Not on file   Occupational History    Occupation: retired [employer]   Tobacco Use    Smoking status: Former Smoker     Types: Cigarettes     Quit date: 1980     Years since quittin.7    Smokeless tobacco: Never Used   Vaping Use    Vaping Use: Never used   Substance and Sexual Activity    Alcohol use: No     Alcohol/week: 0.0 standard drinks    Drug use: No    Sexual activity: Not Currently     Partners: Male   Other Topics Concern    Not on file   Social History Narrative    Born in [place], [facility] background (both parents born in the [address])    Father was a [facility] [employer] , teacher,  and  in [employer]     She never         Retired from [employer] alone in a house, no children    Former  at [address] for many years     Lives With: Alone(has 24 hour care)-- [employer] caregiver          Lives [id number] [address] in [facility] she states she would like [employer] if SNF appropriate    However has always done well at [address]    Type of Home: [address] [place] [address] Avenue: One level    Home Access: Level entry    ADL Assistance: Needs assistance    Homemaking Assistance: Needs assistance    Ambulation Assistance: Independent(normally independent with ww )    Transfer Assistance: Independent(normally independent with occ assist to stabilize ww )    Active : No    Additional Comments: Pt with increased assistance required and recent fall. Social Determinants of Health     Financial Resource Strain:     Difficulty of Paying Living Expenses: Not on file   Food Insecurity:     Worried About Running Out of Food in the Last Year: Not on file    Jana of Food in the Last Year: Not on file   Transportation Needs:     Lack of Transportation (Medical): Not on file    Lack of Transportation (Non-Medical):  Not on file   Physical Activity:     Days of Exercise per Week: Not on file    Minutes of Exercise per Session: Not on file   Stress:     Feeling of Stress : Not on file   Social Connections:     Frequency of Communication with Friends and Family: Not on file    Frequency of Social Gatherings with Friends and Family: Not on file    Attends Congregation Services: Not on file    Active Member of [address] [place] [address] [employer] or Organizations: Not on file    Attends Club or Organization Meetings: Not on file    Marital Status: Not on file   Intimate Partner Violence:     Fear of Current or Ex-Partner: Not on file    Emotionally Abused: Not on file    Physically Abused: Not on file    Sexually Abused: Not on file   Housing Stability:     Unable to Pay for Housing in the Last Year: Not on file    Number of [place] in the Last Year: Not on file    Unstable Housing in the Last Year: Not on file         Lab Results   Component Value Date    LABA1C 5.7 02/23/2020     No results found for: EAG    Lab Results   Component Value Date     01/13/2022    K 5.0 01/13/2022    K 4.4 02/23/2020     01/13/2022    CO2 24 01/13/2022    BUN 17 01/13/2022    CREATININE 0.6 01/13/2022    GLUCOSE 87 01/13/2022    GLUCOSE 156 05/09/2012    CALCIUM 9.6 01/13/2022        Lab Results   Component Value Date    CHOL 172 03/02/2020    CHOL 161 08/16/2016    CHOL 168 01/11/2015     Lab Results   Component Value Date    TRIG 114 03/02/2020    TRIG 93 08/16/2016    TRIG 167 01/11/2015     Lab Results   Component Value Date    HDL 49 03/02/2020    HDL 64 (H) 08/16/2016    HDL 63 (H) 01/11/2015     Lab Results   Component Value Date    LDLCALC 100 03/02/2020    LDLCALC 78 08/16/2016    LDLCALC 72 01/11/2015     Lab Results   Component Value Date    VLDL 23 03/02/2020     Lab Results   Component Value Date    CHOLHDLRATIO 2.0 03/02/2020    CHOLHDLRATIO 2.7 05/09/2012    CHOLHDLRATIO 3.2 10/20/2011       Lab Results   Component Value Date    TSH 3.520 01/11/2015       Lab Results   Component Value Date    WBC 8.4 03/02/2020    HGB 11.2 (A) 03/02/2020    HCT 34.1 (A) 03/02/2020    MCV 94.8 03/02/2020     03/02/2020       Please note orders entered on site at facility after discussion with appropriate facility nursing/therapy/ / nutritional staff. Current longstanding medical problems and acute medical issues addressed with staff. Available data and data elements in on site paper chart reviewed and analyzed. Current external consultant notes reviewed in on site chart. Ordered laboratory testing and imaging will be reviewed when available.

## 2022-02-22 ENCOUNTER — OFFICE VISIT (OUTPATIENT)
Dept: GERIATRIC MEDICINE | Age: 87
End: 2022-02-22
Payer: COMMERCIAL

## 2022-02-22 DIAGNOSIS — K21.9 GASTROESOPHAGEAL REFLUX DISEASE, UNSPECIFIED WHETHER ESOPHAGITIS PRESENT: ICD-10-CM

## 2022-02-22 DIAGNOSIS — Z87.19 HX OF SMALL BOWEL OBSTRUCTION: Primary | ICD-10-CM

## 2022-02-22 DIAGNOSIS — F02.80 LATE ONSET ALZHEIMER'S DISEASE WITHOUT BEHAVIORAL DISTURBANCE (HCC): Chronic | ICD-10-CM

## 2022-02-22 DIAGNOSIS — G30.1 LATE ONSET ALZHEIMER'S DISEASE WITHOUT BEHAVIORAL DISTURBANCE (HCC): Chronic | ICD-10-CM

## 2022-02-22 PROCEDURE — 99308 SBSQ NF CARE LOW MDM 20: CPT | Performed by: PHYSICIAN ASSISTANT

## 2022-02-22 PROCEDURE — G8484 FLU IMMUNIZE NO ADMIN: HCPCS | Performed by: PHYSICIAN ASSISTANT

## 2022-02-22 PROCEDURE — 1123F ACP DISCUSS/DSCN MKR DOCD: CPT | Performed by: PHYSICIAN ASSISTANT

## 2022-02-23 NOTE — PROGRESS NOTES
Subjective:      Patient ID: Leora Anne is a pleasant 80 y.o. female who presents today for:  No chief complaint on file. Patient seen today for follow-up on UTI. Patient was tested positive earlier this week. E. coli present in urine greater than 100,000. To begin Macrobid p.o. twice daily x5 days on Thursday at 1/12/2025 2. Patient has had adequate recovery at this point. No new dysuria, hematuria, suprapubic tenderness. Patient is suffering from increased dementia however was having outside of normal limits during infection. Improved albeit still suffer from dementia. We will follow-up for any further complications. No new orders at this time. Will repeat UA if any symptoms occur. Patient Active Problem List   Diagnosis    Essential hypertension    Stress incontinence    Hearing loss    Spinal stenosis of lumbar region    Acute thoracic back pain    Impaired mobility and activities of daily living    Acute leg pain, left    Acute right hip pain    PVD (peripheral vascular disease) ([address]    Nuclear senile cataract    Nonexudative age-related macular degeneration    Primary open angle glaucoma    DM (diabetes mellitus), type 2 with peripheral vascular complications ([address]    Lumbar stenosis    Gastroesophageal reflux disease    Hyponatremia    Ambulatory dysfunction    Glaucoma    Osteoarthritis    S/P kyphoplasty    Late onset Alzheimer's disease without behavioral disturbance (HCC)    Abnormality of gait and mobility due to severe spinal stenosis.     Leg edema, right    Age-related osteoporosis without current pathological fracture    Other constipation    PAF (paroxysmal atrial fibrillation) (HCC)    Age-related physical debility    Post-nasal drip    Generalized weakness    SBO (small bowel obstruction) ([address]    Fall at home, initial encounter    Abnormal lung sounds    Osteoporosis without current pathological fracture    Urinary tract infection without hematuria     Past Medical History:   Diagnosis Date    Acute cystitis with hematuria 2019    Bowel obstruction (HCC) 2019    Chronic hyponatremia     Compression fracture of T12 vertebra (HCC) 2013    kyphoplasty Dr Gaby Eisenberg    DJD (degenerative joint disease) of hip 3/20/2014    Dr German Penn DM (diabetes mellitus), type 2 with peripheral vascular complications ([facility])     diet controlled    Gallbladder sludge     GERD (gastroesophageal reflux disease) 8/31/10    Glaucoma     bilateral    H/O vascular surgery 2019    Stenjune NAQVI 2006    Hearing loss     History of total hip replacement, right 2019    Dr Kevin Swanson    Lower leg edema     chronic, R>L    Lumbar stenosis     Macular degeneration     left eye    PAF (paroxysmal atrial fibrillation) ([address]     Pain of left scapula 3/25/2019    PVD (peripheral vascular disease) ([address] 10/28/2006    Dr Mariella Parks, stents LE    Stress incontinence 10/28/2011     Past Surgical History:   Procedure Laterality Date    APPENDECTOMY      CYST REMOVAL  2016    DR SLOAN  RT THUMB MUCOUS CYST    HYSTERECTOMY      REFRACTIVE SURGERY      left     TOTAL HIP ARTHROPLASTY Right 9/25/15    DR. NARANJO    UPPER GASTROINTESTINAL ENDOSCOPY  10/07/15    DR [facility]    VERTEBROPLASTY  2013    T12, Dr Buitrago Xu History     Socioeconomic History    Marital status: Single     Spouse name: Not on file    Number of children: 0    Years of education: Not on file    Highest education level: Not on file   Occupational History    Occupation: retired [employer]   Tobacco Use    Smoking status: Former Smoker     Types: Cigarettes     Quit date: 1980     Years since quittin.7    Smokeless tobacco: Never Used   Vaping Use    Vaping Use: Never used   Substance and Sexual Activity    Alcohol use: No     Alcohol/week: 0.0 standard drinks    Drug use: No    Sexual activity: Not Currently     Partners: Male   Other Topics Concern    Not on file   Social History Narrative    Born in [place], [place] background (both parents born in the [address])    Father was a [place] [employer] , teacher,  and  in [employer]     She never         Retired from [employer] alone in a house, no children    Former  at [address] for [employer] for many years     Lives With: Alone(has 24 hour care)--[employer] caregiver          Lives [facility] in [place] she states she would like [employer] if SNF appropriate    However has always done well at [address]    Type of Home: [address]: One level    Home Access: Level entry    ADL Assistance: Needs assistance    Homemaking Assistance: Needs assistance    Ambulation Assistance: Independent(normally independent with ww )    Transfer Assistance: Independent(normally independent with occ assist to stabilize ww )    Active : No    Additional Comments: Pt with increased assistance required and recent fall. Social Determinants of Health     Financial Resource Strain:     Difficulty of Paying Living Expenses: Not on file   Food Insecurity:     Worried About Running Out of Food in the Last Year: Not on file    Jana of Food in the Last Year: Not on file   Transportation Needs:     Lack of Transportation (Medical): Not on file    Lack of Transportation (Non-Medical):  Not on file   Physical Activity:     Days of Exercise per Week: Not on file    Minutes of Exercise per Session: Not on file   Stress:     Feeling of Stress : Not on file   Social Connections:     Frequency of Communication with Friends and Family: Not on file    Frequency of Social Gatherings with Friends and Family: Not on file    Attends Scientologist Services: Not on file    Active Member of Clubs or Organizations: Not on file    Attends Club or Organization Meetings: Not on file    Marital Status: Not on file   Intimate Partner Violence:     Fear of Current or Ex-Partner: Not on file    Emotionally Abused: Not on file    Physically Abused: Not on file    Sexually Abused: Not on file   Housing Stability:     Unable to Pay for Housing in the Last Year: Not on file    Number of [place] in the Last Year: Not on file    Unstable Housing in the Last Year: Not on file     Family History   Problem Relation Age of Onset    Heart Failure Mother         dec age 77    Hypertension Mother     Diabetes Father         dec age 67     No Known Allergies      Review of Systems   Unable to perform ROS: Dementia       Objective:   LMP  (LMP Unknown)     Physical Exam  Constitutional:       General: She is not in acute distress. Appearance: She is not ill-appearing. HENT:      Head: Normocephalic and atraumatic. Mouth/Throat:      Mouth: Mucous membranes are moist.      Pharynx: Oropharynx is clear. Eyes:      Extraocular Movements: Extraocular movements intact. Pupils: Pupils are equal, round, and reactive to light. Cardiovascular:      Rate and Rhythm: Normal rate and regular rhythm. Pulmonary:      Effort: Pulmonary effort is normal.      Breath sounds: Normal breath sounds. Abdominal:      General: Bowel sounds are normal. There is no distension. Palpations: There is no mass. Tenderness: There is abdominal tenderness. There is guarding. Genitourinary:     Comments: DEFERRED  Skin:     General: Skin is warm. Neurological:      General: No focal deficit present. Mental Status: She is alert. Psychiatric:         Mood and Affect: Mood normal.         Assessment:       Diagnosis Orders   1. Recurrent UTI     2. Late onset Alzheimer's disease without behavioral disturbance ([facility]           Plan:      No orders of the defined types were placed in this encounter. No orders of the defined types were placed in this encounter.       Continue Bactrim through full course. Patient has made adequate recovery. Monitor for changes in symptoms including dysuria, increased frequency, or abdominal discomfort, as well as acute AMS symptoms outside of normal limits for patient's baseline dementia status. No follow-ups on file. Side effects, adverse effects of the medication prescribed today, as well as treatment plan and result expectations have been discussed withthe patient who expresses understanding and desires to proceed.     Vikki [place], [place]

## 2022-03-03 NOTE — PROGRESS NOTES
Subjective:      Patient ID: Tennille Gonzalez is a pleasant 80 y.o. female who presents today for:  No chief complaint on file. Patient seen today for monthly follow-up visit. Today for history of SBO, GERD, and late onset Alzheimer's dementia. Patient's dementia is very progress. Has no idea where she is or where I am, she is alert and oriented to self only. She is pleasant overall poor command following. Patient's bowel sounds are WNL, normal active. She states no evident indigestion. She is eating well on her current diet. Overall no significant progression compared to last monthly visit. Continue monitoring him continue current care plan parameters      Patient Active Problem List   Diagnosis    Essential hypertension    Stress incontinence    Hearing loss    Spinal stenosis of lumbar region    Acute thoracic back pain    Impaired mobility and activities of daily living    Acute leg pain, left    Acute right hip pain    PVD (peripheral vascular disease) ([address]    Nuclear senile cataract    Nonexudative age-related macular degeneration    Primary open angle glaucoma    DM (diabetes mellitus), type 2 with peripheral vascular complications ([address]    Lumbar stenosis    Gastroesophageal reflux disease    Hyponatremia    Ambulatory dysfunction    Glaucoma    Osteoarthritis    S/P kyphoplasty    Late onset Alzheimer's disease without behavioral disturbance (HCC)    Abnormality of gait and mobility due to severe spinal stenosis.     Leg edema, right    Age-related osteoporosis without current pathological fracture    Other constipation    PAF (paroxysmal atrial fibrillation) (HCC)    Age-related physical debility    Post-nasal drip    Generalized weakness    SBO (small bowel obstruction) ([address]    Fall at home, initial encounter    Abnormal lung sounds    Osteoporosis without current pathological fracture    Urinary tract infection without hematuria     Past Medical History: Diagnosis Date    Acute cystitis with hematuria 2019    Bowel obstruction (HCC) 2019    Chronic hyponatremia     Compression fracture of T12 vertebra [facility]) 2013    kyphoplasty Dr Rachelle Castano    DJD (degenerative joint disease) of hip 3/20/2014    Dr Jozef Matute DM (diabetes mellitus), type 2 with peripheral vascular complications (HCC)     diet controlled    Gallbladder sludge     GERD (gastroesophageal reflux disease) 8/31/10    Glaucoma     bilateral    H/O vascular surgery 2019    Stenjune NAQVI 2006    Hearing loss     History of total hip replacement, right 2019    Dr Lavinia Sánchez    Lower leg edema     chronic, R>L    Lumbar stenosis     Macular degeneration     left eye    PAF (paroxysmal atrial fibrillation) ([facility]     Pain of left scapula 3/25/2019    PVD (peripheral vascular disease) ([facility] 10/28/2006    Dr Marlen Tyler, stents LE    Stress incontinence 10/28/2011     Past Surgical History:   Procedure Laterality Date    APPENDECTOMY      CYST REMOVAL  2016    DR SLOAN  RT THUMB MUCOUS CYST    HYSTERECTOMY      REFRACTIVE SURGERY      left     TOTAL HIP ARTHROPLASTY Right 9/25/15    DR. NARANJO    UPPER GASTROINTESTINAL ENDOSCOPY  10/07/15    DR Lewis Mode    VERTEBROPLASTY  2013    T12, Dr Mortimer Debar History     Socioeconomic History    Marital status: Single     Spouse name: Not on file    Number of children: 0    Years of education: Not on file    Highest education level: Not on file   Occupational History    Occupation: retired [employer]   Tobacco Use    Smoking status: Former Smoker     Types: Cigarettes     Quit date: 1980     Years since quittin.7    Smokeless tobacco: Never Used   Vaping Use    Vaping Use: Never used   Substance and Sexual Activity    Alcohol use: No     Alcohol/week: 0.0 standard drinks    Drug use: No    Sexual activity: Not Currently     Partners: Male   Other Topics Concern    Not on file   Social History Narrative    [place] [facility] (both parents born in the [address])    Father was a [place] Manda[employer] , teacher,  and  in [employer]     She never         Retired from [employer] alone in a house, no children    Former  at [facility] [employer] [employer] and [address] [place] [address] for [facility] [employer] [facility] for many years     Lives With: Alone(has 24 hour care)--[employer] caregiver          Lives [id number] in [place] she states she would like [facility] if SNF appropriate    However has always done well at [address]    Type of Home: [address]: One level    Home Access: Level entry    ADL Assistance: Needs assistance    Homemaking Assistance: Needs assistance    Ambulation Assistance: Independent(normally independent with ww )    Transfer Assistance: Independent(normally independent with occ assist to stabilize ww )    Active : No    Additional Comments: Pt with increased assistance required and recent fall. Social Determinants of Health     Financial Resource Strain:     Difficulty of Paying Living Expenses: Not on file   Food Insecurity:     Worried About Running Out of Food in the Last Year: Not on file    Jana of Food in the Last Year: Not on file   Transportation Needs:     Lack of Transportation (Medical): Not on file    Lack of Transportation (Non-Medical):  Not on file   Physical Activity:     Days of Exercise per Week: Not on file    Minutes of Exercise per Session: Not on file   Stress:     Feeling of Stress : Not on file   Social Connections:     Frequency of Communication with Friends and Family: Not on file    Frequency of Social Gatherings with Friends and Family: Not on file    Attends Roman Catholic Services: Not on file    Active Member of Clubs or Organizations: Not on file    Attends Club or Organization Meetings: Not on file    Marital Status: Not on file   Intimate Partner Violence:     Fear of Current or Ex-Partner: Not on file    Emotionally Abused: Not on file    Physically Abused: Not on file    Sexually Abused: Not on file   Housing Stability:     Unable to Pay for Housing in the Last Year: Not on file    Number of [place] in the Last Year: Not on file    Unstable Housing in the Last Year: Not on file     Family History   Problem Relation Age of Onset    Heart Failure Mother         dec age 77    Hypertension Mother     Diabetes Father         dec age 67     No Known Allergies      Review of Systems   Unable to perform ROS: Dementia         Objective:   LMP  (LMP Unknown)     Physical Exam  Constitutional:       General: She is not in acute distress. Appearance: She is not ill-appearing. HENT:      Head: Normocephalic and atraumatic. Mouth/Throat:      Mouth: Mucous membranes are moist.      Pharynx: Oropharynx is clear. Eyes:      Extraocular Movements: Extraocular movements intact. Pupils: Pupils are equal, round, and reactive to light. Cardiovascular:      Rate and Rhythm: Normal rate and regular rhythm. Pulmonary:      Effort: Pulmonary effort is normal.      Breath sounds: Normal breath sounds. Abdominal:      General: Bowel sounds are normal. There is no distension. Palpations: Abdomen is soft. There is no mass. Tenderness: There is no abdominal tenderness. There is no guarding. Genitourinary:     Comments: DEFERRED  Skin:     General: Skin is warm. Neurological:      General: No focal deficit present. Mental Status: She is alert. Psychiatric:         Mood and Affect: Mood normal.           Assessment:       Diagnosis Orders   1. Hx of small bowel obstruction     2. Gastroesophageal reflux disease, unspecified whether esophagitis present     3. Late onset Alzheimer's disease without behavioral disturbance ([facility]           Plan:        Overall no significant change compared to baseline. Continue current care parameters. Vital signs on [facility]. No follow-ups on file. Side effects, adverse effects of the medication prescribed today, as well as treatment plan and result expectations have beendiscussed with the patient who expresses understanding and desires to proceed.     [place], [address]

## 2022-03-15 ENCOUNTER — OFFICE VISIT (OUTPATIENT)
Dept: GERIATRIC MEDICINE | Age: 87
End: 2022-03-15
Payer: COMMERCIAL

## 2022-03-15 DIAGNOSIS — M81.0 OSTEOPOROSIS WITHOUT CURRENT PATHOLOGICAL FRACTURE, UNSPECIFIED OSTEOPOROSIS TYPE: ICD-10-CM

## 2022-03-15 DIAGNOSIS — Z86.59 HISTORY OF BEHAVIORAL AND MENTAL HEALTH PROBLEMS: Primary | ICD-10-CM

## 2022-03-15 PROCEDURE — G8484 FLU IMMUNIZE NO ADMIN: HCPCS | Performed by: PHYSICIAN ASSISTANT

## 2022-03-15 PROCEDURE — 1123F ACP DISCUSS/DSCN MKR DOCD: CPT | Performed by: PHYSICIAN ASSISTANT

## 2022-03-15 PROCEDURE — 99308 SBSQ NF CARE LOW MDM 20: CPT | Performed by: PHYSICIAN ASSISTANT

## 2022-03-22 ENCOUNTER — SCHEDULED TELEPHONE ENCOUNTER (OUTPATIENT)
Dept: GERIATRIC MEDICINE | Age: 87
End: 2022-03-22
Payer: COMMERCIAL

## 2022-03-22 DIAGNOSIS — B35.1 ONYCHOMYCOSIS: Primary | ICD-10-CM

## 2022-03-22 PROCEDURE — 99442 PR PHYS/QHP TELEPHONE EVALUATION 11-20 MIN: CPT | Performed by: PHYSICIAN ASSISTANT

## 2022-03-25 ENCOUNTER — OFFICE VISIT (OUTPATIENT)
Dept: GERIATRIC MEDICINE | Age: 87
End: 2022-03-25
Payer: COMMERCIAL

## 2022-03-25 DIAGNOSIS — Z74.1 ASSISTANCE NEEDED FOR PERSONAL HYGIENE: ICD-10-CM

## 2022-03-25 DIAGNOSIS — B35.1 ONYCHOMYCOSIS: Primary | ICD-10-CM

## 2022-03-25 PROCEDURE — 1123F ACP DISCUSS/DSCN MKR DOCD: CPT | Performed by: PHYSICIAN ASSISTANT

## 2022-03-25 PROCEDURE — G8484 FLU IMMUNIZE NO ADMIN: HCPCS | Performed by: PHYSICIAN ASSISTANT

## 2022-03-25 PROCEDURE — 99307 SBSQ NF CARE SF MDM 10: CPT | Performed by: PHYSICIAN ASSISTANT

## 2022-03-27 NOTE — PROGRESS NOTES
Subjective:      Patient ID: Gerardo Jennings is a pleasant 80 y.o. female who presents today for:  No chief complaint on file. [facility]  Patient being seen today for history of recent AMS. Patient is 8-year-old female seen in common area today. Nursing staff reported the patient has been crying out from her room a lot lately screaming \"help me! \"And showing other forms of anxiety behaviors during the daytime. Patient does have history of sundowning in the past several years, however not during daytime hours. Did sit with patient for a while today to examine and monitor. Patient did not show any increased signs of behavior change during my visit today. We will continue to monitor patient via nursing staff. If continues to have increased behaviors we will add additional Seroquel to her medication orders. Otherwise no new changes today. No further complaints as well. Patient Active Problem List   Diagnosis    Essential hypertension    Stress incontinence    Hearing loss    Spinal stenosis of lumbar region    Acute thoracic back pain    Impaired mobility and activities of daily living    Acute leg pain, left    Acute right hip pain    PVD (peripheral vascular disease) ([address]    Nuclear senile cataract    Nonexudative age-related macular degeneration    Primary open angle glaucoma    DM (diabetes mellitus), type 2 with peripheral vascular complications ([address]    Lumbar stenosis    Gastroesophageal reflux disease    Hyponatremia    Ambulatory dysfunction    Glaucoma    Osteoarthritis    S/P kyphoplasty    Late onset Alzheimer's disease without behavioral disturbance (HCC)    Abnormality of gait and mobility due to severe spinal stenosis.     Leg edema, right    Age-related osteoporosis without current pathological fracture    Other constipation    PAF (paroxysmal atrial fibrillation) (HCC)    Age-related physical debility    Post-nasal drip    Generalized weakness  [facility] obstruction) [facility]    Fall at home, initial encounter    Abnormal lung sounds    Osteoporosis without current pathological fracture    Urinary tract infection without hematuria     Past Medical History:   Diagnosis Date    Acute cystitis with hematuria 2019    Bowel obstruction (HCC) 2019    Chronic hyponatremia     Compression fracture of T12 vertebra (HCC) 2013    kyphoplasty Dr Bartolo Jackson DJD (degenerative joint disease) of hip 3/20/2014    Dr Lily Moreno DM (diabetes mellitus), type 2 with peripheral vascular complications ([facility])     diet controlled    Gallbladder sludge     GERD (gastroesophageal reflux disease) 8/31/10    Glaucoma     bilateral    H/O vascular surgery 2019    Stens LE 2006    Hearing loss     History of total hip replacement, right 2019    Dr Jana Hunter    Lower leg edema     chronic, R>L    Lumbar stenosis     Macular degeneration     left eye    PAF (paroxysmal atrial fibrillation) ([facility] 2019    Pain of left scapula 3/25/2019    PVD (peripheral vascular disease) ([facility] 10/28/2006    Dr Samuel Small, stents LE    Stress incontinence 10/28/2011     Past Surgical History:   Procedure Laterality Date    APPENDECTOMY      CYST REMOVAL  2016    DR SLOAN  RT THUMB MUCOUS CYST    HYSTERECTOMY      REFRACTIVE SURGERY      left     TOTAL HIP ARTHROPLASTY Right 9/25/15    DR. NARANJO    UPPER GASTROINTESTINAL ENDOSCOPY  10/07/15    DR Elisabeth Nath    VERTEBROPLASTY      T12, Dr Arambula Sikh History     Socioeconomic History    Marital status: Single     Spouse name: Not on file    Number of children: 0    Years of education: Not on file    Highest education level: Not on file   Occupational History    Occupation: retired [employer]   Tobacco Use    Smoking status: Former Smoker     Types: Cigarettes     Quit date: 1980     Years since quittin.8    Smokeless tobacco: Never Used   Vaping Use    Vaping Use: Never used   Substance and Sexual Activity    Alcohol use: No     Alcohol/week: 0.0 standard drinks    Drug use: No    Sexual activity: Not Currently     Partners: Male   Other Topics Concern    Not on file   Social History Narrative    Born in [place], [place] background (both parents born in the [address])    Father was a [place] [employer] , teacher,  and  in [facility]     She never         Retired from [employer] alone in a house, no children    Former  at [employer] and [address] [place] [address] for [employer] [facility] [employer] for many years     Lives With: Alone(has 24 hour care)--[employer] caregiver          Lives [id number] [address] [facility] [address] in [facility] she states she would like [facility] if SNF appropriate    However has always done well at [address]    Type of Home: [address]: One level    Home Access: Level entry    ADL Assistance: Needs assistance    Homemaking Assistance: Needs assistance    Ambulation Assistance: Independent(normally independent with ww )    Transfer Assistance: Independent(normally independent with occ assist to stabilize ww )    Active : No    Additional Comments: Pt with increased assistance required and recent fall. Social Determinants of Health     Financial Resource Strain:     Difficulty of Paying Living Expenses: Not on file   Food Insecurity:     Worried About Running Out of Food in the Last Year: Not on file    Jana of Food in the Last Year: Not on file   Transportation Needs:     Lack of Transportation (Medical): Not on file    Lack of Transportation (Non-Medical):  Not on file   Physical Activity:     Days of Exercise per Week: Not on file    Minutes of Exercise per Session: Not on file   Stress:     Feeling of Stress : Not on file   Social Connections:     Frequency of Communication with Friends and Family: Not on file    Frequency of Social Gatherings with Friends and Family: Not on file    Attends Sabianist Services: Not on file    Active Member of Clubs or Organizations: Not on file    Attends Club or Organization Meetings: Not on file    Marital Status: Not on file   Intimate Partner Violence:     Fear of Current or Ex-Partner: Not on file    Emotionally Abused: Not on file    Physically Abused: Not on file    Sexually Abused: Not on file   Housing Stability:     Unable to Pay for Housing in the Last Year: Not on file    Number of [place] in the Last Year: Not on file    Unstable Housing in the Last Year: Not on file     Family History   Problem Relation Age of Onset    Heart Failure Mother         dec age 77    Hypertension Mother     Diabetes Father         dec age 67     No Known Allergies      Review of Systems   Unable to perform ROS: Dementia       Objective:   LMP  (LMP Unknown)     Physical Exam  Constitutional:       General: She is not in acute distress. Appearance: She is normal weight. She is not ill-appearing. HENT:      Head: Normocephalic and atraumatic. Mouth/Throat:      Mouth: Mucous membranes are moist.      Pharynx: Oropharynx is clear. Eyes:      Extraocular Movements: Extraocular movements intact. Pupils: Pupils are equal, round, and reactive to light. Cardiovascular:      Rate and Rhythm: Normal rate and regular rhythm. Pulmonary:      Effort: Pulmonary effort is normal.      Breath sounds: Normal breath sounds. Abdominal:      General: Bowel sounds are normal. There is no distension. Palpations: Abdomen is soft. There is no mass. Tenderness: There is no abdominal tenderness. There is no guarding. Genitourinary:     Comments: DEFERRED  Skin:     General: Skin is warm and dry. Findings: Bruising (minor; senile purpura to bilateral arms) present. Neurological:      General: No focal deficit present. Mental Status: She is alert. Mental status is at baseline.  She is disoriented. Psychiatric:         Mood and Affect: Mood normal.      Comments: No evident changes in behaviours         Assessment:       Diagnosis Orders   1. History of behavioral and mental health problems     2. Osteoporosis without current pathological fracture, unspecified osteoporosis type           Plan:          Continue monitoring patient for changes in behaviors. We will continue patient on current medications. Will increase Seroquel if further daytime behaviors noted. Will have patient see psych service if any acute worsening as well. No follow-ups on file. Side effects, adverse effects of the medication prescribed today, as well as treatment plan and result expectations have been discussed withthe patient who expresses understanding and desires to proceed.     Ramon Pompa

## 2022-04-01 ENCOUNTER — OFFICE VISIT (OUTPATIENT)
Dept: GERIATRIC MEDICINE | Age: 87
End: 2022-04-01
Payer: COMMERCIAL

## 2022-04-01 DIAGNOSIS — F02.80 LATE ONSET ALZHEIMER'S DISEASE WITHOUT BEHAVIORAL DISTURBANCE (HCC): Chronic | ICD-10-CM

## 2022-04-01 DIAGNOSIS — B35.1 ONYCHOMYCOSIS: ICD-10-CM

## 2022-04-01 DIAGNOSIS — I48.0 PAF (PAROXYSMAL ATRIAL FIBRILLATION) (HCC): Primary | Chronic | ICD-10-CM

## 2022-04-01 DIAGNOSIS — Z87.19 HX OF CONSTIPATION: ICD-10-CM

## 2022-04-01 DIAGNOSIS — Z87.19 HX OF SMALL BOWEL OBSTRUCTION: ICD-10-CM

## 2022-04-01 DIAGNOSIS — G30.1 LATE ONSET ALZHEIMER'S DISEASE WITHOUT BEHAVIORAL DISTURBANCE (HCC): Chronic | ICD-10-CM

## 2022-04-01 PROCEDURE — 99308 SBSQ NF CARE LOW MDM 20: CPT | Performed by: PHYSICIAN ASSISTANT

## 2022-04-01 PROCEDURE — 1123F ACP DISCUSS/DSCN MKR DOCD: CPT | Performed by: PHYSICIAN ASSISTANT

## 2022-04-04 NOTE — PROGRESS NOTES
Brett Florez is a 80 y.o. female evaluated via telephone on 3/22/2022 for No chief complaint on file. .        Documentation:  I communicated with the patient and/or health care decision maker about patient's bilateral fingernails showing signs of onychomycosis. Patient 8 years old with advanced dementia. Nursing staff stating that she does have a habit of digging in posterior when soiled, could be increased vascularization due to stool or dark/grime buildup. There is distinct yellowing and poor nailbed adherence. Consistent with advanced onychomycosis. We will add ciclopirox cream 0.77% applied twice daily x4 weeks to bilateral nails. Instructed to soak nails in warm water prior to application. We will follow-up to monitor change in condition of nail health within the next 2 to 4 weeks. Details of this discussion including any medical advice provided: see above     Total Time: minutes: 11-20 minutes    Brett Florez was evaluated through a synchronous (real-time) audio encounter. Patient identification was verified at the start of the visit. She (or guardian if applicable) is aware that this is a billable service, which includes applicable co-pays. This visit was conducted with the patient's (and/or legal guardian's) verbal consent. She has not had a related appointment within my department in the past 7 days or scheduled within the next 24 hours. The patient was located in a state where the provider was licensed to provide care.     Note: not billable if this call serves to triage the patient into an appointment for the relevant concern    Ramon Flores

## 2022-04-05 ENCOUNTER — OFFICE VISIT (OUTPATIENT)
Dept: GERIATRIC MEDICINE | Age: 87
End: 2022-04-05
Payer: COMMERCIAL

## 2022-04-05 DIAGNOSIS — S61.309D PARTIAL AVULSION OF FINGERNAIL, SUBSEQUENT ENCOUNTER: ICD-10-CM

## 2022-04-05 DIAGNOSIS — B35.1 ONYCHOMYCOSIS: Primary | ICD-10-CM

## 2022-04-05 PROCEDURE — 99308 SBSQ NF CARE LOW MDM 20: CPT | Performed by: PHYSICIAN ASSISTANT

## 2022-04-05 PROCEDURE — 1123F ACP DISCUSS/DSCN MKR DOCD: CPT | Performed by: PHYSICIAN ASSISTANT

## 2022-04-07 NOTE — PROGRESS NOTES
Subjective:      Patient ID: David Holman is a pleasant 80 y.o. female who presents today for:  No chief complaint on file. [facility]    Patient is being seen today for bilateral finger onychomycosis, as well as RLE swelling and edema. Did evaluate patient's lower leg edema today, does not seem to be significant in comparison between both limbs. Currently no diuretics. Not wearing compression stockings. Patient's overall lower leg swelling is not significant. No new evidence of cellulitis or patient discomfort. Will monitor for the time being without any new interventions at the moment. Her bilateral fingernails are dirty and so beneath fingertips. Currently getting   Ciclopirox 0.77% bid. Does not currently seem to be making dramatic impact, however only a few days in so far. Patient continues to require frequent cleaning of fingernails due to her taking skin and soiled garments. Patient Active Problem List   Diagnosis    Essential hypertension    Stress incontinence    Hearing loss    Spinal stenosis of lumbar region    Acute thoracic back pain    Impaired mobility and activities of daily living    Acute leg pain, left    Acute right hip pain    PVD (peripheral vascular disease) ([address]    Nuclear senile cataract    Nonexudative age-related macular degeneration    Primary open angle glaucoma    DM (diabetes mellitus), type 2 with peripheral vascular complications ([address]    Lumbar stenosis    Gastroesophageal reflux disease    Hyponatremia    Ambulatory dysfunction    Glaucoma    Osteoarthritis    S/P kyphoplasty    Late onset Alzheimer's disease without behavioral disturbance (HCC)    Abnormality of gait and mobility due to severe spinal stenosis.     Leg edema, right    Age-related osteoporosis without current pathological fracture    Other constipation    PAF (paroxysmal atrial fibrillation) (HCC)    Age-related physical debility    Post-nasal drip    Generalized weakness    SBO (small bowel obstruction) ([facility]    Fall at home, initial encounter    Abnormal lung sounds    Osteoporosis without current pathological fracture    Urinary tract infection without hematuria     Past Medical History:   Diagnosis Date    Acute cystitis with hematuria 2019    Bowel obstruction (HCC) 2019    Chronic hyponatremia     Compression fracture of T12 vertebra ([facility]) 2013    kyphoplasty Dr Aleshia Mendez DJD (degenerative joint disease) of hip 3/20/2014    Dr Phoebe Atiknson DM (diabetes mellitus), type 2 with peripheral vascular complications ([facility])     diet controlled    Gallbladder sludge     GERD (gastroesophageal reflux disease) 8/31/10    Glaucoma     bilateral    H/O vascular surgery 2019    Stens DONYA 2006    Hearing loss     History of total hip replacement, right 2019    Dr Chitra Riley    Lower leg edema     chronic, R>L    Lumbar stenosis     Macular degeneration     left eye    PAF (paroxysmal atrial fibrillation) ([address]     Pain of left scapula 3/25/2019    PVD (peripheral vascular disease) ([address] 10/28/2006    Dr Mejia Mom, stents LE    Stress incontinence 10/28/2011     Past Surgical History:   Procedure Laterality Date    APPENDECTOMY      CYST REMOVAL  2016    DR SLOAN  RT THUMB MUCOUS CYST    HYSTERECTOMY      REFRACTIVE SURGERY      left     TOTAL HIP ARTHROPLASTY Right 9/25/15    DR. NARANJO    UPPER GASTROINTESTINAL ENDOSCOPY  10/07/15    DR Marty De Santiago    VERTEBROPLASTY      T12, Dr Jossue Pittman History     Socioeconomic History    Marital status: Single     Spouse name: Not on file    Number of children: 0    Years of education: Not on file    Highest education level: Not on file   Occupational History    Occupation: retired [employer]   Tobacco Use    Smoking status: Former Smoker     Types: Cigarettes     Quit date: 1980     Years since quittin.8    Smokeless tobacco: Never Used   Vaping Use  Vaping Use: Never used   Substance and Sexual Activity    Alcohol use: No     Alcohol/week: 0.0 standard drinks    Drug use: No    Sexual activity: Not Currently     Partners: Male   Other Topics Concern    Not on file   Social History Narrative    Born in [place], [place] background (both parents born in the [address])    Father was a [place] [employer] , teacher,  and  in [facility]     She never         Retired from [employer] alone in a house, no children    Former  at [employer] and [address] [place] [address] for [employer] for many years     Lives With: Alone(has 24 hour care)--[employer] caregiver          Lives [facility] [address] [facility] [address] in [facility] [facility] she states she would like [place] if SNF appropriate    However has always done well at [address]    Type of Home: [address]: One level    Home Access: Level entry    ADL Assistance: Needs assistance    Homemaking Assistance: Needs assistance    Ambulation Assistance: Independent(normally independent with ww )    Transfer Assistance: Independent(normally independent with occ assist to stabilize ww )    Active : No    Additional Comments: Pt with increased assistance required and recent fall. Social Determinants of Health     Financial Resource Strain:     Difficulty of Paying Living Expenses: Not on file   Food Insecurity:     Worried About Running Out of Food in the Last Year: Not on file    Jana of Food in the Last Year: Not on file   Transportation Needs:     Lack of Transportation (Medical): Not on file    Lack of Transportation (Non-Medical):  Not on file   Physical Activity:     Days of Exercise per Week: Not on file    Minutes of Exercise per Session: Not on file   Stress:     Feeling of Stress : Not on file   Social Connections:     Frequency of Communication with Friends and Family: Not on file    Frequency of Social Gatherings with Friends and Family: Not on file    Attends Protestant Services: Not on file    Active Member of Clubs or Organizations: Not on file    Attends Club or Organization Meetings: Not on file    Marital Status: Not on file   Intimate Partner Violence:     Fear of Current or Ex-Partner: Not on file    Emotionally Abused: Not on file    Physically Abused: Not on file    Sexually Abused: Not on file   Housing Stability:     Unable to Pay for Housing in the Last Year: Not on file    Number of [place] in the Last Year: Not on file    Unstable Housing in the Last Year: Not on file     Family History   Problem Relation Age of Onset    Heart Failure Mother         dec age 77    Hypertension Mother     Diabetes Father         dec age 67     No Known Allergies      Review of Systems   Unable to perform ROS: Dementia       Objective:   LMP  (LMP Unknown)   See [facility] for most recent VS. Reviewed. WNL. Physical Exam  Constitutional:       General: She is not in acute distress. Appearance: She is not ill-appearing. HENT:      Head: Normocephalic and atraumatic. Mouth/Throat:      Mouth: Mucous membranes are moist.      Pharynx: Oropharynx is clear. Eyes:      Conjunctiva/sclera: Conjunctivae normal.   Skin:     General: Skin is warm and dry. Comments: Yellowing of fingernails with increased soiling beneath tips of fingernails. Fingernails ~1-2cm length   Neurological:      Mental Status: She is alert. Mental status is at baseline. She is disoriented. Motor: Weakness present. Gait: Gait abnormal.   Psychiatric:         Mood and Affect: Mood normal.         Assessment:       Diagnosis Orders   1. Onychomycosis     2. Assistance needed for personal hygiene           Plan:          Continue ciclopirox for full duration of treatment. Continue with assistance of hand hygiene. We will follow-up to assess efficacy of treatment at later date.     No follow-ups on file.      Side effects, adverse effects of the medication prescribed today, as well as treatment plan and result expectations have been discussed withthe patient who expresses understanding and desires to proceed.     Ramon Obrien

## 2022-04-26 NOTE — PROGRESS NOTES
Subjective:      Patient ID: Jey Clark is a pleasant 80 y.o. female who presents today for:  No chief complaint on file. [facility]  Patient seen today for monthly follow-up visit for chronic medical conditions including recent onychomycosis bilateral hands, PAF, and history of SBO/constipation. Patient's bowel sounds are normal, she is eating adequately. Bowel habits are maintained at baseline with current intake and meds. .  Weight has been stable but slowly declining over the past several months. Ongoing functional decline. On examination patient's heart rate is mostly regular with a few ectopic beats noted. Denies any significant discomfort, however ROS unreliable secondary to patient dementia. Does not show any acute agitation. Nursing staff report no new significant concerns at the moment. Patient is being treated with topical antifungal for onychomycosis. We will continue following up for changes in nail condition. Patient Active Problem List   Diagnosis    Essential hypertension    Stress incontinence    Hearing loss    Spinal stenosis of lumbar region    Acute thoracic back pain    Impaired mobility and activities of daily living    Acute leg pain, left    Acute right hip pain    PVD (peripheral vascular disease) ([address]    Nuclear senile cataract    Nonexudative age-related macular degeneration    Primary open angle glaucoma    DM (diabetes mellitus), type 2 with peripheral vascular complications ([address]    Lumbar stenosis    Gastroesophageal reflux disease    Hyponatremia    Ambulatory dysfunction    Glaucoma    Osteoarthritis    S/P kyphoplasty    Late onset Alzheimer's disease without behavioral disturbance (HCC)    Abnormality of gait and mobility due to severe spinal stenosis.     Leg edema, right    Age-related osteoporosis without current pathological fracture    Other constipation    PAF (paroxysmal atrial fibrillation) (HCC)    Age-related physical debility    Post-nasal drip    Generalized weakness    SBO (small bowel obstruction) ([address]    Fall at home, initial encounter    Abnormal lung sounds    Osteoporosis without current pathological fracture    Urinary tract infection without hematuria     Past Medical History:   Diagnosis Date    Acute cystitis with hematuria 2019    Bowel obstruction (HCC) 2019    Chronic hyponatremia     Compression fracture of T12 vertebra ([facility]) 2013    kyphoplasty Dr Kelli Bradshaw    DJD (degenerative joint disease) of hip 3/20/2014    Dr Chilango Ulrich DM (diabetes mellitus), type 2 with peripheral vascular complications ([facility])     diet controlled    Gallbladder sludge     GERD (gastroesophageal reflux disease) 8/31/10    Glaucoma     bilateral    H/O vascular surgery 2019    Stens LE 2006    Hearing loss     History of total hip replacement, right 2019    Dr Edelmira Lorenzo    Lower leg edema     chronic, R>L    Lumbar stenosis     Macular degeneration     left eye    PAF (paroxysmal atrial fibrillation) ([address] 2019    Pain of left scapula 3/25/2019    PVD (peripheral vascular disease) ([address] 10/28/2006    Dr Kim Chen, stents LE    Stress incontinence 10/28/2011     Past Surgical History:   Procedure Laterality Date    APPENDECTOMY      CYST REMOVAL  2016    DR SLOAN  RT THUMB MUCOUS CYST    HYSTERECTOMY      REFRACTIVE SURGERY      left     TOTAL HIP ARTHROPLASTY Right 9/25/15    DR. NARANJO    UPPER GASTROINTESTINAL ENDOSCOPY  10/07/15    DR Rima Neumann    VERTEBROPLASTY      T12, Dr Teri Grey History     Socioeconomic History    Marital status: Single     Spouse name: Not on file    Number of children: 0    Years of education: Not on file    Highest education level: Not on file   Occupational History    Occupation: retired [employer]   Tobacco Use    Smoking status: Former Smoker     Types: Cigarettes     Quit date: 1980     Years since quittin.9    Smokeless tobacco: Never Used   Vaping Use    Vaping Use: Never used   Substance and Sexual Activity    Alcohol use: No     Alcohol/week: 0.0 standard drinks    Drug use: No    Sexual activity: Not Currently     Partners: Male   Other Topics Concern    Not on file   Social History Narrative    Born in [place] background (both parents born in the [address])    Father was a [place] Sikh[employer] , teacher,  and  in [facility]     She never         Retired from [employer] alone in a house, no children    Former  at [employer] and [address] [place] [address] for [employer] for many years     Lives With: Alone(has 24 hour care)--[employer] caregiver          Lives [place] [address] in [facility] she states she would like [facility] if SNF appropriate    However has always done well at [address]    Type of Home: [address] [place] [address] Avenue: One level    Home Access: Level entry    ADL Assistance: Needs assistance    Homemaking Assistance: Needs assistance    Ambulation Assistance: Independent(normally independent with ww )    Transfer Assistance: Independent(normally independent with occ assist to stabilize ww )    Active : No    Additional Comments: Pt with increased assistance required and recent fall. Social Determinants of Health     Financial Resource Strain:     Difficulty of Paying Living Expenses: Not on file   Food Insecurity:     Worried About Running Out of Food in the Last Year: Not on file    Jana of Food in the Last Year: Not on file   Transportation Needs:     Lack of Transportation (Medical): Not on file    Lack of Transportation (Non-Medical):  Not on file   Physical Activity:     Days of Exercise per Week: Not on file    Minutes of Exercise per Session: Not on file   Stress:     Feeling of Stress : Not on file   Social Connections:     Frequency of Communication with Friends and Family: Not on file    Frequency of Social Gatherings with Friends and Family: Not on file    Attends Taoist Services: Not on file    Active Member of Clubs or Organizations: Not on file    Attends Club or Organization Meetings: Not on file    Marital Status: Not on file   Intimate Partner Violence:     Fear of Current or Ex-Partner: Not on file    Emotionally Abused: Not on file    Physically Abused: Not on file    Sexually Abused: Not on file   Housing Stability:     Unable to Pay for Housing in the Last Year: Not on file    Number of [place] in the Last Year: Not on file    Unstable Housing in the Last Year: Not on file     Family History   Problem Relation Age of Onset    Heart Failure Mother         dec age 77    Hypertension Mother     Diabetes Father         dec age 67     No Known Allergies      Review of Systems   Unable to perform ROS: Dementia       Objective:   LMP  (LMP Unknown)     Physical Exam  Vitals reviewed. Constitutional:       General: She is not in acute distress. Appearance: She is normal weight. She is not ill-appearing. HENT:      Head: Normocephalic and atraumatic. Mouth/Throat:      Mouth: Mucous membranes are moist.      Pharynx: Oropharynx is clear. Eyes:      Conjunctiva/sclera: Conjunctivae normal.      Pupils: Pupils are equal, round, and reactive to light. Cardiovascular:      Rate and Rhythm: Normal rate. Occasional extrasystoles are present. Pulses:           Dorsalis pedis pulses are 1+ on the right side and 1+ on the left side. Posterior tibial pulses are 1+ on the right side and 1+ on the left side. Skin:     General: Skin is warm and dry. Comments: Chronic yellowing of nails; soiled underneath   Neurological:      Mental Status: She is alert. Mental status is at baseline. She is disoriented. Motor: Weakness present.       Gait: Gait abnormal.   Psychiatric:         Mood and Affect: Mood normal. Assessment:       Diagnosis Orders   1. PAF (paroxysmal atrial fibrillation) ([facility]     2. Late onset Alzheimer's disease without behavioral disturbance ([facility]     3. Onychomycosis     4. Hx of small bowel obstruction     5. Hx of constipation           Plan:          No new med changes. No new evidence of RVR or significant cardiac change. Ongoing functional decline, however no acute stepwise decline in cognitive status. Alert and oriented 1/3. We will continue topical antifungal.  Will monitor. No new bowel changes. Diet and monitor for acute change we will add further as needed meds if needed for any constipation issues. No follow-ups on file. Side effects, adverse effects of the medication prescribed today, as well as treatment plan and result expectations have been discussed withthe patient who expresses understanding and desires to proceed.     Surya Gordillo, [address]

## 2022-05-01 NOTE — PROGRESS NOTES
Subjective:      Patient ID: Prince Avalos is a pleasant 80 y.o. female who presents today for:  No chief complaint on file. [facility]    Patient seen today for ongoing monitoring of onychomycosis of bilateral fingernails. There is some improvement, albeit mild. She still has increased debris under her fingernails. Has been maintained long. We will plan on cutting her nails shorter to avoid buildup of debris. There continues to be mild yellowing, however is more improvement. Patient unable to establish any ROS today secondary to dementia. We will continue antifungal topically for 7 more days while cutting nails shorter. No new additional issues otherwise. Patient Active Problem List   Diagnosis    Essential hypertension    Stress incontinence    Hearing loss    Spinal stenosis of lumbar region    Acute thoracic back pain    Impaired mobility and activities of daily living    Acute leg pain, left    Acute right hip pain    PVD (peripheral vascular disease) ([address]    Nuclear senile cataract    Nonexudative age-related macular degeneration    Primary open angle glaucoma    DM (diabetes mellitus), type 2 with peripheral vascular complications ([address]    Lumbar stenosis    Gastroesophageal reflux disease    Hyponatremia    Ambulatory dysfunction    Glaucoma    Osteoarthritis    S/P kyphoplasty    Late onset Alzheimer's disease without behavioral disturbance (HCC)    Abnormality of gait and mobility due to severe spinal stenosis.     Leg edema, right    Age-related osteoporosis without current pathological fracture    Other constipation    PAF (paroxysmal atrial fibrillation) (HCC)    Age-related physical debility    Post-nasal drip    Generalized weakness    SBO (small bowel obstruction) ([address]    Fall at home, initial encounter    Abnormal lung sounds    Osteoporosis without current pathological fracture    Urinary tract infection without hematuria     Past Medical History:   Diagnosis Date    Acute cystitis with hematuria 2019    Bowel obstruction ([address] 2019    Chronic hyponatremia     Compression fracture of T12 vertebra [facility]) 2013    kyphoplasty Dr Chio Cabral    DJD (degenerative joint disease) of hip 3/20/2014    Dr Chaparro Jones DM (diabetes mellitus), type 2 with peripheral vascular complications (HCC)     diet controlled    Gallbladder sludge     GERD (gastroesophageal reflux disease) 8/31/10    Glaucoma     bilateral    H/O vascular surgery 2019    Stens DONYA 2006    Hearing loss     History of total hip replacement, right 2019    Dr Cole Quiroz    Lower leg edema     chronic, R>L    Lumbar stenosis     Macular degeneration     left eye    PAF ([facility]oxysmal [facility] fibrillation) ([facility]     Pain of left scapula 3/25/2019    PVD (peripheral vascular disease) ([facility] 10/28/2006    Dr Mike Lucio, stents LE    Stress incontinence 10/28/2011     Past Surgical History:   Procedure Laterality Date    APPENDECTOMY      CYST REMOVAL  2016    DR SLOAN  RT THUMB MUCOUS CYST    HYSTERECTOMY      REFRACTIVE SURGERY      left     TOTAL HIP ARTHROPLASTY Right 9/25/15    DR. NARANJO    UPPER GASTROINTESTINAL ENDOSCOPY  10/07/15    DR Bronwyn Sandhoff    VERTEBROPLASTY  2013    T12, Dr Thony Porras History     Socioeconomic History    Marital status: Single     Spouse name: Not on file    Number of children: 0    Years of education: Not on file    Highest education level: Not on file   Occupational History    Occupation: retired [employer]   Tobacco Use    Smoking status: Former Smoker     Types: Cigarettes     Quit date: 1980     Years since quittin.9    Smokeless tobacco: Never Used   Vaping Use    Vaping Use: Never used   Substance and Sexual Activity    Alcohol use: No     Alcohol/week: 0.0 standard drinks    Drug use: No    Sexual activity: Not Currently     Partners: Male   Other Topics Concern    Not on file Social History Narrative    Born in [place] background [place]    Father was a [place] [employer] , teacher,  and  in [facility]     She never         Retired from [employer] alone in a house, no children    Former  at [employer] and [address] [place] [address] for [employer] for many years     Lives With: Alone(has 24 hour care)--June [employer] caregiver          Lives [id number] in [facility] she states she would like [facility] if SNF appropriate    However has always done well at [address]    Type of [address]: [address]: One level    Home Access: Level entry    ADL Assistance: Needs assistance    Homemaking Assistance: Needs assistance    Ambulation Assistance: Independent(normally independent with ww )    Transfer Assistance: Independent(normally independent with occ assist to stabilize ww )    Active : No    Additional Comments: Pt with increased assistance required and recent fall. Social Determinants of Health     Financial Resource Strain:     Difficulty of Paying Living Expenses: Not on file   Food Insecurity:     Worried About Running Out of Food in the Last Year: Not on file    Jana of Food in the Last Year: Not on file   Transportation Needs:     Lack of Transportation (Medical): Not on file    Lack of Transportation (Non-Medical):  Not on file   Physical Activity:     Days of Exercise per Week: Not on file    Minutes of Exercise per Session: Not on file   Stress:     Feeling of Stress : Not on file   Social Connections:     Frequency of Communication with Friends and Family: Not on file    Frequency of Social Gatherings with Friends and Family: Not on file    Attends Evangelical Services: Not on file    Active Member of Clubs or Organizations: Not on file    Attends Club or Organization Meetings: Not on file    Marital Status: Not on file   Intimate Partner Violence:     Fear of Current or Ex-Partner: Not on file    Emotionally Abused: Not on file    Physically Abused: Not on file    Sexually Abused: Not on file   Housing Stability:     Unable to Pay for Housing in the Last Year: Not on file    Number of [place] in the Last Year: Not on file    Unstable Housing in the Last Year: Not on file     Family History   Problem Relation Age of Onset    Heart Failure Mother         dec age 77    Hypertension Mother     Diabetes Father         dec age 67     No Known Allergies      Review of Systems   Unable to perform ROS: Dementia       Objective:   LMP  (LMP Unknown)     Physical Exam  Vitals reviewed. Constitutional:       General: She is not in acute distress. Appearance: Normal appearance. She is not ill-appearing. HENT:      Head: Normocephalic and atraumatic. Eyes:      Conjunctiva/sclera: Conjunctivae normal.   Cardiovascular:      Rate and Rhythm: Normal rate. Abdominal:      General: Abdomen is flat. Bowel sounds are normal.   Skin:     General: Skin is warm and dry. Coloration: Skin is pale. Comments: Mild yellowing to bilatral nails, improving albeit only mildly   Neurological:      Mental Status: She is alert. Mental status is at baseline. She is disoriented. Assessment:       Diagnosis Orders   1. Onychomycosis     2. Partial avulsion of fingernail, subsequent encounter           Plan: Will continue topical antifungal.    Will cut fingernails to appropriate length to avoid debris. Monitor for improvement. No follow-ups on file. Side effects, adverse effects of the medication prescribed today, as well as treatment plan and result expectations have been discussed withthe patient who expresses understanding and desires to proceed.     Merlin Camara, [employer]

## 2022-05-03 ENCOUNTER — OFFICE VISIT (OUTPATIENT)
Dept: GERIATRIC MEDICINE | Age: 87
End: 2022-05-03
Payer: COMMERCIAL

## 2022-05-03 DIAGNOSIS — L60.8 DISCOLORATION OF NAILBEDS: Primary | ICD-10-CM

## 2022-05-03 DIAGNOSIS — B35.1 ONYCHOMYCOSIS: ICD-10-CM

## 2022-05-03 PROCEDURE — 99308 SBSQ NF CARE LOW MDM 20: CPT | Performed by: PHYSICIAN ASSISTANT

## 2022-05-03 PROCEDURE — 1123F ACP DISCUSS/DSCN MKR DOCD: CPT | Performed by: PHYSICIAN ASSISTANT

## 2022-05-16 ENCOUNTER — OFFICE VISIT (OUTPATIENT)
Dept: GERIATRIC MEDICINE | Age: 87
End: 2022-05-16
Payer: COMMERCIAL

## 2022-05-16 DIAGNOSIS — I73.9 PVD (PERIPHERAL VASCULAR DISEASE) (HCC): ICD-10-CM

## 2022-05-16 DIAGNOSIS — I48.0 PAF (PAROXYSMAL ATRIAL FIBRILLATION) (HCC): Primary | Chronic | ICD-10-CM

## 2022-05-16 DIAGNOSIS — F02.80 LATE ONSET ALZHEIMER'S DISEASE WITHOUT BEHAVIORAL DISTURBANCE (HCC): Chronic | ICD-10-CM

## 2022-05-16 DIAGNOSIS — G30.1 LATE ONSET ALZHEIMER'S DISEASE WITHOUT BEHAVIORAL DISTURBANCE (HCC): Chronic | ICD-10-CM

## 2022-05-16 PROCEDURE — 99309 SBSQ NF CARE MODERATE MDM 30: CPT | Performed by: INTERNAL MEDICINE

## 2022-05-16 PROCEDURE — 1123F ACP DISCUSS/DSCN MKR DOCD: CPT | Performed by: INTERNAL MEDICINE

## 2022-05-23 NOTE — PROGRESS NOTES
Subjective:      Patient ID: Hazel Wang is a pleasant 80 y.o. female who presents today for:  No chief complaint on file. [facility]  Following up today for patient's bilateral onychomycosis. Did attempt to utilize Ciclopirox 0.77% bid, however was only mildly effective. Patient does continue to take at her undergarments and does soil her hands frequently throughout the day. Secondary to dementia and confusion. Nursing staff to do their best to cleanse hands as needed. Will DC order today as it seems to be ineffective and/or poorly placed utilization of energy towards patient's issue. We will attempt to have patient's hands routinely monitored and frequently cleaned with soap and water and under nailbeds as well as shortening nails to disallow for buildup of debris beneath nailbeds. No new changes otherwise. Patient Active Problem List   Diagnosis    Essential hypertension    Stress incontinence    Hearing loss    Spinal stenosis of lumbar region    Acute thoracic back pain    Impaired mobility and activities of daily living    Acute leg pain, left    Acute right hip pain    PVD (peripheral vascular disease) ([address]    Nuclear senile cataract    Nonexudative age-related macular degeneration    Primary open angle glaucoma    DM (diabetes mellitus), type 2 with peripheral vascular complications ([address]    Lumbar stenosis    Gastroesophageal reflux disease    Hyponatremia    Ambulatory dysfunction    Glaucoma    Osteoarthritis    S/P kyphoplasty    Late onset Alzheimer's disease without behavioral disturbance (HCC)    Abnormality of gait and mobility due to severe spinal stenosis.     Leg edema, right    Age-related osteoporosis without current pathological fracture    Other constipation    PAF (paroxysmal atrial fibrillation) (HCC)    Age-related physical debility    Post-nasal drip    Generalized weakness    SBO (small bowel obstruction) ([address]    Fall at home, initial encounter    Abnormal lung sounds    Osteoporosis without current pathological fracture    Urinary tract infection without hematuria     Past Medical History:   Diagnosis Date    Acute cystitis with hematuria 2019    Bowel obstruction (HCC) 2019    Chronic hyponatremia     Compression fracture of T12 vertebra (HCC) 2013    kyphoplasty Dr Carlito Mathew    DJD (degenerative joint disease) of hip 3/20/2014    Dr Nadege Dickens DM (diabetes mellitus), type 2 with peripheral vascular complications (HCC)     diet controlled    Gallbladder sludge     GERD (gastroesophageal reflux disease) 8/31/10    Glaucoma     bilateral    H/O vascular surgery 2019    Stenjune NAQVI 2006    Hearing loss     History of total hip replacement, right 2019    Dr Terra Gomes    Lower leg edema     chronic, R>L    Lumbar stenosis     Macular degeneration     left eye    PAF (paroxysmal atrial fibrillation) ([address]     Pain of left scapula 3/25/2019    PVD (peripheral vascular disease) ([address] 10/28/2006    Dr Birgit Pina, stents LE    Stress incontinence 10/28/2011     Past Surgical History:   Procedure Laterality Date    APPENDECTOMY      CYST REMOVAL  2016    DR SLOAN  RT THUMB MUCOUS CYST    HYSTERECTOMY      REFRACTIVE SURGERY      left     TOTAL HIP ARTHROPLASTY Right 9/25/15    DR. NARANJO    UPPER GASTROINTESTINAL ENDOSCOPY  10/07/15    DR Marino Gupta    VERTEBROPLASTY      T12, Dr Emmie Giang History     Socioeconomic History    Marital status: Single     Spouse name: Not on file    Number of children: 0    Years of education: Not on file    Highest education level: Not on file   Occupational History    Occupation: retired [employer]   Tobacco Use    Smoking status: Former Smoker     Types: Cigarettes     Quit date: 1980     Years since quittin.0    Smokeless tobacco: Never Used   Vaping Use    Vaping Use: Never used   Substance and Sexual Activity    Alcohol use:  No Alcohol/week: 0.0 standard drinks    Drug use: No    Sexual activity: Not Currently     Partners: Male   Other Topics Concern    Not on file   Social History Narrative    Born in [place], [place] background (both parents born in the [address])    Father was a [place] Rast[employer]arian , teacher,  and  in [facility]     She never         Retired from [employer] alone in a house, no children    Former  at [address] for [employer] for many years     Lives With: Alone(has 24 hour care)--[employer] caregiver          Lives [id number] in [facility] she states she would like [facility] if SNF appropriate    However has always done well at [address]    Type of Home: [address]: One level    Home Access: Level entry    ADL Assistance: Needs assistance    Homemaking Assistance: Needs assistance    Ambulation Assistance: Independent(normally independent with ww )    Transfer Assistance: Independent(normally independent with occ assist to stabilize ww )    Active : No    Additional Comments: Pt with increased assistance required and recent fall. Social Determinants of Health     Financial Resource Strain:     Difficulty of Paying Living Expenses: Not on file   Food Insecurity:     Worried About Running Out of Food in the Last Year: Not on file    Jana of Food in the Last Year: Not on file   Transportation Needs:     Lack of Transportation (Medical): Not on file    Lack of Transportation (Non-Medical):  Not on file   Physical Activity:     Days of Exercise per Week: Not on file    Minutes of Exercise per Session: Not on file   Stress:     Feeling of Stress : Not on file   Social Connections:     Frequency of Communication with Friends and Family: Not on file    Frequency of Social Gatherings with Friends and Family: Not on file    Attends Quaker Services: Not on file    Active Member of Clubs or Organizations: Not on file    Attends Club or Organization Meetings: Not on file    Marital Status: Not on file   Intimate Partner Violence:     Fear of Current or Ex-Partner: Not on file    Emotionally Abused: Not on file    Physically Abused: Not on file    Sexually Abused: Not on file   Housing Stability:     Unable to Pay for Housing in the Last Year: Not on file    Number of [place] in the Last Year: Not on file    Unstable Housing in the Last Year: Not on file     Family History   Problem Relation Age of Onset    Heart Failure Mother         dec age 77    Hypertension Mother     Diabetes Father         dec age 67     No Known Allergies      Review of Systems   Unable to perform ROS: Dementia       Objective:   VSS. See [facility]. Physical Exam  Vitals reviewed. Constitutional:       General: She is not in acute distress. Appearance: Normal appearance. She is not ill-appearing. HENT:      Head: Normocephalic and atraumatic. Eyes:      Conjunctiva/sclera: Conjunctivae normal.   Cardiovascular:      Rate and Rhythm: Normal rate. Abdominal:      General: Abdomen is flat. Bowel sounds are normal.   Skin:     General: Skin is warm and dry. Coloration: Skin is pale. Comments: Mild yellowing to bilateral nails, improving albeit only mildly. Continues to have buildip of material underneath nails. Neurological:      Mental Status: She is alert. Mental status is at baseline. She is disoriented. Assessment:       Diagnosis Orders   1. Discoloration of nailbeds     2. Onychomycosis           Plan:          Discontinue ciclopirox. Continue intensive nail and finger/hand washing. Shorten nails to appropriate length to mitigate debris's. No follow-ups on file.       Side effects, adverse effects of the medication prescribed today, as well as treatment plan and result expectations have been discussed withthe patient who expresses understanding and desires to proceed.     Ramon Domingo

## 2022-06-14 ENCOUNTER — OFFICE VISIT (OUTPATIENT)
Dept: GERIATRIC MEDICINE | Age: 87
End: 2022-06-14
Payer: COMMERCIAL

## 2022-06-14 DIAGNOSIS — I73.9 PVD (PERIPHERAL VASCULAR DISEASE) (HCC): ICD-10-CM

## 2022-06-14 DIAGNOSIS — Z91.81 HX OF FALLING: ICD-10-CM

## 2022-06-14 DIAGNOSIS — I48.0 PAF (PAROXYSMAL ATRIAL FIBRILLATION) (HCC): Primary | Chronic | ICD-10-CM

## 2022-06-14 DIAGNOSIS — Z87.19 HX OF SMALL BOWEL OBSTRUCTION: ICD-10-CM

## 2022-06-14 DIAGNOSIS — R53.1 WEAKNESS GENERALIZED: ICD-10-CM

## 2022-06-14 PROCEDURE — 1123F ACP DISCUSS/DSCN MKR DOCD: CPT | Performed by: PHYSICIAN ASSISTANT

## 2022-06-14 PROCEDURE — 99308 SBSQ NF CARE LOW MDM 20: CPT | Performed by: PHYSICIAN ASSISTANT

## 2022-06-16 NOTE — PROGRESS NOTES
SUBJECTIVE:  This 8-year-old woman seen in room patient is at her baseline pleasant functional interactive patient has no evidence of weight loss notes no change in her mentation no agitation no evidence of new upper restaurant fraction has had prior COVID infection functionally stable at this time. Pain-free. ROS: Cognition  The rest of the 14 point ROS negative    PHYSICAL EXAM: VSS per facility record  #Pupils are small poorly reactive oral mucosa was moist neck was supple no thyromegaly no carotid bruits chest showed faint extra wheezing cardiovascular showed a regular rate abdomen soft nontender extremity trace peripheral pulse skin showed no rash    ASSESSMENT & PLAN:   Diagnosis Orders   1. PAF (paroxysmal atrial fibrillation) ([address]     2. Late onset Alzheimer's disease without behavioral disturbance ([address]     3.  PVD (peripheral vascular disease) ([address]         Continue supportive care has been on donepezil in the past consider low-dose NMDA antagonist.  Continue monitor for endorgan disease notes no acute RVR monitoring closely patient is at this change of age continue conservative management          Past Medical History:   Diagnosis Date    Acute cystitis with hematuria 8/17/2019    Bowel obstruction (HCC) 9/22/2019    Chronic hyponatremia     Compression fracture of T12 vertebra [facility]) 2013    kyphoplasty Dr Mariella Chopra DJD (degenerative joint disease) of hip 3/20/2014    Dr Anita Awan DM (diabetes mellitus), type 2 with peripheral vascular complications ([address]     diet controlled    Gallbladder sludge 2019    GERD (gastroesophageal reflux disease) 8/31/10    Glaucoma     bilateral    H/O vascular surgery 5/9/2019    Josie NAQVI 2006    Hearing loss     History of total hip replacement, right 05/09/2019    Dr Gomez [place]    Lower leg edema     chronic, R>L    Lumbar stenosis     Macular degeneration     left eye    PAF (paroxysmal atrial fibrillation) ([address] 2019    Pain of left scapula 3/25/2019    PVD (peripheral vascular disease) [facility] 10/28/2006    Dr Jennifer Castaneda, stents LE    Stress incontinence 10/28/2011         Past Surgical History:   Procedure Laterality Date    APPENDECTOMY      CYST REMOVAL  06/27/2016    DR SLOAN  RT THUMB MUCOUS CYST    HYSTERECTOMY      REFRACTIVE SURGERY  [id number]    left     TOTAL HIP ARTHROPLASTY Right 9/25/15    DR. NARANJO    UPPER GASTROINTESTINAL ENDOSCOPY  10/07/15    DR Hilda Bowie    VERTEBROPLASTY  2013    T12, Dr Yohan Zelaya         Current Outpatient Medications on File Prior to Visit   Medication Sig Dispense Refill    omeprazole (PRILOSEC) 20 MG delayed release capsule TAKE 1 CAPSULE BY MOUTH EVERY DAY 90 capsule 1    Misc. Devices (COMMODE BEDSIDE) MISC Misc. Devices (COMMODE BEDSIDE) MISC Misc. Devices (COMMODE BEDSIDE) MISC Indications: Weakness , Spinal stenosis of lumbar region, unspecified whether neurogenic claudication present , Impaired mobility and activities of daily living , Abnormality of gait and mobility , Late onset Alzheimer's disease without behavioral disturbance (HCC) , Age-related physical debility , Primary osteoarthritis involving multiple joints As directed 1 each 0 11/22/2019 Active 11-  [facility]) ([address])     [address] by NOT APPLICABLE route      sennosides-docusate sodium (SENOKOT-S) 8.6-50 MG tablet Take 1 tablet by mouth daily 20 tablet 0    docusate sodium (COLACE) 100 MG capsule Take 1 capsule by mouth 2 times daily 60 capsule 2    Calcium Carbonate Antacid (TUMS CHEWY DELIGHTS PO) Take 1 tablet by mouth daily       vitamin D (CHOLECALCIFEROL) 1000 UNIT TABS tablet Take 1,000 Units by mouth daily      brimonidine (ALPHAGAN) 0.2 % ophthalmic solution Place 1 drop into both eyes 2 times daily   11    timolol (TIMOPTIC-XE) 0.5 % ophthalmic gel-forming USE 1 DROP IN BOTH EYES TWICE DAILY.   11    acetaminophen (TYLENOL) 325 MG tablet Take 1 tablet by mouth 3 times daily as needed for Pain (DO NOT EXCEED 4GM IN 24 HOURS) 90 tablet 0    Multiple Vitamin (MULTI VITAMIN DAILY) TABS Take 1 tablet by mouth every morning       No current facility-administered medications on file prior to visit.          Family History   Problem Relation Age of Onset    Heart Failure Mother         dec age 77    Hypertension Mother     Diabetes Father         dec age 67       Social History     Socioeconomic History    Marital status: Single     Spouse name: Not on file    Number of children: 0    Years of education: Not on file    Highest education level: Not on file   Occupational History    Occupation: retired [employer]   Tobacco Use    Smoking status: Former Smoker     Types: Cigarettes     Quit date: 1980     Years since quittin.0    Smokeless tobacco: Never Used   Vaping Use    Vaping Use: Never used   Substance and Sexual Activity    Alcohol use: No     Alcohol/week: 0.0 standard drinks    Drug use: No    Sexual activity: Not Currently     Partners: Male   Other Topics Concern    Not on file   Social History Narrative    Born in [place], [facility] background (both parents born in the [address])    Father was a [facility] [employer] , teacher,  and  in [employer]     She never         Retired from [employer] alone in a house, no children    Former  at [address] for [facility] [employer] [facility] [employer] for many years     Lives With: Alone(has 24 hour care)-- [employer] caregiver          Lives [place] [address] [address] in [facility] she states she would like [email or web address] if SNF appropriate    However has always done well at [address]    Type of Home: [address] [place] [address] Avenue: One level    Home Access: Level entry    ADL Assistance: Needs assistance    Homemaking Assistance: Needs assistance    Ambulation Assistance: Independent(normally independent with ww )    Transfer Assistance: Independent(normally independent with occ assist to stabilize ww )    Active : No    Additional Comments: Pt with increased assistance required and recent fall. Social Determinants of Health     Financial Resource Strain:     Difficulty of Paying Living Expenses: Not on file   Food Insecurity:     Worried About Running Out of Food in the Last Year: Not on file    Jana of Food in the Last Year: Not on file   Transportation Needs:     Lack of Transportation (Medical): Not on file    Lack of Transportation (Non-Medical):  Not on file   Physical Activity:     Days of Exercise per Week: Not on file    Minutes of Exercise per Session: Not on file   Stress:     Feeling of Stress : Not on file   Social Connections:     Frequency of Communication with Friends and Family: Not on file    Frequency of Social Gatherings with Friends and Family: Not on file    Attends Restorationism Services: Not on file    Active Member of [address] [place] [address] [employer] or Organizations: Not on file    Attends Club or Organization Meetings: Not on file    Marital Status: Not on file   Intimate Partner Violence:     Fear of Current or Ex-Partner: Not on file    Emotionally Abused: Not on file    Physically Abused: Not on file    Sexually Abused: Not on file   Housing Stability:     Unable to Pay for Housing in the Last Year: Not on file    Number of [place] in the Last Year: Not on file    Unstable Housing in the Last Year: Not on file         Lab Results   Component Value Date    LABA1C 5.7 02/23/2020     No results found for: EAG    Lab Results   Component Value Date     01/13/2022    K 5.0 01/13/2022    K 4.4 02/23/2020     01/13/2022    CO2 24 01/13/2022    BUN 17 01/13/2022    CREATININE 0.6 01/13/2022    GLUCOSE 87 01/13/2022    GLUCOSE 156 05/09/2012    CALCIUM 9.6 01/13/2022        Lab Results   Component Value Date    CHOL 172 03/02/2020    CHOL 161 08/16/2016    CHOL 168 01/11/2015     Lab Results   Component Value Date    TRIG 114 03/02/2020 TRIG 93 08/16/2016    TRIG 167 01/11/2015     Lab Results   Component Value Date    HDL 49 03/02/2020    HDL 64 (H) 08/16/2016    HDL 63 (H) 01/11/2015     Lab Results   Component Value Date    LDLCALC 100 03/02/2020    LDLCALC 78 08/16/2016    LDLCALC 72 01/11/2015     Lab Results   Component Value Date    VLDL 23 03/02/2020     Lab Results   Component Value Date    CHOLHDLRATIO 2.0 03/02/2020    CHOLHDLRATIO 2.7 05/09/2012    CHOLHDLRATIO 3.2 10/20/2011       Lab Results   Component Value Date    TSH 3.520 01/11/2015       Lab Results   Component Value Date    WBC 8.4 03/02/2020    HGB 11.2 (A) 03/02/2020    HCT 34.1 (A) 03/02/2020    MCV 94.8 03/02/2020     03/02/2020       Please note orders entered on site at facility after discussion with appropriate facility nursing/therapy/ / nutritional staff. Current longstanding medical problems and acute medical issues addressed with staff. Available data and data elements in on site paper chart reviewed and analyzed. Current external consultant notes reviewed in on site chart. Ordered laboratory testing and imaging will be reviewed when available.

## 2022-07-07 NOTE — PROGRESS NOTES
Subjective:      Patient ID: Js Panda is a pleasant 80 y.o. female who presents today for:  No chief complaint on file. [facility]    Monthly follow-up visit for chronic conditions including PAF, PVD, hx of SBO, and generalized weakness/history of falls. Patient currently resides in a wheelchair and ambulates despite exclusively. Full assist with transfers and all ADLs other than eating which she does receive assistance with as well albeit minimal.  Lower extremities appear to be well at this time. Pulses within normal limits. No evident worsening edema. Patient currently not on any anticoagulant or rate control medications for PAF. Rate is regular with a few ectopic beats noted. Patient's weight has been well maintained eating adequately albeit irregularly. No evident change in bowel habits per nurse. Patient has extensive dementia, unable to provide any reliable history. Overall patient appears to be stable from a clinical standpoint we will continue current care plan and follow-up monthly and as needed. Patient Active Problem List   Diagnosis    Essential hypertension    Stress incontinence    Hearing loss    Spinal stenosis of lumbar region    Acute thoracic back pain    Impaired mobility and activities of daily living    Acute leg pain, left    Acute right hip pain    PVD (peripheral vascular disease) ([address]    Nuclear senile cataract    Nonexudative age-related macular degeneration    Primary open angle glaucoma    DM (diabetes mellitus), type 2 with peripheral vascular complications ([address]    Lumbar stenosis    Gastroesophageal reflux disease    Hyponatremia    Ambulatory dysfunction    Glaucoma    Osteoarthritis    S/P kyphoplasty    Late onset Alzheimer's disease without behavioral disturbance (HCC)    Abnormality of gait and mobility due to severe spinal stenosis.     Leg edema, right    Age-related osteoporosis without current pathological fracture    Other constipation    [facility]ox[facility]) [facility]    Age-related physical debility    Post-nasal drip    Generalized weakness    SBO (small bowel obstruction) ([address]    Fall at home, initial encounter    Abnormal lung sounds    Osteoporosis without current pathological fracture    Urinary tract infection without hematuria     Past Medical History:   Diagnosis Date    Acute cystitis with hematuria 8/17/2019    Bowel obstruction (HCC) 9/22/2019    Chronic hyponatremia     Compression fracture of T12 vertebra ([facility]) 2013    kyphoplasty Dr Anuja [place] DJD (degenerative joint disease) of hip 3/20/2014    Dr Bret Figueroa DM (diabetes mellitus), type 2 with peripheral vascular complications ([facility])     diet controlled    Gallbladder sludge 2019    GERD (gastroesophageal reflux disease) 8/31/10    Glaucoma     bilateral    H/O vascular surgery 5/9/2019    Josie NAQVI 2006    Hearing loss     History of total hip replacement, right 05/09/2019    Dr Arabella Kirby    Lower leg edema     chronic, R>L    Lumbar stenosis     Macular degeneration     left eye    PAF (paroxysmal atrial fibrillation) ([address] 2019    Pain of left scapula 3/25/2019    PVD (peripheral vascular disease) ([address] 10/28/2006    Dr Gil Yost, stents LE    Stress incontinence 10/28/2011     Past Surgical History:   Procedure Laterality Date    APPENDECTOMY      CYST REMOVAL  06/27/2016    DR SLOAN  RT THUMB MUCOUS CYST    HYSTERECTOMY      REFRACTIVE SURGERY  062001    left     TOTAL HIP ARTHROPLASTY Right 9/25/15    DR. NARANJO    UPPER GASTROINTESTINAL ENDOSCOPY  10/07/15    DR Bethany Lassiter    VERTEBROPLASTY  2013    T12, Dr Bond Minium History     Socioeconomic History    Marital status: Single     Spouse name: Not on file    Number of children: 0    Years of education: Not on file    Highest education level: Not on file   Occupational History    Occupation: retired [employer]   Tobacco Use    Smoking status: Former Smoker Types: Cigarettes     Quit date: 1980     Years since quittin.1    Smokeless tobacco: Never Used   Vaping Use    Vaping Use: Never used   Substance and Sexual Activity    Alcohol use: No     Alcohol/week: 0.0 standard drinks    Drug use: No    Sexual activity: Not Currently     Partners: Male   Other Topics Concern    Not on file   Social History Narrative    Born in [place], [place] background (both parents born in the [address])    Father was a [place] [employer]ze , teacher,  and  in [facility]     She never         Retired from [employer] alone in a house, no children    Former  at [employer] and [address] [place] [address] for [employer] for many years     Lives With: Alone(has 24 hour care)-- [employer] caregiver          Lives [id number] in [facility] she states she would like [facility] if SNF appropriate    However has always done well at [address]    Type of Home: [address]: One level    Home Access: Level entry    ADL Assistance: Needs assistance    Homemaking Assistance: Needs assistance    Ambulation Assistance: Independent(normally independent with ww )    Transfer Assistance: Independent(normally independent with occ assist to stabilize ww )    Active : No    Additional Comments: Pt with increased assistance required and recent fall. Social Determinants of Health     Financial Resource Strain:     Difficulty of Paying Living Expenses: Not on file   Food Insecurity:     Worried About Running Out of Food in the Last Year: Not on file    Jana of Food in the Last Year: Not on file   Transportation Needs:     Lack of Transportation (Medical): Not on file    Lack of Transportation (Non-Medical):  Not on file   Physical Activity:     Days of Exercise per Week: Not on file    Minutes of Exercise per Session: Not on file   Stress:     Feeling of Stress : Not on file   Social Connections:     Frequency of Communication with Friends and Family: Not on file    Frequency of Social Gatherings with Friends and Family: Not on file    Attends Catholic Services: Not on file    Active Member of Clubs or Organizations: Not on file    Attends Club or Organization Meetings: Not on file    Marital Status: Not on file   Intimate Partner Violence:     Fear of Current or Ex-Partner: Not on file    Emotionally Abused: Not on file    Physically Abused: Not on file    Sexually Abused: Not on file   Housing Stability:     Unable to Pay for Housing in the Last Year: Not on file    Number of [place] in the Last Year: Not on file    Unstable Housing in the Last Year: Not on file     Family History   Problem Relation Age of Onset    Heart Failure Mother         dec age 77    Hypertension Mother     Diabetes Father         dec age 67     No Known Allergies      Review of Systems   Unable to perform ROS: Dementia       Objective:   LMP  (LMP Unknown)     Physical Exam  Vitals reviewed. Constitutional:       General: She is not in acute distress. Appearance: Normal appearance. She is obese. She is not ill-appearing or toxic-appearing. HENT:      Head: Normocephalic and atraumatic. Mouth/Throat:      Mouth: Mucous membranes are moist.      Pharynx: Oropharynx is clear. Eyes:      Conjunctiva/sclera: Conjunctivae normal.   Cardiovascular:      Rate and Rhythm: Normal rate and regular rhythm. Occasional extrasystoles are present. Pulses: Normal pulses. Pulmonary:      Effort: Pulmonary effort is normal. No respiratory distress. Breath sounds: Normal breath sounds. No wheezing or rales. Abdominal:      General: Bowel sounds are normal. There is no distension. Palpations: Abdomen is soft. Tenderness: There is no abdominal tenderness. Musculoskeletal:         General: No swelling. Right lower leg: No edema.       Left lower leg: No edema. Skin:     General: Skin is warm and dry. Findings: Erythema present. No rash. Neurological:      General: No focal deficit present. Mental Status: She is alert and oriented to person, place, and time. Mental status is at baseline. Sensory: No sensory deficit. Motor: Weakness present. Gait: Gait abnormal.   Psychiatric:         Mood and Affect: Mood normal.         Assessment:       Diagnosis Orders   1. PAF (paroxysmal atrial fibrillation) ([facility]     2. PVD (peripheral vascular disease) ([facility]     3. Hx of small bowel obstruction     4. Weakness generalized     5. Hx of falling           Plan:        Continue current care plan without any change. Patient appears to be clinically stable this time. Continue monitoring vital signs as directed. No new change to orders otherwise. No follow-ups on file. Side effects, adverse effects of the medication prescribed today, as well as treatment plan and result expectations have been discussed withthe patient who expresses understanding and desires to proceed.     [place], [place]

## 2022-08-02 ENCOUNTER — OFFICE VISIT (OUTPATIENT)
Dept: GERIATRIC MEDICINE | Age: 87
End: 2022-08-02
Payer: COMMERCIAL

## 2022-08-02 DIAGNOSIS — R09.82 POST-NASAL DRIP: Chronic | ICD-10-CM

## 2022-08-02 DIAGNOSIS — M48.061 SPINAL STENOSIS OF LUMBAR REGION WITHOUT NEUROGENIC CLAUDICATION: ICD-10-CM

## 2022-08-02 DIAGNOSIS — K21.9 GASTROESOPHAGEAL REFLUX DISEASE, UNSPECIFIED WHETHER ESOPHAGITIS PRESENT: Primary | ICD-10-CM

## 2022-08-02 DIAGNOSIS — R53.1 GENERALIZED WEAKNESS: ICD-10-CM

## 2022-08-02 PROCEDURE — 99308 SBSQ NF CARE LOW MDM 20: CPT | Performed by: PHYSICIAN ASSISTANT

## 2022-08-02 PROCEDURE — 1123F ACP DISCUSS/DSCN MKR DOCD: CPT | Performed by: PHYSICIAN ASSISTANT

## 2022-08-09 ENCOUNTER — OFFICE VISIT (OUTPATIENT)
Dept: GERIATRIC MEDICINE | Age: 87
End: 2022-08-09
Payer: COMMERCIAL

## 2022-08-09 DIAGNOSIS — K59.09 OTHER CONSTIPATION: Chronic | ICD-10-CM

## 2022-08-09 DIAGNOSIS — F02.80 LATE ONSET ALZHEIMER'S DISEASE WITHOUT BEHAVIORAL DISTURBANCE (HCC): Primary | Chronic | ICD-10-CM

## 2022-08-09 DIAGNOSIS — G30.1 LATE ONSET ALZHEIMER'S DISEASE WITHOUT BEHAVIORAL DISTURBANCE (HCC): Primary | Chronic | ICD-10-CM

## 2022-08-09 DIAGNOSIS — R54 AGE-RELATED PHYSICAL DEBILITY: Chronic | ICD-10-CM

## 2022-08-09 PROCEDURE — 1123F ACP DISCUSS/DSCN MKR DOCD: CPT | Performed by: PHYSICIAN ASSISTANT

## 2022-08-09 PROCEDURE — 99307 SBSQ NF CARE SF MDM 10: CPT | Performed by: PHYSICIAN ASSISTANT

## 2022-08-24 ENCOUNTER — OFFICE VISIT (OUTPATIENT)
Dept: GERIATRIC MEDICINE | Age: 87
End: 2022-08-24
Payer: COMMERCIAL

## 2022-08-24 DIAGNOSIS — E13.69 OTHER SPECIFIED DIABETES MELLITUS WITH OTHER SPECIFIED COMPLICATION, WITHOUT LONG-TERM CURRENT USE OF INSULIN (HCC): ICD-10-CM

## 2022-08-24 DIAGNOSIS — I10 HYPERTENSION, UNSPECIFIED TYPE: ICD-10-CM

## 2022-08-24 DIAGNOSIS — F02.80 ALZHEIMER'S DEMENTIA WITHOUT BEHAVIORAL DISTURBANCE, UNSPECIFIED TIMING OF DEMENTIA ONSET: Primary | ICD-10-CM

## 2022-08-24 DIAGNOSIS — I48.91 ATRIAL FIBRILLATION, UNSPECIFIED TYPE (HCC): ICD-10-CM

## 2022-08-24 DIAGNOSIS — R53.1 WEAKNESS: ICD-10-CM

## 2022-08-24 DIAGNOSIS — G30.9 ALZHEIMER'S DEMENTIA WITHOUT BEHAVIORAL DISTURBANCE, UNSPECIFIED TIMING OF DEMENTIA ONSET: Primary | ICD-10-CM

## 2022-08-24 PROCEDURE — 1123F ACP DISCUSS/DSCN MKR DOCD: CPT | Performed by: INTERNAL MEDICINE

## 2022-08-24 PROCEDURE — 99308 SBSQ NF CARE LOW MDM 20: CPT | Performed by: INTERNAL MEDICINE

## 2022-08-25 NOTE — PROGRESS NOTES
SNF PROGRESS NOTE      Cc- dementia       Patient is a Christiano Zhang 80 y.o. female who is being seen at [facility]. She is in her wheelchair and she keeps saying \"nicole saves\"  while being in front of the TV. Past Medical History:   Diagnosis Date    Acute cystitis with hematuria 8/17/2019    Bowel obstruction ([address] 9/22/2019    Chronic hyponatremia     Compression fracture of T12 vertebra [facility]) 2013    kyphoplasty Dr Valencia Nolen    DJD (degenerative joint disease) of hip 3/20/2014    Dr Alton Foley    DM (diabetes mellitus), type 2 with peripheral vascular complications ([address]     diet controlled    Gallbladder sludge 2019    GERD (gastroesophageal reflux disease) 8/31/10    Glaucoma     bilateral    H/O vascular surgery 5/9/2019    Josie NAQVI 2006    Hearing loss     History of total hip replacement, right 05/09/2019    Dr Alton Foley    Lower leg edema     chronic, R>L    Lumbar stenosis     Macular degeneration     left eye    PAF (paroxysmal atrial fibrillation) ([facility] 2019    Pain of left scapula 3/25/2019    PVD (peripheral vascular disease) ([address] 10/28/2006    Dr Corie Davila, stents LE    Stress incontinence 10/28/2011     Patient has no known allergies. VS reviewed    Gen- Alert in the wheelchair saying \"nicole saves\"  + corrective lenses.    Heart- RRR no murmur no LE edema   Lungs- CTA b/l no resp distress RA oxygen   Abd- bs x 4       Lab Results   Component Value Date/Time     01/13/2022 12:00 AM    K 5.0 01/13/2022 12:00 AM    K 4.4 02/23/2020 05:02 AM     01/13/2022 12:00 AM    CO2 24 01/13/2022 12:00 AM    BUN 17 01/13/2022 12:00 AM    CREATININE 0.6 01/13/2022 12:00 AM    GLUCOSE 87 01/13/2022 12:00 AM    GLUCOSE 156 05/09/2012 12:57 PM    CALCIUM 9.6 01/13/2022 12:00 AM      Lab Results   Component Value Date    WBC 8.4 03/02/2020    HGB 11.2 (A) 03/02/2020    HCT 34.1 (A) 03/02/2020    MCV 94.8 03/02/2020     03/02/2020 Patient Active Problem List   Diagnosis    Essential hypertension    Stress incontinence    Hearing loss    Spinal stenosis of lumbar region    Acute thoracic back pain    Impaired mobility and activities of daily living    Acute leg pain, left    Acute right hip pain    PVD (peripheral vascular disease) ([facility]    Nuclear senile cataract    Nonexudative age-related macular degeneration    Primary open angle glaucoma    DM (diabetes mellitus), type 2 with peripheral vascular complications ([facility])    Lumbar stenosis    Gastroesophageal reflux disease    Hyponatremia    Ambulatory dysfunction    Glaucoma    Osteoarthritis    S/P kyphoplasty    Late onset Alzheimer's disease without behavioral disturbance ([facility])    Abnormality of gait and mobility due to severe spinal stenosis. Leg edema, right    Age-related osteoporosis without current pathological fracture    Other constipation    PAF (paroxysmal atrial fibrillation) ([facility])    Age-related physical debility    Post-nasal drip    Generalized weakness    SBO (small bowel obstruction) ([facility]    Fall at home, initial encounter    Abnormal lung sounds    Osteoporosis without current pathological fracture    Urinary tract infection without hematuria           Assessment and Plan    Dementia without behavioral disturbance-- Alzheimers  Afib   Rate controlled.    Weakness  DM  HTN        Vicki Gamez DO, [facility]

## 2022-09-06 NOTE — PROGRESS NOTES
Subjective:      Patient ID: Becky Talley is a pleasant 80 y.o. female who presents today for:  Chief Complaint   Patient presents with    1 Month [address] SNF  Impromptu visit with patient's and her niece today outside facility. Did discuss patient's history, took the opportunity to discuss patient's history with family present. Patient was much more verbally active with family present. [place] about patient's travels and her personal history. Did inquire about family's desire for prophylactic examinations and imaging (i.e. DEXA scan, colonoscopy, etc.). Family Jesenia did state that there is a general principal of comfort care which has been previously highlighted in past notes. Patient today showing no new evidence of worsening condition. No evidence of stepwise declines or progressive change in cognition, no evidence of depression. Good to follow-up with family and patient to get better understanding of her history for future visits. Patient Active Problem List   Diagnosis    Essential hypertension    Stress incontinence    Hearing loss    Spinal stenosis of lumbar region    Acute thoracic back pain    Impaired mobility and activities of daily living    Acute leg pain, left    Acute right hip pain    PVD (peripheral vascular disease) ([address]    Nuclear senile cataract    Nonexudative age-related macular degeneration    Primary open angle glaucoma    DM (diabetes mellitus), type 2 with peripheral vascular complications (HCC)    Lumbar stenosis    Gastroesophageal reflux disease    Hyponatremia    Ambulatory dysfunction    Glaucoma    Osteoarthritis    S/P kyphoplasty    Late onset Alzheimer's disease without behavioral disturbance (HCC)    Abnormality of gait and mobility due to severe spinal stenosis.     Leg edema, right    Age-related osteoporosis without current pathological fracture    Other constipation    PAF (paroxysmal atrial fibrillation) (HCC)    Age-related physical debility    Post-nasal drip    Generalized weakness    SBO (small bowel obstruction) ([address]    Fall at home, initial encounter    Abnormal lung sounds    Osteoporosis without current pathological fracture    Urinary tract infection without hematuria     Past Medical History:   Diagnosis Date    Acute cystitis with hematuria 2019    Bowel obstruction ([address] 2019    Chronic hyponatremia     Compression fracture of T12 vertebra [facility]) 2013    kyphoplasty Dr Juan Jose Meehan    DJD (degenerative joint disease) of hip 3/20/2014    Dr Teresa Daniels    DM (diabetes mellitus), type 2 with peripheral vascular complications ([address]     diet controlled    Gallbladder sludge     GERD (gastroesophageal reflux disease) 8/31/10    Glaucoma     bilateral    H/O vascular surgery 2019    Josie NAQVI 2006    Hearing loss     History of total hip replacement, right 2019    Dr Teresa Daniels    Lower leg edema     chronic, R>L    Lumbar stenosis     Macular degeneration     left eye    PAF (paroxysmal atrial fibrillation) ([address] 2019    Pain of left scapula 3/25/2019    PVD (peripheral vascular disease) ([address] 10/28/2006    Dr Cherrie Cantrell, stents LE    Stress incontinence 10/28/2011     Past Surgical History:   Procedure Laterality Date    APPENDECTOMY      CYST REMOVAL  2016    DR SLOAN  RT THUMB MUCOUS CYST    HYSTERECTOMY      REFRACTIVE SURGERY      left     TOTAL HIP ARTHROPLASTY Right 9/25/15    DR. NARANJO    UPPER GASTROINTESTINAL ENDOSCOPY  10/07/15    DR Kwaku Dennis    VERTEBROPLASTY      T12, Dr Fito Davis History     Socioeconomic History    Marital status: Single     Spouse name: Not on file    Number of children: 0    Years of education: Not on file    Highest education level: Not on file   Occupational History    Occupation: retired [employer]   Tobacco Use    Smoking status: Former     Types: Cigarettes     Quit date: 1980     Years since quittin.3    Smokeless tobacco: Never   Vaping Use    Vaping Use: Never used   Substance and Sexual Activity    Alcohol use: No     Alcohol/week: 0.0 standard drinks    Drug use: No    Sexual activity: Not Currently     Partners: Male   Other Topics Concern    Not on file   Social History Narrative    [place] [facility] [place] (both parents born in the [address])    Father was a [place] Jehovah's witness , teacher,  and  in [facility]     She never         Retired from [employer] alone in a house, no children    Former  at [employer] and [address] [place] [address] for [employer] for many years     Lives With: Alone(has 24 hour care)--[employer] caregiver          Lives [facility] in [facility] she states she would like [facility] if SNF appropriate    However has always done well at [address]    Type of Home: [address] [place] [address] Avenue: One level    Home Access: Level entry    ADL Assistance: Needs assistance    Homemaking Assistance: Needs assistance    Ambulation Assistance: Independent(normally independent with ww )    Transfer Assistance: Independent(normally independent with occ assist to stabilize ww )    Active : No    Additional Comments: Pt with increased assistance required and recent fall. Social Determinants of Health     Financial Resource Strain: Not on file   Food Insecurity: Not on file   Transportation Needs: Not on file   Physical Activity: Not on file   Stress: Not on file   Social Connections: Not on file   Intimate Partner Violence: Not on file   Housing Stability: Not on file     Family History   Problem Relation Age of Onset    Heart Failure Mother         dec age 77    Hypertension Mother     Diabetes Father         dec age 67     No Known Allergies      Review of Systems   Unable to perform ROS: Dementia     Objective:   LMP  (LMP Unknown)     Physical Exam  Vitals reviewed.    Constitutional:       General: She is not in acute distress. Appearance: Normal appearance. She is obese. She is not ill-appearing or toxic-appearing. HENT:      Head: Normocephalic and atraumatic. Mouth/Throat:      Mouth: Mucous membranes are moist.      Pharynx: Oropharynx is clear. Eyes:      Conjunctiva/sclera: Conjunctivae normal.   Cardiovascular:      Rate and Rhythm: Normal rate and regular rhythm. Occasional Extrasystoles are present. Pulmonary:      Effort: Pulmonary effort is normal. No respiratory distress. Breath sounds: Normal breath sounds. No wheezing or rales. Musculoskeletal:         General: No swelling. Right lower leg: No edema. Left lower leg: No edema. Skin:     General: Skin is warm and dry. Findings: No erythema or rash. Neurological:      General: No focal deficit present. Mental Status: She is alert and oriented to person, place, and time. Mental status is at baseline. Sensory: No sensory deficit. Motor: Weakness present. Gait: Gait abnormal.   Psychiatric:         Mood and Affect: Mood normal.       Assessment:       Diagnosis Orders   1. Late onset Alzheimer's disease without behavioral disturbance ([facility]        2. Age-related physical debility        3. Other constipation              Plan:        Continue using bowel protocol as scheduled. Did learn about patient's history which has been useful in the application to further future visits. No new debility noted. No invasive prophylactic measures to be undergone at this point. No follow-ups on file. Side effects, adverse effects of the medication prescribed today, as well as treatment plan and result expectations have been discussed withthe patient who expresses understanding and desires to proceed.     Ramon Hunter

## 2022-09-07 NOTE — PROGRESS NOTES
Subjective:      Patient ID: Leo Mace is a pleasant 80 y.o. female who presents today for:  Chief Complaint   Patient presents with    1 Month Follow-Up         [facility]    Seen for monthly visit today for follow-up on GERD, generalized weakness, spinal stenosis to lumbar spine, and postnasal drip. Currently patient not complaining of any acute pain. She is eating well without issue, no obvious indigestion or complaints of reflux. No vomiting or nausea reported. Ongoing weakness secondary to advanced age, requires assistance with all the ADLs. No new lower back pain on palpation today in spinal region. Patient alert to self, frequently disoriented however. No acute complaints per nursing staff. Some mild postnasal drip it seems, will add allergy medication such as Zyrtec as needed. Patient Active Problem List   Diagnosis    Essential hypertension    Stress incontinence    Hearing loss    Spinal stenosis of lumbar region    Acute thoracic back pain    Impaired mobility and activities of daily living    Acute leg pain, left    Acute right hip pain    PVD (peripheral vascular disease) ([address]    Nuclear senile cataract    Nonexudative age-related macular degeneration    Primary open angle glaucoma    DM (diabetes mellitus), type 2 with peripheral vascular complications (HCC)    Lumbar stenosis    Gastroesophageal reflux disease    Hyponatremia    Ambulatory dysfunction    Glaucoma    Osteoarthritis    S/P kyphoplasty    Late onset Alzheimer's disease without behavioral disturbance ([facility])    Abnormality of gait and mobility due to severe spinal stenosis.     Leg edema, right    Age-related osteoporosis without current pathological fracture    Other constipation    PAF (paroxysmal atrial fibrillation) ([facility])    Age-related physical debility    Post-nasal drip    Generalized weakness    SBO (small bowel obstruction) ([address]    Fall at home, initial encounter    Abnormal lung sounds    Osteoporosis without current pathological fracture    Urinary tract infection without hematuria     Past Medical History:   Diagnosis Date    Acute cystitis with hematuria 2019    Bowel obstruction ([address] 2019    Chronic hyponatremia     Compression fracture of T12 vertebra [facility]) 2013    kyphoplasty Dr Jacques Vazquez    DJD (degenerative joint disease) of hip 3/20/2014    Dr Nasrin Tellez    DM (diabetes mellitus), type 2 with peripheral vascular complications ([address]     diet controlled    Gallbladder sludge     GERD (gastroesophageal reflux disease) 8/31/10    Glaucoma     bilateral    H/O vascular surgery 2019    Stens LE 2006    Hearing loss     History of total hip replacement, right 2019    Dr Nasrin Tellez    Lower leg edema     chronic, R>L    Lumbar stenosis     Macular degeneration     left eye    PAF (paroxysmal atrial fibrillation) ([facility] 2019    Pain of left scapula 3/25/2019    PVD (peripheral vascular disease) ([facility] 10/28/2006    Dr Clair Watts, stents LE    Stress incontinence 10/28/2011     Past Surgical History:   Procedure Laterality Date    APPENDECTOMY      CYST REMOVAL  2016    DR SLOAN  RT THUMB MUCOUS CYST    HYSTERECTOMY      REFRACTIVE SURGERY      left     TOTAL HIP ARTHROPLASTY Right 9/25/15    DR. NARANJO    UPPER GASTROINTESTINAL ENDOSCOPY  10/07/15    DR Ayanna Carrero    VERTEBROPLASTY      T12, Dr Tammy Velasquez History     Socioeconomic History    Marital status: Single     Spouse name: Not on file    Number of children: 0    Years of education: Not on file    Highest education level: Not on file   Occupational History    Occupation: retired [employer]   Tobacco Use    Smoking status: Former     Types: Cigarettes     Quit date: 1980     Years since quittin.3    Smokeless tobacco: Never   Vaping Use    Vaping Use: Never used   Substance and Sexual Activity    Alcohol use: No     Alcohol/week: 0.0 standard drinks    Drug use: No    Sexual activity: Not Currently     Partners: Male Other Topics Concern    Not on file   Social History Narrative    Born in [address] background (both parents born in the [address])    Father was a [employer] , teacher,  and  in [facility]     She never         Retired from [employer] alone in a house, no children    Former  at [employer] and [address] [place] [address] for [employer] for many years     Lives With: Alone(has 24 hour care)--[employer] caregiver          [facility] in [facility] she states she would like [place] if SNF appropriate    However has always done well at [address]    Type of Home: [address]: One level    Home Access: Level entry    ADL Assistance: Needs assistance    Homemaking Assistance: Needs assistance    Ambulation Assistance: Independent(normally independent with ww )    Transfer Assistance: Independent(normally independent with occ assist to stabilize ww )    Active : No    Additional Comments: Pt with increased assistance required and recent fall. Social Determinants of Health     Financial Resource Strain: Not on file   Food Insecurity: Not on file   Transportation Needs: Not on file   Physical Activity: Not on file   Stress: Not on file   Social Connections: Not on file   Intimate Partner Violence: Not on file   Housing Stability: Not on file     Family History   Problem Relation Age of Onset    Heart Failure Mother         dec age 77    Hypertension Mother     Diabetes Father         dec age 67     No Known Allergies      Review of Systems   Unable to perform ROS: Dementia     Objective:   LMP  (LMP Unknown)     Physical Exam  Vitals reviewed. Constitutional:       General: She is not in acute distress. Appearance: Normal appearance. She is normal weight. She is not ill-appearing or toxic-appearing. HENT:      Head: Normocephalic and atraumatic.       Nose: Rhinorrhea present. No congestion. Mouth/Throat:      Mouth: Mucous membranes are moist.      Pharynx: Oropharynx is clear. Eyes:      Conjunctiva/sclera: Conjunctivae normal.      Pupils: Pupils are equal, round, and reactive to light. Cardiovascular:      Rate and Rhythm: Normal rate and regular rhythm. Occasional Extrasystoles are present. Pulmonary:      Effort: Pulmonary effort is normal. No respiratory distress. Breath sounds: Normal breath sounds. No wheezing or rales. Abdominal:      General: Bowel sounds are normal. There is no distension. Palpations: Abdomen is soft. Musculoskeletal:         General: No swelling. Right lower leg: No edema. Left lower leg: No edema. Skin:     General: Skin is warm and dry. Findings: Erythema present. No rash. Neurological:      General: No focal deficit present. Mental Status: She is alert and oriented to person, place, and time. Mental status is at baseline. Sensory: No sensory deficit. Motor: Weakness present. Gait: Gait abnormal.   Psychiatric:         Mood and Affect: Mood normal.       Assessment:       Diagnosis Orders   1. Gastroesophageal reflux disease, unspecified whether esophagitis present        2. Generalized weakness        3. Spinal stenosis of lumbar region without neurogenic claudication        4. Post-nasal drip              Plan:        Patient does have a Fenesin order, we will use this along with possible some nasal saline spray if nasres are dry. Only very mild rhinorrhea today. No new further changes at the moment, patient is globally stable for the time being. Vital signs stable see [facility]. Follow-up in 1 month. No follow-ups on file. Side effects, adverse effects of the medication prescribed today, as well as treatment plan and result expectations have been discussed withthe patient who expresses understanding and desires to proceed.     Graham Arellano, [id number]

## 2022-09-28 ENCOUNTER — OFFICE VISIT (OUTPATIENT)
Dept: GERIATRIC MEDICINE | Age: 87
End: 2022-09-28
Payer: COMMERCIAL

## 2022-09-28 DIAGNOSIS — G30.9 ALZHEIMER'S DEMENTIA WITHOUT BEHAVIORAL DISTURBANCE, UNSPECIFIED TIMING OF DEMENTIA ONSET: Primary | ICD-10-CM

## 2022-09-28 DIAGNOSIS — I48.91 ATRIAL FIBRILLATION, UNSPECIFIED TYPE (HCC): ICD-10-CM

## 2022-09-28 DIAGNOSIS — F02.80 ALZHEIMER'S DEMENTIA WITHOUT BEHAVIORAL DISTURBANCE, UNSPECIFIED TIMING OF DEMENTIA ONSET: Primary | ICD-10-CM

## 2022-09-28 DIAGNOSIS — I10 HYPERTENSION, UNSPECIFIED TYPE: ICD-10-CM

## 2022-09-28 DIAGNOSIS — E13.69 OTHER SPECIFIED DIABETES MELLITUS WITH OTHER SPECIFIED COMPLICATION, WITHOUT LONG-TERM CURRENT USE OF INSULIN (HCC): ICD-10-CM

## 2022-09-28 PROCEDURE — 99308 SBSQ NF CARE LOW MDM 20: CPT | Performed by: INTERNAL MEDICINE

## 2022-09-28 PROCEDURE — 1123F ACP DISCUSS/DSCN MKR DOCD: CPT | Performed by: INTERNAL MEDICINE

## 2022-09-28 NOTE — PROGRESS NOTES
SNF PROGRESS NOTE      Cc- dementia       Patient is a Horace Linares 80 y.o. female who is sitting in her recliner in her room. She has no complaints. No issues from nursing. Past Medical History:   Diagnosis Date    Acute cystitis with hematuria 8/17/2019    Bowel obstruction ([address] 9/22/2019    Chronic hyponatremia     Compression fracture of T12 vertebra [facility]) 2013    kyphoplasty Dr Tremaine Valenzuela    DJD (degenerative joint disease) of hip 3/20/2014    Dr Karolina Landon    DM (diabetes mellitus), type 2 with peripheral vascular complications ([address]     diet controlled    Gallbladder sludge 2019    GERD (gastroesophageal reflux disease) 8/31/10    Glaucoma     bilateral    H/O vascular surgery 5/9/2019    Stens LE 2006    Hearing loss     History of total hip replacement, right 05/09/2019    Dr Karolina Landon    Lower leg edema     chronic, R>L    Lumbar stenosis     Macular degeneration     left eye    PAF (paroxysmal atrial fibrillation) ([facility] 2019    Pain of left scapula 3/25/2019    PVD (peripheral vascular disease) ([facility] 10/28/2006    Dr Dickson Gonzalez, stents LE    Stress incontinence 10/28/2011     Patient has no known allergies. VS reviewed    Gen- Alert  in her recliner. Pleasantly confused.    Heart- RRR no murmur no LE edema   Lungs- CTA b/l no resp distress RA oxygen   Abd- bs x 4       Lab Results   Component Value Date/Time     01/13/2022 12:00 AM    K 5.0 01/13/2022 12:00 AM    K 4.4 02/23/2020 05:02 AM     01/13/2022 12:00 AM    CO2 24 01/13/2022 12:00 AM    BUN 17 01/13/2022 12:00 AM    CREATININE 0.6 01/13/2022 12:00 AM    GLUCOSE 87 01/13/2022 12:00 AM    GLUCOSE 156 05/09/2012 12:57 PM    CALCIUM 9.6 01/13/2022 12:00 AM      Lab Results   Component Value Date    WBC 8.4 03/02/2020    HGB 11.2 (A) 03/02/2020    HCT 34.1 (A) 03/02/2020    MCV 94.8 03/02/2020     03/02/2020                   Patient Active Problem List   Diagnosis    Essential

## 2022-10-24 ENCOUNTER — OFFICE VISIT (OUTPATIENT)
Dept: GERIATRIC MEDICINE | Age: 87
End: 2022-10-24
Payer: COMMERCIAL

## 2022-10-24 DIAGNOSIS — R53.1 WEAKNESS: ICD-10-CM

## 2022-10-24 DIAGNOSIS — G30.1 LATE ONSET ALZHEIMER'S DISEASE WITHOUT BEHAVIORAL DISTURBANCE (HCC): Primary | ICD-10-CM

## 2022-10-24 DIAGNOSIS — I10 HYPERTENSION, UNSPECIFIED TYPE: ICD-10-CM

## 2022-10-24 DIAGNOSIS — E13.69 OTHER SPECIFIED DIABETES MELLITUS WITH OTHER SPECIFIED COMPLICATION, WITHOUT LONG-TERM CURRENT USE OF INSULIN (HCC): ICD-10-CM

## 2022-10-24 DIAGNOSIS — F02.80 LATE ONSET ALZHEIMER'S DISEASE WITHOUT BEHAVIORAL DISTURBANCE (HCC): Primary | ICD-10-CM

## 2022-10-24 DIAGNOSIS — I48.91 ATRIAL FIBRILLATION, UNSPECIFIED TYPE (HCC): ICD-10-CM

## 2022-10-24 PROCEDURE — 99308 SBSQ NF CARE LOW MDM 20: CPT | Performed by: INTERNAL MEDICINE

## 2022-10-24 PROCEDURE — G8428 CUR MEDS NOT DOCUMENT: HCPCS | Performed by: INTERNAL MEDICINE

## 2022-10-24 PROCEDURE — 1090F PRES/ABSN URINE INCON ASSESS: CPT | Performed by: INTERNAL MEDICINE

## 2022-10-24 PROCEDURE — 4004F PT TOBACCO SCREEN RCVD TLK: CPT | Performed by: INTERNAL MEDICINE

## 2022-10-24 PROCEDURE — G8484 FLU IMMUNIZE NO ADMIN: HCPCS | Performed by: INTERNAL MEDICINE

## 2022-10-24 PROCEDURE — G8421 BMI NOT CALCULATED: HCPCS | Performed by: INTERNAL MEDICINE

## 2022-10-24 PROCEDURE — 1123F ACP DISCUSS/DSCN MKR DOCD: CPT | Performed by: INTERNAL MEDICINE

## 2022-10-25 NOTE — PROGRESS NOTES
SNF PROGRESS NOTE      Cc- weakness       Patient is a Venus [facility] 80 y.o. female who is being seen at [facility]. The patient is sitting in her wheelchair. She has no complaints at this time. Past Medical History:   Diagnosis Date    Acute cystitis with hematuria 8/17/2019    Bowel obstruction ([address] 9/22/2019    Chronic hyponatremia     Compression fracture of T12 vertebra [facility]) 2013    kyphoplasty Dr Mary Jane Wilson    DJD (degenerative joint disease) of hip 3/20/2014    Dr Denver Peck    DM (diabetes mellitus), type 2 with peripheral vascular complications ([address]     diet controlled    Gallbladder sludge 2019    GERD (gastroesophageal reflux disease) 8/31/10    Glaucoma     bilateral    H/O vascular surgery 5/9/2019    Josie NAQVI 2006    Hearing loss     History of total hip replacement, right 05/09/2019    Dr Denver Peck    Lower leg edema     chronic, R>L    Lumbar stenosis     Macular degeneration     left eye    PAF (paroxysmal atrial fibrillation) ([facility] 2019    Pain of left scapula 3/25/2019    PVD (peripheral vascular disease) ([facility] 10/28/2006    Dr Graham Iron, stents LE    Stress incontinence 10/28/2011     Patient has no known allergies. VS reviewed  Gen- Alert in the wheelchair saying   + corrective lenses.    Heart- RRR no murmur no LE edema   Lungs- CTA b/l no resp distress RA oxygen   Abd- bs x 4       Lab Results   Component Value Date/Time     01/13/2022 12:00 AM    K 5.0 01/13/2022 12:00 AM    K 4.4 02/23/2020 05:02 AM     01/13/2022 12:00 AM    CO2 24 01/13/2022 12:00 AM    BUN 17 01/13/2022 12:00 AM    CREATININE 0.6 01/13/2022 12:00 AM    GLUCOSE 87 01/13/2022 12:00 AM    GLUCOSE 156 05/09/2012 12:57 PM    CALCIUM 9.6 01/13/2022 12:00 AM      Lab Results   Component Value Date    WBC 8.4 03/02/2020    HGB 11.2 (A) 03/02/2020    HCT 34.1 (A) 03/02/2020    MCV 94.8 03/02/2020     03/02/2020                   Patient Active Problem List Diagnosis    Essential hypertension    Stress incontinence    Hearing loss    Spinal stenosis of lumbar region    Acute thoracic back pain    Impaired mobility and activities of daily living    Acute leg pain, left    Acute right hip pain    PVD (peripheral vascular disease) ([address]    Nuclear senile cataract    Nonexudative age-related macular degeneration    Primary open angle glaucoma    DM (diabetes mellitus), type 2 with peripheral vascular complications ([facility])    Lumbar stenosis    Gastroesophageal reflux disease    Hyponatremia    Ambulatory dysfunction    Glaucoma    Osteoarthritis    S/P kyphoplasty    Late onset Alzheimer's disease without behavioral disturbance ([facility])    Abnormality of gait and mobility due to severe spinal stenosis. Leg edema, right    Age-related osteoporosis without current pathological fracture    Other constipation    PAF (paroxysmal atrial fibrillation) ([facility])    Age-related physical debility    Post-nasal drip    Generalized weakness    SBO (small bowel obstruction) ([address]    Fall at home, initial encounter    Abnormal lung sounds    Osteoporosis without current pathological fracture    Urinary tract infection without hematuria           Assessment and Plan  Dementia without behavioral disturbance-- Alzheimers  Afib   Rate controlled.    Weakness  DM  HTN        Valerie Romeo DO [facility]

## 2022-11-20 ENCOUNTER — OFFICE VISIT (OUTPATIENT)
Dept: GERIATRIC MEDICINE | Age: 87
End: 2022-11-20

## 2022-11-20 DIAGNOSIS — G30.1 LATE ONSET ALZHEIMER'S DISEASE WITHOUT BEHAVIORAL DISTURBANCE (HCC): Primary | ICD-10-CM

## 2022-11-20 DIAGNOSIS — E13.69 OTHER SPECIFIED DIABETES MELLITUS WITH OTHER SPECIFIED COMPLICATION, WITHOUT LONG-TERM CURRENT USE OF INSULIN (HCC): ICD-10-CM

## 2022-11-20 DIAGNOSIS — R53.1 WEAKNESS: ICD-10-CM

## 2022-11-20 DIAGNOSIS — I48.91 ATRIAL FIBRILLATION, UNSPECIFIED TYPE (HCC): ICD-10-CM

## 2022-11-20 DIAGNOSIS — F02.80 LATE ONSET ALZHEIMER'S DISEASE WITHOUT BEHAVIORAL DISTURBANCE (HCC): Primary | ICD-10-CM

## 2022-11-20 DIAGNOSIS — R54 AGE-RELATED PHYSICAL DEBILITY: ICD-10-CM

## 2022-11-20 DIAGNOSIS — I10 HYPERTENSION, UNSPECIFIED TYPE: ICD-10-CM

## 2022-11-22 NOTE — PROGRESS NOTES
SNF PROGRESS NOTE      Cc- dementia       Patient is a Marino Paniagua 80 y.o. female who is sitting in her wheelchair near the doorway of her room. She has no complaints. She did have some behavioral issues at the beginning of the month. Urine was sent and negative. Past Medical History:   Diagnosis Date    Acute cystitis with hematuria 8/17/2019    Bowel obstruction ([address] 9/22/2019    Chronic hyponatremia     Compression fracture of T12 vertebra [facility]) 2013    kyphoplasty Dr Juan Sahu    DJD (degenerative joint disease) of hip 3/20/2014    Dr Pat Serrano    DM (diabetes mellitus), type 2 with peripheral vascular complications ([address]     diet controlled    Gallbladder sludge 2019    GERD (gastroesophageal reflux disease) 8/31/10    Glaucoma     bilateral    H/O vascular surgery 5/9/2019    Stens LE 2006    Hearing loss     History of total hip replacement, right 05/09/2019    Dr Pat Serrano    Lower leg edema     chronic, R>L    Lumbar stenosis     Macular degeneration     left eye    PAF (paroxysmal atrial fibrillation) ([facility] 2019    Pain of left scapula 3/25/2019    PVD (peripheral vascular disease) ([facility] 10/28/2006    Dr Liz King, stents LE    Stress incontinence 10/28/2011     Patient has no known allergies. VS reviewed    Gen- awake and in her wheelchair Pleasantly confused, [place].    Heart- RRR no murmur no LE edema   Lungs- CTA b/l no resp distress RA oxygen   Abd- bs x 4       Lab Results   Component Value Date/Time     01/13/2022 12:00 AM    K 5.0 01/13/2022 12:00 AM    K 4.4 02/23/2020 05:02 AM     01/13/2022 12:00 AM    CO2 24 01/13/2022 12:00 AM    BUN 17 01/13/2022 12:00 AM    CREATININE 0.6 01/13/2022 12:00 AM    GLUCOSE 87 01/13/2022 12:00 AM    GLUCOSE 156 05/09/2012 12:57 PM    CALCIUM 9.6 01/13/2022 12:00 AM      Lab Results   Component Value Date    WBC 8.4 03/02/2020    HGB 11.2 (A) 03/02/2020    HCT 34.1 (A) 03/02/2020    MCV 94.8 03/02/2020  03/02/2020                   Patient Active Problem List   Diagnosis    Essential hypertension    Stress incontinence    Hearing loss    Spinal stenosis of lumbar region    Acute thoracic back pain    Impaired mobility and activities of daily living    Acute leg pain, left    Acute right hip pain    PVD (peripheral vascular disease) ([address]    Nuclear senile cataract    Nonexudative age-related macular degeneration    Primary open angle glaucoma    DM (diabetes mellitus), type 2 with peripheral vascular complications ([facility])    Lumbar stenosis    Gastroesophageal reflux disease    Hyponatremia    Ambulatory dysfunction    Glaucoma    Osteoarthritis    S/P kyphoplasty    Late onset Alzheimer's disease without behavioral disturbance ([address]    Abnormality of gait and mobility due to severe spinal stenosis. Leg edema, right    Age-related osteoporosis without current pathological fracture    Other constipation    PAF (paroxysmal atrial fibrillation) ([facility])    Age-related physical debility    Post-nasal drip    Generalized weakness    SBO (small bowel obstruction) ([address]    Fall at home, initial encounter    Abnormal lung sounds    Osteoporosis without current pathological fracture    Urinary tract infection without hematuria           Assessment and Plan    Dementia without behavioral disturbance-- Alzheimers  Namenda and Seroquel. Afib   Rate controlled. Weakness  DM  HTN  Depression  Oin zoloft.     No issues per nursing and medication was reviewed      Dionisio Muñoz

## 2022-12-17 ENCOUNTER — OFFICE VISIT (OUTPATIENT)
Dept: GERIATRIC MEDICINE | Age: 87
End: 2022-12-17

## 2022-12-17 DIAGNOSIS — F02.80 LATE ONSET ALZHEIMER'S DISEASE WITHOUT BEHAVIORAL DISTURBANCE (HCC): Primary | ICD-10-CM

## 2022-12-17 DIAGNOSIS — R53.1 WEAKNESS: ICD-10-CM

## 2022-12-17 DIAGNOSIS — I10 HYPERTENSION, UNSPECIFIED TYPE: ICD-10-CM

## 2022-12-17 DIAGNOSIS — G30.1 LATE ONSET ALZHEIMER'S DISEASE WITHOUT BEHAVIORAL DISTURBANCE (HCC): Primary | ICD-10-CM

## 2022-12-17 DIAGNOSIS — I48.91 ATRIAL FIBRILLATION, UNSPECIFIED TYPE (HCC): ICD-10-CM

## 2022-12-17 DIAGNOSIS — R54 AGE-RELATED PHYSICAL DEBILITY: ICD-10-CM

## 2022-12-17 DIAGNOSIS — E13.69 OTHER SPECIFIED DIABETES MELLITUS WITH OTHER SPECIFIED COMPLICATION, WITHOUT LONG-TERM CURRENT USE OF INSULIN (HCC): ICD-10-CM

## 2022-12-19 NOTE — PROGRESS NOTES
SNF PROGRESS NOTE      Cc- dementia, weakness      Patient is a Mirtha Montejo 80 y.o. female who is being seen at [facility]. She is sitting in the recliner and seems comfortable. She has had noticeable weight loss. Past Medical History:   Diagnosis Date    Acute cystitis with hematuria 8/17/2019    Bowel obstruction ([address] 9/22/2019    Chronic hyponatremia     Compression fracture of T12 vertebra [facility]) 2013    kyphoplasty Dr Juliocesar Victoria    DJD (degenerative joint disease) of hip 3/20/2014    Dr Lakisha Jha    DM (diabetes mellitus), type 2 with peripheral vascular complications ([address]     diet controlled    Gallbladder sludge 2019    GERD (gastroesophageal reflux disease) 8/31/10    Glaucoma     bilateral    H/O vascular surgery 5/9/2019    Josie NAQVI 2006    Hearing loss     History of total hip replacement, right 05/09/2019    Dr Lakisha Jha    Lower leg edema     chronic, R>L    Lumbar stenosis     Macular degeneration     left eye    PAF (paroxysmal atrial fibrillation) ([address] 2019    Pain of left scapula 3/25/2019    PVD (peripheral vascular disease) ([address] 10/28/2006    Dr Zoran Vaughn, stents LE    Stress incontinence 10/28/2011     Patient has no known allergies. VS reviewed      Gen- awake and in her wheelchair Pleasantly confused, [place].    Heart- RRR no murmur no LE edema   Lungs- CTA b/l no resp distress RA oxygen   Abd- bs x 4     Lab Results   Component Value Date/Time     01/13/2022 12:00 AM    K 5.0 01/13/2022 12:00 AM    K 4.4 02/23/2020 05:02 AM     01/13/2022 12:00 AM    CO2 24 01/13/2022 12:00 AM    BUN 17 01/13/2022 12:00 AM    CREATININE 0.6 01/13/2022 12:00 AM    GLUCOSE 87 01/13/2022 12:00 AM    GLUCOSE 156 05/09/2012 12:57 PM    CALCIUM 9.6 01/13/2022 12:00 AM      Lab Results   Component Value Date    WBC 8.4 03/02/2020    HGB 11.2 (A) 03/02/2020    HCT 34.1 (A) 03/02/2020    MCV 94.8 03/02/2020     03/02/2020                   Patient Active Problem List   Diagnosis    Essential hypertension    Stress incontinence    Hearing loss    Spinal stenosis of lumbar region    Acute thoracic back pain    Impaired mobility and activities of daily living    Acute leg pain, left    Acute right hip pain    PVD (peripheral vascular disease) [facility]    Nuclear senile cataract    Nonexudative age-related macular degeneration    Primary open angle glaucoma    DM (diabetes mellitus), type 2 with peripheral vascular complications ([facility])    Lumbar stenosis    Gastroesophageal reflux disease    Hyponatremia    Ambulatory dysfunction    Glaucoma    Osteoarthritis    S/P kyphoplasty    Late onset Alzheimer's disease without behavioral disturbance ([facility])    Abnormality of gait and mobility due to severe spinal stenosis. Leg edema, right    Age-related osteoporosis without current pathological fracture    Other constipation    PAF (paroxysmal atrial fibrillation) ([facility])    Age-related physical debility    Post-nasal drip    Generalized weakness    SBO (small bowel obstruction) ([facility]    Fall at home, initial encounter    Abnormal lung sounds    Osteoporosis without current pathological fracture    Urinary tract infection without hematuria           Assessment and Plan    Dementia without behavioral disturbance-- Alzheimers  Namenda and Seroquel. Afib   Rate controlled. Weakness  DM  HTN  Depression  On zoloft, dosage recently increased.       [employer] Shadi Davison

## 2023-01-17 ENCOUNTER — OFFICE VISIT (OUTPATIENT)
Dept: GERIATRIC MEDICINE | Age: 88
End: 2023-01-17

## 2023-01-17 DIAGNOSIS — I48.91 ATRIAL FIBRILLATION, UNSPECIFIED TYPE (HCC): ICD-10-CM

## 2023-01-17 DIAGNOSIS — F02.80 LATE ONSET ALZHEIMER'S DISEASE WITHOUT BEHAVIORAL DISTURBANCE (HCC): Primary | ICD-10-CM

## 2023-01-17 DIAGNOSIS — E13.69 OTHER SPECIFIED DIABETES MELLITUS WITH OTHER SPECIFIED COMPLICATION, WITHOUT LONG-TERM CURRENT USE OF INSULIN (HCC): ICD-10-CM

## 2023-01-17 DIAGNOSIS — I10 HYPERTENSION, UNSPECIFIED TYPE: ICD-10-CM

## 2023-01-17 DIAGNOSIS — G30.1 LATE ONSET ALZHEIMER'S DISEASE WITHOUT BEHAVIORAL DISTURBANCE (HCC): Primary | ICD-10-CM

## 2023-01-17 DIAGNOSIS — R53.1 WEAKNESS: ICD-10-CM

## 2023-01-17 DIAGNOSIS — R54 AGE-RELATED PHYSICAL DEBILITY: ICD-10-CM

## 2023-01-20 NOTE — PROGRESS NOTES
SNF PROGRESS NOTE      Cc- dementia        Patient is a Praveen Suarez 80 y.o. female who is being seen at [facility]. She is comfortable and has no complaints. No SOB. No CP. Past Medical History:   Diagnosis Date    Acute cystitis with hematuria 8/17/2019    Bowel obstruction ([address] 9/22/2019    Chronic hyponatremia     Compression fracture of T12 vertebra [facility]) 2013    kyphoplasty Dr Hernan Arreguin    DJD (degenerative joint disease) of hip 3/20/2014    Dr Renay Munoz    DM (diabetes mellitus), type 2 with peripheral vascular complications ([address]     diet controlled    Gallbladder sludge 2019    GERD (gastroesophageal reflux disease) 8/31/10    Glaucoma     bilateral    H/O vascular surgery 5/9/2019    Stens LE 2006    Hearing loss     History of total hip replacement, right 05/09/2019    Dr Renay Munoz    Lower leg edema     chronic, R>L    Lumbar stenosis     Macular degeneration     left eye    PAF (paroxysmal atrial fibrillation) ([address] 2019    Pain of left scapula 3/25/2019    PVD (peripheral vascular disease) ([address] 10/28/2006    Dr Poli Kelley, stents LE    Stress incontinence 10/28/2011     Patient has no known allergies. VS reviewed      Gen- awake and in her wheelchair Pleasantly confused, [place].    Heart- RRR no murmur no LE edema   Lungs- CTA b/l no resp distress RA oxygen   Abd- bs x 4     Lab Results   Component Value Date/Time     01/13/2022 12:00 AM    K 5.0 01/13/2022 12:00 AM    K 4.4 02/23/2020 05:02 AM     01/13/2022 12:00 AM    CO2 24 01/13/2022 12:00 AM    BUN 17 01/13/2022 12:00 AM    CREATININE 0.6 01/13/2022 12:00 AM    GLUCOSE 87 01/13/2022 12:00 AM    GLUCOSE 156 05/09/2012 12:57 PM    CALCIUM 9.6 01/13/2022 12:00 AM      Lab Results   Component Value Date    WBC 8.4 03/02/2020    HGB 11.2 (A) 03/02/2020    HCT 34.1 (A) 03/02/2020    MCV 94.8 03/02/2020     03/02/2020                   Patient Active Problem List   Diagnosis Essential hypertension    Stress incontinence    Hearing loss    Spinal stenosis of lumbar region    Acute thoracic back pain    Impaired mobility and activities of daily living    Acute leg pain, left    Acute right hip pain    PVD (peripheral vascular disease) ([facility]    Nuclear senile cataract    Nonexudative age-related macular degeneration    Primary open angle glaucoma    DM (diabetes mellitus), type 2 with peripheral vascular complications ([facility])    Lumbar stenosis    Gastroesophageal reflux disease    Hyponatremia    Ambulatory dysfunction    Glaucoma    Osteoarthritis    S/P kyphoplasty    Late onset Alzheimer's disease without behavioral disturbance ([facility])    Abnormality of gait and mobility due to severe spinal stenosis. Leg edema, right    Age-related osteoporosis without current pathological fracture    Other constipation    PAF (paroxysmal atrial fibrillation) ([facility])    Age-related physical debility    Post-nasal drip    Generalized weakness    SBO (small bowel obstruction) ([facility]    Fall at home, initial encounter    Abnormal lung sounds    Osteoporosis without current pathological fracture    Urinary tract infection without hematuria           Assessment and Plan    Dementia without behavioral disturbance-- Alzheimers  Namenda and Seroquel. Afib   Rate controlled. Weakness  DM  HTN  Depression  On zoloft, dosage recently increased.       Soy Cotton President

## 2023-02-26 ENCOUNTER — OFFICE VISIT (OUTPATIENT)
Dept: GERIATRIC MEDICINE | Age: 88
End: 2023-02-26

## 2023-02-26 DIAGNOSIS — R53.1 WEAKNESS: ICD-10-CM

## 2023-02-26 DIAGNOSIS — G30.1 LATE ONSET ALZHEIMER'S DISEASE WITHOUT BEHAVIORAL DISTURBANCE (HCC): Primary | ICD-10-CM

## 2023-02-26 DIAGNOSIS — E13.69 OTHER SPECIFIED DIABETES MELLITUS WITH OTHER SPECIFIED COMPLICATION, WITHOUT LONG-TERM CURRENT USE OF INSULIN (HCC): ICD-10-CM

## 2023-02-26 DIAGNOSIS — I10 HYPERTENSION, UNSPECIFIED TYPE: ICD-10-CM

## 2023-02-26 DIAGNOSIS — F02.80 LATE ONSET ALZHEIMER'S DISEASE WITHOUT BEHAVIORAL DISTURBANCE (HCC): Primary | ICD-10-CM

## 2023-02-26 DIAGNOSIS — I48.91 ATRIAL FIBRILLATION, UNSPECIFIED TYPE (HCC): ICD-10-CM

## 2023-02-26 DIAGNOSIS — R54 AGE-RELATED PHYSICAL DEBILITY: ICD-10-CM

## 2023-03-02 NOTE — PROGRESS NOTES
SNF PROGRESS NOTE      Cc- dementia       Patient is a [facility] 80 y.o. female who is being seen at [facility]. She is sleeping in her wheelchair in her room. Past Medical History:   Diagnosis Date    Acute cystitis with hematuria 8/17/2019    Bowel obstruction ([address] 9/22/2019    Chronic hyponatremia     Compression fracture of T12 vertebra [facility]) 2013    kyphoplasty Dr Andrea Gaitan    DJD (degenerative joint disease) of hip 3/20/2014    Dr Janki Gonzalez    DM (diabetes mellitus), type 2 with peripheral vascular complications ([address]     diet controlled    Gallbladder sludge 2019    GERD (gastroesophageal reflux disease) 8/31/10    Glaucoma     bilateral    H/O vascular surgery 5/9/2019    Stenjune NAQVI 2006    Hearing loss     History of total hip replacement, right 05/09/2019    Dr Janki Gonzalez    Lower leg edema     chronic, R>L    Lumbar stenosis     Macular degeneration     left eye    PAF (paroxysmal atrial fibrillation) ([facility] 2019    Pain of left scapula 3/25/2019    PVD (peripheral vascular disease) ([facility] 10/28/2006    Dr Aragon Card, stents LE    Stress incontinence 10/28/2011     Patient has no known allergies. VS reviewed    Gen- awake and in her wheelchair Pleasantly confused, [place].    Heart- RRR no murmur no LE edema   Lungs- CTA b/l no resp distress RA oxygen   Abd- bs x 4       Lab Results   Component Value Date/Time     01/13/2022 12:00 AM    K 5.0 01/13/2022 12:00 AM    K 4.4 02/23/2020 05:02 AM     01/13/2022 12:00 AM    CO2 24 01/13/2022 12:00 AM    BUN 17 01/13/2022 12:00 AM    CREATININE 0.6 01/13/2022 12:00 AM    GLUCOSE 87 01/13/2022 12:00 AM    GLUCOSE 156 05/09/2012 12:57 PM    CALCIUM 9.6 01/13/2022 12:00 AM      Lab Results   Component Value Date    WBC 8.4 03/02/2020    HGB 11.2 (A) 03/02/2020    HCT 34.1 (A) 03/02/2020    MCV 94.8 03/02/2020     03/02/2020                   Patient Active Problem List   Diagnosis    Essential hypertension    Stress incontinence    Hearing loss    Spinal stenosis of lumbar region    Acute thoracic back pain    Impaired mobility and activities of daily living    Acute leg pain, left    Acute right hip pain    PVD (peripheral vascular disease) ([facility]    Nuclear senile cataract    Nonexudative age-related macular degeneration    Primary open angle glaucoma    DM (diabetes mellitus), type 2 with peripheral vascular complications ([facility])    Lumbar stenosis    Gastroesophageal reflux disease    Hyponatremia    Ambulatory dysfunction    Glaucoma    Osteoarthritis    S/P kyphoplasty    Late onset Alzheimer's disease without behavioral disturbance ([facility])    Abnormality of gait and mobility due to severe spinal stenosis. Leg edema, right    Age-related osteoporosis without current pathological fracture    Other constipation    PAF (paroxysmal atrial fibrillation) ([facility])    Age-related physical debility    Post-nasal drip    Generalized weakness    SBO (small bowel obstruction) ([facility]    Fall at home, initial encounter    Abnormal lung sounds    Osteoporosis without current pathological fracture    Urinary tract infection without hematuria           Assessment and Plan    Dementia without behavioral disturbance-- Alzheimers  Namenda and Seroquel. Afib   Rate controlled. Weakness  DM  HTN  Depression  On zoloft, dosage recently increased.       Conchis Brooks

## 2023-03-27 ENCOUNTER — OFFICE VISIT (OUTPATIENT)
Dept: GERIATRIC MEDICINE | Age: 88
End: 2023-03-27
Payer: COMMERCIAL

## 2023-03-27 DIAGNOSIS — E13.69 OTHER SPECIFIED DIABETES MELLITUS WITH OTHER SPECIFIED COMPLICATION, WITHOUT LONG-TERM CURRENT USE OF INSULIN (HCC): ICD-10-CM

## 2023-03-27 DIAGNOSIS — I10 HYPERTENSION, UNSPECIFIED TYPE: ICD-10-CM

## 2023-03-27 DIAGNOSIS — G30.1 LATE ONSET ALZHEIMER'S DISEASE WITHOUT BEHAVIORAL DISTURBANCE (HCC): Primary | ICD-10-CM

## 2023-03-27 DIAGNOSIS — R54 AGE-RELATED PHYSICAL DEBILITY: ICD-10-CM

## 2023-03-27 DIAGNOSIS — I48.91 ATRIAL FIBRILLATION, UNSPECIFIED TYPE (HCC): ICD-10-CM

## 2023-03-27 DIAGNOSIS — R53.1 WEAKNESS: ICD-10-CM

## 2023-03-27 DIAGNOSIS — F02.80 LATE ONSET ALZHEIMER'S DISEASE WITHOUT BEHAVIORAL DISTURBANCE (HCC): Primary | ICD-10-CM

## 2023-03-27 PROCEDURE — G8484 FLU IMMUNIZE NO ADMIN: HCPCS | Performed by: INTERNAL MEDICINE

## 2023-03-27 PROCEDURE — 1123F ACP DISCUSS/DSCN MKR DOCD: CPT | Performed by: INTERNAL MEDICINE

## 2023-03-27 PROCEDURE — 99308 SBSQ NF CARE LOW MDM 20: CPT | Performed by: INTERNAL MEDICINE

## 2023-03-29 NOTE — PROGRESS NOTES
SNF PROGRESS NOTE      Cc- dementia       Patient is a Rizwana Neal 80 y.o. female who is being seen at [facility]. She is sitting in her wheelchair in the entrance to her room. She denies any complaints at this time. No issues. Past Medical History:   Diagnosis Date    Acute cystitis with hematuria 8/17/2019    Bowel obstruction ([address] 9/22/2019    Chronic hyponatremia     Compression fracture of T12 vertebra [facility]) 2013    kyphoplasty Dr Michael Vázquez    DJD (degenerative joint disease) of hip 3/20/2014    Dr Dori Oliver    DM (diabetes mellitus), type 2 with peripheral vascular complications ([address]     diet controlled    Gallbladder sludge 2019    GERD (gastroesophageal reflux disease) 8/31/10    Glaucoma     bilateral    H/O vascular surgery 5/9/2019    Josie NAQVI 2006    Hearing loss     History of total hip replacement, right 05/09/2019    Dr Dori Oliver    Lower leg edema     chronic, R>L    Lumbar stenosis     Macular degeneration     left eye    PAF (paroxysmal atrial fibrillation) ([facility] 2019    Pain of left scapula 3/25/2019    PVD (peripheral vascular disease) ([facility] 10/28/2006    Dr Harvinder Sexton, stents LE    Stress incontinence 10/28/2011     Patient has no known allergies. VS reviewed    Gen- awake and in her wheelchair Pleasantly confused, [place].    Heart- RRR no murmur no LE edema   Lungs- CTA b/l no resp distress RA oxygen   Abd- bs x 4   Lab Results   Component Value Date/Time     01/13/2022 12:00 AM    K 5.0 01/13/2022 12:00 AM    K 4.4 02/23/2020 05:02 AM     01/13/2022 12:00 AM    CO2 24 01/13/2022 12:00 AM    BUN 17 01/13/2022 12:00 AM    CREATININE 0.6 01/13/2022 12:00 AM    GLUCOSE 87 01/13/2022 12:00 AM    GLUCOSE 156 05/09/2012 12:57 PM    CALCIUM 9.6 01/13/2022 12:00 AM      Lab Results   Component Value Date    WBC 8.4 03/02/2020    HGB 11.2 (A) 03/02/2020    HCT 34.1 (A) 03/02/2020    MCV 94.8 03/02/2020     03/02/2020

## 2023-04-20 ENCOUNTER — OFFICE VISIT (OUTPATIENT)
Dept: GERIATRIC MEDICINE | Age: 88
End: 2023-04-20

## 2023-04-20 DIAGNOSIS — I48.91 ATRIAL FIBRILLATION, UNSPECIFIED TYPE (HCC): ICD-10-CM

## 2023-04-20 DIAGNOSIS — R53.1 WEAKNESS: ICD-10-CM

## 2023-04-20 DIAGNOSIS — G30.1 LATE ONSET ALZHEIMER'S DISEASE WITHOUT BEHAVIORAL DISTURBANCE (HCC): Primary | ICD-10-CM

## 2023-04-20 DIAGNOSIS — F02.80 LATE ONSET ALZHEIMER'S DISEASE WITHOUT BEHAVIORAL DISTURBANCE (HCC): Primary | ICD-10-CM

## 2023-04-20 DIAGNOSIS — E13.69 OTHER SPECIFIED DIABETES MELLITUS WITH OTHER SPECIFIED COMPLICATION, WITHOUT LONG-TERM CURRENT USE OF INSULIN (HCC): ICD-10-CM

## 2023-04-20 DIAGNOSIS — I10 HYPERTENSION, UNSPECIFIED TYPE: ICD-10-CM

## 2023-04-20 DIAGNOSIS — R54 AGE-RELATED PHYSICAL DEBILITY: ICD-10-CM

## 2023-04-24 LAB
BILIRUBIN, URINE: NEGATIVE
BLOOD, URINE: POSITIVE
CLARITY: ABNORMAL
COLOR: YELLOW
GLUCOSE URINE: ABNORMAL
KETONES, URINE: POSITIVE
LEUKOCYTE ESTERASE, URINE: NEGATIVE
NITRITE, URINE: NEGATIVE
PH UA: 7 (ref 4.5–8)
PROTEIN UA: ABNORMAL
SPECIFIC GRAVITY, URINE: 1.03
UROBILINOGEN, URINE: NORMAL

## 2023-05-22 ENCOUNTER — OFFICE VISIT (OUTPATIENT)
Dept: GERIATRIC MEDICINE | Age: 88
End: 2023-05-22

## 2023-05-22 DIAGNOSIS — I10 HYPERTENSION, UNSPECIFIED TYPE: ICD-10-CM

## 2023-05-22 DIAGNOSIS — R54 AGE-RELATED PHYSICAL DEBILITY: ICD-10-CM

## 2023-05-22 DIAGNOSIS — R53.1 WEAKNESS: ICD-10-CM

## 2023-05-22 DIAGNOSIS — F02.80 LATE ONSET ALZHEIMER'S DISEASE WITHOUT BEHAVIORAL DISTURBANCE (HCC): Primary | ICD-10-CM

## 2023-05-22 DIAGNOSIS — E13.69 OTHER SPECIFIED DIABETES MELLITUS WITH OTHER SPECIFIED COMPLICATION, WITHOUT LONG-TERM CURRENT USE OF INSULIN (HCC): ICD-10-CM

## 2023-05-22 DIAGNOSIS — G30.1 LATE ONSET ALZHEIMER'S DISEASE WITHOUT BEHAVIORAL DISTURBANCE (HCC): Primary | ICD-10-CM

## 2023-05-22 DIAGNOSIS — I48.91 ATRIAL FIBRILLATION, UNSPECIFIED TYPE (HCC): ICD-10-CM

## 2023-06-02 NOTE — PROGRESS NOTES
SNF PROGRESS NOTE      Cc- dementia       Patient is a Evaline Sitter 80 y.o. female who is being seen at [facility]. Over the past 30 days, the patient was diagnosed with covid and placed in and out of isolation. She also had a skin tear with local wound care. Past Medical History:   Diagnosis Date    Acute cystitis with hematuria 8/17/2019    Bowel obstruction ([address] 9/22/2019    Chronic hyponatremia     Compression fracture of T12 vertebra [facility]) 2013    kyphoplasty Dr Shyanne Ellsworth    DJD (degenerative joint disease) of hip 3/20/2014    Dr Maggie Rivera    DM (diabetes mellitus), type 2 with peripheral vascular complications ([address]     diet controlled    Gallbladder sludge 2019    GERD (gastroesophageal reflux disease) 8/31/10    Glaucoma     bilateral    H/O vascular surgery 5/9/2019    Josie NAQVI 2006    Hearing loss     History of total hip replacement, right 05/09/2019    Dr Maggie Rivera    Lower leg edema     chronic, R>L    Lumbar stenosis     Macular degeneration     left eye    PAF (paroxysmal atrial fibrillation) ([facility] 2019    Pain of left scapula 3/25/2019    PVD (peripheral vascular disease) ([address] 10/28/2006    Dr Kelly Philip, stents LE    Stress incontinence 10/28/2011     Patient has no known allergies. VS reviewed    Gen- awake and in her wheelchair in her room Pleasantly confused, [place].    Heart- RRR no murmur no LE edema   Lungs- CTA b/l no resp distress RA oxygen   Abd- bs x 4       Lab Results   Component Value Date/Time     01/13/2022 12:00 AM    K 5.0 01/13/2022 12:00 AM    K 4.4 02/23/2020 05:02 AM     01/13/2022 12:00 AM    CO2 24 01/13/2022 12:00 AM    BUN 17 01/13/2022 12:00 AM    CREATININE 0.6 01/13/2022 12:00 AM    GLUCOSE 87 01/13/2022 12:00 AM    GLUCOSE 156 05/09/2012 12:57 PM    CALCIUM 9.6 01/13/2022 12:00 AM    LABGLOM 113 03/02/2020 12:00 AM    LABGLOM >60.0 02/23/2020 05:02 AM      Lab Results   Component Value Date    WBC 8.4

## 2023-06-25 ENCOUNTER — OFFICE VISIT (OUTPATIENT)
Dept: GERIATRIC MEDICINE | Age: 88
End: 2023-06-25

## 2023-06-25 DIAGNOSIS — E13.69 OTHER SPECIFIED DIABETES MELLITUS WITH OTHER SPECIFIED COMPLICATION, WITHOUT LONG-TERM CURRENT USE OF INSULIN (HCC): ICD-10-CM

## 2023-06-25 DIAGNOSIS — F02.80 LATE ONSET ALZHEIMER'S DISEASE WITHOUT BEHAVIORAL DISTURBANCE (HCC): Primary | ICD-10-CM

## 2023-06-25 DIAGNOSIS — R53.1 WEAKNESS: ICD-10-CM

## 2023-06-25 DIAGNOSIS — R54 AGE-RELATED PHYSICAL DEBILITY: ICD-10-CM

## 2023-06-25 DIAGNOSIS — I48.91 ATRIAL FIBRILLATION, UNSPECIFIED TYPE (HCC): ICD-10-CM

## 2023-06-25 DIAGNOSIS — G30.1 LATE ONSET ALZHEIMER'S DISEASE WITHOUT BEHAVIORAL DISTURBANCE (HCC): Primary | ICD-10-CM

## 2023-06-25 DIAGNOSIS — I10 HYPERTENSION, UNSPECIFIED TYPE: ICD-10-CM

## 2023-07-22 ENCOUNTER — OFFICE VISIT (OUTPATIENT)
Dept: GERIATRIC MEDICINE | Age: 88
End: 2023-07-22

## 2023-07-22 DIAGNOSIS — R53.1 WEAKNESS: ICD-10-CM

## 2023-07-22 DIAGNOSIS — I10 HYPERTENSION, UNSPECIFIED TYPE: ICD-10-CM

## 2023-07-22 DIAGNOSIS — G30.1 LATE ONSET ALZHEIMER'S DISEASE WITHOUT BEHAVIORAL DISTURBANCE (HCC): Primary | ICD-10-CM

## 2023-07-22 DIAGNOSIS — E13.69 OTHER SPECIFIED DIABETES MELLITUS WITH OTHER SPECIFIED COMPLICATION, WITHOUT LONG-TERM CURRENT USE OF INSULIN (HCC): ICD-10-CM

## 2023-07-22 DIAGNOSIS — R54 AGE-RELATED PHYSICAL DEBILITY: ICD-10-CM

## 2023-07-22 DIAGNOSIS — F02.80 LATE ONSET ALZHEIMER'S DISEASE WITHOUT BEHAVIORAL DISTURBANCE (HCC): Primary | ICD-10-CM

## 2023-07-22 DIAGNOSIS — I48.91 ATRIAL FIBRILLATION, UNSPECIFIED TYPE (HCC): ICD-10-CM

## 2023-07-29 NOTE — PROGRESS NOTES
SNF PROGRESS NOTE      Cc- Dementia       Patient is a Tania Huntermb 80 y.o. female who is being seen at [facility] for her 30 day visit for Dementia. Over the past 30 days she was treated for pink eye, she was difficult in giving drops to, otherwise, no issues. Past Medical History:   Diagnosis Date    Acute cystitis with hematuria 8/17/2019    Bowel obstruction ([address]) 9/22/2019    Chronic hyponatremia     Compression fracture of T12 vertebra [facility]) 2013    kyphoplasty Dr Noel Serna    DJD (degenerative joint disease) of hip 3/20/2014    Dr Elver Kim    DM (diabetes mellitus), type 2 with peripheral vascular complications ([address])     diet controlled    Gallbladder sludge 2019    GERD (gastroesophageal reflux disease) 8/31/10    Glaucoma     bilateral    H/O vascular surgery 5/9/2019    Josie NAQVI 2006    Hearing loss     History of total hip replacement, right 05/09/2019    Dr Elver Kim    Lower leg edema     chronic, R>L    Lumbar stenosis     Macular degeneration     left eye    PAF (paroxysmal atrial fibrillation) ([address]) 2019    Pain of left scapula 3/25/2019    PVD (peripheral vascular disease) ([address]) 10/28/2006    Dr Fanny Hunter, stents LE    Stress incontinence 10/28/2011     Patient has no known allergies. VS reviewed    Gen- awake and in her wheelchair in her room Pleasantly confused, [place].    Heart- RRR no murmur no LE edema   Lungs- CTA b/l no resp distress RA oxygen   Abd- bs x 4      Lab Results   Component Value Date/Time     01/13/2022 12:00 AM    K 5.0 01/13/2022 12:00 AM    K 4.4 02/23/2020 05:02 AM     01/13/2022 12:00 AM    CO2 24 01/13/2022 12:00 AM    BUN 17 01/13/2022 12:00 AM    CREATININE 0.6 01/13/2022 12:00 AM    GLUCOSE 87 01/13/2022 12:00 AM    GLUCOSE 156 05/09/2012 12:57 PM    CALCIUM 9.6 01/13/2022 12:00 AM    LABGLOM 113 03/02/2020 12:00 AM    LABGLOM >60.0 02/23/2020 05:02 AM      Lab Results   Component Value Date    WBC 8.4

## 2023-08-16 ENCOUNTER — OFFICE VISIT (OUTPATIENT)
Dept: GERIATRIC MEDICINE | Age: 88
End: 2023-08-16

## 2023-08-16 DIAGNOSIS — I10 HYPERTENSION, UNSPECIFIED TYPE: ICD-10-CM

## 2023-08-16 DIAGNOSIS — I48.91 ATRIAL FIBRILLATION, UNSPECIFIED TYPE (HCC): ICD-10-CM

## 2023-08-16 DIAGNOSIS — F02.80 LATE ONSET ALZHEIMER'S DISEASE WITHOUT BEHAVIORAL DISTURBANCE (HCC): Primary | ICD-10-CM

## 2023-08-16 DIAGNOSIS — R54 AGE-RELATED PHYSICAL DEBILITY: ICD-10-CM

## 2023-08-16 DIAGNOSIS — G30.1 LATE ONSET ALZHEIMER'S DISEASE WITHOUT BEHAVIORAL DISTURBANCE (HCC): Primary | ICD-10-CM

## 2023-08-16 DIAGNOSIS — R53.1 WEAKNESS: ICD-10-CM

## 2023-08-16 DIAGNOSIS — E13.69 OTHER SPECIFIED DIABETES MELLITUS WITH OTHER SPECIFIED COMPLICATION, WITHOUT LONG-TERM CURRENT USE OF INSULIN (HCC): ICD-10-CM

## 2023-09-15 ENCOUNTER — OFFICE VISIT (OUTPATIENT)
Dept: GERIATRIC MEDICINE | Age: 88
End: 2023-09-15

## 2023-09-15 DIAGNOSIS — E13.69 OTHER SPECIFIED DIABETES MELLITUS WITH OTHER SPECIFIED COMPLICATION, WITHOUT LONG-TERM CURRENT USE OF INSULIN (HCC): ICD-10-CM

## 2023-09-15 DIAGNOSIS — I48.91 ATRIAL FIBRILLATION, UNSPECIFIED TYPE (HCC): ICD-10-CM

## 2023-09-15 DIAGNOSIS — G30.1 LATE ONSET ALZHEIMER'S DISEASE WITHOUT BEHAVIORAL DISTURBANCE (HCC): Primary | ICD-10-CM

## 2023-09-15 DIAGNOSIS — R54 AGE-RELATED PHYSICAL DEBILITY: ICD-10-CM

## 2023-09-15 DIAGNOSIS — I10 HYPERTENSION, UNSPECIFIED TYPE: ICD-10-CM

## 2023-09-15 DIAGNOSIS — R53.1 WEAKNESS: ICD-10-CM

## 2023-09-15 DIAGNOSIS — F02.80 LATE ONSET ALZHEIMER'S DISEASE WITHOUT BEHAVIORAL DISTURBANCE (HCC): Primary | ICD-10-CM

## 2023-09-21 NOTE — PROGRESS NOTES
SNF PROGRESS NOTE      Cc- Dementia      Patient is a Maeola Goldberg 80 y.o. female who is being seen at [facility] for her 30 day visit for Dementia. Over the past 30 days, the patient was placed in isolation due to exposure to COVID. Refused to see the ear doctor. She saw the eye doctor. Past Medical History:   Diagnosis Date    Acute cystitis with hematuria 8/17/2019    Bowel obstruction ([address]) 9/22/2019    Chronic hyponatremia     Compression fracture of T12 vertebra [facility]) 2013    kyphoplasty Dr Kathya Padron    DJD (degenerative joint disease) of hip 3/20/2014    Dr Sanjuanita Bamberger    DM (diabetes mellitus), type 2 with peripheral vascular complications ([address])     diet controlled    Gallbladder sludge 2019    GERD (gastroesophageal reflux disease) 8/31/10    Glaucoma     bilateral    H/O vascular surgery 5/9/2019    Stens LE 2006    Hearing loss     History of total hip replacement, right 05/09/2019    Dr Sanjuanita Bamberger    Lower leg edema     chronic, R>L    Lumbar stenosis     Macular degeneration     left eye    PAF (paroxysmal atrial fibrillation) ([address]) 2019    Pain of left scapula 3/25/2019    PVD (peripheral vascular disease) ([address]) 10/28/2006    Dr Matti Muñoz, stents LE    Stress incontinence 10/28/2011     Patient has no known allergies. VS reviewed    Gen- awake and in her wheelchair in her room Pleasantly confused, [place]. Heart- RRR no murmur no LE edema   Lungs- CTA b/l no resp distress RA oxygen   Abd- bs x 4          Assessment and Plan    Dementia without behavioral disturbance-- Alzheimers  Namenda and Seroquel. Afib   Rate controlled. Weakness/age related decline.    DM  HTN  Depression  On zoloft      Onita Jake [facility]     Electronically signed by: Erasmo Castaneda DO on 9/15/2023

## 2023-10-15 ENCOUNTER — OFFICE VISIT (OUTPATIENT)
Dept: GERIATRIC MEDICINE | Age: 88
End: 2023-10-15

## 2023-10-15 DIAGNOSIS — G30.1 LATE ONSET ALZHEIMER'S DISEASE WITHOUT BEHAVIORAL DISTURBANCE (HCC): Primary | ICD-10-CM

## 2023-10-15 DIAGNOSIS — R53.1 WEAKNESS: ICD-10-CM

## 2023-10-15 DIAGNOSIS — I10 HYPERTENSION, UNSPECIFIED TYPE: ICD-10-CM

## 2023-10-15 DIAGNOSIS — E13.69 OTHER SPECIFIED DIABETES MELLITUS WITH OTHER SPECIFIED COMPLICATION, WITHOUT LONG-TERM CURRENT USE OF INSULIN (HCC): ICD-10-CM

## 2023-10-15 DIAGNOSIS — R54 AGE-RELATED PHYSICAL DEBILITY: ICD-10-CM

## 2023-10-15 DIAGNOSIS — I48.91 ATRIAL FIBRILLATION, UNSPECIFIED TYPE (HCC): ICD-10-CM

## 2023-10-15 DIAGNOSIS — F02.80 LATE ONSET ALZHEIMER'S DISEASE WITHOUT BEHAVIORAL DISTURBANCE (HCC): Primary | ICD-10-CM

## 2023-11-15 ENCOUNTER — OFFICE VISIT (OUTPATIENT)
Dept: GERIATRIC MEDICINE | Age: 88
End: 2023-11-15

## 2023-11-15 DIAGNOSIS — R53.1 WEAKNESS: ICD-10-CM

## 2023-11-15 DIAGNOSIS — G30.1 LATE ONSET ALZHEIMER'S DISEASE WITHOUT BEHAVIORAL DISTURBANCE (HCC): Primary | ICD-10-CM

## 2023-11-15 DIAGNOSIS — E13.69 OTHER SPECIFIED DIABETES MELLITUS WITH OTHER SPECIFIED COMPLICATION, WITHOUT LONG-TERM CURRENT USE OF INSULIN (HCC): ICD-10-CM

## 2023-11-15 DIAGNOSIS — I10 HYPERTENSION, UNSPECIFIED TYPE: ICD-10-CM

## 2023-11-15 DIAGNOSIS — I48.91 ATRIAL FIBRILLATION, UNSPECIFIED TYPE (HCC): ICD-10-CM

## 2023-11-15 DIAGNOSIS — F02.80 LATE ONSET ALZHEIMER'S DISEASE WITHOUT BEHAVIORAL DISTURBANCE (HCC): Primary | ICD-10-CM

## 2023-11-18 NOTE — PROGRESS NOTES
SNF PROGRESS NOTE      Cc- Dementia      Patient is a Libia Tejada 80 y.o. female who is being seen at [facility] for her 30 day visit for Dementia. Over the last 30 days, patient has refused med several times. She also had an episode where she slid onto the floor, no injuries. Past Medical History:   Diagnosis Date    Acute cystitis with hematuria 8/17/2019    Bowel obstruction ([address]) 9/22/2019    Chronic hyponatremia     Compression fracture of T12 vertebra [facility]) 2013    kyphoplasty Dr Uli Armenta    DJD (degenerative joint disease) of hip 3/20/2014    Dr Tomeka Crespo    DM (diabetes mellitus), type 2 with peripheral vascular complications ([address])     diet controlled    Gallbladder sludge 2019    GERD (gastroesophageal reflux disease) 8/31/10    Glaucoma     bilateral    H/O vascular surgery 5/9/2019    Stenjune NAQVI 2006    Hearing loss     History of total hip replacement, right 05/09/2019    Dr Tomeka Crespo    Lower leg edema     chronic, R>L    Lumbar stenosis     Macular degeneration     left eye    PAF (paroxysmal atrial fibrillation) ([address]) 2019    Pain of left scapula 3/25/2019    PVD (peripheral vascular disease) ([address]) 10/28/2006    Dr Alison Chapa, stents LE    Stress incontinence 10/28/2011     Patient has no known allergies. VS reviewed      Gen- awake and in her wheelchair in her room Pleasantly confused, [place]. Heart- RRR no murmur no LE edema   Lungs- CTA b/l no resp distress RA oxygen   Abd- bs x 4        Assessment and Plan    Dementia without behavioral disturbance-- Alzheimers  Namenda and Seroquel. Afib   Rate controlled. Weakness/age related decline.    DM  HTN  Depression  On zoloft      Camillia Bentley [facility]     Electronically signed by: Henry Burton DO on 11/15/2023

## 2023-11-19 ENCOUNTER — HOSPITAL ENCOUNTER (INPATIENT)
Age: 88
LOS: 3 days | Discharge: NURSING, REHAB OR LONG-TERM CARE | DRG: 522 | End: 2023-11-22
Attending: EMERGENCY MEDICINE | Admitting: SURGERY
Payer: COMMERCIAL

## 2023-11-19 ENCOUNTER — APPOINTMENT (OUTPATIENT)
Dept: GENERAL RADIOLOGY | Age: 88
DRG: 522 | End: 2023-11-19
Payer: COMMERCIAL

## 2023-11-19 DIAGNOSIS — S72.002A CLOSED FRACTURE OF LEFT HIP, INITIAL ENCOUNTER (HCC): Primary | ICD-10-CM

## 2023-11-19 PROBLEM — S72.009A HIP FRACTURE (HCC): Status: ACTIVE | Noted: 2023-11-19

## 2023-11-19 LAB
ABO + RH BLD: NORMAL
ANION GAP SERPL CALCULATED.3IONS-SCNC: 12 MEQ/L (ref 9–15)
APTT PPP: 29.3 SEC (ref 24.4–36.8)
BASOPHILS # BLD: 0 K/UL (ref 0–0.2)
BASOPHILS NFR BLD: 0.4 %
BLD GP AB SCN SERPL QL: NORMAL
BUN SERPL-MCNC: 26 MG/DL (ref 8–23)
CALCIUM SERPL-MCNC: 9.4 MG/DL (ref 8.5–9.9)
CHLORIDE SERPL-SCNC: 108 MEQ/L (ref 95–107)
CO2 SERPL-SCNC: 21 MEQ/L (ref 20–31)
CREAT SERPL-MCNC: 0.68 MG/DL (ref 0.5–0.9)
EOSINOPHIL # BLD: 0.4 K/UL (ref 0–0.7)
EOSINOPHIL NFR BLD: 4.2 %
ERYTHROCYTE [DISTWIDTH] IN BLOOD BY AUTOMATED COUNT: 13.8 % (ref 11.5–14.5)
ETHANOL PERCENT: NORMAL G/DL
ETHANOLAMINE SERPL-MCNC: <10 MG/DL (ref 0–0.08)
GLUCOSE SERPL-MCNC: 127 MG/DL (ref 70–99)
HCT VFR BLD AUTO: 36.3 % (ref 37–47)
HGB BLD-MCNC: 11.4 G/DL (ref 12–16)
INR PPP: 1.1
LYMPHOCYTES # BLD: 1.4 K/UL (ref 1–4.8)
LYMPHOCYTES NFR BLD: 14.5 %
MAGNESIUM SERPL-MCNC: 1.8 MG/DL (ref 1.7–2.4)
MCH RBC QN AUTO: 30.4 PG (ref 27–31.3)
MCHC RBC AUTO-ENTMCNC: 31.4 % (ref 33–37)
MCV RBC AUTO: 96.8 FL (ref 79.4–94.8)
MONOCYTES # BLD: 1 K/UL (ref 0.2–0.8)
MONOCYTES NFR BLD: 9.8 %
NEUTROPHILS # BLD: 6.9 K/UL (ref 1.4–6.5)
NEUTS SEG NFR BLD: 70.3 %
PLATELET # BLD AUTO: 161 K/UL (ref 130–400)
POTASSIUM SERPL-SCNC: 4.2 MEQ/L (ref 3.4–4.9)
PROTHROMBIN TIME: 14.8 SEC (ref 12.3–14.9)
RBC # BLD AUTO: 3.75 M/UL (ref 4.2–5.4)
SODIUM SERPL-SCNC: 141 MEQ/L (ref 135–144)
WBC # BLD AUTO: 9.8 K/UL (ref 4.8–10.8)

## 2023-11-19 PROCEDURE — 80048 BASIC METABOLIC PNL TOTAL CA: CPT

## 2023-11-19 PROCEDURE — 6830039000 HC L3 TRAUMA ALERT

## 2023-11-19 PROCEDURE — 93005 ELECTROCARDIOGRAM TRACING: CPT | Performed by: PHYSICIAN ASSISTANT

## 2023-11-19 PROCEDURE — 86850 RBC ANTIBODY SCREEN: CPT

## 2023-11-19 PROCEDURE — 72170 X-RAY EXAM OF PELVIS: CPT

## 2023-11-19 PROCEDURE — 85610 PROTHROMBIN TIME: CPT

## 2023-11-19 PROCEDURE — 86900 BLOOD TYPING SEROLOGIC ABO: CPT

## 2023-11-19 PROCEDURE — 6360000002 HC RX W HCPCS: Performed by: PHYSICIAN ASSISTANT

## 2023-11-19 PROCEDURE — 96374 THER/PROPH/DIAG INJ IV PUSH: CPT

## 2023-11-19 PROCEDURE — 36415 COLL VENOUS BLD VENIPUNCTURE: CPT

## 2023-11-19 PROCEDURE — 99285 EMERGENCY DEPT VISIT HI MDM: CPT

## 2023-11-19 PROCEDURE — 86901 BLOOD TYPING SEROLOGIC RH(D): CPT

## 2023-11-19 PROCEDURE — 83735 ASSAY OF MAGNESIUM: CPT

## 2023-11-19 PROCEDURE — 6360000002 HC RX W HCPCS: Performed by: EMERGENCY MEDICINE

## 2023-11-19 PROCEDURE — 1210000000 HC MED SURG R&B

## 2023-11-19 PROCEDURE — 85730 THROMBOPLASTIN TIME PARTIAL: CPT

## 2023-11-19 PROCEDURE — 85025 COMPLETE CBC W/AUTO DIFF WBC: CPT

## 2023-11-19 PROCEDURE — 82077 ASSAY SPEC XCP UR&BREATH IA: CPT

## 2023-11-19 PROCEDURE — 73552 X-RAY EXAM OF FEMUR 2/>: CPT

## 2023-11-19 RX ORDER — POTASSIUM CHLORIDE 29.8 MG/ML
20 INJECTION INTRAVENOUS PRN
Status: DISCONTINUED | OUTPATIENT
Start: 2023-11-19 | End: 2023-11-22 | Stop reason: HOSPADM

## 2023-11-19 RX ORDER — MECOBALAMIN 5000 MCG
5 TABLET,DISINTEGRATING ORAL NIGHTLY
Status: DISCONTINUED | OUTPATIENT
Start: 2023-11-19 | End: 2023-11-22 | Stop reason: HOSPADM

## 2023-11-19 RX ORDER — VITAMIN B COMPLEX
1000 TABLET ORAL DAILY
Status: DISCONTINUED | OUTPATIENT
Start: 2023-11-19 | End: 2023-11-22 | Stop reason: HOSPADM

## 2023-11-19 RX ORDER — LIDOCAINE 4 G/G
1 PATCH TOPICAL EVERY 12 HOURS
Status: DISCONTINUED | OUTPATIENT
Start: 2023-11-19 | End: 2023-11-22 | Stop reason: HOSPADM

## 2023-11-19 RX ORDER — SODIUM CHLORIDE 0.9 % (FLUSH) 0.9 %
5-40 SYRINGE (ML) INJECTION EVERY 12 HOURS SCHEDULED
Status: DISCONTINUED | OUTPATIENT
Start: 2023-11-19 | End: 2023-11-22 | Stop reason: HOSPADM

## 2023-11-19 RX ORDER — MAGNESIUM SULFATE IN WATER 40 MG/ML
2000 INJECTION, SOLUTION INTRAVENOUS PRN
Status: DISCONTINUED | OUTPATIENT
Start: 2023-11-19 | End: 2023-11-22 | Stop reason: HOSPADM

## 2023-11-19 RX ORDER — ONDANSETRON 4 MG/1
4 TABLET, ORALLY DISINTEGRATING ORAL EVERY 8 HOURS PRN
Status: DISCONTINUED | OUTPATIENT
Start: 2023-11-19 | End: 2023-11-22 | Stop reason: HOSPADM

## 2023-11-19 RX ORDER — ACETAMINOPHEN 325 MG/1
650 TABLET ORAL EVERY 6 HOURS
Status: DISCONTINUED | OUTPATIENT
Start: 2023-11-19 | End: 2023-11-22 | Stop reason: HOSPADM

## 2023-11-19 RX ORDER — QUETIAPINE FUMARATE 25 MG/1
12.5 TABLET, FILM COATED ORAL 2 TIMES DAILY
Status: DISCONTINUED | OUTPATIENT
Start: 2023-11-19 | End: 2023-11-22 | Stop reason: HOSPADM

## 2023-11-19 RX ORDER — PANTOPRAZOLE SODIUM 40 MG/1
40 TABLET, DELAYED RELEASE ORAL
Status: DISCONTINUED | OUTPATIENT
Start: 2023-11-20 | End: 2023-11-22 | Stop reason: HOSPADM

## 2023-11-19 RX ORDER — TRAMADOL HYDROCHLORIDE 50 MG/1
50 TABLET ORAL EVERY 6 HOURS PRN
Status: DISCONTINUED | OUTPATIENT
Start: 2023-11-19 | End: 2023-11-22 | Stop reason: HOSPADM

## 2023-11-19 RX ORDER — POTASSIUM CHLORIDE 7.45 MG/ML
10 INJECTION INTRAVENOUS PRN
Status: DISCONTINUED | OUTPATIENT
Start: 2023-11-19 | End: 2023-11-22 | Stop reason: HOSPADM

## 2023-11-19 RX ORDER — QUETIAPINE FUMARATE 25 MG/1
12.5 TABLET, FILM COATED ORAL 2 TIMES DAILY
COMMUNITY

## 2023-11-19 RX ORDER — MEMANTINE HYDROCHLORIDE 10 MG/1
10 TABLET ORAL 2 TIMES DAILY
Status: DISCONTINUED | OUTPATIENT
Start: 2023-11-19 | End: 2023-11-22 | Stop reason: HOSPADM

## 2023-11-19 RX ORDER — SODIUM CHLORIDE, SODIUM LACTATE, POTASSIUM CHLORIDE, CALCIUM CHLORIDE 600; 310; 30; 20 MG/100ML; MG/100ML; MG/100ML; MG/100ML
INJECTION, SOLUTION INTRAVENOUS CONTINUOUS
Status: DISCONTINUED | OUTPATIENT
Start: 2023-11-20 | End: 2023-11-22

## 2023-11-19 RX ORDER — POLYETHYLENE GLYCOL 3350 17 G/17G
17 POWDER, FOR SOLUTION ORAL DAILY PRN
Status: DISCONTINUED | OUTPATIENT
Start: 2023-11-19 | End: 2023-11-22 | Stop reason: HOSPADM

## 2023-11-19 RX ORDER — SODIUM CHLORIDE 9 MG/ML
INJECTION, SOLUTION INTRAVENOUS PRN
Status: DISCONTINUED | OUTPATIENT
Start: 2023-11-19 | End: 2023-11-22 | Stop reason: HOSPADM

## 2023-11-19 RX ORDER — ENOXAPARIN SODIUM 100 MG/ML
30 INJECTION SUBCUTANEOUS 2 TIMES DAILY
Status: ACTIVE | OUTPATIENT
Start: 2023-11-19 | End: 2023-11-20

## 2023-11-19 RX ORDER — TRAMADOL HYDROCHLORIDE 50 MG/1
50 TABLET ORAL EVERY 8 HOURS PRN
COMMUNITY

## 2023-11-19 RX ORDER — SENNA AND DOCUSATE SODIUM 50; 8.6 MG/1; MG/1
1 TABLET, FILM COATED ORAL 2 TIMES DAILY
Status: DISCONTINUED | OUTPATIENT
Start: 2023-11-19 | End: 2023-11-22 | Stop reason: HOSPADM

## 2023-11-19 RX ORDER — METHOCARBAMOL 500 MG/1
500 TABLET, FILM COATED ORAL EVERY 6 HOURS
Status: DISCONTINUED | OUTPATIENT
Start: 2023-11-19 | End: 2023-11-22 | Stop reason: HOSPADM

## 2023-11-19 RX ORDER — FENTANYL CITRATE 0.05 MG/ML
25 INJECTION, SOLUTION INTRAMUSCULAR; INTRAVENOUS ONCE
Status: COMPLETED | OUTPATIENT
Start: 2023-11-19 | End: 2023-11-19

## 2023-11-19 RX ORDER — BRIMONIDINE TARTRATE 2 MG/ML
1 SOLUTION/ DROPS OPHTHALMIC 2 TIMES DAILY
Status: DISCONTINUED | OUTPATIENT
Start: 2023-11-19 | End: 2023-11-22 | Stop reason: HOSPADM

## 2023-11-19 RX ORDER — TRAMADOL HYDROCHLORIDE 50 MG/1
25 TABLET ORAL EVERY 6 HOURS PRN
Status: DISCONTINUED | OUTPATIENT
Start: 2023-11-19 | End: 2023-11-22 | Stop reason: HOSPADM

## 2023-11-19 RX ORDER — ONDANSETRON 2 MG/ML
4 INJECTION INTRAMUSCULAR; INTRAVENOUS EVERY 6 HOURS PRN
Status: DISCONTINUED | OUTPATIENT
Start: 2023-11-19 | End: 2023-11-22 | Stop reason: HOSPADM

## 2023-11-19 RX ORDER — MEMANTINE HYDROCHLORIDE 10 MG/1
10 TABLET ORAL 2 TIMES DAILY
COMMUNITY

## 2023-11-19 RX ORDER — SODIUM CHLORIDE 0.9 % (FLUSH) 0.9 %
5-40 SYRINGE (ML) INJECTION PRN
Status: DISCONTINUED | OUTPATIENT
Start: 2023-11-19 | End: 2023-11-22 | Stop reason: HOSPADM

## 2023-11-19 RX ORDER — BISACODYL 10 MG
10 SUPPOSITORY, RECTAL RECTAL DAILY PRN
Status: DISCONTINUED | OUTPATIENT
Start: 2023-11-19 | End: 2023-11-22 | Stop reason: HOSPADM

## 2023-11-19 RX ADMIN — FENTANYL CITRATE 25 MCG: 50 INJECTION INTRAMUSCULAR; INTRAVENOUS at 12:26

## 2023-11-19 RX ADMIN — HYDROMORPHONE HYDROCHLORIDE 0.25 MG: 1 INJECTION, SOLUTION INTRAMUSCULAR; INTRAVENOUS; SUBCUTANEOUS at 17:56

## 2023-11-19 ASSESSMENT — PAIN DESCRIPTION - ORIENTATION
ORIENTATION: LEFT
ORIENTATION: LEFT

## 2023-11-19 ASSESSMENT — PAIN DESCRIPTION - LOCATION
LOCATION: HIP
LOCATION: HIP

## 2023-11-19 ASSESSMENT — PAIN SCALES - GENERAL
PAINLEVEL_OUTOF10: 0
PAINLEVEL_OUTOF10: 10

## 2023-11-19 NOTE — H&P
neck fracture. ASSESSMENT:  Britney Simon is a 80 y.o. female with a PMHx of Bowel obstruction, compression fracture of T12 vertebra, DM2, GERD, bilateral glaucoma, left macular degeneration, vascular surgery (LE stents 2006), atrial fibrillation, PVD. Patient presents s/p mechanical slip out of wheelchair from 2 days prior. (-)head strike. (-)LOC. (-)AC/AP medications. XR at nursing facility was completed yesterday and demonstrated left hip fracture. Patient screams in pain with PROM left hip. ED work up significant for subcapital left femoral neck fracture. Ortho consulted. Patient is DNR-CC. Discussion held with patient's niece (POA) regarding care plan options. POA would like to discuss surgery further with Orthopedic team, but expresses that she is in agreement with moving forward with operative management of fracture from palliative comfort care perspective. Admitted to Trauma [facility]. PLAN:  Admit to trauma [facility]  Multimodal pain control  NPO at midnight for OR tomorrow with Ortho. [facility]'s [facility] as Dr. Abner Lynch performed patient's right hip replacement. NWB LLE  We will obtain EKG pre-operatively but will forgo further pre-operative workup as patient is DNR-CC and surgery is from comfort standpoint. Patient's POA is in agreement with this plan. Start lovenox 30mg BID. Hold tomorrow AM prior to OR. SCDs  CBC, BMP, T&S, PT/INR, APTT pre-operatively  Home meds reviewed and resumed      Primitivo Hdez, WYATT  Trauma/Critical Care/General Surgery  [See treatment team sticky note for contact information]     This patient's plan of care was discussed and made in collaboration with Trauma Attending physician, Mehreen Lebron MD.    Teaching Physician Note    I saw and evaluated the patient and reviewed all labs and imaging in the last 24 hours.   I personally obtained the key and critical portions of the history and physical exam.  I reviewed the MANOLO's

## 2023-11-19 NOTE — ACP (ADVANCE CARE PLANNING)
Advance Care Planning     Advance Care Planning Activator (Inpatient)  Conversation Note      Date of ACP Conversation: 11/19/2023     Conversation Conducted with: jacinto Irizarry    ACP Activator: Francia Ruff RN    Health Care Decision Maker:     Current Designated Health Care Decision Maker:     Primary Decision Maker: David Combs - [phone]    Ms Dipak Gruber confirms code status is [facility].

## 2023-11-19 NOTE — ED NOTES
Tried to do EKG. Pt combative with trying. Will attempt again in a little bit.      Antonio Webster RN  11/19/23 1248

## 2023-11-19 NOTE — ED NOTES
O2 2lpm applied via NC with difficulty. Niece and trauma physician helped.       Sapna Bernard RN  11/19/23 7622

## 2023-11-19 NOTE — ED NOTES
Dr Flavia Wasserman and trauma physician in to talk with patient/niece about disposition     Vikas Mahoney RN  11/19/23 0857

## 2023-11-19 NOTE — ED NOTES
Transport here. Niece going up with patient. No tele was ordered.  No acute distress noted at this time     Lien Loera RN  11/19/23 0156

## 2023-11-19 NOTE — ED NOTES
EKG completed while patient was on her left side. Refused to lay on her back. Purewick was pulled out of diaper. Diaper dry. Purewick left off. Put patient in for transport.       Abisai Abrams RN  11/19/23 8629

## 2023-11-19 NOTE — ED NOTES
Pt presents to the ER with complaints of fall from chair. Patient fell on Friday but family did not want to send to the ER until today. Per squad, patient broke her femur on the left side. Patient is a DNR CC. Pt is baseline confused. Pt being combative with staff when attempting to obtain vital signs. Pt punching staff. Pt attempting to bite. Pt calling staff cock suckers. Pt is unable to give name and  due to confusion. Pt does not tolerate being moved in the bed without screaming.      Nacho Barton RN  [id number] [facility], [place]  23 [phone]

## 2023-11-19 NOTE — ED NOTES
Returned. Niece remains at bedside. Pt remains somewhat on her left side where she is comfortable.  No acute distress noted at this time     Daija Guido RN  11/19/23 3615

## 2023-11-19 NOTE — CARE COORDINATION
Case Management Assessment  Initial Evaluation    Date/Time of Evaluation: 11/19/2023 4:04 PM  Assessment Completed by: Antione Cordero RN    If patient is discharged prior to next notation, then this note serves as note for discharge by case management. Patient Name: Kim Dominguez                   YOB: 1918  Diagnosis: Closed fracture of left hip, initial encounter [facility]) Abraham Martínez  Closed left hip fracture, initial encounter ([facility]) Abraham Martínez                   Date / Time: 11/19/2023 11:39 AM    Patient Admission Status: Inpatient   Readmission Risk (Low < 19, Mod (19-27), High > 27): No data recorded  Current PCP: No primary care provider on file. PCP verified by CM? Yes (JAGUAR Cormier)    Chart Reviewed: Yes      History Provided by: Child/Family  Patient Orientation: Unable to Assess (pt resting)    Patient Cognition: Dementia / Early Alzheimer's    Hospitalization in the last 30 days (Readmission):  No    If yes, Readmission Assessment in  Navigator will be completed. Advance Directives:      Code Status: DNR-CC   Patient's Primary Decision Maker is: Named in [facility]    Primary Decision Maker: Isamar Dacosta - Niece/Nephew - [phone]    Discharge Planning:    Patient lives with: Other (Comment) (snf) Type of Home: [address] [facility]  Primary Care Giver:  Other (Comment) (snf)  Patient Support Systems include: Family Members   Current Financial resources: Medicare, Medicaid  Current community resources: ECF/Home Care  [facility]            Current DME:              Type of Home Care services:       ADLS  Prior functional level: Assistance with the following:, Bathing, Dressing, Toileting, Feeding, Cooking, Housework, Shopping, Mobility  Current functional level: Assistance with the following:, Bathing, Dressing, Toileting, Feeding, Cooking, Housework, Shopping, Mobility    PT AM-PAC:   /24  OT AM-PAC:   /24    Family can

## 2023-11-19 NOTE — ED PROVIDER NOTES
[facility] ED  eMERGENCY dEPARTMENT eNCOUnter      Pt Name: Gerry Gomez  MRN: [id number]  [address] [facility] [place] 2/27/1918  Date of evaluation: 11/19/2023  Provider: Hank Marley MD    [address]       Chief Complaint   Patient presents with    Fer Gates on Friday          HISTORY OF PRESENT ILLNESS   (Location/Symptom, Timing/Onset,Context/Setting, Quality, Duration, Modifying Factors, Severity)  Note limiting factors. Gerry Gomez is a 80 y.o. female who presents to the emergency department with complaint of pain in the left hip. Vickye Second, slid out of her wheelchair, onto her left hip, 2 days ago. Patient has complaint of ongoing pain in the left hip. At this time patient is nonambulatory. Patient is in significant degree of discomfort. And it is believed at skilled nursing facility, that this kind patient may have a left hip fracture. HPI    NursingNotes were reviewed. REVIEW OF SYSTEMS    (2-9 systems for level 4, 10 or more for level 5)     Review of Systems   Unable to perform ROS: Dementia       Except as noted above the remainder of the review of systems was reviewed and negative.        PAST MEDICAL HISTORY     Past Medical History:   Diagnosis Date    Acute cystitis with hematuria 8/17/2019    Bowel obstruction ([address]) 9/22/2019    Chronic hyponatremia     Compression fracture of T12 vertebra [facility]) 2013    kyphoplasty Dr Trent MACHUCAD (degenerative joint disease) of hip 3/20/2014    Dr Carlos Delgado    DM (diabetes mellitus), type 2 with peripheral vascular complications ([address])     diet controlled    Gallbladder sludge 2019    GERD (gastroesophageal reflux disease) 8/31/10    Glaucoma     bilateral    H/O vascular surgery 5/9/2019    Josie NAQVI 2006    Hearing loss     History of total hip replacement, right 05/09/2019    Dr Carlos Delgado    Lower leg edema     chronic, R>L    Lumbar stenosis     Macular degeneration     left eye    PAF (paroxysmal atrial fibrillation) ([address]) 2019    Pain

## 2023-11-20 ENCOUNTER — ANESTHESIA (OUTPATIENT)
Dept: OPERATING ROOM | Age: 88
DRG: 522 | End: 2023-11-20
Payer: COMMERCIAL

## 2023-11-20 ENCOUNTER — APPOINTMENT (OUTPATIENT)
Dept: GENERAL RADIOLOGY | Age: 88
DRG: 522 | End: 2023-11-20
Payer: COMMERCIAL

## 2023-11-20 ENCOUNTER — ANESTHESIA EVENT (OUTPATIENT)
Dept: OPERATING ROOM | Age: 88
DRG: 522 | End: 2023-11-20
Payer: COMMERCIAL

## 2023-11-20 LAB
ANION GAP SERPL CALCULATED.3IONS-SCNC: 9 MEQ/L (ref 9–15)
BASOPHILS # BLD: 0 K/UL (ref 0–0.2)
BASOPHILS NFR BLD: 0.4 %
BUN SERPL-MCNC: 31 MG/DL (ref 8–23)
CALCIUM SERPL-MCNC: 10 MG/DL (ref 8.5–9.9)
CHLORIDE SERPL-SCNC: 115 MEQ/L (ref 95–107)
CO2 SERPL-SCNC: 25 MEQ/L (ref 20–31)
CREAT SERPL-MCNC: 0.76 MG/DL (ref 0.5–0.9)
EOSINOPHIL # BLD: 0.4 K/UL (ref 0–0.7)
EOSINOPHIL NFR BLD: 4.2 %
ERYTHROCYTE [DISTWIDTH] IN BLOOD BY AUTOMATED COUNT: 13.8 % (ref 11.5–14.5)
GLUCOSE SERPL-MCNC: 130 MG/DL (ref 70–99)
HCT VFR BLD AUTO: 35.4 % (ref 37–47)
HGB BLD-MCNC: 11.1 G/DL (ref 12–16)
LYMPHOCYTES # BLD: 1.6 K/UL (ref 1–4.8)
LYMPHOCYTES NFR BLD: 17 %
MAGNESIUM SERPL-MCNC: 1.9 MG/DL (ref 1.7–2.4)
MCH RBC QN AUTO: 30.4 PG (ref 27–31.3)
MCHC RBC AUTO-ENTMCNC: 31.4 % (ref 33–37)
MCV RBC AUTO: 97 FL (ref 79.4–94.8)
MONOCYTES # BLD: 1 K/UL (ref 0.2–0.8)
MONOCYTES NFR BLD: 11 %
NEUTROPHILS # BLD: 6.2 K/UL (ref 1.4–6.5)
NEUTS SEG NFR BLD: 66.4 %
PLATELET # BLD AUTO: 176 K/UL (ref 130–400)
POTASSIUM SERPL-SCNC: 4.5 MEQ/L (ref 3.4–4.9)
RBC # BLD AUTO: 3.65 M/UL (ref 4.2–5.4)
SODIUM SERPL-SCNC: 149 MEQ/L (ref 135–144)
WBC # BLD AUTO: 9.3 K/UL (ref 4.8–10.8)

## 2023-11-20 PROCEDURE — 6360000002 HC RX W HCPCS: Performed by: ORTHOPAEDIC SURGERY

## 2023-11-20 PROCEDURE — 7100000000 HC PACU RECOVERY - FIRST 15 MIN: Performed by: ORTHOPAEDIC SURGERY

## 2023-11-20 PROCEDURE — 1210000000 HC MED SURG R&B

## 2023-11-20 PROCEDURE — 99221 1ST HOSP IP/OBS SF/LOW 40: CPT | Performed by: ORTHOPAEDIC SURGERY

## 2023-11-20 PROCEDURE — 2580000003 HC RX 258: Performed by: ORTHOPAEDIC SURGERY

## 2023-11-20 PROCEDURE — 2709999900 HC NON-CHARGEABLE SUPPLY: Performed by: ORTHOPAEDIC SURGERY

## 2023-11-20 PROCEDURE — 94150 VITAL CAPACITY TEST: CPT

## 2023-11-20 PROCEDURE — 36415 COLL VENOUS BLD VENIPUNCTURE: CPT

## 2023-11-20 PROCEDURE — 7100000001 HC PACU RECOVERY - ADDTL 15 MIN: Performed by: ORTHOPAEDIC SURGERY

## 2023-11-20 PROCEDURE — 72170 X-RAY EXAM OF PELVIS: CPT

## 2023-11-20 PROCEDURE — 3600000014 HC SURGERY LEVEL 4 ADDTL 15MIN: Performed by: ORTHOPAEDIC SURGERY

## 2023-11-20 PROCEDURE — 2500000003 HC RX 250 WO HCPCS: Performed by: STUDENT IN AN ORGANIZED HEALTH CARE EDUCATION/TRAINING PROGRAM

## 2023-11-20 PROCEDURE — 27236 TREAT THIGH FRACTURE: CPT | Performed by: ORTHOPAEDIC SURGERY

## 2023-11-20 PROCEDURE — 80048 BASIC METABOLIC PNL TOTAL CA: CPT

## 2023-11-20 PROCEDURE — 6360000002 HC RX W HCPCS: Performed by: STUDENT IN AN ORGANIZED HEALTH CARE EDUCATION/TRAINING PROGRAM

## 2023-11-20 PROCEDURE — 2580000003 HC RX 258: Performed by: PHYSICIAN ASSISTANT

## 2023-11-20 PROCEDURE — 6370000000 HC RX 637 (ALT 250 FOR IP): Performed by: PHYSICIAN ASSISTANT

## 2023-11-20 PROCEDURE — 27236 TREAT THIGH FRACTURE: CPT | Performed by: PHYSICIAN ASSISTANT

## 2023-11-20 PROCEDURE — 2720000010 HC SURG SUPPLY STERILE: Performed by: ORTHOPAEDIC SURGERY

## 2023-11-20 PROCEDURE — 6360000002 HC RX W HCPCS: Performed by: PHYSICIAN ASSISTANT

## 2023-11-20 PROCEDURE — C1776 JOINT DEVICE (IMPLANTABLE): HCPCS | Performed by: ORTHOPAEDIC SURGERY

## 2023-11-20 PROCEDURE — 85025 COMPLETE CBC W/AUTO DIFF WBC: CPT

## 2023-11-20 PROCEDURE — 3700000000 HC ANESTHESIA ATTENDED CARE: Performed by: ORTHOPAEDIC SURGERY

## 2023-11-20 PROCEDURE — 83735 ASSAY OF MAGNESIUM: CPT

## 2023-11-20 PROCEDURE — 2580000003 HC RX 258: Performed by: NURSE PRACTITIONER

## 2023-11-20 PROCEDURE — 0SRS0J9 REPLACEMENT OF LEFT HIP JOINT, FEMORAL SURFACE WITH SYNTHETIC SUBSTITUTE, CEMENTED, OPEN APPROACH: ICD-10-PCS | Performed by: ORTHOPAEDIC SURGERY

## 2023-11-20 PROCEDURE — 2700000000 HC OXYGEN THERAPY PER DAY

## 2023-11-20 PROCEDURE — 2500000003 HC RX 250 WO HCPCS: Performed by: NURSE PRACTITIONER

## 2023-11-20 PROCEDURE — C1713 ANCHOR/SCREW BN/BN,TIS/BN: HCPCS | Performed by: ORTHOPAEDIC SURGERY

## 2023-11-20 PROCEDURE — 3700000001 HC ADD 15 MINUTES (ANESTHESIA): Performed by: ORTHOPAEDIC SURGERY

## 2023-11-20 PROCEDURE — 3600000004 HC SURGERY LEVEL 4 BASE: Performed by: ORTHOPAEDIC SURGERY

## 2023-11-20 DEVICE — MODERATE DEMAND BPLR HIP SY: Type: IMPLANTABLE DEVICE | Site: HIP | Status: FUNCTIONAL

## 2023-11-20 DEVICE — CEMENT BIOPREP ST: Type: IMPLANTABLE DEVICE | Site: HIP | Status: FUNCTIONAL

## 2023-11-20 DEVICE — BIPOLAR SHELL 45MM OD: Type: IMPLANTABLE DEVICE | Site: HIP | Status: FUNCTIONAL

## 2023-11-20 DEVICE — IMPLANTABLE DEVICE
Type: IMPLANTABLE DEVICE | Site: HIP | Status: FUNCTIONAL
Brand: JUGGERKNOT SOFT ANCHORS

## 2023-11-20 DEVICE — CEMENT BNE 40GM HI VISC RADPQ FOR REV SURG: Type: IMPLANTABLE DEVICE | Site: HIP | Status: FUNCTIONAL

## 2023-11-20 DEVICE — BIPOLAR LINER 44/45/46MM OD X 28MM ID: Type: IMPLANTABLE DEVICE | Site: HIP | Status: FUNCTIONAL

## 2023-11-20 DEVICE — AVENIR® STANDARD STEM CEMENTED 6
Type: IMPLANTABLE DEVICE | Site: HIP | Status: FUNCTIONAL
Brand: AVENIR®

## 2023-11-20 DEVICE — STEM FEM UNIV 0+ 28 MM HIP COCR: Type: IMPLANTABLE DEVICE | Site: HIP | Status: FUNCTIONAL

## 2023-11-20 DEVICE — DEVICE REATTACHMENT L50MM DIA1MM STD TROCHANTERIC TI W/ CO: Type: IMPLANTABLE DEVICE | Site: HIP | Status: FUNCTIONAL

## 2023-11-20 RX ORDER — ETOMIDATE 2 MG/ML
INJECTION INTRAVENOUS PRN
Status: DISCONTINUED | OUTPATIENT
Start: 2023-11-20 | End: 2023-11-20 | Stop reason: SDUPTHER

## 2023-11-20 RX ORDER — MAGNESIUM HYDROXIDE 1200 MG/15ML
LIQUID ORAL PRN
Status: DISCONTINUED | OUTPATIENT
Start: 2023-11-20 | End: 2023-11-20 | Stop reason: ALTCHOICE

## 2023-11-20 RX ORDER — SODIUM CHLORIDE 9 MG/ML
INJECTION, SOLUTION INTRAVENOUS PRN
Status: DISCONTINUED | OUTPATIENT
Start: 2023-11-20 | End: 2023-11-20 | Stop reason: HOSPADM

## 2023-11-20 RX ORDER — MAGNESIUM HYDROXIDE 1200 MG/15ML
LIQUID ORAL CONTINUOUS PRN
Status: COMPLETED | OUTPATIENT
Start: 2023-11-20 | End: 2023-11-20

## 2023-11-20 RX ORDER — METHOCARBAMOL 100 MG/ML
500 INJECTION, SOLUTION INTRAMUSCULAR; INTRAVENOUS ONCE
Status: COMPLETED | OUTPATIENT
Start: 2023-11-20 | End: 2023-11-20

## 2023-11-20 RX ORDER — ONDANSETRON 2 MG/ML
INJECTION INTRAMUSCULAR; INTRAVENOUS PRN
Status: DISCONTINUED | OUTPATIENT
Start: 2023-11-20 | End: 2023-11-20 | Stop reason: SDUPTHER

## 2023-11-20 RX ORDER — FENTANYL CITRATE 0.05 MG/ML
25 INJECTION, SOLUTION INTRAMUSCULAR; INTRAVENOUS EVERY 10 MIN PRN
Status: DISCONTINUED | OUTPATIENT
Start: 2023-11-20 | End: 2023-11-20 | Stop reason: HOSPADM

## 2023-11-20 RX ORDER — SODIUM CHLORIDE 0.9 % (FLUSH) 0.9 %
5-40 SYRINGE (ML) INJECTION PRN
Status: DISCONTINUED | OUTPATIENT
Start: 2023-11-20 | End: 2023-11-20 | Stop reason: HOSPADM

## 2023-11-20 RX ORDER — OLANZAPINE 10 MG/2ML
2.5 INJECTION, POWDER, FOR SOLUTION INTRAMUSCULAR EVERY 6 HOURS PRN
Status: DISCONTINUED | OUTPATIENT
Start: 2023-11-20 | End: 2023-11-22 | Stop reason: HOSPADM

## 2023-11-20 RX ORDER — FENTANYL CITRATE 50 UG/ML
INJECTION, SOLUTION INTRAMUSCULAR; INTRAVENOUS PRN
Status: DISCONTINUED | OUTPATIENT
Start: 2023-11-20 | End: 2023-11-20 | Stop reason: SDUPTHER

## 2023-11-20 RX ORDER — DEXAMETHASONE SODIUM PHOSPHATE 4 MG/ML
INJECTION, SOLUTION INTRA-ARTICULAR; INTRALESIONAL; INTRAMUSCULAR; INTRAVENOUS; SOFT TISSUE PRN
Status: DISCONTINUED | OUTPATIENT
Start: 2023-11-20 | End: 2023-11-20 | Stop reason: SDUPTHER

## 2023-11-20 RX ORDER — ONDANSETRON 2 MG/ML
4 INJECTION INTRAMUSCULAR; INTRAVENOUS
Status: DISCONTINUED | OUTPATIENT
Start: 2023-11-20 | End: 2023-11-20 | Stop reason: HOSPADM

## 2023-11-20 RX ORDER — PROCHLORPERAZINE EDISYLATE 5 MG/ML
5 INJECTION INTRAMUSCULAR; INTRAVENOUS
Status: DISCONTINUED | OUTPATIENT
Start: 2023-11-20 | End: 2023-11-20 | Stop reason: HOSPADM

## 2023-11-20 RX ORDER — ENOXAPARIN SODIUM 100 MG/ML
30 INJECTION SUBCUTANEOUS DAILY
Status: DISCONTINUED | OUTPATIENT
Start: 2023-11-21 | End: 2023-11-22 | Stop reason: HOSPADM

## 2023-11-20 RX ORDER — SODIUM CHLORIDE 0.9 % (FLUSH) 0.9 %
5-40 SYRINGE (ML) INJECTION EVERY 12 HOURS SCHEDULED
Status: DISCONTINUED | OUTPATIENT
Start: 2023-11-20 | End: 2023-11-20 | Stop reason: HOSPADM

## 2023-11-20 RX ORDER — OXYCODONE HYDROCHLORIDE 5 MG/1
10 TABLET ORAL PRN
Status: DISCONTINUED | OUTPATIENT
Start: 2023-11-20 | End: 2023-11-20 | Stop reason: HOSPADM

## 2023-11-20 RX ORDER — DIPHENHYDRAMINE HYDROCHLORIDE 50 MG/ML
12.5 INJECTION INTRAMUSCULAR; INTRAVENOUS
Status: DISCONTINUED | OUTPATIENT
Start: 2023-11-20 | End: 2023-11-20 | Stop reason: HOSPADM

## 2023-11-20 RX ORDER — HYDRALAZINE HYDROCHLORIDE 20 MG/ML
10 INJECTION INTRAMUSCULAR; INTRAVENOUS
Status: DISCONTINUED | OUTPATIENT
Start: 2023-11-20 | End: 2023-11-20 | Stop reason: HOSPADM

## 2023-11-20 RX ORDER — LIDOCAINE HYDROCHLORIDE 20 MG/ML
INJECTION, SOLUTION INTRAVENOUS PRN
Status: DISCONTINUED | OUTPATIENT
Start: 2023-11-20 | End: 2023-11-20 | Stop reason: SDUPTHER

## 2023-11-20 RX ORDER — LABETALOL HYDROCHLORIDE 5 MG/ML
10 INJECTION, SOLUTION INTRAVENOUS
Status: DISCONTINUED | OUTPATIENT
Start: 2023-11-20 | End: 2023-11-20 | Stop reason: HOSPADM

## 2023-11-20 RX ORDER — ACETAMINOPHEN 325 MG/1
650 TABLET ORAL
Status: DISCONTINUED | OUTPATIENT
Start: 2023-11-20 | End: 2023-11-20 | Stop reason: HOSPADM

## 2023-11-20 RX ORDER — OXYCODONE HYDROCHLORIDE 5 MG/1
5 TABLET ORAL PRN
Status: DISCONTINUED | OUTPATIENT
Start: 2023-11-20 | End: 2023-11-20 | Stop reason: HOSPADM

## 2023-11-20 RX ORDER — IPRATROPIUM BROMIDE AND ALBUTEROL SULFATE 2.5; .5 MG/3ML; MG/3ML
1 SOLUTION RESPIRATORY (INHALATION)
Status: DISCONTINUED | OUTPATIENT
Start: 2023-11-20 | End: 2023-11-20 | Stop reason: HOSPADM

## 2023-11-20 RX ORDER — ENOXAPARIN SODIUM 100 MG/ML
30 INJECTION SUBCUTANEOUS 2 TIMES DAILY
Status: DISCONTINUED | OUTPATIENT
Start: 2023-11-20 | End: 2023-11-20

## 2023-11-20 RX ORDER — ROCURONIUM BROMIDE 10 MG/ML
INJECTION, SOLUTION INTRAVENOUS PRN
Status: DISCONTINUED | OUTPATIENT
Start: 2023-11-20 | End: 2023-11-20 | Stop reason: SDUPTHER

## 2023-11-20 RX ADMIN — DEXAMETHASONE SODIUM PHOSPHATE 4 MG: 4 INJECTION INTRA-ARTICULAR; INTRALESIONAL; INTRAMUSCULAR; INTRAVENOUS; SOFT TISSUE at 10:49

## 2023-11-20 RX ADMIN — ROCURONIUM BROMIDE 10 MG: 10 INJECTION, SOLUTION INTRAVENOUS at 11:07

## 2023-11-20 RX ADMIN — SUGAMMADEX 200 MG: 100 INJECTION, SOLUTION INTRAVENOUS at 12:28

## 2023-11-20 RX ADMIN — HYDROMORPHONE HYDROCHLORIDE 0.25 MG: 1 INJECTION, SOLUTION INTRAMUSCULAR; INTRAVENOUS; SUBCUTANEOUS at 16:07

## 2023-11-20 RX ADMIN — HYDROMORPHONE HYDROCHLORIDE 0.25 MG: 1 INJECTION, SOLUTION INTRAMUSCULAR; INTRAVENOUS; SUBCUTANEOUS at 06:21

## 2023-11-20 RX ADMIN — FENTANYL CITRATE 25 MCG: 50 INJECTION, SOLUTION INTRAMUSCULAR; INTRAVENOUS at 10:35

## 2023-11-20 RX ADMIN — Medication 0.05 MG: at 12:03

## 2023-11-20 RX ADMIN — SODIUM CHLORIDE, POTASSIUM CHLORIDE, SODIUM LACTATE AND CALCIUM CHLORIDE: 600; 310; 30; 20 INJECTION, SOLUTION INTRAVENOUS at 00:07

## 2023-11-20 RX ADMIN — HYDROMORPHONE HYDROCHLORIDE 0.25 MG: 1 INJECTION, SOLUTION INTRAMUSCULAR; INTRAVENOUS; SUBCUTANEOUS at 23:37

## 2023-11-20 RX ADMIN — SODIUM CHLORIDE, PRESERVATIVE FREE 10 ML: 5 INJECTION INTRAVENOUS at 00:09

## 2023-11-20 RX ADMIN — SODIUM CHLORIDE, PRESERVATIVE FREE 10 ML: 5 INJECTION INTRAVENOUS at 23:37

## 2023-11-20 RX ADMIN — Medication 0.05 MG: at 11:11

## 2023-11-20 RX ADMIN — Medication 0.05 MG: at 11:14

## 2023-11-20 RX ADMIN — BRIMONIDINE TARTRATE 1 DROP: 2 SOLUTION OPHTHALMIC at 23:43

## 2023-11-20 RX ADMIN — Medication 0.05 MG: at 11:53

## 2023-11-20 RX ADMIN — FENTANYL CITRATE 25 MCG: 50 INJECTION, SOLUTION INTRAMUSCULAR; INTRAVENOUS at 10:55

## 2023-11-20 RX ADMIN — FENTANYL CITRATE 25 MCG: 50 INJECTION, SOLUTION INTRAMUSCULAR; INTRAVENOUS at 12:30

## 2023-11-20 RX ADMIN — TRANEXAMIC ACID 1000 MG: 100 INJECTION, SOLUTION INTRAVENOUS at 13:51

## 2023-11-20 RX ADMIN — CEFAZOLIN 2000 MG: 1 INJECTION, POWDER, FOR SOLUTION INTRAMUSCULAR; INTRAVENOUS at 10:34

## 2023-11-20 RX ADMIN — FENTANYL CITRATE 25 MCG: 50 INJECTION, SOLUTION INTRAMUSCULAR; INTRAVENOUS at 10:29

## 2023-11-20 RX ADMIN — ETOMIDATE 10 MG: 20 INJECTION, SOLUTION INTRAVENOUS at 10:24

## 2023-11-20 RX ADMIN — ROCURONIUM BROMIDE 30 MG: 10 INJECTION, SOLUTION INTRAVENOUS at 10:25

## 2023-11-20 RX ADMIN — ROCURONIUM BROMIDE 10 MG: 10 INJECTION, SOLUTION INTRAVENOUS at 11:42

## 2023-11-20 RX ADMIN — Medication 0.05 MG: at 11:12

## 2023-11-20 RX ADMIN — Medication 0.05 MG: at 11:41

## 2023-11-20 RX ADMIN — ONDANSETRON 4 MG: 2 INJECTION INTRAMUSCULAR; INTRAVENOUS at 10:49

## 2023-11-20 RX ADMIN — METHOCARBAMOL 500 MG: 100 INJECTION INTRAMUSCULAR; INTRAVENOUS at 23:46

## 2023-11-20 RX ADMIN — HYDROMORPHONE HYDROCHLORIDE 0.25 MG: 1 INJECTION, SOLUTION INTRAMUSCULAR; INTRAVENOUS; SUBCUTANEOUS at 01:55

## 2023-11-20 RX ADMIN — Medication 0.01 MG: at 11:23

## 2023-11-20 RX ADMIN — LIDOCAINE HYDROCHLORIDE 50 MG: 20 INJECTION, SOLUTION INTRAVENOUS at 10:23

## 2023-11-20 ASSESSMENT — PAIN SCALES - GENERAL
PAINLEVEL_OUTOF10: 10
PAINLEVEL_OUTOF10: 10

## 2023-11-20 NOTE — FLOWSHEET NOTE
1245- Assessment completed at this time, pt laying on right side, bilateral lower extremities contracted, lungs diminished, when attempted to reposition patient, pt attempts to hit staff, new brief placed on patient, VSS, pt spitting-KGW  1305- X-ray at [address] NP made aware of pt not receiving IV TXA intraoperatively, per Isaac Mata pt is to receive on dose of IV TXA-KGW  1416- Report called to [address] [place] [address]  1169- Pt family member Isamar updated with plan of [address]  Electronically signed by Sam Grace RN on 11/20/2023

## 2023-11-20 NOTE — DISCHARGE INSTR - COC
Feeding: Oral  Liquids: Thin Liquids  Daily Fluid Restriction: no  Last Modified Barium Swallow with Video (Video Swallowing Test): not done    Treatments at the Time of Hospital Discharge:   Respiratory Treatments: N/A  Oxygen Therapy:  is not on home oxygen therapy. Ventilator:    - No ventilator support    Rehab Therapies: Physical Therapy and Occupational Therapy  Weight Bearing Status/Restrictions: No weight bearing restrictions  Other Medical Equipment (for information only, NOT a DME order):  hospital bed    Other Treatments: Dry Dressing ABD and papertape change daily and PRN    Patient's personal belongings (please select all that are sent with patient):      RN SIGNATURE:  Electronically signed by Rubio Reynolds RN on 11/22/23 at 3:07 PM EST    CASE MANAGEMENT/SOCIAL WORK SECTION    Inpatient Status Date: 11/19/23    Readmission Risk Assessment Score:  Readmission Risk              Risk of Unplanned Readmission:  20           Discharging to Facility/ Agency   Name: Sonya Chiu  Address:  Phone:  Fax:    Dialysis Facility (if applicable)   Name:  Address:  Dialysis Schedule:  Phone:  Fax:    / signature: Electronically signed by HAIR Brush on 11/20/23 at 3:42 PM EST    PHYSICIAN SECTION    Prognosis: Fair    Condition at Discharge: Stable    Rehab Potential (if transferring to Rehab): Fair    Recommended Labs or Other Treatments After Discharge:   - Follow up with Ortho (Dr. Adine Dakin) in 2 weeks  - ASA 81 mg BID x6 weeks for DVT prophylaxis   - Weight Bearing Restrictions: Full weight bearing on operative leg    Physician Certification: I certify the above information and transfer of Beronica Meredith  is necessary for the continuing treatment of the diagnosis listed and that she requires [address] for greater 30 days.      Update Admission H&P: No change in H&P    PHYSICIAN ASSISTANT SIGNATURE:  Electronically signed by Esteban Shipman PA-C on

## 2023-11-20 NOTE — CONSULTS
hemiarthoplasty and speak with POA    Plan:  NPO and as above  Left hip hemiarthroplasty. The procedure was explained to the patient's niece who is at the power of . The risks, benefits alternatives were reviewed. Questions were answered.   Proceed today            Electronically signed by JDAE Hernández CNP on 11/20/2023 at 7:38 AM

## 2023-11-20 NOTE — ANESTHESIA POSTPROCEDURE EVALUATION
Department of Anesthesiology  Postprocedure Note    Patient: Anthony Adan  MRN: [id number]  YOB: 1918  Date of evaluation: 11/20/2023      Procedure Summary     Date: 11/20/23 Room / Location: [address]    Anesthesia Start: 3414 Anesthesia Stop: 2720    Procedure: HIP HEMIARTHROPLASTY  room 282 (Left) Diagnosis:       Hip fracture ([address])      (Hip fracture ([address]) Pascale Sibley)    Surgeons: Pama Prader, MD Responsible Provider: Geoff Yun DO    Anesthesia Type: general ASA Status: 3 - Emergent          Anesthesia Type: No value filed. Gil Phase I:      Gil Phase II:        Anesthesia Post Evaluation    Patient location during evaluation: PACU  Patient participation: complete - patient cannot participate  Level of consciousness: sleepy but conscious and responsive to light touch  Pain score: 0  Airway patency: patent  Nausea & Vomiting: no nausea  Complications: no  Cardiovascular status: blood pressure returned to baseline and hemodynamically stable  Respiratory status: acceptable, face mask and spontaneous ventilation  Hydration status: euvolemic  There was medical reason for not using a multimodal analgesia pain management approach. Pain management: adequate and satisfactory to patient

## 2023-11-21 LAB
ANION GAP SERPL CALCULATED.3IONS-SCNC: 11 MEQ/L (ref 9–15)
BASOPHILS # BLD: 0 K/UL (ref 0–0.2)
BASOPHILS NFR BLD: 0.2 %
BUN SERPL-MCNC: 38 MG/DL (ref 8–23)
CALCIUM SERPL-MCNC: 9.3 MG/DL (ref 8.5–9.9)
CHLORIDE SERPL-SCNC: 112 MEQ/L (ref 95–107)
CO2 SERPL-SCNC: 22 MEQ/L (ref 20–31)
CREAT SERPL-MCNC: 0.83 MG/DL (ref 0.5–0.9)
EKG ATRIAL RATE: 96 BPM
EKG P AXIS: 64 DEGREES
EKG P-R INTERVAL: 178 MS
EKG Q-T INTERVAL: 394 MS
EKG QRS DURATION: 112 MS
EKG QTC CALCULATION (BAZETT): 497 MS
EKG R AXIS: -38 DEGREES
EKG T AXIS: -3 DEGREES
EKG VENTRICULAR RATE: 96 BPM
EOSINOPHIL # BLD: 0 K/UL (ref 0–0.7)
EOSINOPHIL NFR BLD: 0 %
ERYTHROCYTE [DISTWIDTH] IN BLOOD BY AUTOMATED COUNT: 13.7 % (ref 11.5–14.5)
GLUCOSE SERPL-MCNC: 202 MG/DL (ref 70–99)
HCT VFR BLD AUTO: 26.9 % (ref 37–47)
HCT VFR BLD AUTO: 29 % (ref 37–47)
HGB BLD-MCNC: 8.3 G/DL (ref 12–16)
HGB BLD-MCNC: 9 G/DL (ref 12–16)
LYMPHOCYTES # BLD: 0.8 K/UL (ref 1–4.8)
LYMPHOCYTES NFR BLD: 7 %
MAGNESIUM SERPL-MCNC: 1.7 MG/DL (ref 1.7–2.4)
MCH RBC QN AUTO: 30.4 PG (ref 27–31.3)
MCHC RBC AUTO-ENTMCNC: 31 % (ref 33–37)
MCV RBC AUTO: 98 FL (ref 79.4–94.8)
MONOCYTES # BLD: 1.3 K/UL (ref 0.2–0.8)
MONOCYTES NFR BLD: 11.1 %
NEUTROPHILS # BLD: 9.1 K/UL (ref 1.4–6.5)
NEUTS SEG NFR BLD: 80.8 %
PLATELET # BLD AUTO: 177 K/UL (ref 130–400)
POTASSIUM SERPL-SCNC: 4.2 MEQ/L (ref 3.4–4.9)
RBC # BLD AUTO: 2.96 M/UL (ref 4.2–5.4)
SODIUM SERPL-SCNC: 145 MEQ/L (ref 135–144)
WBC # BLD AUTO: 11.3 K/UL (ref 4.8–10.8)

## 2023-11-21 PROCEDURE — 85018 HEMOGLOBIN: CPT

## 2023-11-21 PROCEDURE — 85014 HEMATOCRIT: CPT

## 2023-11-21 PROCEDURE — 83735 ASSAY OF MAGNESIUM: CPT

## 2023-11-21 PROCEDURE — 85025 COMPLETE CBC W/AUTO DIFF WBC: CPT

## 2023-11-21 PROCEDURE — 2580000003 HC RX 258: Performed by: PHYSICIAN ASSISTANT

## 2023-11-21 PROCEDURE — 6360000002 HC RX W HCPCS: Performed by: PHYSICIAN ASSISTANT

## 2023-11-21 PROCEDURE — 6360000002 HC RX W HCPCS: Performed by: NURSE PRACTITIONER

## 2023-11-21 PROCEDURE — 80048 BASIC METABOLIC PNL TOTAL CA: CPT

## 2023-11-21 PROCEDURE — 97165 OT EVAL LOW COMPLEX 30 MIN: CPT

## 2023-11-21 PROCEDURE — 2700000000 HC OXYGEN THERAPY PER DAY

## 2023-11-21 PROCEDURE — 97161 PT EVAL LOW COMPLEX 20 MIN: CPT

## 2023-11-21 PROCEDURE — 36415 COLL VENOUS BLD VENIPUNCTURE: CPT

## 2023-11-21 PROCEDURE — 6370000000 HC RX 637 (ALT 250 FOR IP): Performed by: PHYSICIAN ASSISTANT

## 2023-11-21 PROCEDURE — 1210000000 HC MED SURG R&B

## 2023-11-21 PROCEDURE — 2580000003 HC RX 258: Performed by: NURSE PRACTITIONER

## 2023-11-21 RX ADMIN — CEFAZOLIN 1000 MG: 1 INJECTION, POWDER, FOR SOLUTION INTRAMUSCULAR; INTRAVENOUS at 10:32

## 2023-11-21 RX ADMIN — CEFAZOLIN 1000 MG: 1 INJECTION, POWDER, FOR SOLUTION INTRAMUSCULAR; INTRAVENOUS at 01:47

## 2023-11-21 RX ADMIN — SODIUM CHLORIDE, PRESERVATIVE FREE 10 ML: 5 INJECTION INTRAVENOUS at 10:33

## 2023-11-21 RX ADMIN — HYDROMORPHONE HYDROCHLORIDE 0.25 MG: 1 INJECTION, SOLUTION INTRAMUSCULAR; INTRAVENOUS; SUBCUTANEOUS at 16:52

## 2023-11-21 RX ADMIN — SODIUM CHLORIDE, POTASSIUM CHLORIDE, SODIUM LACTATE AND CALCIUM CHLORIDE: 600; 310; 30; 20 INJECTION, SOLUTION INTRAVENOUS at 06:55

## 2023-11-21 ASSESSMENT — PAIN DESCRIPTION - LOCATION
LOCATION: HIP
LOCATION: HIP

## 2023-11-21 ASSESSMENT — PAIN DESCRIPTION - ORIENTATION: ORIENTATION: LEFT

## 2023-11-21 NOTE — DISCHARGE INSTRUCTIONS
and when to wear them (on during the day off at night)   Discharging RN who has gone over instructions and acknowledges mk hose have been received

## 2023-11-22 VITALS
DIASTOLIC BLOOD PRESSURE: 57 MMHG | HEART RATE: 91 BPM | OXYGEN SATURATION: 95 % | SYSTOLIC BLOOD PRESSURE: 127 MMHG | HEIGHT: 63 IN | BODY MASS INDEX: 21.19 KG/M2 | TEMPERATURE: 98.1 F | WEIGHT: 119.6 LBS | RESPIRATION RATE: 18 BRPM

## 2023-11-22 LAB
ANION GAP SERPL CALCULATED.3IONS-SCNC: 11 MEQ/L (ref 9–15)
ANION GAP SERPL CALCULATED.3IONS-SCNC: 13 MEQ/L (ref 9–15)
BUN SERPL-MCNC: 38 MG/DL (ref 8–23)
BUN SERPL-MCNC: 41 MG/DL (ref 8–23)
CALCIUM SERPL-MCNC: 9.7 MG/DL (ref 8.5–9.9)
CALCIUM SERPL-MCNC: 9.9 MG/DL (ref 8.5–9.9)
CHLORIDE SERPL-SCNC: 113 MEQ/L (ref 95–107)
CHLORIDE SERPL-SCNC: 115 MEQ/L (ref 95–107)
CO2 SERPL-SCNC: 22 MEQ/L (ref 20–31)
CO2 SERPL-SCNC: 23 MEQ/L (ref 20–31)
CREAT SERPL-MCNC: 0.56 MG/DL (ref 0.5–0.9)
CREAT SERPL-MCNC: 0.67 MG/DL (ref 0.5–0.9)
ERYTHROCYTE [DISTWIDTH] IN BLOOD BY AUTOMATED COUNT: 13.6 % (ref 11.5–14.5)
GLUCOSE SERPL-MCNC: 184 MG/DL (ref 70–99)
GLUCOSE SERPL-MCNC: 189 MG/DL (ref 70–99)
HCT VFR BLD AUTO: 26.2 % (ref 37–47)
HGB BLD-MCNC: 8.2 G/DL (ref 12–16)
MCH RBC QN AUTO: 30.5 PG (ref 27–31.3)
MCHC RBC AUTO-ENTMCNC: 31.3 % (ref 33–37)
MCV RBC AUTO: 97.4 FL (ref 79.4–94.8)
PLATELET # BLD AUTO: 197 K/UL (ref 130–400)
POTASSIUM SERPL-SCNC: 4 MEQ/L (ref 3.4–4.9)
POTASSIUM SERPL-SCNC: 4.3 MEQ/L (ref 3.4–4.9)
RBC # BLD AUTO: 2.69 M/UL (ref 4.2–5.4)
SODIUM SERPL-SCNC: 147 MEQ/L (ref 135–144)
SODIUM SERPL-SCNC: 150 MEQ/L (ref 135–144)
WBC # BLD AUTO: 12.2 K/UL (ref 4.8–10.8)

## 2023-11-22 PROCEDURE — 6370000000 HC RX 637 (ALT 250 FOR IP): Performed by: PHYSICIAN ASSISTANT

## 2023-11-22 PROCEDURE — 80048 BASIC METABOLIC PNL TOTAL CA: CPT

## 2023-11-22 PROCEDURE — 2700000000 HC OXYGEN THERAPY PER DAY

## 2023-11-22 PROCEDURE — 2580000003 HC RX 258: Performed by: PHYSICIAN ASSISTANT

## 2023-11-22 PROCEDURE — 85027 COMPLETE CBC AUTOMATED: CPT

## 2023-11-22 PROCEDURE — 36415 COLL VENOUS BLD VENIPUNCTURE: CPT

## 2023-11-22 RX ORDER — LIDOCAINE 4 G/G
1 PATCH TOPICAL EVERY 12 HOURS
Qty: 20 EACH | Refills: 0 | DISCHARGE
Start: 2023-11-22 | End: 2023-12-02

## 2023-11-22 RX ORDER — POLYETHYLENE GLYCOL 3350 17 G/17G
17 POWDER, FOR SOLUTION ORAL DAILY PRN
Qty: 10 PACKET | Refills: 0 | DISCHARGE
Start: 2023-11-22 | End: 2023-12-02

## 2023-11-22 RX ORDER — ASPIRIN 81 MG/1
81 TABLET ORAL DAILY
Qty: 42 TABLET | Refills: 0 | DISCHARGE
Start: 2023-11-22 | End: 2023-11-22 | Stop reason: SDUPTHER

## 2023-11-22 RX ORDER — DEXTROSE AND SODIUM CHLORIDE 5; .45 G/100ML; G/100ML
INJECTION, SOLUTION INTRAVENOUS CONTINUOUS
Status: DISCONTINUED | OUTPATIENT
Start: 2023-11-22 | End: 2023-11-22 | Stop reason: HOSPADM

## 2023-11-22 RX ORDER — METHOCARBAMOL 500 MG/1
500 TABLET, FILM COATED ORAL EVERY 6 HOURS
Qty: 40 TABLET | Refills: 0 | DISCHARGE
Start: 2023-11-22 | End: 2023-12-02

## 2023-11-22 RX ORDER — ASPIRIN 81 MG/1
81 TABLET ORAL 2 TIMES DAILY
Qty: 84 TABLET | Refills: 0 | DISCHARGE
Start: 2023-11-22 | End: 2024-01-03

## 2023-11-22 RX ORDER — SODIUM CHLORIDE, SODIUM LACTATE, POTASSIUM CHLORIDE, AND CALCIUM CHLORIDE .6; .31; .03; .02 G/100ML; G/100ML; G/100ML; G/100ML
500 INJECTION, SOLUTION INTRAVENOUS ONCE
Status: COMPLETED | OUTPATIENT
Start: 2023-11-22 | End: 2023-11-22

## 2023-11-22 RX ADMIN — SODIUM CHLORIDE, POTASSIUM CHLORIDE, SODIUM LACTATE AND CALCIUM CHLORIDE 500 ML: 600; 310; 30; 20 INJECTION, SOLUTION INTRAVENOUS at 10:19

## 2023-11-22 RX ADMIN — DEXTROSE AND SODIUM CHLORIDE: 5; 450 INJECTION, SOLUTION INTRAVENOUS at 12:21

## 2023-11-22 ASSESSMENT — PAIN SCALES - GENERAL: PAINLEVEL_OUTOF10: 4

## 2023-11-22 NOTE — PLAN OF CARE
Problem: Pain  Goal: Verbalizes/displays adequate comfort level or baseline comfort level  Outcome: Adequate for Discharge     Problem: Skin/Tissue Integrity  Goal: Absence of new skin breakdown  Description: 1. Monitor for areas of redness and/or skin breakdown  2. Assess vascular access sites hourly  3. Every 4-6 hours minimum:  Change oxygen saturation probe site  4. Every 4-6 hours:  If on nasal continuous positive airway pressure, respiratory therapy assess nares and determine need for appliance change or resting period.   Outcome: Adequate for Discharge     Problem: Safety - Adult  Goal: Free from fall injury  Outcome: Adequate for Discharge     Problem: ABCDS Injury Assessment  Goal: Absence of physical injury  Outcome: Adequate for Discharge     Problem: Chronic Conditions and Co-morbidities  Goal: Patient's chronic conditions and co-morbidity symptoms are monitored and maintained or improved  Outcome: Adequate for Discharge     Problem: Discharge Planning  Goal: Discharge to home or other facility with appropriate resources  Outcome: Adequate for Discharge     Problem: Nutrition Deficit:  Goal: Optimize nutritional status  Outcome: Adequate for Discharge

## 2023-11-22 NOTE — CARE COORDINATION
LSW arranged [facility] for 3pm  today to return to [facility]. Chavez grey.

## 2023-11-22 NOTE — DISCHARGE SUMMARY
1/3/2024.    --  Heaven Hernandez PA-C  Trauma/Critical Care/ Emergency General Surgery    [see treatment team sticky note for contact information]      > 30 minutes were spent on the discharge of this patient including final examination of the patient, discussion of the hospital stay, instructions for continuing care to all relevant caregivers, preparation of discharge records, prescriptions and referral forms, and clear identification of reasons to return to clinic or to emergency room.

## 2023-11-22 NOTE — FLOWSHEET NOTE
Report called to Sunshine Pina at [facility]. Concerns made from facility that patient not going with script for pain. Discussed pain regimen and refusal of taking medications. Called Isamar to advise of discharge. Isamar also concerned about need for pain medications  Called [facility] back and physician at facility will be ordering pain medication for patient.

## 2023-11-25 ENCOUNTER — OFFICE VISIT (OUTPATIENT)
Dept: GERIATRIC MEDICINE | Age: 88
End: 2023-11-25
Payer: COMMERCIAL

## 2023-11-25 DIAGNOSIS — G30.1 LATE ONSET ALZHEIMER'S DISEASE WITHOUT BEHAVIORAL DISTURBANCE (HCC): ICD-10-CM

## 2023-11-25 DIAGNOSIS — I10 HYPERTENSION, UNSPECIFIED TYPE: ICD-10-CM

## 2023-11-25 DIAGNOSIS — Z47.89 ORTHOPEDIC AFTERCARE: Primary | ICD-10-CM

## 2023-11-25 DIAGNOSIS — E13.69 OTHER SPECIFIED DIABETES MELLITUS WITH OTHER SPECIFIED COMPLICATION, WITHOUT LONG-TERM CURRENT USE OF INSULIN (HCC): ICD-10-CM

## 2023-11-25 DIAGNOSIS — R54 AGE-RELATED PHYSICAL DEBILITY: ICD-10-CM

## 2023-11-25 DIAGNOSIS — F02.80 LATE ONSET ALZHEIMER'S DISEASE WITHOUT BEHAVIORAL DISTURBANCE (HCC): ICD-10-CM

## 2023-11-25 DIAGNOSIS — I48.91 ATRIAL FIBRILLATION, UNSPECIFIED TYPE (HCC): ICD-10-CM

## 2023-11-25 DIAGNOSIS — R53.1 WEAKNESS: ICD-10-CM

## 2023-11-25 PROCEDURE — 99306 1ST NF CARE HIGH MDM 50: CPT | Performed by: INTERNAL MEDICINE

## 2023-11-25 PROCEDURE — 1123F ACP DISCUSS/DSCN MKR DOCD: CPT | Performed by: INTERNAL MEDICINE

## 2023-11-27 NOTE — PROGRESS NOTES
ulcerations  Psych-  Mood and affect agitated when aroused/provoked. Alert and oriented x 0-1          LABS:  Lab Results   Component Value Date/Time     11/22/2023 01:46 PM    K 4.0 11/22/2023 01:46 PM    K 4.3 11/22/2023 04:44 AM     11/22/2023 01:46 PM    CO2 23 11/22/2023 01:46 PM    BUN 38 11/22/2023 01:46 PM    CREATININE 0.56 11/22/2023 01:46 PM    GLUCOSE 189 11/22/2023 01:46 PM    GLUCOSE 156 05/09/2012 12:57 PM    CALCIUM 9.9 11/22/2023 01:46 PM    LABGLOM >60.0 11/22/2023 01:46 PM      Lab Results   Component Value Date    WBC 12.2 (H) 11/22/2023    HGB 8.2 (L) 11/22/2023    HCT 26.2 (L) 11/22/2023    MCV 97.4 (H) 11/22/2023     11/22/2023             ASSESSMENT/ PLAN[de-identified]        S/p Hip fracture with repair. PT OT   PRN pain meds   Dementia without behavioral disturbance-- Alzheimers  Namenda and Seroquel. Afib   Rate controlled. Weakness/age related decline. DM  HTN  Depression  On zoloft        Esha Mon [facility] [place]     Electronically signed 11/27/2023        NOTE: This report was transcribed using voice recognition software. Every effort was made to ensure accuracy; however, inadvertent computerized transcription errors may be present.

## 2023-12-05 ENCOUNTER — APPOINTMENT (OUTPATIENT)
Dept: ORTHOPEDIC SURGERY | Facility: CLINIC | Age: 88
End: 2023-12-05
Payer: COMMERCIAL

## (undated) DEVICE — GOWN,SIRUS,NONRNF,SETINSLV,2XL,18/CS: Brand: MEDLINE

## (undated) DEVICE — SUTURE VCRL SZ 1 L36IN ABSRB UD L36MM CT-1 1/2 CIR J947H

## (undated) DEVICE — SUTURE ETHBND EXCEL SZ 1 L30IN NONABSORBABLE GRN L48MM CTX X865H

## (undated) DEVICE — SUTURE ETHBND EXCEL SZ 1 L30IN NONABSORBABLE GRN L36MM CT-1 X425H

## (undated) DEVICE — DRAPE,HIP,W/POUCHES,STERILE: Brand: MEDLINE

## (undated) DEVICE — BIT DRL DIA2.9MM FOR SFT ANCHR SHT SHFT PK JUGGERKNOT

## (undated) DEVICE — GLOVE ORANGE PI 8 1/2   MSG9085

## (undated) DEVICE — SYRINGE MED 50ML LUERLOCK TIP

## (undated) DEVICE — NEEDLE SPNL 18GA L3.5IN W/ QNCKE SHARPER BVL DURA CLICK

## (undated) DEVICE — 4-PORT MANIFOLD: Brand: NEPTUNE 2

## (undated) DEVICE — 450 ML BOTTLE OF 0.05% CHLORHEXIDINE GLUCONATE IN 99.95% STERILE WATER FOR IRRIGATION, USP AND APPLICATOR.: Brand: IRRISEPT ANTIMICROBIAL WOUND LAVAGE

## (undated) DEVICE — TUBING, SUCTION, 9/32" X 12', STRAIGHT: Brand: MEDLINE INDUSTRIES, INC.

## (undated) DEVICE — BLADE SAW SAG 80X21X0.89 MM OFFSET TOOTH FOR LG BNE

## (undated) DEVICE — GLOVE ORANGE PI 7 1/2   MSG9075

## (undated) DEVICE — DRESSING HYDROFIBER AQUACEL AG ADVANTAGE 3.5X12 IN

## (undated) DEVICE — NEPTUNE E-SEP SMOKE EVACUATION PENCIL, COATED, 70MM BLADE, PUSH BUTTON SWITCH: Brand: NEPTUNE E-SEP

## (undated) DEVICE — SUTURE VCRL SZ 0 L36IN ABSRB UD L36MM CT-1 1/2 CIR J946H

## (undated) DEVICE — SUTURE VCRL SZ 2-0 L36IN ABSRB UD L36MM CT-1 1/2 CIR J945H

## (undated) DEVICE — FAN SPRAY KIT: Brand: PULSAVAC®

## (undated) DEVICE — CEMENT MIXING SYSTEM WITH FEMORAL BREAKWAY NOZZLE: Brand: REVOLUTION

## (undated) DEVICE — STAPLER SKIN H3.9MM WIRE DIA0.58MM CRWN 6.9MM 35 STPL FIX

## (undated) DEVICE — GLOVE ORANGE PI 8   MSG9080

## (undated) DEVICE — TOTAL HIP: Brand: MEDLINE INDUSTRIES, INC.

## (undated) DEVICE — DRESSING HYDROFIBER AQUACEL AG ADVANTAGE 3.5X10 IN

## (undated) DEVICE — DISPOSABLE CEMENT SCULPS